# Patient Record
Sex: FEMALE | Race: WHITE | NOT HISPANIC OR LATINO | Employment: UNEMPLOYED | URBAN - METROPOLITAN AREA
[De-identification: names, ages, dates, MRNs, and addresses within clinical notes are randomized per-mention and may not be internally consistent; named-entity substitution may affect disease eponyms.]

---

## 2018-01-19 ENCOUNTER — GENERIC CONVERSION - ENCOUNTER (OUTPATIENT)
Dept: OTHER | Facility: OTHER | Age: 58
End: 2018-01-19

## 2018-01-19 DIAGNOSIS — E78.5 HYPERLIPIDEMIA: ICD-10-CM

## 2018-01-19 DIAGNOSIS — E03.9 HYPOTHYROIDISM: ICD-10-CM

## 2018-01-19 DIAGNOSIS — F32.9 MAJOR DEPRESSIVE DISORDER, SINGLE EPISODE: ICD-10-CM

## 2018-01-19 DIAGNOSIS — E11.9 TYPE 2 DIABETES MELLITUS WITHOUT COMPLICATIONS (HCC): ICD-10-CM

## 2018-01-24 VITALS
SYSTOLIC BLOOD PRESSURE: 122 MMHG | BODY MASS INDEX: 34.99 KG/M2 | HEART RATE: 82 BPM | HEIGHT: 65 IN | RESPIRATION RATE: 18 BRPM | TEMPERATURE: 97.1 F | DIASTOLIC BLOOD PRESSURE: 82 MMHG | WEIGHT: 210 LBS

## 2018-03-01 ENCOUNTER — OFFICE VISIT (OUTPATIENT)
Dept: FAMILY MEDICINE CLINIC | Facility: CLINIC | Age: 58
End: 2018-03-01
Payer: COMMERCIAL

## 2018-03-01 VITALS
SYSTOLIC BLOOD PRESSURE: 120 MMHG | DIASTOLIC BLOOD PRESSURE: 70 MMHG | HEIGHT: 65 IN | WEIGHT: 204 LBS | TEMPERATURE: 99.9 F | BODY MASS INDEX: 33.99 KG/M2

## 2018-03-01 DIAGNOSIS — J11.1 INFLUENZA: Primary | ICD-10-CM

## 2018-03-01 DIAGNOSIS — F32.A DEPRESSION, UNSPECIFIED DEPRESSION TYPE: Primary | ICD-10-CM

## 2018-03-01 PROBLEM — R79.89 LOW VITAMIN D LEVEL: Status: ACTIVE | Noted: 2018-01-19

## 2018-03-01 PROCEDURE — 99213 OFFICE O/P EST LOW 20 MIN: CPT | Performed by: FAMILY MEDICINE

## 2018-03-01 RX ORDER — LEVOTHYROXINE SODIUM 0.07 MG/1
1 TABLET ORAL DAILY
COMMUNITY
Start: 2018-01-19 | End: 2018-09-12 | Stop reason: SDUPTHER

## 2018-03-01 RX ORDER — BUPROPION HYDROCHLORIDE 300 MG/1
1 TABLET ORAL DAILY
COMMUNITY
Start: 2012-01-09 | End: 2018-03-01 | Stop reason: SDUPTHER

## 2018-03-01 RX ORDER — ALBUTEROL SULFATE 90 UG/1
1-2 AEROSOL, METERED RESPIRATORY (INHALATION)
COMMUNITY
Start: 2012-10-02 | End: 2020-01-24 | Stop reason: SDUPTHER

## 2018-03-01 RX ORDER — FLUTICASONE PROPIONATE 50 MCG
2 SPRAY, SUSPENSION (ML) NASAL DAILY
COMMUNITY
Start: 2016-03-24 | End: 2019-07-29 | Stop reason: SDUPTHER

## 2018-03-01 RX ORDER — SERTRALINE HYDROCHLORIDE 100 MG/1
150 TABLET, FILM COATED ORAL DAILY
Qty: 90 TABLET | Refills: 1 | Status: SHIPPED | OUTPATIENT
Start: 2018-03-01 | End: 2018-03-14 | Stop reason: SDUPTHER

## 2018-03-01 RX ORDER — QUETIAPINE 300 MG/1
300 TABLET, FILM COATED, EXTENDED RELEASE ORAL
Qty: 90 TABLET | Refills: 1 | Status: SHIPPED | OUTPATIENT
Start: 2018-03-01 | End: 2018-03-09 | Stop reason: SDUPTHER

## 2018-03-01 RX ORDER — BUPROPION HYDROCHLORIDE 300 MG/1
300 TABLET ORAL DAILY
Qty: 90 TABLET | Refills: 1 | Status: SHIPPED | OUTPATIENT
Start: 2018-03-01 | End: 2018-09-10 | Stop reason: SDUPTHER

## 2018-03-01 RX ORDER — LEVOTHYROXINE SODIUM 0.1 MG/1
1 TABLET ORAL DAILY
COMMUNITY
Start: 2018-01-22 | End: 2018-03-01 | Stop reason: SDUPTHER

## 2018-03-01 RX ORDER — OMEPRAZOLE 40 MG/1
1 CAPSULE, DELAYED RELEASE ORAL DAILY
COMMUNITY
Start: 2012-02-29 | End: 2018-09-12

## 2018-03-01 RX ORDER — BUPROPION HYDROCHLORIDE 75 MG/1
75 TABLET ORAL DAILY
Qty: 90 TABLET | Refills: 1 | Status: SHIPPED | OUTPATIENT
Start: 2018-03-01 | End: 2018-09-10 | Stop reason: SDUPTHER

## 2018-03-01 RX ORDER — ALBUTEROL SULFATE 2.5 MG/3ML
1 SOLUTION RESPIRATORY (INHALATION)
COMMUNITY
Start: 2012-02-22 | End: 2018-03-01 | Stop reason: SDUPTHER

## 2018-03-01 RX ORDER — QUETIAPINE 300 MG/1
1 TABLET, FILM COATED, EXTENDED RELEASE ORAL
COMMUNITY
Start: 2012-08-24 | End: 2018-03-01 | Stop reason: SDUPTHER

## 2018-03-01 RX ORDER — ALBUTEROL SULFATE 2.5 MG/3ML
2.5 SOLUTION RESPIRATORY (INHALATION) EVERY 4 HOURS PRN
Qty: 20 VIAL | Refills: 1 | Status: SHIPPED | OUTPATIENT
Start: 2018-03-01 | End: 2020-01-24 | Stop reason: SDUPTHER

## 2018-03-01 RX ORDER — SERTRALINE HYDROCHLORIDE 100 MG/1
1.5 TABLET, FILM COATED ORAL DAILY
COMMUNITY
Start: 2012-02-27 | End: 2018-03-01 | Stop reason: SDUPTHER

## 2018-03-01 RX ORDER — BUPROPION HYDROCHLORIDE 75 MG/1
1 TABLET ORAL DAILY
COMMUNITY
Start: 2012-12-18 | End: 2018-03-01 | Stop reason: SDUPTHER

## 2018-03-01 RX ORDER — OSELTAMIVIR PHOSPHATE 75 MG/1
75 CAPSULE ORAL 2 TIMES DAILY
Qty: 10 CAPSULE | Refills: 0 | Status: SHIPPED | OUTPATIENT
Start: 2018-03-01 | End: 2018-03-06

## 2018-03-01 RX ORDER — FLUTICASONE PROPIONATE 50 MCG
SPRAY, SUSPENSION (ML) NASAL
COMMUNITY
Start: 2014-11-17 | End: 2018-09-12

## 2018-03-01 RX ORDER — LEVOTHYROXINE SODIUM 0.1 MG/1
100 TABLET ORAL DAILY
Qty: 90 TABLET | Refills: 1 | Status: SHIPPED | OUTPATIENT
Start: 2018-03-01 | End: 2018-09-12

## 2018-03-01 NOTE — LETTER
March 1, 2018     Patient: Nuvia Mon   YOB: 1960   Date of Visit: 3/1/2018       To Whom it May Concern:    Nuvia Mon is under my professional care  She was seen in my office on 3/1/2018  She may return to work on 3/5/18  If you have any questions or concerns, please don't hesitate to call           Sincerely,          Lamar Gomes MD        CC: No Recipients

## 2018-03-01 NOTE — PROGRESS NOTES
Assessment/Plan:    70-year-old woman with: Influenza  Discussed supportive care return parameters  Will give Tamiflu  Advised patient to call back if symptoms are not resolving or if they worsen    No problem-specific Assessment & Plan notes found for this encounter  Diagnoses and all orders for this visit:    Influenza  -     oseltamivir (TAMIFLU) 75 mg capsule; Take 1 capsule (75 mg total) by mouth 2 (two) times a day for 5 days    Other orders  -     albuterol (2 5 mg/3 mL) 0 083 % nebulizer solution; Inhale 1 each  -     buPROPion (WELLBUTRIN) 75 mg tablet; Take 1 tablet by mouth daily  -     buPROPion (WELLBUTRIN XL) 300 mg 24 hr tablet; Take 1 tablet by mouth daily  -     fluticasone (FLONASE) 50 mcg/act nasal spray; into each nostril  -     fluticasone (FLONASE) 50 mcg/act nasal spray; 2 sprays into each nostril daily  -     levothyroxine 75 mcg tablet; Take 1 tablet by mouth daily  -     levothyroxine (LEVOXYL) 100 mcg tablet; Take 1 tablet by mouth daily  -     omeprazole (PriLOSEC) 40 MG capsule; Take 1 capsule by mouth daily  -     albuterol (PROVENTIL HFA) 90 mcg/act inhaler; Inhale 1-2 puffs  -     beclomethasone (QVAR) 40 MCG/ACT inhaler; Inhale 1 puff 2 (two) times a day  -     beclomethasone (QVAR) 80 MCG/ACT inhaler; Inhale Daily  -     QUEtiapine (SEROQUEL XR) 300 mg 24 hr tablet; Take 1 tablet by mouth  -     sertraline (ZOLOFT) 100 mg tablet; Take 1 5 tablets by mouth daily          Subjective:   Chief Complaint   Patient presents with    Fatigue     pt noted she slept right through her work shift   Fever     got as high as 102   URI     x 2-3 day  Body aches, cough, chest pains, some mucus (yellowish in color)  OTC Aleve used without much relief  Pt noted her parents have recently been ill   Medication Refill     Levoxyl , Gabapentin, Trazodone, Burpropion, Bupropion XL, Sertraline, Seroquel       Medication Refill     Lab slip           Patient ID: Caryl Shelton is a 66 y o  female  Patient is a 56-year-old woman who presents complaining of cough congestion sore throat fevers chills and body aches along with some nausea and diarrhea for the past day or so  Tolerating p o  intake  Positive sick contacts  Fatigue   Associated symptoms include chills, congestion, coughing, fatigue, a fever, myalgias and a sore throat  URI    Associated symptoms include congestion, coughing and a sore throat  Medication Refill   Associated symptoms include chills, congestion, coughing, fatigue, a fever, myalgias and a sore throat  The following portions of the patient's history were reviewed and updated as appropriate: allergies, current medications, past family history, past medical history, past social history, past surgical history and problem list     Review of Systems   Constitutional: Positive for chills, fatigue and fever  HENT: Positive for congestion, sinus pressure and sore throat  Eyes: Negative  Respiratory: Positive for cough  Cardiovascular: Negative  Gastrointestinal: Negative  Endocrine: Negative  Genitourinary: Negative  Musculoskeletal: Positive for myalgias  Allergic/Immunologic: Negative  Neurological: Negative  Hematological: Negative  Psychiatric/Behavioral: Negative  All other systems reviewed and are negative  Objective:      /70 (BP Location: Right arm, Patient Position: Sitting, Cuff Size: Large)   Temp 99 9 °F (37 7 °C)   Ht 5' 5" (1 651 m)   Wt 92 5 kg (204 lb)   LMP  (LMP Unknown)   BMI 33 95 kg/m²          Physical Exam   Constitutional: She is oriented to person, place, and time  She appears well-developed and well-nourished  HENT:   Head: Atraumatic  Right Ear: External ear normal    Left Ear: External ear normal    Eyes: Conjunctivae and EOM are normal  Pupils are equal, round, and reactive to light  Neck: Normal range of motion     Cardiovascular: Normal rate, regular rhythm and normal heart sounds  Pulmonary/Chest: Effort normal and breath sounds normal  No respiratory distress  Abdominal: Soft  Bowel sounds are normal  She exhibits no distension  There is no tenderness  There is no rebound and no guarding  Musculoskeletal: Normal range of motion  Neurological: She is alert and oriented to person, place, and time  No cranial nerve deficit  Skin: Skin is warm and dry  Psychiatric: She has a normal mood and affect   Her behavior is normal  Judgment and thought content normal

## 2018-03-05 ENCOUNTER — TELEPHONE (OUTPATIENT)
Dept: FAMILY MEDICINE CLINIC | Facility: CLINIC | Age: 58
End: 2018-03-05

## 2018-03-05 NOTE — TELEPHONE ENCOUNTER
Patient called requesting a refill on tamiflu  She also states she is takes 200mg of seroquel NOT 300mg  Requests it be filled as 200mg

## 2018-03-06 NOTE — TELEPHONE ENCOUNTER
I am okay refilling her medications in general but the Tamiflu is not typically refilled as it would not help  If she is still having respiratory symptoms or they worsen I can send in an antibiotic

## 2018-03-08 ENCOUNTER — TELEPHONE (OUTPATIENT)
Dept: FAMILY MEDICINE CLINIC | Facility: CLINIC | Age: 58
End: 2018-03-08

## 2018-03-08 DIAGNOSIS — F32.A DEPRESSION, UNSPECIFIED DEPRESSION TYPE: ICD-10-CM

## 2018-03-08 DIAGNOSIS — J06.9 ACUTE UPPER RESPIRATORY INFECTION: Primary | ICD-10-CM

## 2018-03-08 NOTE — TELEPHONE ENCOUNTER
PATIENT CALLED BACK AND IS OK WITH ANOTHER ANTIBIOTIC - SHE ALSO SAID THAT A SCRIPT WAS SENT OVER FOR HER AND IT SHOULD OF BEEN  MG  MG (SERAQUIL

## 2018-03-09 RX ORDER — QUETIAPINE 300 MG/1
300 TABLET, FILM COATED, EXTENDED RELEASE ORAL
Qty: 90 TABLET | Refills: 0 | Status: SHIPPED | OUTPATIENT
Start: 2018-03-09 | End: 2018-03-14

## 2018-03-09 RX ORDER — TRAZODONE HYDROCHLORIDE 50 MG/1
TABLET ORAL
COMMUNITY
Start: 2017-01-19 | End: 2018-03-14 | Stop reason: SDUPTHER

## 2018-03-09 RX ORDER — AMOXICILLIN 500 MG/1
500 TABLET, FILM COATED ORAL 3 TIMES DAILY
Qty: 21 TABLET | Refills: 0 | Status: SHIPPED | OUTPATIENT
Start: 2018-03-09 | End: 2018-03-16

## 2018-03-09 RX ORDER — QUETIAPINE FUMARATE 200 MG/1
TABLET, FILM COATED ORAL
COMMUNITY
Start: 2018-02-08 | End: 2018-03-14 | Stop reason: SDUPTHER

## 2018-03-09 RX ORDER — GABAPENTIN 100 MG/1
CAPSULE ORAL
COMMUNITY
Start: 2018-02-08 | End: 2018-09-12 | Stop reason: SDUPTHER

## 2018-03-09 NOTE — TELEPHONE ENCOUNTER
I sent in script for the 300 mg and will send in a script for amoxicillin as well  Patient should call back if symptoms are not improving

## 2018-03-13 NOTE — PROGRESS NOTES
Please call patient discuss that her labs showed elevated cholesterol  Encouraged healthy diet like the Mediterranean diet, exercise, healthy diet as tolerated and will discuss more fully at her follow-up visit

## 2018-03-14 DIAGNOSIS — F32.A DEPRESSION, UNSPECIFIED DEPRESSION TYPE: ICD-10-CM

## 2018-03-14 RX ORDER — QUETIAPINE FUMARATE 200 MG/1
200 TABLET, FILM COATED ORAL
Qty: 90 TABLET | Refills: 0 | Status: SHIPPED | OUTPATIENT
Start: 2018-03-14 | End: 2018-06-04 | Stop reason: SDUPTHER

## 2018-03-14 RX ORDER — SERTRALINE HYDROCHLORIDE 100 MG/1
200 TABLET, FILM COATED ORAL
Qty: 90 TABLET | Refills: 0 | Status: SHIPPED | OUTPATIENT
Start: 2018-03-14 | End: 2018-09-12 | Stop reason: SDUPTHER

## 2018-03-14 RX ORDER — TRAZODONE HYDROCHLORIDE 50 MG/1
50 TABLET ORAL
Qty: 90 TABLET | Refills: 0 | Status: SHIPPED | OUTPATIENT
Start: 2018-03-14 | End: 2018-06-25 | Stop reason: SDUPTHER

## 2018-03-14 NOTE — TELEPHONE ENCOUNTER
Patient called back she is asking for three of her meds to be refilled  She states the Seroquel was renewed for 300mg and this makes her too sleepy  She will keep it but the dose should have been for 200mg  She is also asking for her Zoloft to be renewed because the dosage was wrong because it should have been for 2 tablets not 1 5 tablets  Also she says her script for Trazodone was denied and patient does not know why  If you need to speak with her please call her back and let her know why this script was denied  She is out of her medication and would like this filled ASAP  Thank you

## 2018-06-04 DIAGNOSIS — F32.A DEPRESSION, UNSPECIFIED DEPRESSION TYPE: ICD-10-CM

## 2018-06-04 RX ORDER — QUETIAPINE FUMARATE 200 MG/1
200 TABLET, FILM COATED ORAL
Qty: 90 TABLET | Refills: 0 | Status: SHIPPED | OUTPATIENT
Start: 2018-06-04 | End: 2018-09-10 | Stop reason: SDUPTHER

## 2018-06-25 DIAGNOSIS — F32.A DEPRESSION, UNSPECIFIED DEPRESSION TYPE: ICD-10-CM

## 2018-06-25 RX ORDER — TRAZODONE HYDROCHLORIDE 50 MG/1
50 TABLET ORAL
Qty: 90 TABLET | Refills: 0 | Status: SHIPPED | OUTPATIENT
Start: 2018-06-25 | End: 2018-09-12 | Stop reason: SDUPTHER

## 2018-06-29 DIAGNOSIS — F32.A DEPRESSION, UNSPECIFIED DEPRESSION TYPE: ICD-10-CM

## 2018-06-29 RX ORDER — SERTRALINE HYDROCHLORIDE 100 MG/1
TABLET, FILM COATED ORAL
Qty: 90 TABLET | Refills: 1 | Status: SHIPPED | OUTPATIENT
Start: 2018-06-29 | End: 2018-09-12

## 2018-09-10 DIAGNOSIS — F32.A DEPRESSION, UNSPECIFIED DEPRESSION TYPE: ICD-10-CM

## 2018-09-10 RX ORDER — QUETIAPINE FUMARATE 200 MG/1
200 TABLET, FILM COATED ORAL
Qty: 90 TABLET | Refills: 0 | Status: SHIPPED | OUTPATIENT
Start: 2018-09-10 | End: 2018-09-12 | Stop reason: SDUPTHER

## 2018-09-10 RX ORDER — BUPROPION HYDROCHLORIDE 300 MG/1
TABLET ORAL
Qty: 90 TABLET | Refills: 0 | Status: SHIPPED | OUTPATIENT
Start: 2018-09-10 | End: 2018-09-12 | Stop reason: SDUPTHER

## 2018-09-10 RX ORDER — BUPROPION HYDROCHLORIDE 75 MG/1
TABLET ORAL
Qty: 90 TABLET | Refills: 0 | Status: SHIPPED | OUTPATIENT
Start: 2018-09-10 | End: 2018-09-12 | Stop reason: SDUPTHER

## 2018-09-12 ENCOUNTER — OFFICE VISIT (OUTPATIENT)
Dept: FAMILY MEDICINE CLINIC | Facility: CLINIC | Age: 58
End: 2018-09-12
Payer: COMMERCIAL

## 2018-09-12 VITALS
HEIGHT: 65 IN | TEMPERATURE: 97.2 F | DIASTOLIC BLOOD PRESSURE: 70 MMHG | BODY MASS INDEX: 34.92 KG/M2 | SYSTOLIC BLOOD PRESSURE: 128 MMHG | WEIGHT: 209.6 LBS

## 2018-09-12 DIAGNOSIS — E03.8 HYPOTHYROIDISM DUE TO HASHIMOTO'S THYROIDITIS: Primary | ICD-10-CM

## 2018-09-12 DIAGNOSIS — N18.30 TYPE 2 DIABETES MELLITUS WITH STAGE 3 CHRONIC KIDNEY DISEASE, WITHOUT LONG-TERM CURRENT USE OF INSULIN (HCC): ICD-10-CM

## 2018-09-12 DIAGNOSIS — E78.2 MIXED HYPERLIPIDEMIA: ICD-10-CM

## 2018-09-12 DIAGNOSIS — F41.9 ANXIETY: ICD-10-CM

## 2018-09-12 DIAGNOSIS — G43.009 MIGRAINE WITHOUT AURA AND WITHOUT STATUS MIGRAINOSUS, NOT INTRACTABLE: ICD-10-CM

## 2018-09-12 DIAGNOSIS — F32.A DEPRESSION, UNSPECIFIED DEPRESSION TYPE: ICD-10-CM

## 2018-09-12 DIAGNOSIS — E11.22 TYPE 2 DIABETES MELLITUS WITH STAGE 3 CHRONIC KIDNEY DISEASE, WITHOUT LONG-TERM CURRENT USE OF INSULIN (HCC): ICD-10-CM

## 2018-09-12 DIAGNOSIS — E05.90 HYPERTHYROIDISM: ICD-10-CM

## 2018-09-12 DIAGNOSIS — E06.3 HYPOTHYROIDISM DUE TO HASHIMOTO'S THYROIDITIS: Primary | ICD-10-CM

## 2018-09-12 DIAGNOSIS — R22.41 KNEE MASS, RIGHT: ICD-10-CM

## 2018-09-12 PROCEDURE — 3008F BODY MASS INDEX DOCD: CPT | Performed by: FAMILY MEDICINE

## 2018-09-12 PROCEDURE — 99214 OFFICE O/P EST MOD 30 MIN: CPT | Performed by: FAMILY MEDICINE

## 2018-09-12 RX ORDER — BUPROPION HYDROCHLORIDE 300 MG/1
300 TABLET ORAL DAILY
Qty: 90 TABLET | Refills: 1 | Status: SHIPPED | OUTPATIENT
Start: 2018-09-12 | End: 2018-11-30 | Stop reason: SDUPTHER

## 2018-09-12 RX ORDER — LEVOTHYROXINE SODIUM 0.07 MG/1
75 TABLET ORAL DAILY
Qty: 90 TABLET | Refills: 1 | Status: SHIPPED | OUTPATIENT
Start: 2018-09-12 | End: 2018-11-30 | Stop reason: SDUPTHER

## 2018-09-12 RX ORDER — BUPROPION HYDROCHLORIDE 75 MG/1
75 TABLET ORAL DAILY
Qty: 90 TABLET | Refills: 1 | Status: SHIPPED | OUTPATIENT
Start: 2018-09-12 | End: 2018-11-30 | Stop reason: SDUPTHER

## 2018-09-12 RX ORDER — TRAZODONE HYDROCHLORIDE 50 MG/1
50 TABLET ORAL
Qty: 90 TABLET | Refills: 1 | Status: SHIPPED | OUTPATIENT
Start: 2018-09-12 | End: 2019-04-03 | Stop reason: SDUPTHER

## 2018-09-12 RX ORDER — QUETIAPINE FUMARATE 200 MG/1
200 TABLET, FILM COATED ORAL
Qty: 90 TABLET | Refills: 1 | Status: SHIPPED | OUTPATIENT
Start: 2018-09-12 | End: 2018-11-30 | Stop reason: SDUPTHER

## 2018-09-12 RX ORDER — GABAPENTIN 100 MG/1
100 CAPSULE ORAL
Qty: 90 CAPSULE | Refills: 1 | Status: SHIPPED | OUTPATIENT
Start: 2018-09-12 | End: 2018-11-30 | Stop reason: SDUPTHER

## 2018-09-12 RX ORDER — SERTRALINE HYDROCHLORIDE 100 MG/1
200 TABLET, FILM COATED ORAL
Qty: 90 TABLET | Refills: 1 | Status: SHIPPED | OUTPATIENT
Start: 2018-09-12 | End: 2018-11-20 | Stop reason: SDUPTHER

## 2018-09-12 NOTE — PROGRESS NOTES
Assessment/Plan:    The patient go for MRI of right knee mass  Patient will go for laboratory studies regarding fatigue and other chronic conditions listed  Medications refilled at this time  The patient will see nutritionist   Would recommend flu shot  Would recommend pneumococcal 23 vaccine  Follow-up in 6 months or as needed       Diagnoses and all orders for this visit:    Hypothyroidism due to Hashimoto's thyroiditis  -     CBC and differential; Future  -     Comprehensive metabolic panel; Future  -     Hemoglobin A1C; Future  -     Lipid panel; Future  -     Microalbumin / creatinine urine ratio  -     TSH, 3rd generation with Free T4 reflex; Future  -     Vitamin B12; Future  -     Vitamin D 25 hydroxy; Future    Depression, unspecified depression type  -     buPROPion (WELLBUTRIN XL) 300 mg 24 hr tablet; Take 1 tablet (300 mg total) by mouth daily  -     buPROPion (WELLBUTRIN) 75 mg tablet; Take 1 tablet (75 mg total) by mouth daily  -     gabapentin (NEURONTIN) 100 mg capsule; Take 1 capsule (100 mg total) by mouth daily at bedtime  -     QUEtiapine (SEROquel) 200 mg tablet; Take 1 tablet (200 mg total) by mouth daily at bedtime  -     sertraline (ZOLOFT) 100 mg tablet; Take 2 tablets (200 mg total) by mouth daily at bedtime  -     traZODone (DESYREL) 50 mg tablet; Take 1 tablet (50 mg total) by mouth daily at bedtime    Hyperthyroidism  -     levothyroxine 75 mcg tablet; Take 1 tablet (75 mcg total) by mouth daily    Type 2 diabetes mellitus with stage 3 chronic kidney disease, without long-term current use of insulin ([facility] )  -     Ambulatory referral to Nutrition Services; Future  -     CBC and differential; Future  -     Comprehensive metabolic panel; Future  -     Hemoglobin A1C; Future  -     Lipid panel; Future  -     Microalbumin / creatinine urine ratio  -     TSH, 3rd generation with Free T4 reflex; Future  -     Vitamin B12; Future  -     Vitamin D 25 hydroxy;  Future    Migraine without aura and without status migrainosus, not intractable    Anxiety    Mixed hyperlipidemia  -     Lipid panel; Future    Knee mass, right  -     MRI knee right  wo contrast; Future          Subjective:      Patient ID: Yaritza Eastman is a 66 y o  female  Patient follow-up on diabetes, hypothyroidism, hyperlipidemia, anxiety and depression as well as migraines  Patient is having increasing headaches  Patient is using boost in the morning routinely  Patient will needs medications refilled  No change in urination or defecation  No chest pain or shortness of breath  Patient has noticed some swelling over the medial right  Knee  Patient also with fatigue  Patient has had x-ray of right knee already at [facility] which was unremarkable as per the patient  Headache      Knee Pain      Medication Refill   Associated symptoms include arthralgias, fatigue and headaches  The following portions of the patient's history were reviewed and updated as appropriate: allergies, current medications, past family history, past medical history, past social history, past surgical history and problem list     Review of Systems   Constitutional: Positive for fatigue  HENT: Negative  Eyes: Negative  Respiratory: Negative  Cardiovascular: Negative  Gastrointestinal: Negative  Endocrine: Negative  Genitourinary: Negative  Musculoskeletal: Positive for arthralgias  Skin: Negative  Allergic/Immunologic: Negative  Neurological: Positive for headaches  Hematological: Negative  Psychiatric/Behavioral: Negative  Objective:      /70 (BP Location: Right arm, Patient Position: Sitting, Cuff Size: Large)   Temp (!) 97 2 °F (36 2 °C) (Tympanic)   Ht 5' 5 25" (1 657 m)   Wt 95 1 kg (209 lb 9 6 oz)   LMP  (LMP Unknown)   BMI 34 61 kg/m²          Physical Exam   Constitutional: She is oriented to person, place, and time  She appears well-developed and well-nourished  No distress     HENT: Head: Normocephalic  Right Ear: External ear normal    Left Ear: External ear normal    Mouth/Throat: Oropharynx is clear and moist  No oropharyngeal exudate  Eyes: EOM are normal  Pupils are equal, round, and reactive to light  Right eye exhibits no discharge  Left eye exhibits no discharge  No scleral icterus  Neck: Normal range of motion  Neck supple  No thyromegaly present  Cardiovascular: Normal rate, regular rhythm, normal heart sounds and intact distal pulses  Exam reveals no gallop and no friction rub  No murmur heard  Pulmonary/Chest: Effort normal and breath sounds normal  No respiratory distress  She has no wheezes  She has no rales  She exhibits no tenderness  Abdominal: Soft  Bowel sounds are normal  She exhibits no distension  There is no tenderness  There is no rebound and no guarding  Musculoskeletal: Normal range of motion  She exhibits tenderness  She exhibits no edema  The swelling medially over right knee  Possible mass noted   Lymphadenopathy:     She has no cervical adenopathy  Neurological: She is oriented to person, place, and time  No cranial nerve deficit  She exhibits normal muscle tone  Coordination normal    Skin: Skin is warm and dry  No rash noted  She is not diaphoretic  No erythema  No pallor  Psychiatric: She has a normal mood and affect  Her behavior is normal  Judgment and thought content normal    Nursing note and vitals reviewed

## 2018-09-12 NOTE — PROGRESS NOTES
Assessment/Plan:         {Assess[facility]:96941}      Subjective:      Patient ID: Alfonza Hashimoto is a 66 y o  female      HPI    {Common ambulatory [place]:73175}    Review of Systems      Objective:      /70 (BP Location: Right arm, Patient Position: Sitting, Cuff Size: Large)   Temp (!) 97 2 °F (36 2 °C) (Tympanic)   Ht 5' 5 25" (1 657 m)   Wt 95 1 kg (209 lb 9 6 oz)   LMP  (LMP Unknown)   BMI 34 61 kg/m²          Physical Exam

## 2018-11-20 ENCOUNTER — TELEPHONE (OUTPATIENT)
Dept: FAMILY MEDICINE CLINIC | Facility: CLINIC | Age: 58
End: 2018-11-20

## 2018-11-20 DIAGNOSIS — F32.A DEPRESSION, UNSPECIFIED DEPRESSION TYPE: ICD-10-CM

## 2018-11-20 RX ORDER — SERTRALINE HYDROCHLORIDE 100 MG/1
200 TABLET, FILM COATED ORAL
Qty: 180 TABLET | Refills: 1 | Status: SHIPPED | OUTPATIENT
Start: 2018-11-20 | End: 2018-11-30 | Stop reason: SDUPTHER

## 2018-11-20 NOTE — TELEPHONE ENCOUNTER
PT ASKING FOR REFILL ON SERTRALINE, PT STATES SHE HAS BEEN TAKING 2 TABS AT BEDTIME, BUT CURRENTLY HAS A SCRIPT WITH THE WRONG INSTRUCTIONS FROM June   PLEASE ADDRESS  [facility] [place] [phone]

## 2018-11-30 ENCOUNTER — OFFICE VISIT (OUTPATIENT)
Dept: FAMILY MEDICINE CLINIC | Facility: CLINIC | Age: 58
End: 2018-11-30
Payer: COMMERCIAL

## 2018-11-30 VITALS
BODY MASS INDEX: 32.82 KG/M2 | DIASTOLIC BLOOD PRESSURE: 76 MMHG | SYSTOLIC BLOOD PRESSURE: 122 MMHG | TEMPERATURE: 97.7 F | WEIGHT: 197 LBS | HEIGHT: 65 IN

## 2018-11-30 DIAGNOSIS — F32.A DEPRESSION, UNSPECIFIED DEPRESSION TYPE: ICD-10-CM

## 2018-11-30 DIAGNOSIS — E05.90 HYPERTHYROIDISM: ICD-10-CM

## 2018-11-30 DIAGNOSIS — J06.9 ACUTE UPPER RESPIRATORY INFECTION: Primary | ICD-10-CM

## 2018-11-30 PROCEDURE — 99214 OFFICE O/P EST MOD 30 MIN: CPT | Performed by: FAMILY MEDICINE

## 2018-11-30 PROCEDURE — 1036F TOBACCO NON-USER: CPT | Performed by: FAMILY MEDICINE

## 2018-11-30 PROCEDURE — 3008F BODY MASS INDEX DOCD: CPT | Performed by: FAMILY MEDICINE

## 2018-11-30 RX ORDER — SERTRALINE HYDROCHLORIDE 100 MG/1
200 TABLET, FILM COATED ORAL
Qty: 180 TABLET | Refills: 1 | Status: SHIPPED | OUTPATIENT
Start: 2018-11-30 | End: 2019-07-29 | Stop reason: SDUPTHER

## 2018-11-30 RX ORDER — BUPROPION HYDROCHLORIDE 75 MG/1
75 TABLET ORAL DAILY
Qty: 90 TABLET | Refills: 1 | Status: SHIPPED | OUTPATIENT
Start: 2018-11-30 | End: 2019-02-06 | Stop reason: HOSPADM

## 2018-11-30 RX ORDER — LEVOTHYROXINE SODIUM 0.07 MG/1
75 TABLET ORAL DAILY
Qty: 90 TABLET | Refills: 1 | Status: SHIPPED | OUTPATIENT
Start: 2018-11-30 | End: 2019-07-29 | Stop reason: SDUPTHER

## 2018-11-30 RX ORDER — GUAIFENESIN AND CODEINE PHOSPHATE 100; 10 MG/5ML; MG/5ML
5 SOLUTION ORAL 3 TIMES DAILY PRN
Qty: 120 ML | Refills: 0 | Status: SHIPPED | OUTPATIENT
Start: 2018-11-30 | End: 2019-02-06 | Stop reason: HOSPADM

## 2018-11-30 RX ORDER — AZITHROMYCIN 250 MG/1
500 TABLET, FILM COATED ORAL EVERY 24 HOURS
Qty: 10 TABLET | Refills: 0 | Status: SHIPPED | OUTPATIENT
Start: 2018-11-30 | End: 2018-12-05

## 2018-11-30 RX ORDER — QUETIAPINE FUMARATE 200 MG/1
200 TABLET, FILM COATED ORAL
Qty: 90 TABLET | Refills: 1 | Status: SHIPPED | OUTPATIENT
Start: 2018-11-30 | End: 2019-07-29 | Stop reason: SDUPTHER

## 2018-11-30 RX ORDER — FLUTICASONE PROPIONATE 50 MCG
2 SPRAY, SUSPENSION (ML) NASAL DAILY
Qty: 16 G | Refills: 0 | Status: SHIPPED | OUTPATIENT
Start: 2018-11-30 | End: 2019-02-25

## 2018-11-30 RX ORDER — GABAPENTIN 100 MG/1
100 CAPSULE ORAL
Qty: 90 CAPSULE | Refills: 1 | Status: SHIPPED | OUTPATIENT
Start: 2018-11-30 | End: 2019-07-29 | Stop reason: SDUPTHER

## 2018-11-30 RX ORDER — BUPROPION HYDROCHLORIDE 300 MG/1
300 TABLET ORAL DAILY
Qty: 90 TABLET | Refills: 1 | Status: SHIPPED | OUTPATIENT
Start: 2018-11-30 | End: 2019-02-06 | Stop reason: HOSPADM

## 2018-11-30 NOTE — PROGRESS NOTES
Assessment/Plan:    57-year-old woman with:  Acute URI, depression and hypothyroidism  Discussed treatment options with risks and benefits  Will continue current medication and per patient's request will give Z-Shawn at the higher dose  Discussed the potential risks of the medication but patient admits that she did well on this dose  Will give cough medicine to use as needed at bedtime but not to be used with any other sedating medicines and patient to begin Flonase as well  Advised to call back if not improving or worsening  No problem-specific Assessment & Plan notes found for this encounter  Diagnoses and all orders for this visit:    Acute upper respiratory infection  -     azithromycin (ZITHROMAX) 250 mg tablet; Take 2 tablets (500 mg total) by mouth every 24 hours for 5 days  -     guaifenesin-codeine (GUAIFENESIN AC) 100-10 MG/5ML liquid; Take 5 mL by mouth 3 (three) times a day as needed for cough  -     fluticasone (FLONASE) 50 mcg/act nasal spray; 2 sprays into each nostril daily    Depression, unspecified depression type  -     sertraline (ZOLOFT) 100 mg tablet; Take 2 tablets (200 mg total) by mouth daily at bedtime  -     gabapentin (NEURONTIN) 100 mg capsule; Take 1 capsule (100 mg total) by mouth daily at bedtime  -     QUEtiapine (SEROquel) 200 mg tablet; Take 1 tablet (200 mg total) by mouth daily at bedtime  -     buPROPion (WELLBUTRIN XL) 300 mg 24 hr tablet; Take 1 tablet (300 mg total) by mouth daily  -     buPROPion (WELLBUTRIN) 75 mg tablet; Take 1 tablet (75 mg total) by mouth daily    Hyperthyroidism  -     levothyroxine 75 mcg tablet; Take 1 tablet (75 mcg total) by mouth daily          Subjective:   Chief Complaint   Patient presents with    Sore Throat    Cold Like Symptoms          Patient ID: Man Das is a 66 y o  female      Patient is a 57-year-old woman who presents complaining of several days of cough congestion sinus pressure with sore throat fevers and chills and body aches  No nausea vomiting  Tolerating p o  intake  Patient also requests refill for medications for depression hypothyroidism admits she has been stable on medications  No other complaints at this time  Sore Throat    Associated symptoms include congestion and coughing  The following portions of the patient's history were reviewed and updated as appropriate: allergies, current medications, past family history, past medical history, past social history, past surgical history and problem list     Review of Systems   Constitutional: Positive for chills and fever  HENT: Positive for congestion, sinus pressure and sore throat  Eyes: Negative  Respiratory: Positive for cough  Cardiovascular: Negative  Gastrointestinal: Negative  Endocrine: Negative  Genitourinary: Negative  Musculoskeletal: Positive for myalgias  Allergic/Immunologic: Negative  Neurological: Negative  Hematological: Negative  Psychiatric/Behavioral: Negative  All other systems reviewed and are negative  Objective:      /76 (BP Location: Left arm, Patient Position: Sitting, Cuff Size: Large)   Temp 97 7 °F (36 5 °C) (Tympanic)   Ht 5' 5" (1 651 m)   Wt 89 4 kg (197 lb)   LMP  (LMP Unknown)   BMI 32 78 kg/m²          Physical Exam   Constitutional: She is oriented to person, place, and time  She appears well-developed and well-nourished  HENT:   Head: Atraumatic  Right Ear: External ear normal    Left Ear: External ear normal    Mucus and edema nasal mucosa   Eyes: Pupils are equal, round, and reactive to light  Conjunctivae and EOM are normal    Neck: Normal range of motion  Cardiovascular: Normal rate, regular rhythm and normal heart sounds  Pulmonary/Chest: Effort normal and breath sounds normal  No respiratory distress  Abdominal: Soft  She exhibits no distension  There is no tenderness  There is no rebound and no guarding  Musculoskeletal: Normal range of motion  Neurological: She is alert and oriented to person, place, and time  No cranial nerve deficit  Skin: Skin is warm and dry  Psychiatric: She has a normal mood and affect   Her behavior is normal  Judgment and thought content normal

## 2019-01-26 ENCOUNTER — HOSPITAL ENCOUNTER (INPATIENT)
Facility: HOSPITAL | Age: 59
LOS: 11 days | Discharge: HOME | DRG: 885 | End: 2019-02-06
Attending: EMERGENCY MEDICINE | Admitting: PSYCHIATRY & NEUROLOGY
Payer: COMMERCIAL

## 2019-01-26 DIAGNOSIS — F32.A DEPRESSION: ICD-10-CM

## 2019-01-26 DIAGNOSIS — F41.9 ANXIETY: ICD-10-CM

## 2019-01-26 DIAGNOSIS — E03.9 HYPOTHYROIDISM, UNSPECIFIED TYPE: ICD-10-CM

## 2019-01-26 DIAGNOSIS — F33.2 SEVERE RECURRENT MAJOR DEPRESSION WITHOUT PSYCHOTIC FEATURES (HCC): Primary | ICD-10-CM

## 2019-01-26 LAB
ALBUMIN SERPL BCP-MCNC: 4.4 G/DL (ref 3–5.2)
ALP SERPL-CCNC: 116 U/L (ref 43–122)
ALT SERPL W P-5'-P-CCNC: 30 U/L (ref 9–52)
AMPHETAMINES SERPL QL SCN: NEGATIVE
ANION GAP SERPL CALCULATED.3IONS-SCNC: 7 MMOL/L (ref 5–14)
AST SERPL W P-5'-P-CCNC: 26 U/L (ref 14–36)
BARBITURATES UR QL: NEGATIVE
BASOPHILS # BLD AUTO: 0 THOUSANDS/ΜL (ref 0–0.1)
BASOPHILS NFR BLD AUTO: 1 % (ref 0–1)
BENZODIAZ UR QL: NEGATIVE
BILIRUB SERPL-MCNC: 0.3 MG/DL
BUN SERPL-MCNC: 15 MG/DL (ref 5–25)
CALCIUM SERPL-MCNC: 9.5 MG/DL (ref 8.4–10.2)
CHLORIDE SERPL-SCNC: 103 MMOL/L (ref 97–108)
CO2 SERPL-SCNC: 26 MMOL/L (ref 22–30)
COCAINE UR QL: NEGATIVE
CREAT SERPL-MCNC: 0.89 MG/DL (ref 0.6–1.2)
EOSINOPHIL # BLD AUTO: 0.3 THOUSAND/ΜL (ref 0–0.4)
EOSINOPHIL NFR BLD AUTO: 4 % (ref 0–6)
ERYTHROCYTE [DISTWIDTH] IN BLOOD BY AUTOMATED COUNT: 14.2 %
ETHANOL SERPL-MCNC: <10 MG/DL (ref 0–10)
GFR SERPL CREATININE-BSD FRML MDRD: 73 ML/MIN/1.73SQ M
GLUCOSE SERPL-MCNC: 127 MG/DL (ref 70–99)
HCT VFR BLD AUTO: 40.3 % (ref 36–46)
HGB BLD-MCNC: 13.2 G/DL (ref 12–16)
LYMPHOCYTES # BLD AUTO: 1.8 THOUSANDS/ΜL (ref 0.5–4)
LYMPHOCYTES NFR BLD AUTO: 25 % (ref 25–45)
MCH RBC QN AUTO: 29.3 PG (ref 26–34)
MCHC RBC AUTO-ENTMCNC: 32.8 G/DL (ref 31–36)
MCV RBC AUTO: 89 FL (ref 80–100)
METHADONE UR QL: NEGATIVE
MONOCYTES # BLD AUTO: 0.5 THOUSAND/ΜL (ref 0.2–0.9)
MONOCYTES NFR BLD AUTO: 8 % (ref 1–10)
NEUTROPHILS # BLD AUTO: 4.5 THOUSANDS/ΜL (ref 1.8–7.8)
NEUTS SEG NFR BLD AUTO: 63 % (ref 45–65)
OPIATES UR QL SCN: NEGATIVE
PCP UR QL: NEGATIVE
PLATELET # BLD AUTO: 262 THOUSANDS/UL (ref 150–450)
PMV BLD AUTO: 7.6 FL (ref 8.9–12.7)
POTASSIUM SERPL-SCNC: 4.2 MMOL/L (ref 3.6–5)
PROT SERPL-MCNC: 7.7 G/DL (ref 5.9–8.4)
RBC # BLD AUTO: 4.51 MILLION/UL (ref 4–5.2)
SODIUM SERPL-SCNC: 136 MMOL/L (ref 137–147)
THC UR QL: NEGATIVE
TSH SERPL DL<=0.05 MIU/L-ACNC: 1.88 UIU/ML (ref 0.47–4.68)
WBC # BLD AUTO: 7.2 THOUSAND/UL (ref 4.5–11)

## 2019-01-26 PROCEDURE — 85025 COMPLETE CBC W/AUTO DIFF WBC: CPT | Performed by: EMERGENCY MEDICINE

## 2019-01-26 PROCEDURE — 36415 COLL VENOUS BLD VENIPUNCTURE: CPT | Performed by: EMERGENCY MEDICINE

## 2019-01-26 PROCEDURE — 84443 ASSAY THYROID STIM HORMONE: CPT | Performed by: EMERGENCY MEDICINE

## 2019-01-26 PROCEDURE — 93005 ELECTROCARDIOGRAM TRACING: CPT

## 2019-01-26 PROCEDURE — 99285 EMERGENCY DEPT VISIT HI MDM: CPT

## 2019-01-26 PROCEDURE — 80307 DRUG TEST PRSMV CHEM ANLYZR: CPT | Performed by: EMERGENCY MEDICINE

## 2019-01-26 PROCEDURE — 80320 DRUG SCREEN QUANTALCOHOLS: CPT | Performed by: EMERGENCY MEDICINE

## 2019-01-26 PROCEDURE — 99253 IP/OBS CNSLTJ NEW/EST LOW 45: CPT | Performed by: HOSPITALIST

## 2019-01-26 PROCEDURE — 80053 COMPREHEN METABOLIC PANEL: CPT | Performed by: EMERGENCY MEDICINE

## 2019-01-26 RX ORDER — LORAZEPAM 1 MG/1
1 TABLET ORAL EVERY 4 HOURS PRN
Status: DISCONTINUED | OUTPATIENT
Start: 2019-01-26 | End: 2019-02-06 | Stop reason: HOSPADM

## 2019-01-26 RX ORDER — MAGNESIUM HYDROXIDE/ALUMINUM HYDROXICE/SIMETHICONE 120; 1200; 1200 MG/30ML; MG/30ML; MG/30ML
30 SUSPENSION ORAL EVERY 4 HOURS PRN
Status: DISCONTINUED | OUTPATIENT
Start: 2019-01-26 | End: 2019-02-06 | Stop reason: HOSPADM

## 2019-01-26 RX ORDER — BENZTROPINE MESYLATE 1 MG/ML
1 INJECTION INTRAMUSCULAR; INTRAVENOUS EVERY 6 HOURS PRN
Status: DISCONTINUED | OUTPATIENT
Start: 2019-01-26 | End: 2019-02-06 | Stop reason: HOSPADM

## 2019-01-26 RX ORDER — BENZTROPINE MESYLATE 1 MG/1
1 TABLET ORAL EVERY 6 HOURS PRN
Status: DISCONTINUED | OUTPATIENT
Start: 2019-01-26 | End: 2019-02-06 | Stop reason: HOSPADM

## 2019-01-26 RX ORDER — IBUPROFEN 400 MG/1
400 TABLET ORAL EVERY 8 HOURS PRN
Status: DISCONTINUED | OUTPATIENT
Start: 2019-01-26 | End: 2019-02-06 | Stop reason: HOSPADM

## 2019-01-26 RX ORDER — LEVOTHYROXINE SODIUM 0.07 MG/1
75 TABLET ORAL
Status: DISCONTINUED | OUTPATIENT
Start: 2019-01-27 | End: 2019-02-06 | Stop reason: HOSPADM

## 2019-01-26 RX ORDER — TRAZODONE HYDROCHLORIDE 50 MG/1
50 TABLET ORAL
Status: DISCONTINUED | OUTPATIENT
Start: 2019-01-26 | End: 2019-02-06 | Stop reason: HOSPADM

## 2019-01-26 RX ORDER — RISPERIDONE 1 MG/1
1 TABLET, FILM COATED ORAL
Status: DISCONTINUED | OUTPATIENT
Start: 2019-01-26 | End: 2019-02-06 | Stop reason: HOSPADM

## 2019-01-26 RX ORDER — GABAPENTIN 100 MG/1
100 CAPSULE ORAL
Status: DISCONTINUED | OUTPATIENT
Start: 2019-01-26 | End: 2019-02-06 | Stop reason: HOSPADM

## 2019-01-26 RX ORDER — QUETIAPINE FUMARATE 100 MG/1
200 TABLET, FILM COATED ORAL
Status: DISCONTINUED | OUTPATIENT
Start: 2019-01-26 | End: 2019-02-06 | Stop reason: HOSPADM

## 2019-01-26 RX ORDER — HALOPERIDOL 5 MG
5 TABLET ORAL EVERY 6 HOURS PRN
Status: DISCONTINUED | OUTPATIENT
Start: 2019-01-26 | End: 2019-02-06 | Stop reason: HOSPADM

## 2019-01-26 RX ORDER — OLANZAPINE 10 MG/1
10 INJECTION, POWDER, LYOPHILIZED, FOR SOLUTION INTRAMUSCULAR 2 TIMES DAILY PRN
Status: DISCONTINUED | OUTPATIENT
Start: 2019-01-26 | End: 2019-02-06 | Stop reason: HOSPADM

## 2019-01-26 RX ADMIN — QUETIAPINE FUMARATE 200 MG: 100 TABLET ORAL at 22:51

## 2019-01-26 RX ADMIN — GABAPENTIN 100 MG: 100 CAPSULE ORAL at 22:51

## 2019-01-26 RX ADMIN — TRAZODONE HYDROCHLORIDE 50 MG: 50 TABLET ORAL at 21:06

## 2019-01-26 NOTE — ED NOTES
Patient is a 66 yr old female with a hx of profound depression and anxiety  She was recommended to the Ed by a crisis line (through her job) for admission due to feeling overwhelmed, and having vague suicidal thoughts  She is having finanacial, relationship and now work related problems and feels that she is unable to return to work because she feels humiliated at [employer]  She has minimal support in the area, Her sister and family is in [address] [place], and parents are in [place]  She feels like a failure having to call them for help  She is single and has no children  She no longer attends [employer] which was a  big support for her because she feels angry at God  She feels  alone and isolated  She sleeps due to taking meds at night (from PCP)  She is very tearful and has been crying  for days  She feels despondant and  feels like there are no other options for her  She signed a 201  Patient is cooperative in the Ed, very anxious and tearful  Patient reports that she was hospitalized about 10 yrs ago in [place] for depression and was treated by a psychiatrist for a while, but is currently receiving meds from a PCP  She attempted to get an appt through her EAP at work, however missed the appointment yesterday due to getting hung up in traffic    She has called the crisis line from work several times and was directed to come to the hospital       201 signed and patient is being reviewed on [address] [place]

## 2019-01-26 NOTE — ED NOTES
Insurance Authorization:   Phone call placed to [facility] [place])  Phone number: [phone]     Spoke to  [address] [place], [place] [address] days approved    Level of care:inpatient mental health  Review on 5 days review on 1/30 Wed   Authorization # [id number]    review phone number is [phone]

## 2019-01-26 NOTE — ED NOTES
Patient is accepted at [phone] to [address] behavioral health  Patient is accepted by Dr Ricardo Dye       Patient may go to the floor after report is given       Nurse report is to be called to [phone]  prior to patient transfer

## 2019-01-26 NOTE — ED PROVIDER NOTES
History  Chief Complaint   Patient presents with    Psychiatric Evaluation     states that her depression has been getting worse for the past 1 week  denies SI at this time but states that she did think about it over the last week  denies HI/AH/VH     Patient is a 65-year-old female  She has a history of depression and anxiety  She is compliant with her medication  She denies drug or alcohol abuse  She reports several stressors one on top of the other  Over the last week she reports being profoundly depressed  She reports that she has been crying all the time  Does not want to eat  She is able to sleep  She denies any overdose or self injury  She denies any suicidal or homicidal ideation  No hallucinations  She has no localizing symptoms  Symptoms are severe  There been no relieving factors  Prior to Admission Medications   Prescriptions Last Dose Informant Patient Reported? Taking?    QUEtiapine (SEROquel) 200 mg tablet   No Yes   Sig: Take 1 tablet (200 mg total) by mouth daily at bedtime   albuterol (2 5 mg/3 mL) 0 083 % nebulizer solution   No No   Sig: Take 3 mL (2 5 mg total) by nebulization every 4 (four) hours as needed for wheezing   albuterol (PROVENTIL HFA) 90 mcg/act inhaler   Yes No   Sig: Inhale 1-2 puffs   beclomethasone (QVAR) 40 MCG/ACT inhaler   Yes Yes   Sig: Inhale 1 puff 2 (two) times a day   buPROPion (WELLBUTRIN XL) 300 mg 24 hr tablet   No Yes   Sig: Take 1 tablet (300 mg total) by mouth daily   buPROPion (WELLBUTRIN) 75 mg tablet   No Yes   Sig: Take 1 tablet (75 mg total) by mouth daily   fluticasone (FLONASE) 50 mcg/act nasal spray   Yes Yes   Si sprays into each nostril daily   fluticasone (FLONASE) 50 mcg/act nasal spray   No No   Si sprays into each nostril daily   gabapentin (NEURONTIN) 100 mg capsule   No Yes   Sig: Take 1 capsule (100 mg total) by mouth daily at bedtime   guaifenesin-codeine (GUAIFENESIN AC) 100-10 MG/5ML liquid   No No   Sig: Take 5 mL by mouth 3 (three) times a day as needed for cough   levothyroxine 75 mcg tablet   No Yes   Sig: Take 1 tablet (75 mcg total) by mouth daily   sertraline (ZOLOFT) 100 mg tablet   No Yes   Sig: Take 2 tablets (200 mg total) by mouth daily at bedtime   traZODone (DESYREL) 50 mg tablet   No Yes   Sig: Take 1 tablet (50 mg total) by mouth daily at bedtime      Facility-Administered Medications: None       Past Medical History:   Diagnosis Date    Asthma     Depression (emotion)     Pre-diabetes        Past Surgical History:   Procedure Laterality Date    ADENOIDECTOMY      DILATION AND CURETTAGE OF UTERUS      TONSILLECTOMY         History reviewed  No pertinent family history  I have reviewed and agree with the history as documented  Social History   Substance Use Topics    Smoking status: Never Smoker    Smokeless tobacco: Never Used    Alcohol use No        Review of Systems   Constitutional: Negative for chills and fever  HENT: Negative for rhinorrhea and sore throat  Eyes: Negative for pain, redness and visual disturbance  Respiratory: Negative for cough and shortness of breath  Cardiovascular: Negative for chest pain and leg swelling  Gastrointestinal: Negative for abdominal pain, diarrhea and vomiting  Endocrine: Negative for polydipsia and polyuria  Genitourinary: Negative for dysuria, frequency, hematuria, vaginal bleeding and vaginal discharge  Musculoskeletal: Negative for back pain and neck pain  Skin: Negative for rash and wound  Allergic/Immunologic: Negative for immunocompromised state  Neurological: Negative for weakness, numbness and headaches  Hematological: Does not bruise/bleed easily  Psychiatric/Behavioral: Positive for dysphoric mood  Negative for hallucinations and suicidal ideas  The patient is nervous/anxious  All other systems reviewed and are negative  Physical Exam  Physical Exam   Constitutional: She is oriented to person, place, and time  She appears well-developed and well-nourished  No distress  HENT:   Head: Normocephalic and atraumatic  Mouth/Throat: Oropharynx is clear and moist    Eyes: Pupils are equal, round, and reactive to light  Conjunctivae and EOM are normal  Right eye exhibits no discharge  Left eye exhibits no discharge  No scleral icterus  Neck: Normal range of motion  Neck supple  Cardiovascular: Normal rate, regular rhythm, normal heart sounds and intact distal pulses  Exam reveals no gallop and no friction rub  No murmur heard  Pulmonary/Chest: Effort normal and breath sounds normal  No stridor  No respiratory distress  She has no wheezes  She has no rales  Abdominal: Soft  Bowel sounds are normal  She exhibits no distension  There is no tenderness  There is no rebound and no guarding  Musculoskeletal: Normal range of motion  She exhibits no edema, tenderness or deformity  No CVA tenderness  No calf tenderness/palpable cords  Neurological: She is alert and oriented to person, place, and time  She has normal strength  No cranial nerve deficit or sensory deficit  GCS eye subscore is 4  GCS verbal subscore is 5  GCS motor subscore is 6  Skin: Skin is warm and dry  No rash noted  She is not diaphoretic  Psychiatric:   Crying female  Vitals reviewed        Vital Signs  ED Triage Vitals [01/26/19 1347]   Temperature Pulse Respirations Blood Pressure SpO2   (!) 97 4 °F (36 3 °C) 86 14 161/60 98 %      Temp Source Heart Rate Source Patient Position - Orthostatic VS BP Location FiO2 (%)   Tympanic Monitor Sitting Left arm --      Pain Score       --           Vitals:    01/26/19 1347   BP: 161/60   Pulse: 86   Patient Position - Orthostatic VS: Sitting       Visual Acuity      ED Medications  Medications - No data to display    Diagnostic Studies  Results Reviewed     Procedure Component Value Units Date/Time    Rapid drug screen, urine [[id number]]  (Normal) Collected:  01/26/19 1502    Lab Status:  Final result Specimen:  Urine from Urine, Clean Catch Updated:  01/26/19 1531     Amph/Meth UR Negative     Barbiturate Ur Negative     Benzodiazepine Urine Negative     Cocaine Urine Negative     Methadone Urine Negative     Opiate Urine Negative     PCP Ur Negative     THC Urine Negative    Narrative:         FOR MEDICAL PURPOSES ONLY  IF CONFIRMATION NEEDED PLEASE CONTACT THE LAB WITHIN 5 DAYS  Drug Screen Cutoff Levels:  AMPHETAMINE/METHAMPHETAMINES  1000 ng/mL  BARBITURATES     200 ng/mL  BENZODIAZEPINES     200 ng/mL  COCAINE      300 ng/mL  METHADONE      300 ng/mL  OPIATES      300 ng/mL  PHENCYCLIDINE     25 ng/mL  THC       50 ng/mL    Comprehensive metabolic panel [[id number]]  (Abnormal) Collected:  01/26/19 1501    Lab Status:  Final result Specimen:  Blood from Hand, Right Updated:  01/26/19 1525     Sodium 136 (L) mmol/L      Potassium 4 2 mmol/L      Chloride 103 mmol/L      CO2 26 mmol/L      ANION GAP 7 mmol/L      BUN 15 mg/dL      Creatinine 0 89 mg/dL      Glucose 127 (H) mg/dL      Calcium 9 5 mg/dL      AST 26 U/L      ALT 30 U/L      Alkaline Phosphatase 116 U/L      Total Protein 7 7 g/dL      Albumin 4 4 g/dL      Total Bilirubin 0 30 mg/dL      eGFR 73 ml/min/1 73sq m     Narrative:         National Kidney Disease Education Program recommendations are as follows:  GFR calculation is accurate only with a steady state creatinine  Chronic Kidney disease less than 60 ml/min/1 73 sq  meters  Kidney failure less than 15 ml/min/1 73 sq  meters      CBC and differential [[id number]]  (Abnormal) Collected:  01/26/19 1501    Lab Status:  Final result Specimen:  Blood from Hand, Right Updated:  01/26/19 1525     WBC 7 20 Thousand/uL      RBC 4 51 Million/uL      Hemoglobin 13 2 g/dL      Hematocrit 40 3 %      MCV 89 fL      MCH 29 3 pg      MCHC 32 8 g/dL      RDW 14 2 %      MPV 7 6 (L) fL      Platelets 817 Thousands/uL      Neutrophils Relative 63 %      Lymphocytes Relative 25 %      Monocytes Relative 8 % Eosinophils Relative 4 %      Basophils Relative 1 %      Neutrophils Absolute 4 50 Thousands/µL      Lymphocytes Absolute 1 80 Thousands/µL      Monocytes Absolute 0 50 Thousand/µL      Eosinophils Absolute 0 30 Thousand/µL      Basophils Absolute 0 00 Thousands/µL     Ethanol [[id number]]  (Normal) Collected:  01/26/19 1501    Lab Status:  Final result Specimen:  Blood from Hand, Right Updated:  01/26/19 1524     Ethanol Lvl <10 mg/dL     TSH [[id number]] Collected:  01/26/19 1501    Lab Status: In process Specimen:  Blood from Hand, Right Updated:  01/26/19 1510                 No orders to display              Procedures  ECG 12 Lead Documentation  Date/Time: 1/26/2019 3:15 PM  Performed by: Shayy Coughlin by: Kenny Patel     ECG reviewed by me, the ED Provider: yes    Patient location:  ED  Interpretation:     Interpretation: normal    Rate:     ECG rate assessment: normal    Rhythm:     Rhythm: sinus rhythm    Ectopy:     Ectopy: none    QRS:     QRS axis:  Normal  Conduction:     Conduction: normal    ST segments:     ST segments:  Normal  T waves:     T waves: normal             Phone Contacts  ED Phone Contact    ED Course                               MDM  Number of Diagnoses or Management Options  Depression:   Diagnosis management comments: Patient is medically cleared for crisis evaluate patient and psychiatric admission  201 signed         Amount and/or Complexity of Data Reviewed  Clinical lab tests: ordered and reviewed  Independent visualization of images, tracings, or specimens: yes      CritCare Time    Disposition  Final diagnoses:   Depression     Time reflects when diagnosis was documented in both MDM as applicable and the Disposition within this note     Time User Action Codes Description Comment    1/26/2019  3:15 PM Geetha La Add [F32 9] Depression       ED Disposition     ED Disposition Condition Comment    Transfer to [address] [place] [address] Dung should be transferred out to [facility] and has been medically cleared  Follow-up Information    None         Patient's Medications   Discharge Prescriptions    No medications on file     No discharge procedures on file      ED Provider  Electronically Signed by           Sandra Johnson MD  01/26/19 7297

## 2019-01-26 NOTE — ED NOTES
Patient change into hospital paper scrub and made comfortable  Belonging is done and place in locker 5 bottom  Blood and urine obtain and send to lab for testing valuables send with security to safe   meds send to [address] [place] [address]  01/26/19 6992

## 2019-01-26 NOTE — ED NOTES
Patient appears to be resting comfortably on litter with even and unlabored respirations  Offers no complaints at this time  Will continue to monitor       Scott Nava RN  01/26/19 3221

## 2019-01-27 LAB
ATRIAL RATE: 69 BPM
P AXIS: 36 DEGREES
PR INTERVAL: 152 MS
QRS AXIS: 8 DEGREES
QRSD INTERVAL: 88 MS
QT INTERVAL: 398 MS
QTC INTERVAL: 426 MS
T WAVE AXIS: 39 DEGREES
VENTRICULAR RATE: 69 BPM

## 2019-01-27 PROCEDURE — 93010 ELECTROCARDIOGRAM REPORT: CPT | Performed by: INTERNAL MEDICINE

## 2019-01-27 PROCEDURE — 86592 SYPHILIS TEST NON-TREP QUAL: CPT | Performed by: HOSPITALIST

## 2019-01-27 PROCEDURE — 99222 1ST HOSP IP/OBS MODERATE 55: CPT | Performed by: STUDENT IN AN ORGANIZED HEALTH CARE EDUCATION/TRAINING PROGRAM

## 2019-01-27 RX ORDER — TRAZODONE HYDROCHLORIDE 50 MG/1
50 TABLET ORAL
Status: DISCONTINUED | OUTPATIENT
Start: 2019-01-27 | End: 2019-02-01

## 2019-01-27 RX ORDER — FLUTICASONE PROPIONATE 50 MCG
2 SPRAY, SUSPENSION (ML) NASAL DAILY
Status: DISCONTINUED | OUTPATIENT
Start: 2019-01-27 | End: 2019-02-04

## 2019-01-27 RX ADMIN — LEVOTHYROXINE SODIUM 75 MCG: 75 TABLET ORAL at 06:55

## 2019-01-27 RX ADMIN — QUETIAPINE FUMARATE 200 MG: 100 TABLET ORAL at 21:45

## 2019-01-27 RX ADMIN — BUPROPION HYDROCHLORIDE 450 MG: 300 TABLET, FILM COATED, EXTENDED RELEASE ORAL at 14:05

## 2019-01-27 RX ADMIN — GABAPENTIN 100 MG: 100 CAPSULE ORAL at 21:45

## 2019-01-27 RX ADMIN — TRAZODONE HYDROCHLORIDE 50 MG: 50 TABLET ORAL at 21:45

## 2019-01-27 RX ADMIN — IBUPROFEN 400 MG: 400 TABLET ORAL at 10:50

## 2019-01-27 RX ADMIN — SERTRALINE HYDROCHLORIDE 200 MG: 50 TABLET ORAL at 14:05

## 2019-01-27 RX ADMIN — FLUTICASONE PROPIONATE 2 SPRAY: 50 SPRAY, METERED NASAL at 16:21

## 2019-01-27 NOTE — ASSESSMENT & PLAN NOTE
Patient presented with severe depression  Patient will be admitted to the psych [facility]   Management per psychiatric

## 2019-01-27 NOTE — PROGRESS NOTES
Patient has remained visible this afternoon  Patient was med/meal compliant  No prn's were requested  Patient presents pleasant and cooperative  Staff will continue to monitor for safety and well being

## 2019-01-27 NOTE — TREATMENT PLAN
TREATMENT PLAN REVIEW - [id number] [facility] 66 y o  1960 female MRN: [id number]    [address] Room / Bed: [facility] 376/[facility] 720-06 Encounter: [id number]          Admit Date/Time:  1/26/2019  1:44 PM    Treatment Team: Attending Provider: Gurinder Stevenson MD; Consulting Physician: India Pisano MD; Patient Care Assistant: Vale Valladares;  Patient Care Assistant: Joselito Savage; Licensed Practical Nurse: Carter Garner LPN; Patient Care Technician: Linus Rea    Diagnosis: Principal Problem:    Severe recurrent major depression without psychotic features ([facility] [address] )  Active Problems:    Anxiety    Hypothyroidism      Patient Strengths/Assets: ability for insight, average or above intelligence, capable of independent living, good past treatment response, motivation for treatment/growth, patient is on a voluntary commitment, patient is willing to work on problems    Patient Barriers/Limitations: lack of financial means, no/few hobbies or interests    Short Term Goals: decrease in depressive symptoms    Long Term Goals: improvement in depression, resolution of depressive symptoms, stabilization of mood, improvement in reality testing    Progress Towards Goals: starting psychiatric medications as prescribed    Recommended Treatment: medication management, patient medication education, group therapy, milieu therapy, continued Behavioral Health psychiatric evaluation/assessment process    Treatment Frequency: daily medication monitoring, group and milieu therapy daily, monitoring through interdisciplinary rounds, monitoring through weekly patient care conferences    Expected Discharge Date:  02/02/2019    Discharge Plan: referral for outpatient medication management with a psychiatrist, referral for outpatient psychotherapy, return to previous living arrangement    Treatment Plan Created/Updated By: Gurinder Stevenson MD

## 2019-01-27 NOTE — PROGRESS NOTES
Patient is a 64year old female, 61 51 81, admitted from [facility] for depression and anxiety  She is had  several stressors she reported that she is crying all the time    She is crying at this time Patient denies Si , homicidal ideations  patient is cooperative during admitting  will continue to monitor

## 2019-01-27 NOTE — NURSING NOTE
Dr Denise Montero paged from [facility] and ordered patents night time medications as she requested  He allowed her Seroquel 200 mg as well as her Gabapentin q100 mg and went to sleep after taking a PRN Trazadone 50 mg  Patient insisted RN get her HS meds ordered or she would not sleep well after having a very tearful evening  Depressed and isolative most of the evening

## 2019-01-27 NOTE — PROGRESS NOTES
She is compliant with her medication/meal   She denies anxiety, depression and no thoughts of hurts self  Patient c/o headache Ibuprofen given at 1050   Will continue to monitor

## 2019-01-27 NOTE — H&P
Psychiatric Evaluation - Behavioral Health     Identification Data:Ana Rosa Nguyen 66 y o  female MRN: [id number]  Unit/Bed#: U 376-01 Encounter: [id number]    Chief Complaint:   "I am so depressed"    History of present illness:    Danika Valenzuela is a 66 y o  female with a history of Major Depressive Disorder who was admitted to the inpatient psychiatric unit on a voluntary 201 commitment basis due to depression  Symptoms prior to admission included worsening depression, feeling depressed, hopelessness, helplessness, poor appetite and difficulty sleeping  Onset of symptoms was gradual starting few weeks ago with gradually worsening course since that time  Stressors preceding admission included problems at work and stress at work       On initial evaluation after admission to the inpatient psychiatric unit [facility] patient reports of worsening depression in the last few weeks the patient reports feeling numb and crying a lot  Patient reports stressors at work including that she was written up at work  Patient reports feeling worthless hopeless reports poor sleep with difficulty falling asleep and staying asleep  Patient reports decreased appetite  Patient reports anxiety with nervousness; she is nervous about going back to her present job  Patient also reports feeling lonely and see patient was tearful during evaluation  Patient denies symptoms of keenan or psychosis  Patient endorses symptoms of anhedonia stating she does not want to do anything  Fiona Anderson Psychiatric Review Of Systems:  Change in sleep: yes  Appetite changes: yes  Weight changes: no  Change in energy/anergy: yes  Change in interest/pleasure/anhedonia: yes  Somatic symptoms: no  Anxiety/panic: yes  Manic symptoms: no  Guilt feelings:no  Hopeless: yes  Self injurious behavior/risky behavior: no    Historical Information     Past Psychiatric History:   The patient reports long history of depression   Patient seen in outpatient treatment in the past  Patient stated that she last seen a psychiatrist 1 year ago and stated that her psychitric medications have been prescribed by her PCP in the last year  Patient denies any past suicide attempt  Patient reports compliance with medications  Substance Abuse History:  Denies illicit drug use or alcohol use  Patient denies cigarette smoking  Family Psychiatric History:   Patient reports an aunt with history of bipolar disorder  Patient does not know any family history of completed or attempted suicide  Social History:  Developmental:  Education: college graduate  Marital history: single  Living arrangement, social support: lives alone  Occupational History: employed  Access to firearms: denies     Traumatic History:   Abuse:none is reported  Other Traumatic Events: none reported    Past Medical History:   Diagnosis Date    Asthma     Depression (emotion)     Pre-diabetes        Medical Review Of Systems:  Pertinent items are noted in HPI  Meds/Allergies   all current active meds have been reviewed  Allergies   Allergen Reactions    Darvon [Propoxyphene]     Fluoxetine     Meclizine     Morphine And Related     Oxycodone-Acetaminophen      Objective      Mental Status Evaluation:  Appearance:  {Adequate hygiene and grooming   Behavior:  cooperative   Speech:   Language Normal rate and Normal volume  Able to name objects   Mood:  depressed   Affect:    Thought process constricted  Goal directed and coherent   Associations: Tightly connected   Thought Content:  Does not verbalize delusional material   Perceptual Disturbances: Denies hallucinations and does not appear to be responding to internal stimuli   Risk Potential: No suicidal or homicidal ideation   Orientation  Oriented x 3   Memory grossly intact   Attention/Concentration attention span and concentration were age appropriate   Fund of knowledge Diminished   Insight:  limited   Judgment: Limited   Gait/Station: normal gait/station   Motor Activity: No abnormal movement noted         Lab Results: I have personally reviewed pertinent lab results  Imaging Studies: reviewe  EKG, Pathology, and Other Studies: reviewed    Code Status:Full code    Patient Strengths/Assets: ability for insight, capable of independent living, communication skills, compliant with medication, motivation for treatment/growth, patient is on a voluntary commitment, patient is willing to work on problems, reasoning ability    Patient Barriers/Limitations: financial instability, no/few hobbies or interests    Assessment/Plan     Principal Problem:    Recurrent major depressive disorder ([facility] )  Active Problems:    Anxiety    Hypothyroidism    Plan: Will up titrate Wellbutrin XL to 450 mg p o  Q day for depression  Will continue Zoloft 200 mg tablet daily for depression  We also continue trazodone 50 mg p o  Q bedtime and Seroquel 200 mg Q bedtime for mood stabilization and depression  We will encourage her group, participation milieu structure  Risks, benefits and possible side effects of Medications:   Risks, benefits, and possible side effects of medications explained to patient and patient verbalizes understanding  Inpatient Psychiatric Certification:    Estimated length of stay: 7 midnights    Based upon physical, mental and social evaluations, I certify that inpatient psychiatric services are medically necessary for this patient for a duration of 7 midnights for the treatment of Recurrent major depressive disorder ([facility] )    Available alternative community resources do not meet the patient's mental health care needs  I further attest that an established written individualized plan of care has been implemented and is outlined in the patient's medical records      Lisa Farah MD 01/27/19

## 2019-01-27 NOTE — NURSING NOTE
Behaviors controlled and appropriate all evening  Medication complaint  Reports no issues related to sleep  Patient in bed at this time and appears to be sleeping  Eyes closed and chest movements noted  Offered no complaints    Patient did not wake up to request any PRN medications during the night

## 2019-01-27 NOTE — CONSULTS
Consult- Stephanie Quigley 1960, 66 y o  female MRN: [id number]    Unit/Bed#: [facility] 376-01 Encounter: [id number]    Primary Care Provider: Mony Catalan DO   Date and time admitted to hospital: 1/26/2019  1:44 PM      Inpatient consult for Medical Clearance for [address] patient  Consult performed by: Joe Hercules ordered by: Geremias Hooper          Hypothyroidism   Assessment & Plan    TSH is normal  Continue with home dose     Depression   Assessment & Plan    Patient presented with severe depression  Patient will be admitted to the psych [facility]   Management per psychiatric       Anxiety   Assessment & Plan    Management per Psychiatry           Recommendations for Discharge:  · Patient is medically stable    Counseling / Coordination of Care Time: 30 minutes  Greater than 50% of total time spent on patient counseling and coordination of care  Collaboration of Care: Were Recommendations Directly Discussed with Primary Treatment Team? - No     History of Present Illness:    Stephanie Quigley is a 66 y o  female who is originally admitted to the psychiatric service due to depression  We are consulted for medical management  Patient has been feeling anxious and depressed lately  According to her patient is going through lot of stressors  Patient denied any suicidal or homicidal ideation  Review of Systems:    Review of Systems   Constitutional: Negative  HENT: Negative  Respiratory: Negative  Cardiovascular: Negative  Gastrointestinal: Negative  Genitourinary: Negative  Musculoskeletal: Negative  Neurological: Negative  Psychiatric/Behavioral: Positive for behavioral problems and sleep disturbance  Negative for suicidal ideas  The patient is nervous/anxious          Past Medical and Surgical History:     Past Medical History:   Diagnosis Date    Asthma     Depression (emotion)     Pre-diabetes        Past Surgical History:   Procedure Laterality Date    ADENOIDECTOMY  DILATION AND CURETTAGE OF UTERUS      TONSILLECTOMY         Meds/Allergies:    all medications and allergies reviewed    Allergies: Allergies   Allergen Reactions    Darvon [Propoxyphene]     Fluoxetine     Meclizine     Morphine And Related     Oxycodone-Acetaminophen        Social History:     Marital Status: Single    Substance Use History:   History   Alcohol Use No     History   Smoking Status    Never Smoker   Smokeless Tobacco    Never Used     History   Drug Use No       Family History:    History reviewed  No pertinent family history  Physical Exam:     Vitals:   Blood Pressure: 132/77 (01/26/19 1820)  Pulse: 89 (01/26/19 1820)  Temperature: 98 2 °F (36 8 °C) (01/26/19 1820)  Temp Source: Temporal (01/26/19 1820)  Respirations: 18 (01/26/19 1820)  Height: 5' 0 5" (153 7 cm) (01/26/19 1820)  Weight - Scale: 95 3 kg (210 lb) (01/26/19 1820)  SpO2: 97 % (01/26/19 1820)    Physical Exam   Constitutional: She appears well-developed and well-nourished  Eyes: Pupils are equal, round, and reactive to light  EOM are normal    Neck: Normal range of motion  Neck supple  Cardiovascular: Normal rate and regular rhythm  Exam reveals no friction rub  No murmur heard  Pulmonary/Chest: Effort normal and breath sounds normal  No respiratory distress  Abdominal: Bowel sounds are normal  She exhibits no distension  There is no guarding  Musculoskeletal: Normal range of motion  She exhibits no edema  Skin: Skin is warm and dry  Psychiatric:   Patient is depressed             Additional Data:     Lab Results: I have personally reviewed pertinent reports          Results from last 7 days  Lab Units 01/26/19  1501   WBC Thousand/uL 7 20   HEMOGLOBIN g/dL 13 2   HEMATOCRIT % 40 3   PLATELETS Thousands/uL 262   NEUTROS PCT % 63   LYMPHS PCT % 25   MONOS PCT % 8   EOS PCT % 4       Results from last 7 days  Lab Units 01/26/19  1501   SODIUM mmol/L 136*   POTASSIUM mmol/L 4 2   CHLORIDE mmol/L 103 CO2 mmol/L 26   BUN mg/dL 15   CREATININE mg/dL 0 89   ANION GAP mmol/L 7   CALCIUM mg/dL 9 5   ALBUMIN g/dL 4 4   TOTAL BILIRUBIN mg/dL 0 30   ALK PHOS U/L 116   ALT U/L 30   AST U/L 26   GLUCOSE RANDOM mg/dL 127*             Lab Results   Component Value Date/Time    HGBA1C 7 0 (H) 11/25/2013 11:54 AM               Imaging: I have personally reviewed pertinent films in PACS with a Radiologist  and No imaging available    No orders to display       EKG, Pathology, and Other Studies Reviewed on Admission:   · EKG:  Normal sinus rhythm    ** Please Note: This note has been constructed using a voice recognition system   **

## 2019-01-28 LAB — RPR SER QL: NORMAL

## 2019-01-28 PROCEDURE — 99232 SBSQ HOSP IP/OBS MODERATE 35: CPT | Performed by: PSYCHIATRY & NEUROLOGY

## 2019-01-28 RX ADMIN — SERTRALINE HYDROCHLORIDE 200 MG: 50 TABLET ORAL at 08:38

## 2019-01-28 RX ADMIN — TRAZODONE HYDROCHLORIDE 50 MG: 50 TABLET ORAL at 22:17

## 2019-01-28 RX ADMIN — BUPROPION HYDROCHLORIDE 450 MG: 300 TABLET, FILM COATED, EXTENDED RELEASE ORAL at 08:37

## 2019-01-28 RX ADMIN — LEVOTHYROXINE SODIUM 75 MCG: 75 TABLET ORAL at 05:19

## 2019-01-28 RX ADMIN — FLUTICASONE PROPIONATE 2 SPRAY: 50 SPRAY, METERED NASAL at 08:37

## 2019-01-28 RX ADMIN — GABAPENTIN 100 MG: 100 CAPSULE ORAL at 22:17

## 2019-01-28 RX ADMIN — QUETIAPINE FUMARATE 200 MG: 100 TABLET ORAL at 22:17

## 2019-01-28 NOTE — SOCIAL WORK
Pt cooperative during initial biopsychosocial assessment  Pt's mood depressed and affect anxious  Pt's thought process/content appropriate/organized  Pt's speech normal rate and rhythm, eye contact appropriate, and appearance well-groomed  Pt reports coming to the hospital due to increased depression, tearfulness, poor eating and sleeping and noticed when fun things were no longer fun, such as [place] shopping  Pt reports she "couldn't deal with life anymore"  Pt reports she was hospitalized once before so she knows when she needs to get help before it is too late  Pt reports passive SI, but preventative factors include her family and her cats  Pt reports she does not want her family to find her body and she cannot say goodbye to her 2 cats  Pt's current stressors are her employment and finances  Pt reports she is med compliant with meds prescribed by PCP  Pt denies D/A  Pt denies TOB use  Pt denies family hx of D/A  Pt denies legal hx  Pt denies hx of abuse  Pt reports psychosocial loss including her grandmother "way back", her mother who passed from [address] 9 years ago and her aunt 5 years ago  Pt reports seeing her mother die from ALS was very difficult  Pt reports one previous IP psychiatric hospitalization about 10 years ago in [address] [place] [address]  Pt reports that hospitalization was caused by SI  Pt reports family hx of MH  Pt reports her aunt had bipolar disorder and her nephew has anxiety and depression  Pt reports she currently has no outpatient mental health services  Pt interested in therapy and psychiatry  Pt reports a hx of seeing Dr Morgan Velasquez and is requesting not to see him for psychiatry  Pt reports she signed up for counseling through her EAP, but missed the appointment due to traffic  Pt reports increased anxiety and depression due to missing appointment  Pt single with no children  Pt lives alone in her own home for the past 10 years   Pt currently employed at [employer] but plans to quit her job as it has become an extreme stressor for her  Pt reports she was recently written up and nothing remains private as multiple employees know her write up  Pt reports the  is not who he used to be  Pt reports a lot of tesfaye activity going  Pt reports they are writing lots of people up for silly things, watching employees on the cameras, and cutting hours  Pt reports 2 people at her work will not even speak with her and she has given up on saying hello to them  Pt reports it seems as though she is not allowed to make any mistakes  Pt reports she has been employed there for 16 years and the past 10 years have been terrible for pt  Pt plans on quitting  Pt has family and friend support  Pt has limited family support due to distance  Pt reports he sister and father are both very supportive but sister lives in [place] and father lives in [place], but also travels a lot  Pt has support from friends  Pt has 2 cats  Pt has college degree in [employer] and advertising, but does not want a 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

## 2019-01-28 NOTE — PLAN OF CARE
Problem: DISCHARGE PLANNING  Goal: Discharge to home or other facility with appropriate resources  INTERVENTIONS:  - Identify barriers to discharge w/patient and caregiver  - Arrange for needed discharge resources and transportation as appropriate  - Deny SI, depression, increase sleep and appetite  Symptoms will resolve or diminish upon discharge  - Comply with meds, attend 75% of group therapy, identify positive coping skills  - Identify discharge learning needs (meds, outpatient mental health services)  - Arrange for interpretive services to assist at discharge as needed  - Refer to Case Management Department for coordinating discharge planning if the patient needs post-hospital services based on physician/advanced practitioner order or complex needs related to functional status, cognitive ability, or social support system    Outcome: Progressing  Worker sat with pt and contacted [employer] financial/benefit services  Pt eligible for short-term disability  Claim opened  [employer] will fax needed paperwork to [facility] fax  Worker will complete paperwork and have physician complete necessary paperwork

## 2019-01-28 NOTE — SOCIAL WORK
Worker sat with patient  [employer] benefits contacted  Pt eligible for short-term disability  Claim opened  [employer] to fax worker short term disability paperwork  Worker will complete necessary paperwork and have physician complete necessary paperwork

## 2019-01-28 NOTE — PROGRESS NOTES
Progress Note - James Boudreaux 66 y o  female MRN: [id number]   Unit/Bed#: [facility] 376-01 Encounter: [id number]    Behavior over the last 24 hours: kate Goldstein continues to feel anxious and depressed, continues to feel anxious, depressed and hopeless, appears anxious, depressed and helpless today  She endorsed excessive daytime sleepiness with crying spells  Seclusive to the room  Compliant with medications  Attends group therapy  Sleep: hypersomnia  Appetite: normal  Medication side effects: No   ROS: no complaints    Mental Status Evaluation:    Appearance:  casually dressed   Behavior:  normal   Speech:  normal rate and volume   Mood:  depressed, anxious   Affect:  constricted   Thought Process:  coherent, goal directed   Associations: intact associations   Thought Content:  normal   Perceptual Disturbances: none   Risk Potential: Suicidal ideation - Yes, passive death wish  Homicidal ideation - None  Potential for aggression - No   Sensorium:  oriented to person, place and time/date   Memory:  recent and remote memory grossly intact   Consciousness:  alert and awake   Attention: attention span and concentration are age appropriate   Insight:  fair   Judgment: fair   Gait/Station: normal gait/station   Motor Activity: no abnormal movements     Vital signs in last 24 hours:    Temp:  [97 °F (36 1 °C)-98 5 °F (36 9 °C)] 97 °F (36 1 °C)  HR:  [77-82] 82  Resp:  [16] 16  BP: (101-119)/(62-69) 101/62    Laboratory results:  I have personally reviewed all pertinent laboratory/tests results  Progress Toward Goals: progressing gradually    Assessment/Plan   Principal Problem:    Severe recurrent major depression without psychotic features [facility])  Active Problems:    Anxiety    Hypothyroidism    Recommended Treatment:     Planned medication and treatment changes: All current active medications have been reviewed    Encourage group therapy, milieu therapy and occupational therapy  Behavioral Health checks every 15 minutes  Continue current medications:    Current Facility-Administered Medications:  albuterol 2 5 mg Nebulization Q6H PRN Donnie Jaquez MD   aluminum-magnesium hydroxide-simethicone 30 mL Oral Q4H PRN Malachi España MD   benztropine 1 mg Intramuscular Q6H PRN Malachi España MD   benztropine 1 mg Oral Q6H PRN Malachi España MD   buPROPion 450 mg Oral Daily Faviola Andrade MD   fluticasone 2 spray Each Nare Daily Faviola Andrade MD   gabapentin 100 mg Oral HS Carlos Enrique Mccullough MD   haloperidol 5 mg Oral Q6H PRN Malachi España MD   ibuprofen 400 mg Oral Q8H PRN Malachi España MD   influenza vaccine 0 5 mL Intramuscular Prior to discharge Donnie Jaquez MD   levothyroxine 75 mcg Oral Early Morning Donnie Jaquez MD   LORazepam 1 mg Oral Q4H PRN Malachi Epsaña MD   magnesium hydroxide 30 mL Oral Daily PRN Malachi España MD   OLANZapine 10 mg Intramuscular BID PRN Malachi España MD   QUEtiapine 200 mg Oral HS Carlos Enrique Mccullough MD   risperiDONE 1 mg Oral Q3H PRN Malachi España MD   sertraline 200 mg Oral Daily Faviola Andrade MD   traZODone 50 mg Oral HS PRN Malachi España MD   traZODone 50 mg Oral HS Faviola Andrade MD       Risks / Benefits of Treatment:    Risks, benefits, and possible side effects of medications explained to patient and patient verbalizes understanding and agreement for treatment  Counseling / Coordination of Care:    Patient's progress discussed with staff in treatment team meeting  Medications, treatment progress and treatment plan reviewed with patient      Micky Burnett MD 01/28/19

## 2019-01-28 NOTE — NURSING NOTE
Patient received Trazadone 50 mg po prn at 2145 and slept well  Patient was less depressed, less tearful and much brighter affect last evening  Behaviors controlled and appropriate all evening  Medication complaint  Reports no issues related to sleep  Patient in bed at this time and appears to be sleeping  Eyes closed and chest movements noted  Offered no complaints    Patient did not wake up to request any PRN medications during the night

## 2019-01-28 NOTE — DISCHARGE INSTR - OTHER ORDERS
If you are experiencing a mental health emergency, you may call the [address] 24 hours a day, 7 days per week at [phone]  In [facility], call [phone]  When you need someone to listen, the Michael Franks is available for 16 hours a day, 7 days a week, from the time of 7-10am and 2pm-2am   It is not available from the hours of 2am-6am and 10am-2pm  A representative can be reached at [phone]  The [facility] Family-to-Family Education Program is a free 12-week (2 1/2 hours/week) course for families of individuals with severe brain disorders (mental illnesses)  The classes are taught by trained family members  All course materials are furnished at no cost to you  Below are some details  To register, e-mail [email or web address] or call [phone]  The curriculum focuses on schizophrenia, bipolar disorder (manic depression), clinical depression, panic disorders and obsessive-compulsive disorder (OCD)  The course discusses the clinical treatment of these illnesses and teaches the knowledge and skills that family members need to cope more effectively    Topics Include:  Learning about feelings, learning about facts   Schizophrenia, major depression and keenan: diagnosis and dealing with critical periods   Subtypes of depression and bipolar disorder, panic disorder and OCD; diagnosis and causes; sharing our stories   The biology of the brain/new research   Problem solving workshops   Medication review   Empathy workshop  what its like to have a brain disorder   Communication skills workshop   Self-care and relative groups   Rehabilitation, services available   Advocacy: fighting stigma   Review and certification ceremony    Mljx-pe-Zqhy Education Course  The [employer] Education class is a ten week  two hours per week  experiential education course on the topic of recovery for any person with serious mental illness who is interested in establishing and maintaining wellness  The course uses a combination of lecture, interactive exercises, and structural group processes  The diversity of experience among participants affords for a lively dynamic that moves the course along  [employer]s Bmma-br-Nbzc Education class is offered free of charge to people who experience mental illness  You do not need to be a member of [employer] to take the course  Courses are taught by teams of trained mentors/peer-teachers who are themselves experienced at living well with mental illness  Below are some details  To register, call [phone] or e-mail [email or web address]  Sign up today! [facility] group is for family members, caregivers, and loved ones of individuals living with the everyday challenges of mental illness  The leaders are family members in the same situation  Sessions take place in an intimate, confidential setting to allow families to share openly with each other  These support groups allow participants to learn from the experiences of other group members, share coping strategies, and offer each other encouragement and understanding  Celina Pantoja know that you are not alone  Drop inno registration is necessary  Here are the times and locations  MELY  Monthly: 3rd Monday, 7:00-8:30 pm  [place]onda  [address]  Monthly: 4th Tuesday, 7:00-8:30 pm  [address] [facility]  Monthly: 1st Monday, 7:00-8:30 pm  [address]         Monthly Support for Persons with Mental Illness  The Peer Support Group is a monthly meeting for individuals facing the challenges of recovering from severe and persistent mental illness  Depression, manic depression, schizophrenia, and general anxiety disorder are only a few of the diagnoses of individuals who have found a supportive place at our meetings    Our [facility]  We are a fellowship of individuals who share a common goal of recovery and the ability to maintain mental and emotional stability  We help others and ourselves through sharing our experiences, strength and hope with each other  No matter how traumatic our past or how despairing our present may be, there is hope for a new day  Sessions take place in an intimate, confidential setting to allow individuals to share openly with each other  Julius March know that you are not alone  Drop inno registration is necessary  Here are the times and locations    ZEENAT  Monthly: 1st Monday, 7:00-8:30 pm  [facility]  [address], [place]   [id number]  Monthly: 3rd Monday, 7:00-8:30 pm  [address]  [address] [facility]

## 2019-01-28 NOTE — PROGRESS NOTES
Pt attended discharge group and self discovery  Pt engaged and able to self reflect on mh recovery needs  Pt cooperative and anxious in presentation  Pt needed prompting to participate  Group focused on knowing diagnosis, positive thinking and goals for the future  Admissions group focused on signs and symptoms of depression and bipolar diagnosis  Pt was respectful and cooperative  Continue to provide therapeutic group support  01/28/19 1430   Activity/Group Checklist   Group Other (Comment)  (self discovery)   Attendance Attended   Attendance Duration (min) 46-60   Interactions Interacted appropriately   Affect/Mood Appropriate   Goals Achieved Identified feelings; Discussed coping strategies; Discussed self-esteem issues; Increased hopefulness; Displayed empathy;Identified distorted thoughts/beliefs; Able to listen to others; Able to engage in interactions; Able to reflect/comment on own behavior;Able to manage/cope with feelings;Verbalized increased hopefulness; Able to self-disclose; Able to recieve feedback; Able to give feedback to another;Able to experience relief/decrease in symptoms

## 2019-01-28 NOTE — PLAN OF CARE
Anxiety     Anxiety is at manageable level Progressing        Depression     Treatment Goal: Demonstrate behavioral control of depressive symptoms, verbalize feelings of improved mood/affect, and adopt new coping skills prior to discharge Progressing     Verbalize thoughts and feelings Progressing     Refrain from harming self Progressing     Refrain from [address] Attend and participate in unit activities, including therapeutic, recreational, and educational groups Progressing

## 2019-01-29 PROCEDURE — 99232 SBSQ HOSP IP/OBS MODERATE 35: CPT | Performed by: PSYCHIATRY & NEUROLOGY

## 2019-01-29 RX ADMIN — QUETIAPINE FUMARATE 200 MG: 100 TABLET ORAL at 22:45

## 2019-01-29 RX ADMIN — SERTRALINE HYDROCHLORIDE 200 MG: 50 TABLET ORAL at 08:20

## 2019-01-29 RX ADMIN — TRAZODONE HYDROCHLORIDE 50 MG: 50 TABLET ORAL at 22:45

## 2019-01-29 RX ADMIN — GABAPENTIN 100 MG: 100 CAPSULE ORAL at 22:45

## 2019-01-29 RX ADMIN — LEVOTHYROXINE SODIUM 75 MCG: 75 TABLET ORAL at 06:02

## 2019-01-29 RX ADMIN — BUPROPION HYDROCHLORIDE 450 MG: 300 TABLET, FILM COATED, EXTENDED RELEASE ORAL at 08:20

## 2019-01-29 RX ADMIN — FLUTICASONE PROPIONATE 2 SPRAY: 50 SPRAY, METERED NASAL at 08:21

## 2019-01-29 NOTE — PROGRESS NOTES
Pt presents with blunted affect  Brightens slightly upon approach  C/o medications making her tired and would like to take them at night  She is encouraged to speak with her doctor regarding this  Dr Imani Singh note added  Medication and meal complaint  2/10 depression, 1/4 anxiety  Will monitor

## 2019-01-29 NOTE — PROGRESS NOTES
Pt attended problem solving group  Pt initially mentioned she was tired from medication adjustments and mh needs  Pt making steady progress towards goals  Pt utilize problem solving example for group and shared her concerns about financial/work situation  Pt able to brainstorm and focus on mh recovery needs  Pt mentioned anxiety is a barrier  Pt was able to self reflect and provide positive feedback and critical thinking  Pt engaged in problem solving worksheet and applying methods to her own mh recovery needs  Group focus on positive self talk, positive coping skills, self esteem, problem solving steps and process  Continue to provide therepeutic group support  01/29/19 1000   Activity/Group Checklist   Group Other (Comment)  (problem solving)   Attendance Attended   Attendance Duration (min) 46-60   Interactions Interacted appropriately   Affect/Mood Appropriate   Goals Achieved Identified feelings; Discussed coping strategies; Increased hopefulness; Displayed empathy;Identified distorted thoughts/beliefs; Identified resources and support systems; Able to listen to others; Able to engage in interactions; Able to reflect/comment on own behavior;Able to manage/cope with feelings

## 2019-01-29 NOTE — PROGRESS NOTES
Patient received at 1900, and was bright in affect and pleasant upon approach  Patient was cooperative with care and medication compliant  AAOx4  Patient was visible and social in milieu as she was observed ambulating around unit socializing with a group of peers  Interactions with other patients and staff were appropriate  Patient denied SI, HI, A/V hallucinations, pain, and depression (pt stated she was depressed and crying earlier in the day, but was feeling much better with no crying or depression tonight)  Patient reported 2/4 anxiety  No negative behaviors or distress noted/observed, and patient was encouraged to attend groups tomorrow  No PRN"s were requested  Q15 minute rounding was continued for patient safety  Will continue to monitor and offer therapeutic support

## 2019-01-29 NOTE — PROGRESS NOTES
Progress Note - James Boudreaux 66 y o  female MRN: [id number]   Unit/Bed#: [facility] 376-01 Encounter: [id number]    Behavior over the last 24 hours: unchanged  "I am extremely tired"  Rowan Goldstein appears anxious and depressed, continues to report anxiety symptoms and depressive symptoms, continues to report low energy, poor sleep and anhedonia today  Talked about ways of dealing with cognitive distortions and guilt  Also on dealing with psychosocial stressors  Compliant with medications  Participates appropriately in milieu  Socializes with peers  Sleep: insomnia  Appetite: normal  Medication side effects: No   ROS: no complaints    Mental Status Evaluation:    Appearance:  casually dressed   Behavior:  cooperative, calm   Speech:  normal rate and volume   Mood:  depressed, anxious   Affect:  tearful   Thought Process:  logical, coherent, goal directed   Associations: intact associations   Thought Content:  normal   Perceptual Disturbances: none   Risk Potential: Suicidal ideation - None at present  Homicidal ideation - None at present  Potential for aggression - No   Sensorium:  oriented to person, place and time/date   Memory:  recent and remote memory grossly intact   Consciousness:  alert and awake   Attention: attention span and concentration are age appropriate   Insight:  fair   Judgment: improving   Gait/Station: normal gait/station   Motor Activity: no abnormal movements     Vital signs in last 24 hours:    Temp:  [97 °F (36 1 °C)-98 4 °F (36 9 °C)] 97 °F (36 1 °C)  HR:  [76-81] 81  Resp:  [16] 16  BP: (119-128)/(71-76) 119/75    Laboratory results:  I have personally reviewed all pertinent laboratory/tests results  Progress Toward Goals: improving gradually    Assessment/Plan   Principal Problem:    Severe recurrent major depression without psychotic features [facility])  Active Problems:    Anxiety    Hypothyroidism    Recommended Treatment:     Planned medication and treatment changes:     All current active medications have been reviewed  Encourage group therapy, milieu therapy and occupational therapy  Behavioral Health checks every 15 minutes  Change Zoloft from QAM to QHS    Current Facility-Administered Medications:  albuterol 2 5 mg Nebulization Q6H PRN Lafe Duane, MD   aluminum-magnesium hydroxide-simethicone 30 mL Oral Q4H PRN Cherise El MD   benztropine 1 mg Intramuscular Q6H PRN Cherise El MD   benztropine 1 mg Oral Q6H PRN Cherise El MD   buPROPion 450 mg Oral Daily Daryl Gaviria MD   fluticasone 2 spray Each Nare Daily Daryl Gaviria MD   gabapentin 100 mg Oral HS Darby Figueroa MD   haloperidol 5 mg Oral Q6H PRN Cherise El MD   ibuprofen 400 mg Oral Q8H PRN Cherise El MD   influenza vaccine 0 5 mL Intramuscular Prior to discharge Lafe Duane, MD   levothyroxine 75 mcg Oral Early Morning Lafe Duane, MD   LORazepam 1 mg Oral Q4H PRN Cherise El MD   magnesium hydroxide 30 mL Oral Daily PRN Cherise El MD   OLANZapine 10 mg Intramuscular BID PRN Cherise El MD   QUEtiapine 200 mg Oral HS Darby Figueroa MD   risperiDONE 1 mg Oral Q3H PRN MD Fern Gay ON 1/30/2019] sertraline 200 mg Oral HS Monroe Brunner, MD   traZODone 50 mg Oral HS PRN Cherise El MD   traZODone 50 mg Oral HS Daryl Gaviria MD       Risks / Benefits of Treatment:    Risks, benefits, and possible side effects of medications explained to patient and patient verbalizes understanding and agreement for treatment  Counseling / Coordination of Care:    Patient's progress discussed with staff in treatment team meeting  Medications, treatment progress and treatment plan reviewed with patient  Reassurance and supportive therapy provided  Encouraged participation in milieu and group therapy on the unit      Monroe Brunner, MD 01/29/19

## 2019-01-29 NOTE — PROGRESS NOTES
01/29/19 1345   Activity/Group Checklist   Group Other (Comment)  (Art Therapy Process Group/Visual Introduction, Discussion)   Attendance Attended   Attendance Duration (min) Greater than 60   Interactions Interacted appropriately   Affect/Mood Appropriate; Constricted  (Emotional at times)   Goals Achieved Identified feelings; Able to listen to others; Able to engage in interactions; Able to reflect/comment on own behavior;Able to manage/cope with feelings; Able to recieve feedback; Able to give feedback to another  (Able to engage art materials and directive)     Patient process appeared focused and involved; very cognitive and thoughtful  Artwork reflected avoidance of connecting with emotions creatively with little connection to various themes  Sense of self off-balance, overshadowed by external factors that are affecting her emotional/mental state  Patient able to share artwork and participate in discussion  Able to work through emotional moment, with support  Patient apprehensive about gaining insight to issues, though seems motivated to challenge self  No obvious indicators of SI, HI or psychosis

## 2019-01-29 NOTE — PLAN OF CARE
Problem: DEPRESSION  Goal: Will be euthymic at discharge  INTERVENTIONS: DOROTA Lepe  - Encourage patient to attend and participate in art therapy  - Support patient taking ownership of thoughts/behavior contributing to depression   - Explore concepts of hope, grounding and healthy expression through discussion and exercising properties of art materials and process  Outcome: Progressing      Problem: SELF CARE DEFICIT  Goal: Return ADL status to a safe level of function  INTERVENTIONS: DOROTA Lepe  - Encourage to attend and participate in art therapy   - Provide means incorporate new coping strategies and self care  - Explore concepts of alternative perspective, awareness  and healthy expression through discussion and exercising properties of art materials and process       Outcome: Progressing  Patient began attending art therapy with full participation

## 2019-01-30 PROCEDURE — 99232 SBSQ HOSP IP/OBS MODERATE 35: CPT | Performed by: PSYCHIATRY & NEUROLOGY

## 2019-01-30 RX ADMIN — BUPROPION HYDROCHLORIDE 450 MG: 300 TABLET, FILM COATED, EXTENDED RELEASE ORAL at 08:20

## 2019-01-30 RX ADMIN — TRAZODONE HYDROCHLORIDE 50 MG: 50 TABLET ORAL at 21:28

## 2019-01-30 RX ADMIN — GABAPENTIN 100 MG: 100 CAPSULE ORAL at 21:28

## 2019-01-30 RX ADMIN — QUETIAPINE FUMARATE 200 MG: 100 TABLET ORAL at 21:28

## 2019-01-30 RX ADMIN — SERTRALINE HYDROCHLORIDE 200 MG: 50 TABLET ORAL at 21:27

## 2019-01-30 RX ADMIN — LEVOTHYROXINE SODIUM 75 MCG: 75 TABLET ORAL at 06:07

## 2019-01-30 NOTE — PLAN OF CARE
Problem: DISCHARGE PLANNING  Goal: Discharge to home or other facility with appropriate resources  INTERVENTIONS:  - Identify barriers to discharge w/patient and caregiver  - Arrange for needed discharge resources and transportation as appropriate  - Deny SI, depression, increase sleep and appetite  Symptoms will resolve or diminish upon discharge  - Comply with meds, attend 75% of group therapy, identify positive coping skills  - Identify discharge learning needs (meds, outpatient mental health services)  - Arrange for interpretive services to assist at discharge as needed  - Refer to Case Management Department for coordinating discharge planning if the patient needs post-hospital services based on physician/advanced practitioner order or complex needs related to functional status, cognitive ability, or social support system    Outcome: Progressing  Worker in contact with pt's sister

## 2019-01-30 NOTE — PLAN OF CARE
Problem: DISCHARGE PLANNING  Goal: Discharge to home or other facility with appropriate resources  INTERVENTIONS:  - Identify barriers to discharge w/patient and caregiver  - Arrange for needed discharge resources and transportation as appropriate  - Deny SI, depression, increase sleep and appetite  Symptoms will resolve or diminish upon discharge  - Comply with meds, attend 75% of group therapy, identify positive coping skills  - Identify discharge learning needs (meds, outpatient mental health services)  - Arrange for interpretive services to assist at discharge as needed  - Refer to Case Management Department for coordinating discharge planning if the patient needs post-hospital services based on physician/advanced practitioner order or complex needs related to functional status, cognitive ability, or social support system    Outcome: Progressing  Worker opened short term disability claim through [employer] and Disability Team  Pt approved from 1/26/19 through 3/1/19

## 2019-01-30 NOTE — PROGRESS NOTES
Progress Note - James Boudreaux 66 y o  female MRN: [id number]   Unit/Bed#: [facility] 376-01 Encounter: [id number]    Behavior over the last 24 hours: some improvement  "I still feel low"     Mariana Ott continues to report anxiety and depressive symptoms, appears depressed, reports less frequent crying spells today  She was able to identify several aspects of her life that she plans to work on  Compliant with medications  Participates appropriately in group therapy  Social with peers  Sleep: improved  Appetite: normal  Medication side effects: No   ROS: no complaints    Mental Status Evaluation:    Appearance:  casually dressed, dressed appropriately   Behavior:  normal, cooperative, calm   Speech:  normal rate and volume   Mood:  depressed   Affect:  constricted   Thought Process:  goal directed, linear   Associations: intact associations   Thought Content:  normal   Perceptual Disturbances: none   Risk Potential: Suicidal ideation - None at present  Homicidal ideation - None at present  Potential for aggression - Not at present   Sensorium:  oriented to person, place and time/date   Memory:  recent and remote memory grossly intact   Consciousness:  alert and awake   Attention: attention span and concentration are age appropriate   Insight:  fair   Judgment: improving   Gait/Station: normal gait/station   Motor Activity: no abnormal movements     Vital signs in last 24 hours:    Temp:  [97 5 °F (36 4 °C)-97 7 °F (36 5 °C)] 97 7 °F (36 5 °C)  HR:  [79-82] 82  Resp:  [16] 16  BP: (105-124)/(50-70) 105/50    Laboratory results:  I have personally reviewed all pertinent laboratory/tests results  Progress Toward Goals: improving gradually    Assessment/Plan   Principal Problem:    Severe recurrent major depression without psychotic features [facility])  Active Problems:    Anxiety    Hypothyroidism    Recommended Treatment:     Planned medication and treatment changes:     All current active medications have been reviewed  Encourage group therapy, milieu therapy and occupational therapy  Behavioral Health checks every 15 minutes  Continue current medications:    Current Facility-Administered Medications:  albuterol 2 5 mg Nebulization Q6H PRN Fanny Blakely MD   aluminum-magnesium hydroxide-simethicone 30 mL Oral Q4H NAYAN Muir MD   benztropine 1 mg Intramuscular Q6H PRWESTLEY Muir MD   benztropine 1 mg Oral Q6H PRWESTLEY Muir MD   buPROPion 450 mg Oral Daily Yazmin Franco MD   fluticasone 2 spray Each Nare Daily Yazmin Franco MD   gabapentin 100 mg Oral HS Jennifer Browne MD   haloperidol 5 mg Oral Q6H PRWESTLEY Muir MD   ibuprofen 400 mg Oral Q8H PRN Keke Muir MD   influenza vaccine 0 5 mL Intramuscular Prior to discharge Fanny Blakely MD   levothyroxine 75 mcg Oral Early Morning Fanny Blakely MD   LORazepam 1 mg Oral Q4H PRWESTLEY Muir MD   magnesium hydroxide 30 mL Oral Daily PRWESTLEY Muri MD   OLANZapine 10 mg Intramuscular BID PRWESTLEY Muir MD   QUEtiapine 200 mg Oral HS Jennifer Browne MD   risperiDONE 1 mg Oral Q3H PRWESTLEY Muir MD   sertraline 200 mg Oral HS Shawn Ignacio MD   traZODone 50 mg Oral HS PRWESTLEY Muir MD   traZODone 50 mg Oral HS Yazmin Franco MD       Risks / Benefits of Treatment:    Risks, benefits, and possible side effects of medications explained to patient and patient verbalizes understanding and agreement for treatment  Counseling / Coordination of Care:    Patient's progress discussed with staff in treatment team meeting  Medications, treatment progress and treatment plan reviewed with patient  Reassurance and supportive therapy provided      Shawn Ignacio MD 01/30/19

## 2019-01-30 NOTE — PROGRESS NOTES
01/30/19 6925   Activity/Group Checklist   Group Other (Comment)  (Art Therapy Process Group/Ownership, Discussion)   Attendance Attended   Attendance Duration (min) Greater than 60   Interactions Interacted appropriately   Affect/Mood Appropriate   Goals Achieved Identified feelings; Discussed coping strategies; Identified distorted thoughts/beliefs; Able to listen to others; Able to reflect/comment on own behavior;Able to engage in interactions; Able to manage/cope with feelings;Verbalized increased hopefulness; Able to recieve feedback; Able to give feedback to another;Able to experience relief/decrease in symptoms  (Able to engage art materials and gain insight)

## 2019-01-30 NOTE — SOCIAL WORK
Worker contacted pt's sister, Jennifer Pinon  Sister lives in [address] [facility]  Worker explained pt has no discharge date as of yet, is compliant with medications and has been pleasant and cooperative on the unit  Worker explained worker completed short term disability claim today so pt will not have to worry about returning to work until 3/1  Worker explained this may also give pt time to search for more fitting jobs  Sister explained pt does not have skills in finding and applying for jobs  Sister reports pt still looks in the newspaper and will walk in to apply for jobs  Sister explained while these are possible ways to get a job, most applications are now online  Sister reports pt does not have great technology skills, so this may be a bridge that needs to be gapped in order for pt to successfully get a job  Worker explained she will check with pt's insurance to see if her benefits cover ICM services  Worker educated sister on ICM services and that they could potentially help pt with job application skills  Worker will also have to see if pt is interested in services  Sister would like pt to have an ICM  Worker explained other services offered would be therapy and psychiatry  Sister reports pt loves the group therapy and would like to know if there are any group therapy options in the community for pt to attend  Worker explained she will provide pt with these types of resources in pt's discharge packet  Sister informed worker pt does have an issue with hoarding  Sister reports pt's apartment are becoming unlivable over time  Sister reports pt's apt is not longer safe and the entrance and exits are almost blocked  Sister explained she believes this is a fire hazard  Pt's kitchen has no room for pt to cook in  Pt is self-conscious about her hoarding and now keeps her blinds closed so no one can see into her apartment  Sister explained pt sits in the dark       Pt's sister and sister's  plans to come visit from [place] this weekend and also help pt be prepared for discharge  Sister reports they plan to make some repairs in the apartment, do some laundry and some grocery shopping  Sister reports pt has good medication compliance  Sister would like to be updated with discharge dates when known more clearly  Sister appears very supportive

## 2019-01-30 NOTE — PROGRESS NOTES
01/30/19 1000   Activity/Group Checklist   Group Other (Comment)  (Surviving Trauma/Education, Open Discussion)   Attendance Attended   Attendance Duration (min) Greater than 60   Interactions Interacted appropriately   Affect/Mood Appropriate   Goals Achieved Discussed self-esteem issues; Increased hopefulness; Displayed empathy;Identified distorted thoughts/beliefs; Able to listen to others; Able to engage in interactions; Able to reflect/comment on own behavior;Able to manage/cope with feelings; Able to recieve feedback; Able to give feedback to another

## 2019-01-30 NOTE — PROGRESS NOTES
Pt pleasant upon approach  Visible and social on the unit  Denies anxiety, AH,VH,HI,SI  C/o difficulty waking up this morning  Will monitor

## 2019-01-30 NOTE — PLAN OF CARE
Anxiety     Anxiety is at manageable level Progressing        Depression     Treatment Goal: Demonstrate behavioral control of depressive symptoms, verbalize feelings of improved mood/affect, and adopt new coping skills prior to discharge Progressing     Verbalize thoughts and feelings Progressing     Refrain from harming self Progressing     Refrain from [address] Attend and participate in unit activities, including therapeutic, recreational, and educational groups Progressing          Pt continues to be visible and social on the unit  Pleasant with peers and staff  Often seen smiling and joking with peers  Not noticed sleeping or withdrawing to room

## 2019-01-30 NOTE — PROGRESS NOTES
Pt attended discharge group  Pt able to engage in group dialogue  Pt pleasant and expresses interest in group topic  Pt needs increased confidence and abilities working on her mh recovery goals  Pt hesitates and underlying concerns need to continue to be addressed  Pt anxious and hopeless  Pt did acknowledge that her EAP was helpful from her work assistance program and was aware of that resource  She is continuing to educate herself on community providers and provide further confidence building  Pt showing investment into herself  Pt able to develop a crisis plan for self and work on skills for her own personal mh recovery  Group reviewed problem behaviors, triggers, warning signs and interventions to deescalate  Pt was able to identify own personal strengths and needs to keep calm and safe  Continue to provide therepeutic group support  01/30/19 1300   Activity/Group Checklist   Group Admission/Discharge   Attendance Attended   Attendance Duration (min) 31-45   Interactions Interacted appropriately   Affect/Mood Appropriate   Goals Achieved Identified feelings; Discussed coping strategies; Discussed discharge plans; Able to listen to others; Able to engage in interactions; Able to reflect/comment on own behavior;Able to manage/cope with feelings;Verbalized increased hopefulness; Able to self-disclose

## 2019-01-30 NOTE — PROGRESS NOTES
Patient received at 1900, and again was bright and pleasant upon approach  Patient was cooperative with care and medication compliant  AAOx4  Patient was visible and social in milieu as she was observed sitting in conference room playing cards and socializing with a group of peers  Interactions with other patients and staff were appropriate  Patient denied SI, HI, A/V hallucinations, pain, and anxiety (pt  stated she was anxious earlier in the day, but stated playing cards helped her relax and decrease her anxiety) Patient reported 5/10 depression  No negative behaviors or distress noted/observed, and patient was encouraged to attend groups tomorrow  No PRN's were requested  Q15 minute rounding was continued for patient safety  Will continue to monitor and offer therapeutic support

## 2019-01-31 LAB
CHOLEST SERPL-MCNC: 230 MG/DL
HDLC SERPL-MCNC: 46 MG/DL (ref 40–59)
LDLC SERPL CALC-MCNC: 156 MG/DL
NONHDLC SERPL-MCNC: 184 MG/DL
TRIGL SERPL-MCNC: 141 MG/DL

## 2019-01-31 PROCEDURE — 99232 SBSQ HOSP IP/OBS MODERATE 35: CPT | Performed by: PSYCHIATRY & NEUROLOGY

## 2019-01-31 PROCEDURE — 80061 LIPID PANEL: CPT | Performed by: PSYCHIATRY & NEUROLOGY

## 2019-01-31 RX ORDER — BUPROPION HYDROCHLORIDE 300 MG/1
300 TABLET ORAL DAILY
Status: DISCONTINUED | OUTPATIENT
Start: 2019-02-01 | End: 2019-02-01

## 2019-01-31 RX ADMIN — LEVOTHYROXINE SODIUM 75 MCG: 75 TABLET ORAL at 05:35

## 2019-01-31 RX ADMIN — FLUTICASONE PROPIONATE 2 SPRAY: 50 SPRAY, METERED NASAL at 09:03

## 2019-01-31 RX ADMIN — GABAPENTIN 100 MG: 100 CAPSULE ORAL at 22:46

## 2019-01-31 RX ADMIN — TRAZODONE HYDROCHLORIDE 50 MG: 50 TABLET ORAL at 22:46

## 2019-01-31 RX ADMIN — SERTRALINE HYDROCHLORIDE 200 MG: 50 TABLET ORAL at 22:45

## 2019-01-31 RX ADMIN — QUETIAPINE FUMARATE 200 MG: 100 TABLET ORAL at 22:46

## 2019-01-31 RX ADMIN — BUPROPION HYDROCHLORIDE 450 MG: 300 TABLET, FILM COATED, EXTENDED RELEASE ORAL at 09:03

## 2019-01-31 NOTE — PROGRESS NOTES
Patient is visible and social on the unit  Pleasant with peers and staff  Med and meal compliant   Patient requested med before bed  Will continue to monitor and provide therapeutic support

## 2019-01-31 NOTE — PROGRESS NOTES
Patient stated she did not sleep good last night, She told staff she was not going to groups this morning because  She needs to get some sleep but stated that she will go  Later on to groups

## 2019-01-31 NOTE — PROGRESS NOTES
Pt attended stress management and recovery principles group  Pt alert and engaged in group process  Pt continuing to make progress towards goals  Pt asks appropriate questions and working on decision making  Continue to provide education and mh recovery support  01/31/19 1300   Activity/Group Checklist   Group Other (Comment)  (recovery principles )   Attendance Attended   Attendance Duration (min) 31-45   Interactions Interacted appropriately   Affect/Mood Appropriate   Goals Achieved Discussed coping strategies; Displayed empathy;Identified distorted thoughts/beliefs; Identified resources and support systems; Able to engage in interactions; Able to reflect/comment on own behavior;Able to manage/cope with feelings;Verbalized increased hopefulness; Able to self-disclose; Able to recieve feedback; Able to give feedback to another;Able to experience relief/decrease in symptoms

## 2019-01-31 NOTE — PROGRESS NOTES
Patient requested to meet with this therapist after the art therapy group  Patient had questions regarding personal issues she has been connecting to what she has learned during group throughout the week  This therapist provided support, accountability and compassion  Patient was able to identify patterns occurring within the trouble she has reported having with past employers that arise from her  She was able to connect them to dysfunctional family systems and expressed some relief at the sense they were making to her  Patient continues to challenge her norms with new perceptions, which seems to be making a difference for her  Patient became emotional as she processed this information, stating she was feeling relief and release  This therapist updated the nurses patient may become emotional as she digests her breakthrough

## 2019-01-31 NOTE — PROGRESS NOTES
Progress Note - James Boudreaux 66 y o  female MRN: [id number]   Unit/Bed#: U 376-01 Encounter: [id number]    Behavior over the last 24 hours: some improvement  CC "I'm doing alright but still tired though"  Shazia Garza appears slightly less anxious and depressed, continues to report depressive symptoms and some anxiety, reports low energy today  She talked about coping skills and how to deal with some of her stressors  Was stressed yesterday regarding conversations with her sister about job changes  Compliant with medications  Participates appropriately in milieu  Participates appropriately in group therapy  Sleep: improved, slept better, with some daytime drowsiness  Appetite: normal  Medication side effects: No   ROS: no complaints    Mental Status Evaluation:    Appearance:  casually dressed, dressed appropriately   Behavior:  normal, pleasant, cooperative   Speech:  normal rate and volume   Mood:  depressed   Affect:  constricted, reactive   Thought Process:  coherent, goal directed   Associations: intact associations   Thought Content:  no overt delusions   Perceptual Disturbances: none   Risk Potential: Suicidal ideation - None at present  Homicidal ideation - None at present  Potential for aggression - Not at present   Sensorium:  oriented to person, place and time/date   Memory:  recent and remote memory grossly intact   Consciousness:  alert and awake   Attention: attention span and concentration are age appropriate   Insight:  good and improved   Judgment: good and improved   Gait/Station: normal gait/station   Motor Activity: no abnormal movements     Vital signs in last 24 hours:    Temp:  [97 2 °F (36 2 °C)-97 9 °F (36 6 °C)] 97 9 °F (36 6 °C)  HR:  [75-81] 81  Resp:  [16] 16  BP: (113-137)/(60-73) 113/73    Laboratory results:  I have personally reviewed all pertinent laboratory/tests results      Progress Toward Goals: progressing gradually    Assessment/Plan   Principal Problem:    Severe recurrent major depression without psychotic features [facility])  Active Problems:    Anxiety    Hypothyroidism    Recommended Treatment:     Planned medication and treatment changes: All current active medications have been reviewed  Encourage group therapy, milieu therapy and occupational therapy  Behavioral Health checks every 15 minutes  Consider SNRI  Taper off Wellbutrin    Current Facility-Administered Medications:  albuterol 2 5 mg Nebulization Q6H PRN Sandra Kevin MD   aluminum-magnesium hydroxide-simethicone 30 mL Oral Q4H PRN Jose Carlos Johnson MD   benztropine 1 mg Intramuscular Q6H PRN Jose Carlos Johnson MD   benztropine 1 mg Oral Q6H PRN Jose Calros Johnson MD   buPROPion 450 mg Oral Daily Susan Aguero MD   fluticasone 2 spray Each Nare Daily Susan Aguero MD   gabapentin 100 mg Oral HS Hina Gannon MD   haloperidol 5 mg Oral Q6H PRN Jose Carlos Johnson MD   ibuprofen 400 mg Oral Q8H PRN Jose Carlos Johnson MD   influenza vaccine 0 5 mL Intramuscular Prior to discharge Sandra Kevin MD   levothyroxine 75 mcg Oral Early Morning Sandra Kevin MD   LORazepam 1 mg Oral Q4H PRN Jose Carlos Johnson MD   magnesium hydroxide 30 mL Oral Daily PRN Jose Carlos Johnson MD   OLANZapine 10 mg Intramuscular BID PRN Jose Carlos Johnson MD   QUEtiapine 200 mg Oral HS Hina Gannon MD   risperiDONE 1 mg Oral Q3H PRN Jose Carlos Johnson MD   sertraline 200 mg Oral HS Alicja Rendon MD   traZODone 50 mg Oral HS PRN Jose Carlos Johnson MD   traZODone 50 mg Oral HS Susan Aguero MD       Risks / Benefits of Treatment:    Risks, benefits, and possible side effects of medications explained to patient and patient verbalizes understanding and agreement for treatment  Counseling / Coordination of Care:    Patient's progress discussed with staff in treatment team meeting  Medications, treatment progress and treatment plan reviewed with patient    Reassurance and supportive therapy provided      Jomarie Cranker, MD 01/31/19

## 2019-01-31 NOTE — PROGRESS NOTES
Clinical Pharmacy Note: Medication Education Group     Intervention:  Interactive Game    Length of Group: 60 min     Methods/resources used: verbal discussion     Topics Discussed: Medication adherence, side effect management, nonpharmacologic strategies, medication effectiveness and indications      Time spent in group: Entire time      Patient Specific concerns: Kathryn Cannon presented to group today dressed in street clothing and appeared well-groomed and alert and oriented to person, place and time  Kathryn Cannon seemed depressed  Patient's affect was mainly pleasant and quiet and she listened attentively and asked a few questions  Patient was respectful of others throughout and interacted appropriately with peers  Kathryn Cannon did have specific concerns about how each of her medications affected her appetite  Group discussed bupropion, sertraline, and quetiapine and how antidepressants typically do not increase appetite (unless decreased appetite was a symptom of depression) and antipsychotics can cause increased appetite and weight gain  Kathryn Cannon had appropriate insight and said quetiapine has helped her for years and it wouldn't be worth stopping it to lose weight       Patient understood counseling: yes     Electronically signed by: Leidy Godfrey, Pharmacist

## 2019-01-31 NOTE — SOCIAL WORK
Pt's sister called with questions for worker regarding "the big picture"  Sister is in agreement that [employer] may be toxic for pt and leaving the job may be beneficial  Sister is concerned for pt when she has no income due to leaving employment and what should the steps to follow be  Worker explained it is pt's choice and responsibility to leave her current employment  Worker explained pt is approved through 3/1 for disability through [employer] benefits so pt has time to create a plan  Sister reports pt is already getting $80 from their father to help pt afford her apartment  Worker explained this will be another responsibility of pt to inform father of her plans and accept whether or not pt will continue to receive assistance from her father  Worker explained pt has options for when she decides to leave [employer]  Worker explained pt has about one month to begin searching and applying to jobs  Worker explained she would not be able to begin working somewhere new due to the short term disability  Worker explained pt could at least have potential places lined up so there is not a large time period of unemployment with no income  Worker explained worker is referring pt to an outpatient provider with a  Sliding scale for uninsured  Worker explained this would be expensive but she would still be able to access services  Worker explained pt would be able to apply for medicaid when pt becomes unemployed leading to being uninsured  Worker explained pt could follow up with the [facility] mental health office until her benefits were to become active and qualify for services in the interim  Worker explained SSD is difficult to be approved for and it may be a long process for pt to be approved due to few hospitalizations, no current outpatient services and holding a full-time job appropriately for so long  Worker explained [employer] may be a more appropriate option for pt and pt can even maintain a small part-time job while receiving SSI  Sister concerned she may have pushed this topic too much with pt last night focusing on what will happen once she quits [employer]  Worker explained although pt is dealing with her mental health at this point, she still needs to remain accountable  Worker explained sister does need to remain cautious and supportive but it is okay to talk about the responsibilities of life  Worker explained to sister it may be beneficial to make the responsibility known, but then move forward without perseverating on it too much  Worker explained pt is already very overwhelmed and anxious, so she needs to be aware of responsibilities, but also given space to regain her mental health well-being  Sister verbalized understanding  Sister appreciative of tx being provided for pt  Worker will remain in contact with sister

## 2019-01-31 NOTE — PROGRESS NOTES
Patient slept through the night,Patient was observed sleeping, with eyes closed, chest movements noted, and breathing heard during sleep period  Will continue to monitor and provide therapeutic support

## 2019-01-31 NOTE — PLAN OF CARE
Anxiety     Anxiety is at manageable level Progressing        Depression     Treatment Goal: Demonstrate behavioral control of depressive symptoms, verbalize feelings of improved mood/affect, and adopt new coping skills prior to discharge Progressing     Verbalize thoughts and feelings Progressing     Refrain from harming self Progressing     Refrain from [address] Attend and participate in unit activities, including therapeutic, recreational, and educational groups Progressing        DEPRESSION     Will be euthymic at discharge [address] Discharge to home or other facility with appropriate resources Progressing        SELF CARE DEFICIT     Return ADL status to a safe level of function Progressing

## 2019-02-01 PROCEDURE — 99232 SBSQ HOSP IP/OBS MODERATE 35: CPT | Performed by: PSYCHIATRY & NEUROLOGY

## 2019-02-01 RX ORDER — BUPROPION HYDROCHLORIDE 150 MG/1
150 TABLET ORAL DAILY
Status: DISCONTINUED | OUTPATIENT
Start: 2019-02-02 | End: 2019-02-04

## 2019-02-01 RX ORDER — DULOXETIN HYDROCHLORIDE 20 MG/1
20 CAPSULE, DELAYED RELEASE ORAL
Status: DISCONTINUED | OUTPATIENT
Start: 2019-02-01 | End: 2019-02-04

## 2019-02-01 RX ORDER — TRAZODONE HYDROCHLORIDE 50 MG/1
50 TABLET ORAL
Status: DISCONTINUED | OUTPATIENT
Start: 2019-02-01 | End: 2019-02-01

## 2019-02-01 RX ADMIN — DULOXETINE HYDROCHLORIDE 20 MG: 20 CAPSULE, DELAYED RELEASE ORAL at 21:25

## 2019-02-01 RX ADMIN — QUETIAPINE FUMARATE 200 MG: 100 TABLET ORAL at 21:25

## 2019-02-01 RX ADMIN — LEVOTHYROXINE SODIUM 75 MCG: 75 TABLET ORAL at 05:49

## 2019-02-01 RX ADMIN — GABAPENTIN 100 MG: 100 CAPSULE ORAL at 21:26

## 2019-02-01 RX ADMIN — SERTRALINE HYDROCHLORIDE 200 MG: 50 TABLET ORAL at 21:25

## 2019-02-01 NOTE — PROGRESS NOTES
Clinical Pharmacy Note: Antipsychotic Monitoring Requirements     Lizeth Metcalf is a 66 y o  female who presents with depression and is currently taking  quetiapine 200 mg once daily  Performing metabolic monitoring on patients receiving any antipsychotics is a Centers for [employer] (CMS) requirement  Antipsychotic treatment increases the risk of developing type 2 diabetes mellitus, hypertension, and hyperlipidemia  According to the [facility] [address] (NICE) guidelines, baseline weight, waist circumference, pulse, blood pressure, fasting blood glucose, hemoglobin A1c, lipid profile, and prolactin level (if initiating risperidone or paliperidone) should be collected  These guidelines coincide with Centers and Medicare & Medicaid Services (CMS) requirements including baseline Body Mass Index, blood pressure, fasting glucose, hemoglobin A1c, and fasting lipid panel  CMS states laboratory values collected 12 months from discharge date are acceptable for evaluation  CMS Checklist:     BMI: yes  BP: yes  Fasting glucose: yes  A1c within last 12 months: yes  Lipid panel within last 12 months: yes  Nicotine Replacement Therapy: no, patient does not smoke    The following values have been collected:  Value Status Result    BMI Body mass index is 39 38 kg/m²     Blood pressure /68 (BP Location: Left arm)   Pulse 76   Temp 98 °F (36 7 °C)   Resp 16   Ht 5' 0 5" (1 537 m)   Wt 93 kg (205 lb)   LMP  (LMP Unknown)   SpO2 99%   BMI 39 38 kg/m²    Fasting glucose   0  Lab Value Date/Time   GLUC 127 (H) 01/26/2019 1501      Hemoglobin A1c   0  Lab Value Date/Time   HGBA1C 7 0 (H) 11/25/2013 1154      Lipid panel   0  Lab Value Date/Time   CHOLESTEROL 230 (H) 01/31/2019 0550       0  Lab Value Date/Time   HDL 46 01/31/2019 0550   HDL 49 11/25/2013 1154       0  Lab Value Date/Time   LDLCALC 156 (H) 01/31/2019 0550   LDLCALC 136 11/25/2013 1154 0  Lab Value Date/Time   TRIG 141 01/31/2019 0550   TRIG 122 11/25/2013 1154        ASCVD risk score: 5 8%  History of ACS, MI, stable angina, stroke, TIA, PAD, coronary/aterial revascularization:  no  LDL cholesterol =>190 mg/dL: no  Age: 66 y o  Diabetes: yes  Sex: female  Race: white  HDL Cholesterol: 46  Systolic Blood Pressure: 949  Treatment for Hypertension: yes and no  Smoker: no  (score reflects the risk of cardiovascular events such as stroke or myocardial infarction in the next 10 years)    Recommendations    Based on the results of hemoglobin A1c, lipid panel, and blood pressure monitoring, Anny Russ is an potential candidate for no pharmacological interventions and routine monitoring based on  ASCVD<7 5% and BP within goal       Based on the above information, pharmacy recommends the following: Patient does not have a recent A1c on file, but has a history of diabetes mellitus  With a history of A1c=7% and patient expressing frustrating with weight gain due to quetiapine, may be appropriate to start prophylactic metformin for metabolic syndrome        Pharmacy will continue to follow patient with team   Electronically signed by: Noa Wilson, Pharmacist

## 2019-02-01 NOTE — PLAN OF CARE
Problem: Depression  Goal: Treatment Goal: Demonstrate behavioral control of depressive symptoms, verbalize feelings of improved mood/affect, and adopt new coping skills prior to discharge  Outcome: Progressing    Goal: Verbalize thoughts and feelings  Interventions:  - Assess and re-assess patient's level of risk   - Engage patient in 1:1 interactions, daily, for a minimum of 15 minutes   - Encourage patient to express feelings, fears, frustrations, hopes    Outcome: Progressing    Goal: Refrain from harming self  Interventions:  - Monitor patient closely, per order   - Supervise medication ingestion, monitor effects and side effects    Outcome: Progressing    Goal: Refrain from isolation  Interventions:  - Develop a trusting relationship   - Encourage socialization    Outcome: Progressing    Goal: Attend and participate in unit activities, including therapeutic, recreational, and educational groups  Interventions:  - Provide therapeutic and educational activities daily, encourage attendance and participation, and document same in the medical record    Outcome: Progressing      Problem: Anxiety  Goal: Anxiety is at manageable level  Interventions:  - Assess and monitor patient's anxiety level  - Monitor for signs and symptoms of anxiety both physical and emotional (heart palpitations, chest pain, shortness of breath, headaches, nausea, feeling jumpy, restlessness, irritable, apprehensive)  - Collaborate with interdisciplinary team and initiate plan and interventions as ordered  - Weare patient to unit/surroundings  - Explain treatment plan  - Encourage participation in care  - Encourage verbalization of concerns/fears  - Identify coping mechanisms  - Assist in developing anxiety-reducing skills  - Administer/offer alternative therapies  - Limit or eliminate stimulants   Outcome: Progressing      Comments: Pt a&o x 4    Pt requested to hold morning medications until she spoke with MD since she would like a change in her meds  Pt reported after not taking the Wellbutrin this morning, she already has started to feel less anxious  Pt denies SI's, HI's, AH's, VH's  Pt social with peers  Will continue to monitor and provide therapeutic support

## 2019-02-01 NOTE — PROGRESS NOTES
02/01/19 0175   Activity/Group Checklist   Group Other (Comment)  (Art Therapy Process Group/Exploring Recovery Transformation)   Attendance Attended   Attendance Duration (min) Greater than 60   Interactions Interacted appropriately   Affect/Mood Appropriate   Goals Achieved Identified feelings; Identified distorted thoughts/beliefs; Able to listen to others; Able to engage in interactions; Able to reflect/comment on own behavior;Able to manage/cope with feelings; Able to recieve feedback; Able to give feedback to another;Able to experience relief/decrease in symptoms     Patient continues to look for insight and new ways to think about personal obstacles, reactions and behavior and how she contributes to recycling them

## 2019-02-01 NOTE — PROGRESS NOTES
Patient received at 1900, and was very personable and bright upon approach  However, patient approached author with concerns about her medication as she stated doctor was supposed to order a new medication to start at bedtime for her  Author informed her that no new medication had been started, but she was to start a lower dose of Wellbutrin tomorrow morning  Patient was upset with this news stating "I told the doctor I do not want to take that medication anymore  I told the doctor I wanted to try something else and he told me I would be given something else  Can you please tell him because I don't want to take that tomorrow morning"  Author told her he would let doctor know with a sticky note, and pass it along to next shift, and that she needed to let him know tomorrow as well  Patient verbalized comprehension, and thanked Flo Councilman for his help  Patient was cooperative with care and medication compliant  AAOx4  Patient was visible and social in milieu as she was observed ambulating around unit and sitting in dining room socializing with a group of peers  Interactions with other patients and staff were appropriate  Patient denied SI, HI, A/V hallucinations, pain, and anxiety  Patient reported 610 depression  No negative behaviors or distress noted/observed, and patient was encouraged to attend groups tomorrow  No PRN's were requested  Q15 minute rounding was continued for patient safety  Will continue to monitor and offer therapeutic support
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
## 2019-02-01 NOTE — PROGRESS NOTES
Pt attended positive coping skills group  Pt continues to struggle with feelings of depression and noted she does not feel Hildegard Pap in the coping skills she does in life  Pt engaged in group dialogue but cautious with exploring feelings  Pt acknowledged she is dealing with feelings of anger  She also spoke about her spirituality and noted she had conflictual feelings  She also addressed how her past relationships influenced her spirituality  Pt spoke about feeling uncomfortable with her emotions stating she cries often but it isn't a relief but more anger  Pt asks questions in the problem solving process and williness to learn but indicated uncertainty decision making and low self esteem  Continue to provide therepeutic group support  02/01/19 1300   Activity/Group Checklist   Group Other (Comment)  (problem solving)   Attendance Attended   Attendance Duration (min) 31-45   Interactions Interacted appropriately   Affect/Mood Appropriate   Goals Achieved Identified feelings; Discussed coping strategies; Identified distorted thoughts/beliefs; Able to listen to others; Able to engage in interactions; Able to reflect/comment on own behavior;Able to manage/cope with feelings;Verbalized increased hopefulness; Able to self-disclose; Able to recieve feedback; Able to give feedback to another;Able to experience relief/decrease in symptoms

## 2019-02-02 PROCEDURE — 99232 SBSQ HOSP IP/OBS MODERATE 35: CPT | Performed by: PHYSICIAN ASSISTANT

## 2019-02-02 RX ADMIN — QUETIAPINE FUMARATE 200 MG: 100 TABLET ORAL at 21:45

## 2019-02-02 RX ADMIN — SERTRALINE HYDROCHLORIDE 200 MG: 50 TABLET ORAL at 21:45

## 2019-02-02 RX ADMIN — BUPROPION HYDROCHLORIDE 150 MG: 150 TABLET, FILM COATED, EXTENDED RELEASE ORAL at 10:17

## 2019-02-02 RX ADMIN — GABAPENTIN 100 MG: 100 CAPSULE ORAL at 21:45

## 2019-02-02 RX ADMIN — LEVOTHYROXINE SODIUM 75 MCG: 75 TABLET ORAL at 06:53

## 2019-02-02 RX ADMIN — DULOXETINE HYDROCHLORIDE 20 MG: 20 CAPSULE, DELAYED RELEASE ORAL at 21:45

## 2019-02-02 NOTE — PROGRESS NOTES
Pt appears slightly anxious this morning  Approached this nurse and immediately stated, "I need my phone  I am past due on 2 bills because I didn't get it yesterday " Pt 2/4 anxiety and 6/10 "because of this " Denies HI,SI,AH,  Security paged and pt advised that the phone would be brought to the unit today  She is happy with this  Meal compliant  Currently resting in her room

## 2019-02-02 NOTE — PROGRESS NOTES
02/02/19 1400   Activity/Group Checklist   Group Other (Comment)  (Art Therapy/OPEN STUDIO, Social)   Attendance Attended   Attendance Duration (min) Greater than 60   Interactions Interacted appropriately   Affect/Mood Appropriate   Goals Achieved Able to listen to others; Able to engage in interactions

## 2019-02-02 NOTE — PROGRESS NOTES
It appeared patient slept through majority of the night without concerns/complaints; did not request any PRN medications   Chest rises noted, no evidence of dyspnea

## 2019-02-02 NOTE — PROGRESS NOTES
Progress Note - Behavioral Health   Angel Rodriguez 66 y o  female MRN: [id number]  Unit/Bed#: [facility] 376-01 Encounter: [id number]    Assessment/Plan   Principal Problem:    Severe recurrent major depression without psychotic features ([address] )  Active Problems:    Anxiety    Hypothyroidism      Subjective:  Patient reports slow decrease in depressive symptoms  Reports energy low and has hard time concentrating  Crying episodes are at a minimum  Has improved sleep and appetite  Denied suicidal thoughts today  Anxiety reported as more under control  No irritability or mood swings  Does not endorse manic symptoms  Denies psychotic symptoms  Medication compliant  Is on taper of Wellbutrin with recent addition of Cymbalta  Appears to be tolerating medications well without serious side effects      Current Medications:  Current Facility-Administered Medications   Medication Dose Route Frequency    albuterol inhalation solution 2 5 mg  2 5 mg Nebulization Q6H PRN    aluminum-magnesium hydroxide-simethicone (MYLANTA) 200-200-20 mg/5 mL oral suspension 30 mL  30 mL Oral Q4H PRN    benztropine (COGENTIN) injection 1 mg  1 mg Intramuscular Q6H PRN    benztropine (COGENTIN) tablet 1 mg  1 mg Oral Q6H PRN    buPROPion (WELLBUTRIN XL) 24 hr tablet 150 mg  150 mg Oral Daily    DULoxetine (CYMBALTA) delayed release capsule 20 mg  20 mg Oral HS    fluticasone (FLONASE) 50 mcg/act nasal spray 2 spray  2 spray Each Nare Daily    gabapentin (NEURONTIN) capsule 100 mg  100 mg Oral HS    haloperidol (HALDOL) tablet 5 mg  5 mg Oral Q6H PRN    ibuprofen (MOTRIN) tablet 400 mg  400 mg Oral Q8H PRN    influenza vaccine, recombinant, quadrivalent (FLUBLOK) IM injection 0 5 mL  0 5 mL Intramuscular Prior to discharge    levothyroxine tablet 75 mcg  75 mcg Oral Early Morning    LORazepam (ATIVAN) tablet 1 mg  1 mg Oral Q4H PRN    magnesium hydroxide (MILK OF MAGNESIA) 400 mg/5 mL oral suspension 30 mL  30 mL Oral Daily PRN    OLANZapine (ZyPREXA) IM injection 10 mg  10 mg Intramuscular BID PRN    QUEtiapine (SEROquel) tablet 200 mg  200 mg Oral HS    risperiDONE (RisperDAL) tablet 1 mg  1 mg Oral Q3H PRN    sertraline (ZOLOFT) tablet 200 mg  200 mg Oral HS    traZODone (DESYREL) tablet 50 mg  50 mg Oral HS PRN       Behavioral Health Medications: all current active meds have been reviewed and continue current psychiatric medications  Vitals:  Vitals:    02/02/19 0801   BP: 134/70   Pulse: 76   Resp:    Temp:    SpO2:        Laboratory results:    I have personally reviewed all pertinent laboratory/tests results  Most Recent Labs:   Lab Results   Component Value Date    WBC 7 20 01/26/2019    RBC 4 51 01/26/2019    HGB 13 2 01/26/2019    HCT 40 3 01/26/2019     01/26/2019    RDW 14 2 01/26/2019    NEUTROABS 4 50 01/26/2019    SODIUM 136 (L) 01/26/2019    K 4 2 01/26/2019     01/26/2019    CO2 26 01/26/2019    BUN 15 01/26/2019    CREATININE 0 89 01/26/2019    GLUC 127 (H) 01/26/2019    CALCIUM 9 5 01/26/2019    AST 26 01/26/2019    ALT 30 01/26/2019    ALKPHOS 116 01/26/2019    TP 7 7 01/26/2019    ALB 4 4 01/26/2019    TBILI 0 30 01/26/2019    CHOLESTEROL 230 (H) 01/31/2019    HDL 46 01/31/2019    TRIG 141 01/31/2019    LDLCALC 156 (H) 01/31/2019    [place] 184 01/31/2019    [id number] 1 880 01/26/2019    RPR Non-Reactive 01/27/2019    HGBA1C 7 0 (H) 11/25/2013     11/25/2013       Psychiatric Review of Systems:  Behavior over the last 24 hours:  improved  Sleep: normal  Appetite: normal  Medication side effects: Yes, sedation  ROS: no complaints    Mental Status Evaluation:  Appearance:  casually dressed   Behavior:  cooperative   Speech:  normal pitch and normal volume   Mood:  Less depressed and anxious   Affect:  mood-congruent   Language appropriate   Thought Process:  linear   Thought Content:  denied delusions and obsessions   Perceptual Disturbances: None   Risk Potential: Denied SI/HI   Potential for aggression: No   Sensorium:  person, place and time/date   Cognition:  grossly intact   Consciousness:  alert and awake    Recent and Remote Memory intact   Attention: attention span and concentration were age appropriate   Insight:  partial   Judgment: partial   Gait/Station: normal gait/station and normal balance   Motor Activity: no abnormal movements     Progress Toward Goals: slow progression    Recommended Treatment: Continue with group therapy, milieu therapy and occupational therapy  1   Continue taper of Wellbutrin  2  Continue increase dosing of Cymbalta in coming days  3  Continue other medications    Risks, benefits and possible side effects of Medications:   Risks, benefits, and possible side effects of medications explained to patient and patient verbalizes understanding        Yoan Velasquez PA-C

## 2019-02-02 NOTE — PROGRESS NOTES
Patient received at 200, and is a very personable and genuinely nice person  Patient had one moment earlier in shift of sobbing as she stated "I am so upset  I just talked to my sister and she said my cats are pooping all over the carpet because they are mad at me I am not there  She said she is going to take them to the vet to hold until I get out  I am so upset  I want to be there for them too"  Author expressed his empathy, and assured her that they should be ok, and she should make sure she gets better as well to be there for the cats  Patient agreed, and stated "I just feel bad for them  They must really miss me"  Patient was cooperative with care and medication compliant  AAOx4  Patient was visible and social in milieu as she was observed ambulating around unit and sitting in dining room socializing with a group of peers  Interactions with other patients and staff were appropriate  Patient denied SI, HI, A/V hallucinations, and pain  Patient reported 3/4 anxiety "because of my cats", and 610 depression  No negative behaviors or distress noted/observed, and patient was encouraged to attend groups tomorrow  No PRN's were requested  Q15 minute rounding was continued for patient safety  Will continue to monitor and offer therapeutic support

## 2019-02-02 NOTE — PROGRESS NOTES
Pt has been pleasant and cooperative  Pt visible and social on the unit  Pt is currently visiting with family  No PRNs have been requested  Will continue to monitor

## 2019-02-02 NOTE — PROGRESS NOTES
02/02/19 1015   Activity/Group Checklist   Group Other (Comment)  (Art Therapy Process Group/Perceptions, Open Discussion)   Attendance Attended   Attendance Duration (min) 46-60   Interactions Interacted appropriately   Affect/Mood Appropriate   Goals Achieved Identified feelings; Discussed coping strategies; Identified distorted thoughts/beliefs; Able to listen to others; Able to engage in interactions; Able to reflect/comment on own behavior;Able to recieve feedback; Able to give feedback to another  (Able to engage art materials and directive)

## 2019-02-03 PROCEDURE — 99232 SBSQ HOSP IP/OBS MODERATE 35: CPT | Performed by: PHYSICIAN ASSISTANT

## 2019-02-03 RX ADMIN — TRAZODONE HYDROCHLORIDE 50 MG: 50 TABLET ORAL at 21:23

## 2019-02-03 RX ADMIN — FLUTICASONE PROPIONATE 2 SPRAY: 50 SPRAY, METERED NASAL at 08:03

## 2019-02-03 RX ADMIN — DULOXETINE HYDROCHLORIDE 20 MG: 20 CAPSULE, DELAYED RELEASE ORAL at 21:11

## 2019-02-03 RX ADMIN — QUETIAPINE FUMARATE 200 MG: 100 TABLET ORAL at 21:11

## 2019-02-03 RX ADMIN — BUPROPION HYDROCHLORIDE 150 MG: 150 TABLET, FILM COATED, EXTENDED RELEASE ORAL at 08:02

## 2019-02-03 RX ADMIN — SERTRALINE HYDROCHLORIDE 200 MG: 50 TABLET ORAL at 21:11

## 2019-02-03 RX ADMIN — GABAPENTIN 100 MG: 100 CAPSULE ORAL at 21:11

## 2019-02-03 RX ADMIN — LEVOTHYROXINE SODIUM 75 MCG: 75 TABLET ORAL at 06:36

## 2019-02-03 NOTE — NURSING NOTE
Patient slept well and reported no issues related to sleep  Patient in bed at this time and appears to be sleeping  Eyes closed and chest movements noted  Offered no complaints    Patient did not wake up to request any PRN medications during the night

## 2019-02-03 NOTE — PROGRESS NOTES
Pt brightens upon approach  Denies AH,VH,HI,SI  5/10 depression, 2/4 anxiety  Continues to report dissatisfaction at Wellbutrin being ordered for the morning  Pt is visible and social on the unit  No complaints  Currently resting in bed

## 2019-02-03 NOTE — PROGRESS NOTES
Pt remains pleasant and cooperative  Pt is visible and social on the unit  Pt has not requested any PRNs  Pt reports feeling much better since her admission  Will continue to monitor

## 2019-02-03 NOTE — PROGRESS NOTES
Progress Note - Behavioral Health   Rodger Davis 66 y o  female MRN: [id number]  Unit/Bed#: [facility] 376-01 Encounter: [id number]    Assessment/Plan   Principal Problem:    Severe recurrent major depression without psychotic features ([address] )  Active Problems:    Anxiety    Hypothyroidism      Subjective:  Patient on taper of Wellbutrin with addition of Cymbalta  Reports ongoing depression but less than on admission  Denied SI  Appears to remain with low energy and not interested in socializing this morning  Did not appear overly anxious  Not irritable  Denied psychosis and doesn't show signs of keenan  Does not show signs of Serotonin syndrome  Medication compliant  Denied potential side effects      Current Medications:  Current Facility-Administered Medications   Medication Dose Route Frequency    albuterol inhalation solution 2 5 mg  2 5 mg Nebulization Q6H PRN    aluminum-magnesium hydroxide-simethicone (MYLANTA) 200-200-20 mg/5 mL oral suspension 30 mL  30 mL Oral Q4H PRN    benztropine (COGENTIN) injection 1 mg  1 mg Intramuscular Q6H PRN    benztropine (COGENTIN) tablet 1 mg  1 mg Oral Q6H PRN    buPROPion (WELLBUTRIN XL) 24 hr tablet 150 mg  150 mg Oral Daily    DULoxetine (CYMBALTA) delayed release capsule 20 mg  20 mg Oral HS    fluticasone (FLONASE) 50 mcg/act nasal spray 2 spray  2 spray Each Nare Daily    gabapentin (NEURONTIN) capsule 100 mg  100 mg Oral HS    haloperidol (HALDOL) tablet 5 mg  5 mg Oral Q6H PRN    ibuprofen (MOTRIN) tablet 400 mg  400 mg Oral Q8H PRN    influenza vaccine, recombinant, quadrivalent (FLUBLOK) IM injection 0 5 mL  0 5 mL Intramuscular Prior to discharge    levothyroxine tablet 75 mcg  75 mcg Oral Early Morning    LORazepam (ATIVAN) tablet 1 mg  1 mg Oral Q4H PRN    magnesium hydroxide (MILK OF MAGNESIA) 400 mg/5 mL oral suspension 30 mL  30 mL Oral Daily PRN    OLANZapine (ZyPREXA) IM injection 10 mg  10 mg Intramuscular BID PRN    QUEtiapine (SEROquel) tablet 200 mg  200 mg Oral HS    risperiDONE (RisperDAL) tablet 1 mg  1 mg Oral Q3H PRN    sertraline (ZOLOFT) tablet 200 mg  200 mg Oral HS    traZODone (DESYREL) tablet 50 mg  50 mg Oral HS PRN       Behavioral Health Medications: all current active meds have been reviewed and continue current psychiatric medications  Vitals:  Vitals:    02/03/19 0749   BP: 115/68   Pulse: 76   Resp:    Temp:    SpO2:        Laboratory results:    I have personally reviewed all pertinent laboratory/tests results  Most Recent Labs:   Lab Results   Component Value Date    WBC 7 20 01/26/2019    RBC 4 51 01/26/2019    HGB 13 2 01/26/2019    HCT 40 3 01/26/2019     01/26/2019    RDW 14 2 01/26/2019    NEUTROABS 4 50 01/26/2019    SODIUM 136 (L) 01/26/2019    K 4 2 01/26/2019     01/26/2019    CO2 26 01/26/2019    BUN 15 01/26/2019    CREATININE 0 89 01/26/2019    GLUC 127 (H) 01/26/2019    CALCIUM 9 5 01/26/2019    AST 26 01/26/2019    ALT 30 01/26/2019    ALKPHOS 116 01/26/2019    TP 7 7 01/26/2019    ALB 4 4 01/26/2019    TBILI 0 30 01/26/2019    CHOLESTEROL 230 (H) 01/31/2019    HDL 46 01/31/2019    TRIG 141 01/31/2019    LDLCALC 156 (H) 01/31/2019    [place] 184 01/31/2019    [id number] 1 880 01/26/2019    RPR Non-Reactive 01/27/2019    HGBA1C 7 0 (H) 11/25/2013     11/25/2013       Psychiatric Review of Systems:  Behavior over the last 24 hours:  unchanged  Sleep: normal  Appetite: normal  Medication side effects: No  ROS: no complaints    Mental Status Evaluation:  Appearance:  casually dressed   Behavior:  guarded   Speech:  normal pitch and normal volume   Mood:  less depressed and anxious   Affect:  constricted   Language sparse   Thought Process:  Goal directed and linear   Thought Content:  denied delusions and obsessions   Perceptual Disturbances: None   Risk Potential: Denied SI/HI  Potential for aggression;  No   Sensorium:     Cognition:  grossly intact   Consciousness:  awake    Recent and Remote Memory intact   Attention: attention span and concentration were age appropriate   Insight:  partial   Judgment: partial   Gait/Station: normal gait/station and normal balance   Motor Activity: no abnormal movements     Progress Toward Goals: progressing slowly    Recommended Treatment: Continue with group therapy, milieu therapy and occupational therapy  1   Continue taper of Wellbutrin and increase of Cymbalta most likely tomorrow  2  Continue other medications    Risks, benefits and possible side effects of Medications:   Risks, benefits, and possible side effects of medications explained to patient and patient verbalizes understanding        Karen Bowers PA-C

## 2019-02-04 PROCEDURE — 90682 RIV4 VACC RECOMBINANT DNA IM: CPT | Performed by: HOSPITALIST

## 2019-02-04 PROCEDURE — 99232 SBSQ HOSP IP/OBS MODERATE 35: CPT | Performed by: PSYCHIATRY & NEUROLOGY

## 2019-02-04 RX ORDER — DULOXETIN HYDROCHLORIDE 60 MG/1
60 CAPSULE, DELAYED RELEASE ORAL
Status: DISCONTINUED | OUTPATIENT
Start: 2019-02-04 | End: 2019-02-06 | Stop reason: HOSPADM

## 2019-02-04 RX ORDER — FLUTICASONE PROPIONATE 50 MCG
2 SPRAY, SUSPENSION (ML) NASAL DAILY PRN
Status: DISCONTINUED | OUTPATIENT
Start: 2019-02-04 | End: 2019-02-06 | Stop reason: HOSPADM

## 2019-02-04 RX ADMIN — INFLUENZA A VIRUS A/MICHIGAN/45/2015 (H1N1) RECOMBINANT HEMAGGLUTININ ANTIGEN, INFLUENZA A VIRUS A/SINGAPORE/INFIMH-16-0019/2016 (H3N2) RECOMBINANT HEMAGGLUTININ ANTIGEN, INFLUENZA B VIRUS B/MARYLAND/15/2016 RECOMBINANT HEMAGGLUTININ ANTIGEN, AND INFLUENZA B VIRUS B/PHUKET/3073/2013 RECOMBINANT HEMAGGLUTININ ANTIGEN 0.5 ML: 45; 45; 45; 45 INJECTION INTRAMUSCULAR at 22:36

## 2019-02-04 RX ADMIN — TRAZODONE HYDROCHLORIDE 50 MG: 50 TABLET ORAL at 22:39

## 2019-02-04 RX ADMIN — DULOXETINE HYDROCHLORIDE 60 MG: 60 CAPSULE, DELAYED RELEASE ORAL at 22:31

## 2019-02-04 RX ADMIN — QUETIAPINE FUMARATE 200 MG: 100 TABLET ORAL at 22:31

## 2019-02-04 RX ADMIN — BUPROPION HYDROCHLORIDE 150 MG: 150 TABLET, FILM COATED, EXTENDED RELEASE ORAL at 08:06

## 2019-02-04 RX ADMIN — LEVOTHYROXINE SODIUM 75 MCG: 75 TABLET ORAL at 06:21

## 2019-02-04 RX ADMIN — GABAPENTIN 100 MG: 100 CAPSULE ORAL at 22:31

## 2019-02-04 RX ADMIN — SERTRALINE HYDROCHLORIDE 200 MG: 50 TABLET ORAL at 22:30

## 2019-02-04 RX ADMIN — FLUTICASONE PROPIONATE 2 SPRAY: 50 SPRAY, METERED NASAL at 08:06

## 2019-02-04 RX ADMIN — Medication 2 SPRAY: at 16:00

## 2019-02-04 NOTE — NURSING NOTE
Patient visible on unit at start of shift  Pleasant and cooperative with unit routine  Patient observed interacting appropriately with staff and peers  Patient took part in the super bowl activities  Patient continues to have some mild depression, denies anxiety and SI  Thinking clear and organized  Patient HS medication compliant and PRN trazodone requested and received  Patient retreated to her room for sleep without issue  Will continue to monitor

## 2019-02-04 NOTE — PROGRESS NOTES
Pt attended admissions and self discovery group  Pt able to self reflect with prompting  Pt slowly working towards [address] recovery goals and seeks further education and information  Pt anxious at times and low self esteem  Provided resources of community resources, crisis intervention, warmline and local supports  Pt able to continue working on positive self talk and coping skills  Discussed common symtoms and triggers  Continue to provide therepeutic group support  02/04/19 1430   Activity/Group Checklist   Group Other (Comment)  (self discovery)   Attendance Attended   Attendance Duration (min) 46-60   Interactions Interacted appropriately   Affect/Mood Appropriate   Goals Achieved Identified feelings; Discussed coping strategies; Able to listen to others; Able to engage in interactions; Able to reflect/comment on own behavior;Able to manage/cope with feelings;Verbalized increased hopefulness; Able to self-disclose; Able to recieve feedback; Able to give feedback to another;Able to experience relief/decrease in symptoms

## 2019-02-04 NOTE — PLAN OF CARE
Problem: DISCHARGE PLANNING  Goal: Discharge to home or other facility with appropriate resources  INTERVENTIONS:  - Identify barriers to discharge w/patient and caregiver  - Arrange for needed discharge resources and transportation as appropriate  - Deny SI, depression, increase sleep and appetite  Symptoms will resolve or diminish upon discharge  - Comply with meds, attend 75% of group therapy, identify positive coping skills  - Identify discharge learning needs (meds, outpatient mental health services)  - Arrange for interpretive services to assist at discharge as needed  - Refer to Case Management Department for coordinating discharge planning if the patient needs post-hospital services based on physician/advanced practitioner order or complex needs related to functional status, cognitive ability, or social support system    Outcome: Progressing  Worker left voicemail for pt's sister regarding discharge planning

## 2019-02-04 NOTE — PROGRESS NOTES
Progress Note - James Boudreaux 66 y o  female MRN: [id number]   Unit/Bed#: [facility] 376-01 Encounter: [id number]    Behavior over the last 24 hours: improved  CC  "I feel a little bit better, I feel like I'm making baby steps now"  John Stone appears less anxious and depressed, continues to improve, feels more motivated and optimistic today  She reported feeling concerned about care for her cats and is looking forward to going back home to care for them  Compliant with medications  Participates appropriately in milieu  Participates appropriately in group therapy  Sleep: improved  Appetite: normal  Medication side effects: No   ROS: reports constipation    Mental Status Evaluation:    Appearance:  casually dressed, dressed appropriately   Behavior:  pleasant, cooperative, calm   Speech:  normal rate and volume   Mood:  improved   Affect:  normal range and intensity   Thought Process:  logical, coherent, goal directed   Associations: intact associations   Thought Content:  no overt delusions   Perceptual Disturbances: none   Risk Potential: Suicidal ideation - None at present  Homicidal ideation - None at present  Potential for aggression - No   Sensorium:  oriented to person, place and time/date   Memory:  recent and remote memory grossly intact   Consciousness:  alert and awake   Attention: attention span and concentration are age appropriate   Insight:  age appropriate and improved   Judgment: age appropriate and improved   Gait/Station: normal gait/station   Motor Activity: no abnormal movements     Vital signs in last 24 hours:    Temp:  [97 °F (36 1 °C)-98 °F (36 7 °C)] 97 °F (36 1 °C)  HR:  [80-90] 80  Resp:  [16] 16  BP: (110-134)/(66-76) 124/71    Laboratory results:  I have personally reviewed all pertinent laboratory/tests results      Progress Toward Goals: making gradual improvement    Assessment/Plan   Principal Problem:    Severe recurrent major depression without psychotic features [facility])  Active Problems:    Anxiety    Hypothyroidism    Recommended Treatment:     Planned medication and treatment changes: All current active medications have been reviewed  Encourage group therapy, milieu therapy and occupational therapy  Behavioral Health checks every 15 minutes  Discharge planning  Increase Cymbalta to 60mg/day  Discontinue Wellbutrin    Current Facility-Administered Medications:  albuterol 2 5 mg Nebulization Q6H PRN Ant Pacheco MD   aluminum-magnesium hydroxide-simethicone 30 mL Oral Q4H PRN Algie Paget, MD   benztropine 1 mg Intramuscular Q6H PRN Algie Paget, MD   benztropine 1 mg Oral Q6H PRN Algie Paget, MD   DULoxetine 60 mg Oral HS Etta Zapata MD   fluticasone 2 spray Each Nare Daily Amaris Sinha MD   gabapentin 100 mg Oral HS Steffen Soto MD   haloperidol 5 mg Oral Q6H PRN Algie Paget, MD   ibuprofen 400 mg Oral Q8H PRN Algie Paget, MD   influenza vaccine 0 5 mL Intramuscular Prior to discharge Ant Pacheco MD   levothyroxine 75 mcg Oral Early Morning Ant Pacheco MD   LORazepam 1 mg Oral Q4H PRN Algie Paget, MD   magnesium hydroxide 30 mL Oral Daily PRN Algie Paget, MD   OLANZapine 10 mg Intramuscular BID PRN Algie Paget, MD   QUEtiapine 200 mg Oral HS Steffen Soto MD   risperiDONE 1 mg Oral Q3H PRN Algie Paget, MD   sertraline 200 mg Oral HS Etta Zapata MD   traZODone 50 mg Oral HS PRN Algie Paget, MD       Risks / Benefits of Treatment:    Risks, benefits, and possible side effects of medications explained to patient and patient verbalizes understanding and agreement for treatment  Counseling / Coordination of Care:    Patient's progress discussed with staff in treatment team meeting  Medications, treatment progress and treatment plan reviewed with patient  Reassurance and supportive therapy provided      Etta Zapata MD 02/04/19

## 2019-02-04 NOTE — PROGRESS NOTES
02/04/19 1000   Activity/Group Checklist   Group (recovery group)   Attendance Attended   Attendance Duration (min) 46-60   Interactions Interacted appropriately   Affect/Mood Appropriate   Goals Achieved Able to self-disclose;Verbalized increased hopefulness; Able to engage in interactions; Able to listen to others   Group was on exploring the triggers that led to admissions problems solving and brain storming coping skills

## 2019-02-04 NOTE — PLAN OF CARE
Problem: Depression  Goal: Verbalize thoughts and feelings  Interventions:  - Assess and re-assess patient's level of risk   - Engage patient in 1:1 interactions, daily, for a minimum of 15 minutes   - Encourage patient to express feelings, fears, frustrations, hopes    Outcome: Progressing    Goal: Refrain from harming self  Interventions:  - Monitor patient closely, per order   - Supervise medication ingestion, monitor effects and side effects    Outcome: Progressing    Goal: Refrain from isolation  Interventions:  - Develop a trusting relationship   - Encourage socialization    Outcome: Progressing    Goal: Attend and participate in unit activities, including therapeutic, recreational, and educational groups  Interventions:  - Provide therapeutic and educational activities daily, encourage attendance and participation, and document same in the medical record    Outcome: Progressing      Problem: Anxiety  Goal: Anxiety is at manageable level  Interventions:  - Assess and monitor patient's anxiety level  - Monitor for signs and symptoms of anxiety both physical and emotional (heart palpitations, chest pain, shortness of breath, headaches, nausea, feeling jumpy, restlessness, irritable, apprehensive)  - Collaborate with interdisciplinary team and initiate plan and interventions as ordered  - [place] patient to unit/surroundings  - Explain treatment plan  - Encourage participation in care  - Encourage verbalization of concerns/fears  - Identify coping mechanisms  - Assist in developing anxiety-reducing skills  - Administer/offer alternative therapies  - Limit or eliminate stimulants   Outcome: Progressing      Comments: Patient pleasant upon approach  Patient reports "feeling good today " Patient is visible and social on the unit walking the hallway  Patient is med and meal compliant  Will continue to monitor and provide therapeutic support

## 2019-02-04 NOTE — PROGRESS NOTES
Pt is pleasant and cooperative  Pt is visible and social on the unit  Pt med/meal compliant  Flonase was changed from once daily to PRN per pt's request  No other PRNs were requested  Will continue to monitor

## 2019-02-04 NOTE — PLAN OF CARE
Problem: DISCHARGE PLANNING  Goal: Discharge to home or other facility with appropriate resources  INTERVENTIONS:  - Identify barriers to discharge w/patient and caregiver  - Arrange for needed discharge resources and transportation as appropriate  - Deny SI, depression, increase sleep and appetite  Symptoms will resolve or diminish upon discharge  - Comply with meds, attend 75% of group therapy, identify positive coping skills  - Identify discharge learning needs (meds, outpatient mental health services)  - Arrange for interpretive services to assist at discharge as needed  - Refer to Case Management Department for coordinating discharge planning if the patient needs post-hospital services based on physician/advanced practitioner order or complex needs related to functional status, cognitive ability, or social support system    Outcome: Progressing  Worker left voicemail for [address] to schedule intake

## 2019-02-05 PROCEDURE — 99232 SBSQ HOSP IP/OBS MODERATE 35: CPT | Performed by: PSYCHIATRY & NEUROLOGY

## 2019-02-05 RX ORDER — DOCUSATE SODIUM 100 MG/1
100 CAPSULE, LIQUID FILLED ORAL 2 TIMES DAILY
Status: DISCONTINUED | OUTPATIENT
Start: 2019-02-05 | End: 2019-02-06 | Stop reason: HOSPADM

## 2019-02-05 RX ADMIN — GABAPENTIN 100 MG: 100 CAPSULE ORAL at 21:22

## 2019-02-05 RX ADMIN — QUETIAPINE FUMARATE 200 MG: 100 TABLET ORAL at 21:22

## 2019-02-05 RX ADMIN — DULOXETINE HYDROCHLORIDE 60 MG: 60 CAPSULE, DELAYED RELEASE ORAL at 21:22

## 2019-02-05 RX ADMIN — DOCUSATE SODIUM 100 MG: 100 CAPSULE, LIQUID FILLED ORAL at 17:58

## 2019-02-05 RX ADMIN — SERTRALINE HYDROCHLORIDE 200 MG: 50 TABLET ORAL at 21:22

## 2019-02-05 RX ADMIN — LEVOTHYROXINE SODIUM 75 MCG: 75 TABLET ORAL at 05:40

## 2019-02-05 NOTE — PROGRESS NOTES
Patient received at 1900, and was very pleasant and amiable  Pt  asked to speak with author shortly after 2000, and stated very excitedly "I talked to my sister aminta who told me our father is driving up from [place] because he wants to be there for me and help with my treatment and recovery  I am so happy and grateful!" Author expressed his support, and told patient that having family support is of the utmost importance to assist any patient in recovery, and having him here will be instrumental to her getting back on the right track for recovery and to normalcy in her life  Patient thanked Christiano Porter for his support  Patient was cooperative with care and medication compliant  AAOx4  Patient was visible and social in milieu as she was observed sitting in dining room socializing with a group of peers  Interactions with other patients and staff were appropriate  Patient denied SI, HI, A/V hallucinations, and pain  Patient reported 2/4 anxiety "because of my pending discharge", and 510 depression  No negative behaviors or distress noted/observed, and patient was encouraged to attend groups tomorrow  PRN Trazodone 50 mg was requested at 2239  Q15 minute rounding was continued for patient safety  Will continue to monitor and offer therapeutic support

## 2019-02-05 NOTE — PLAN OF CARE
Problem: DEPRESSION  Goal: Will be euthymic at discharge  INTERVENTIONS: DOROTA Garcia  - Encourage patient to attend and participate in art therapy  - Support patient taking ownership of thoughts/behavior contributing to depression   - Explore concepts of hope, grounding and healthy expression through discussion and exercising properties of art materials and process  Outcome: Completed Date Met: 02/05/19  Patient able to demonstrate increased ownership in depression and reactions, with an increased understanding of the value of feeling uncomfortable  Increased hope, grounding and awareness as she engaged constructive ways to express herself through art materials    Problem: SELF CARE DEFICIT  Goal: Return ADL status to a safe level of function  INTERVENTIONS: DOROTA Garcia  - Encourage to attend and participate in art therapy   - Provide means incorporate new coping strategies and self care  - Explore concepts of alternative perspective, awareness  and healthy expression through discussion and exercising properties of art materials and process  Outcome: Completed Date Met: 02/05/19  Patient has been independently engaging new creative coping techniques, seeing beyond the aesthetics and understanding self better

## 2019-02-05 NOTE — SOCIAL WORK
Worker faxed psych eval and med list to [facility] in order to determine eligibility for therapy through the agency  Pt's insurance in-network

## 2019-02-05 NOTE — PLAN OF CARE
Problem: DISCHARGE PLANNING  Goal: Discharge to home or other facility with appropriate resources  INTERVENTIONS:  - Identify barriers to discharge w/patient and caregiver  - Arrange for needed discharge resources and transportation as appropriate  - Deny SI, depression, increase sleep and appetite  Symptoms will resolve or diminish upon discharge  - Comply with meds, attend 75% of group therapy, identify positive coping skills  - Identify discharge learning needs (meds, outpatient mental health services)  - Arrange for interpretive services to assist at discharge as needed  - Refer to Case Management Department for coordinating discharge planning if the patient needs post-hospital services based on physician/advanced practitioner order or complex needs related to functional status, cognitive ability, or social support system    Outcome: Progressing  Worker spoke with pt's sister regarding discharge   Worker will have family meeting with pt and pt's sister via conference call tomorrow at 8 AM

## 2019-02-05 NOTE — PLAN OF CARE
Problem: DISCHARGE PLANNING  Goal: Discharge to home or other facility with appropriate resources  INTERVENTIONS:  - Identify barriers to discharge w/patient and caregiver  - Arrange for needed discharge resources and transportation as appropriate  - Deny SI, depression, increase sleep and appetite  Symptoms will resolve or diminish upon discharge  - Comply with meds, attend 75% of group therapy, identify positive coping skills  - Identify discharge learning needs (meds, outpatient mental health services)  - Arrange for interpretive services to assist at discharge as needed  - Refer to Case Management Department for coordinating discharge planning if the patient needs post-hospital services based on physician/advanced practitioner order or complex needs related to functional status, cognitive ability, or social support system    Outcome: Progressing  Worker informed pt a family meeting with worker, pt and pt's sister via telephone will be held tomorrow prior to discharge at [employer]  Worker again provided pt with various options if she decides to quit her job in terms of financial options, including SSI, unemployment and insurance options including Medicaid  Worker informed pt of outpatient referrals including [facility] that has available intake on 3/5  Pt in agreement with intake but would like to find a therapist sooner  Worker will search for available therapist in-network with [employer]  Worker will provide [employer]'s benefits telephone number to pt

## 2019-02-05 NOTE — PROGRESS NOTES
Pt presents as with depressed mood  Brightens on approach  C/o " a little depression and anxiety, but much better than before " States she is ready to leave tomorrow  Pt is meal compliant  She appears to nap often through the morning  Will monitor

## 2019-02-05 NOTE — SOCIAL WORK
Worker spoke with pt regarding discharge planning for discharge tomorrow 2/6  Worker explained there is an available intake at [facility] on 3/5  Pt in agreement with intake, but reports she will need individual therapy prior to that intake as she cannot wait that long  Worker explained she will look into therapists with earlier availability  Worker explained she will speak with the psychiatrist in regards to providing enough medication in order for pt to schedule a psychiatry appointment at [facility]  Worker discussed the family meeting to be with with pt, pt's sister via telephone and worker  Worker explained a large family meeting did not seem appropriate for pt as she is progressing well towards discharge and the family needs to be in support of pt right now, rather than focusing only on consequences of pt's decisions  Worker explained pt must remain proactive and responsible, but would benefit from holding boundaries with family so that the family does not focus only on what family may not be in support of or what pt needs to get done as soon as possible  Pt verbalized understanding  Pt reports she is aware she needs to be proactive and understand quitting her job may be no insurance  Worker provided pt with potential financial resources once she [place] from her job including [employer], Medicaid and/or unemployment

## 2019-02-05 NOTE — PROGRESS NOTES
02/05/19 1345   Activity/Group Checklist   Group Other (Comment)  (Art Therapy Process Group/Visual Introduction, Discussion)   Attendance Attended   Attendance Duration (min) Greater than 60   Interactions Interacted appropriately   Affect/Mood Appropriate   Goals Achieved Increased hopefulness; Able to listen to others; Able to engage in interactions; Able to recieve feedback; Able to give feedback to another;Verbalized increased hopefulness  (Able to engage art materials)     Patient presented as upbeat and hopeful  Able to identify new creative coping strategies which she plans on implementing once discharged  Patient was able to share with the group that feeling uncomfortable can be a good thing

## 2019-02-05 NOTE — PLAN OF CARE
Problem: DISCHARGE PLANNING  Goal: Discharge to home or other facility with appropriate resources  INTERVENTIONS:  - Identify barriers to discharge w/patient and caregiver  - Arrange for needed discharge resources and transportation as appropriate  - Deny SI, depression, increase sleep and appetite  Symptoms will resolve or diminish upon discharge  - Comply with meds, attend 75% of group therapy, identify positive coping skills  - Identify discharge learning needs (meds, outpatient mental health services)  - Arrange for interpretive services to assist at discharge as needed  - Refer to Case Management Department for coordinating discharge planning if the patient needs post-hospital services based on physician/advanced practitioner order or complex needs related to functional status, cognitive ability, or social support system    Outcome: Progressing  Worker faxed psych eval and med list to [facility] to determine if pt can be seen by therapist through the agency

## 2019-02-05 NOTE — SOCIAL WORK
Worker spoke with pt's sister regarding discharge planning  Pt's sister requesting a family meeting prior to discharge with pt, pt's father, pt's step mother, and pt's sister  Sister reports they would like to discuss what is to follow pt's discharge and work out all the missing pieces  Sister reports the family would like to discuss the living conditions that need to be resolved within the pt's home as it is becoming unlivable  Family would like to discuss the consequences if pt decides to quit her job at [employer] and what financial stability would she have  Family would like to discuss where patient will obtain funding if she is not employed as family has been helping her but will not be able to fully provide financially for pt  Worker explained a discharge family meeting would be appropriate, however worker explained it seemed that it could quickly turn into an intervention regarding all that needs to be completed once pt leaves  Worker explained an intervention and having the family present one conflict after another to pt would not be appropriate as pt is ready for and facing discharge  Worker explained this type of meeting could lead pt backwards into the symptomology she presented with upon admission  Worker explained while pt needs to be held accountable and cannot avoid responsibilities, pt needs to presented with these topics is a supportive, motivational manner  Sister verbalized understanding and explained it may be better if sister is just present via telephone and pt's father and stepmother will be at pt's home after discharge to take her out for lunch  Worker explained with the sister that these topics can still be discussed tomorrow, but worker will facilitate the meeting so patient feels supported and remains motivated for discharge  Sister in agreement   Worker will have family meeting with pt and pt's sister via conference call tomorrow at [place] [id number]

## 2019-02-05 NOTE — PROGRESS NOTES
Progress Note - James Boudreaux 66 y o  female MRN: [id number]   Unit/Bed#: [facility] 376-01 Encounter: [id number]    Behavior over the last 24 hours: improved  CC"I'm doing okay, just got up from a nice nap"    Kathryn Cannon appears less anxious and depressed, feels more cooperative, doing better and in control, continues to improve, reports improved mood and motivation today  She reported that she is doing well and looking forward to discharge tomorrow, her parents and sister are coming and she is pleased with the support that they have provided  Compliant with medications  Participates appropriately in group therapy  Socializes with peers  Sleep: improved, slept better  Appetite: normal  Medication side effects: No   ROS: reports constipation    Mental Status Evaluation:    Appearance:  age appropriate, casually dressed   Behavior:  normal, pleasant, cooperative   Speech:  normal rate and volume   Mood:  improved, anxious   Affect:  normal range and intensity   Thought Process:  logical, coherent, goal directed   Associations: intact associations   Thought Content:  normal   Perceptual Disturbances: none   Risk Potential: Suicidal ideation - None  Homicidal ideation - None  Potential for aggression - No   Sensorium:  oriented to person, place and time/date   Memory:  recent and remote memory grossly intact   Consciousness:  alert and awake   Attention: attention span and concentration are age appropriate   Insight:  good   Judgment: good   Gait/Station: normal gait/station   Motor Activity: no abnormal movements     Vital signs in last 24 hours:    Temp:  [97 5 °F (36 4 °C)-97 8 °F (36 6 °C)] 97 8 °F (36 6 °C)  HR:  [85-87] 87  Resp:  [16-18] 16  BP: (115-117)/(71-73) 115/73    Laboratory results:  I have personally reviewed all pertinent laboratory/tests results      Progress Toward Goals: significant improvement    Assessment/Plan   Principal Problem:    Severe recurrent major depression without psychotic features [facility])  Active Problems:    Anxiety    Hypothyroidism    Recommended Treatment:     Planned medication and treatment changes: All current active medications have been reviewed  Encourage group therapy, milieu therapy and occupational therapy  Behavioral Health checks every 15 minutes  Discharge planning  Add Docusate PRN for constipation  Current Facility-Administered Medications:  albuterol 2 5 mg Nebulization Q6H PRN Chandni Mccauley MD   aluminum-magnesium hydroxide-simethicone 30 mL Oral Q4H PRN Enrrique Knutson MD   benztropine 1 mg Intramuscular Q6H PRN Enrrique Knutson MD   benztropine 1 mg Oral Q6H PRN Enrrique Knutson MD   DULoxetine 60 mg Oral HS Val Mackay MD   fluticasone 2 spray Each Nare Daily PRN TERELL Lea   gabapentin 100 mg Oral HS Keanu Mantilla MD   haloperidol 5 mg Oral Q6H PRN Enrrique Knutson MD   ibuprofen 400 mg Oral Q8H PRN Enrrique Knutson MD   levothyroxine 75 mcg Oral Early Morning Chandni Mccauley MD   LORazepam 1 mg Oral Q4H PRN Enrrique Knutson MD   magnesium hydroxide 30 mL Oral Daily PRN Enrrique Knutson MD   OLANZapine 10 mg Intramuscular BID PRN Enrrique Knutson MD   QUEtiapine 200 mg Oral HS Keanu Mantilla MD   risperiDONE 1 mg Oral Q3H PRN Enrrique Knutson MD   sertraline 200 mg Oral HS Val Mackay MD   traZODone 50 mg Oral HS PRN Enrrique Knutson MD       Risks / Benefits of Treatment:    Risks, benefits, and possible side effects of medications explained to patient and patient verbalizes understanding and agreement for treatment  Counseling / Coordination of Care:    Patient's progress discussed with staff in treatment team meeting  Medications, treatment progress and treatment plan reviewed with patient  Importance of medication and treatment compliance reviewed with patient  Supportive therapy provided to patient  Crisis/safety plan discussed with patient      Justice Rodriguez Katie Wilcox MD 02/05/19

## 2019-02-05 NOTE — PLAN OF CARE
Problem: DISCHARGE PLANNING  Goal: Discharge to home or other facility with appropriate resources  INTERVENTIONS:  - Identify barriers to discharge w/patient and caregiver  - Arrange for needed discharge resources and transportation as appropriate  - Deny SI, depression, increase sleep and appetite   Symptoms will resolve or diminish upon discharge  - Comply with meds, attend 75% of group therapy, identify positive coping skills  - Identify discharge learning needs (meds, outpatient mental health services)  - Arrange for interpretive services to assist at discharge as needed  - Refer to Case Management Department for coordinating discharge planning if the patient needs post-hospital services based on physician/advanced practitioner order or complex needs related to functional status, cognitive ability, or social support system    Outcome: Progressing  Intake scheduled at [facility] on 3/5 at 11AM

## 2019-02-05 NOTE — PLAN OF CARE
Anxiety     Anxiety is at manageable level Progressing        Depression     Treatment Goal: Demonstrate behavioral control of depressive symptoms, verbalize feelings of improved mood/affect, and adopt new coping skills prior to discharge Progressing     Verbalize thoughts and feelings Progressing     Refrain from harming self Progressing     Refrain from [address] Attend and participate in unit activities, including therapeutic, recreational, and educational groups Progressing        Pt continues to be visible and social on the unit  Medication and meal compliant  Will monitor

## 2019-02-06 VITALS
HEART RATE: 85 BPM | HEIGHT: 61 IN | OXYGEN SATURATION: 99 % | SYSTOLIC BLOOD PRESSURE: 115 MMHG | BODY MASS INDEX: 39.27 KG/M2 | RESPIRATION RATE: 18 BRPM | TEMPERATURE: 98.2 F | WEIGHT: 208 LBS | DIASTOLIC BLOOD PRESSURE: 73 MMHG

## 2019-02-06 PROCEDURE — 99239 HOSP IP/OBS DSCHRG MGMT >30: CPT | Performed by: PSYCHIATRY & NEUROLOGY

## 2019-02-06 RX ORDER — SERTRALINE HYDROCHLORIDE 100 MG/1
200 TABLET, FILM COATED ORAL
Qty: 30 TABLET | Refills: 0 | Status: CANCELLED | OUTPATIENT
Start: 2019-02-06

## 2019-02-06 RX ORDER — SERTRALINE HYDROCHLORIDE 100 MG/1
200 TABLET, FILM COATED ORAL
Qty: 60 TABLET | Refills: 1 | Status: SHIPPED | OUTPATIENT
Start: 2019-02-06 | End: 2019-02-25

## 2019-02-06 RX ORDER — DULOXETIN HYDROCHLORIDE 60 MG/1
60 CAPSULE, DELAYED RELEASE ORAL
Qty: 30 CAPSULE | Refills: 1 | Status: SHIPPED | OUTPATIENT
Start: 2019-02-06 | End: 2019-03-20

## 2019-02-06 RX ORDER — ALBUTEROL SULFATE 90 UG/1
2 AEROSOL, METERED RESPIRATORY (INHALATION) EVERY 6 HOURS PRN
Status: DISCONTINUED | OUTPATIENT
Start: 2019-02-06 | End: 2019-02-06 | Stop reason: HOSPADM

## 2019-02-06 RX ORDER — LEVOTHYROXINE SODIUM 0.07 MG/1
75 TABLET ORAL
Qty: 30 TABLET | Refills: 0 | Status: CANCELLED | OUTPATIENT
Start: 2019-02-07

## 2019-02-06 RX ORDER — LEVOTHYROXINE SODIUM 0.07 MG/1
75 TABLET ORAL
Qty: 30 TABLET | Refills: 1 | Status: SHIPPED | OUTPATIENT
Start: 2019-02-07 | End: 2019-02-25

## 2019-02-06 RX ORDER — DOCUSATE SODIUM 100 MG/1
100 CAPSULE, LIQUID FILLED ORAL 2 TIMES DAILY
Qty: 10 CAPSULE | Refills: 0 | Status: CANCELLED | OUTPATIENT
Start: 2019-02-06

## 2019-02-06 RX ORDER — GABAPENTIN 100 MG/1
100 CAPSULE ORAL
Qty: 30 CAPSULE | Refills: 0 | Status: CANCELLED | OUTPATIENT
Start: 2019-02-06 | End: 2019-03-08

## 2019-02-06 RX ORDER — QUETIAPINE FUMARATE 200 MG/1
200 TABLET, FILM COATED ORAL
Qty: 30 TABLET | Refills: 1 | Status: SHIPPED | OUTPATIENT
Start: 2019-02-06 | End: 2019-02-25

## 2019-02-06 RX ORDER — QUETIAPINE FUMARATE 200 MG/1
200 TABLET, FILM COATED ORAL
Qty: 30 TABLET | Refills: 0 | Status: CANCELLED | OUTPATIENT
Start: 2019-02-06

## 2019-02-06 RX ORDER — GABAPENTIN 100 MG/1
100 CAPSULE ORAL
Qty: 30 CAPSULE | Refills: 1 | Status: SHIPPED | OUTPATIENT
Start: 2019-02-06 | End: 2019-02-25

## 2019-02-06 RX ORDER — DULOXETIN HYDROCHLORIDE 60 MG/1
60 CAPSULE, DELAYED RELEASE ORAL
Qty: 30 CAPSULE | Refills: 0 | Status: CANCELLED | OUTPATIENT
Start: 2019-02-06

## 2019-02-06 RX ADMIN — DOCUSATE SODIUM 100 MG: 100 CAPSULE, LIQUID FILLED ORAL at 08:40

## 2019-02-06 RX ADMIN — LEVOTHYROXINE SODIUM 75 MCG: 75 TABLET ORAL at 06:37

## 2019-02-06 NOTE — SOCIAL WORK
Worker held family meeting with pt, pt's sister via telephone and worker to discuss final discharge information  Worker provided sister with all follow up appointments and resources provided for potential assistance for the future  Pt's future responsibilities were briefly touched including quitting [employer], finding new employment/benefits and making her home livable again  Pt verbalized understanding and motivation in these areas  Sister is in support of pt  Pt will be discharged today

## 2019-02-06 NOTE — PLAN OF CARE
Problem: DISCHARGE PLANNING  Goal: Discharge to home or other facility with appropriate resources  INTERVENTIONS:  - Identify barriers to discharge w/patient and caregiver  - Arrange for needed discharge resources and transportation as appropriate  - Deny SI, depression, increase sleep and appetite   Symptoms will resolve or diminish upon discharge  - Comply with meds, attend 75% of group therapy, identify positive coping skills  - Identify discharge learning needs (meds, outpatient mental health services)  - Arrange for interpretive services to assist at discharge as needed  - Refer to Case Management Department for coordinating discharge planning if the patient needs post-hospital services based on physician/advanced practitioner order or complex needs related to functional status, cognitive ability, or social support system    Outcome: Completed Date Met: 02/06/19

## 2019-02-06 NOTE — SOCIAL WORK
[facility] refused pt as they believe she should be referred to [facility]  Worker explained pt not interested in partial  [facility] will not take pt's referral   Worker contacted multiple private therapists accepting [employer] and left voicemails with none of pt's information, just expressing interest to schedule a referral  No responses from private therapists  Worker contacted [facility]  Worker schedule therapy intake for 1/13 at 5:15PM with Dr Bjorn Kulkarni

## 2019-02-06 NOTE — NURSING NOTE
Discharge note:  Patient is awake, A&Ox4, respirations are regular and unlabored, no c/o pain or discomfort offered  Patient denies anxiety, depression, and SI at time of discharge  Discharge instructions and medications reviewed with patient prior to discharge, patient states she "does not have any questions"  Personal belongings, discharge instructions, and prescriptions given to patient at time of discharge  Patient escorted by staff

## 2019-02-06 NOTE — SOCIAL WORK
Pt to be discharged today  Pt denies SI/HI, AH/VH  Pt oriented x3  Pt to return home  Pt's car is parked in the parking deck  Pt to follow up with [facility] on 1/13 at 5:15 and [facility] on 3/5 at 11AM  Pt provided information for Social Security and Assistance Office for future as pt plans on leaving her job  Pt provided with [facility] information as she is interested in groups  Pt requesting to leave with her scripts and fill them at her personal pharmacy  Pt with no further questions

## 2019-02-06 NOTE — PLAN OF CARE
Problem: Depression  Goal: Treatment Goal: Demonstrate behavioral control of depressive symptoms, verbalize feelings of improved mood/affect, and adopt new coping skills prior to discharge  Outcome: Progressing    Goal: Refrain from harming self  Interventions:  - Monitor patient closely, per order   - Supervise medication ingestion, monitor effects and side effects    Outcome: Adequate for Discharge    Goal: Refrain from isolation  Interventions:  - Develop a trusting relationship   - Encourage socialization    Outcome: Adequate for Discharge    Goal: Attend and participate in unit activities, including therapeutic, recreational, and educational groups  Interventions:  - Provide therapeutic and educational activities daily, encourage attendance and participation, and document same in the medical record    Outcome: Progressing      Problem: Anxiety  Goal: Anxiety is at manageable level  Interventions:  - Assess and monitor patient's anxiety level  - Monitor for signs and symptoms of anxiety both physical and emotional (heart palpitations, chest pain, shortness of breath, headaches, nausea, feeling jumpy, restlessness, irritable, apprehensive)  - Collaborate with interdisciplinary team and initiate plan and interventions as ordered    - [place] patient to unit/surroundings  - Explain treatment plan  - Encourage participation in care  - Encourage verbalization of concerns/fears  - Identify coping mechanisms  - Assist in developing anxiety-reducing skills  - Administer/offer alternative therapies  - Limit or eliminate stimulants   Outcome: Progressing

## 2019-02-06 NOTE — DISCHARGE SUMMARY
Discharge Summary - 1 [facility] [place],[address] 66 y o  female MRN: [id number]  Unit/Bed#: -01 Encounter: [id number]     Admission Date: 1/26/2019         Discharge Date: No discharge date for patient encounter  Attending Psychiatrist: Kyara Harrison MD    Reason for Admission/HPI: Kaila Holly is a 66 y o  female with a history of Major Depressive Disorder who was admitted to the inpatient psychiatric unit on a voluntary 201 commitment basis due to depression  Symptoms prior to admission included worsening depression, feeling depressed, hopelessness, helplessness, poor appetite and difficulty sleeping  Onset of symptoms was gradual starting few weeks ago with gradually worsening course since that time  Stressors preceding admission included problems at work and stress at work        On initial evaluation after admission to the inpatient psychiatric unit Frankomayito Ortiz patient reports of worsening depression in the last few weeks the patient reports feeling numb and crying a lot  Patient reports stressors at work including that she was written up at work  Patient reports feeling worthless hopeless reports poor sleep with difficulty falling asleep and staying asleep  Patient reports decreased appetite  Patient reports anxiety with nervousness; she is nervous about going back to her present job  Patient also reports feeling lonely and see patient was tearful during evaluation  Patient denies symptoms of keenan or psychosis  Patient endorses symptoms of anhedonia stating she does not want to do anything          Past Psychiatric History:     Past Inpatient Psychiatric Treatment:   Multiple past inpatient psychiatric admissions    Past Outpatient Psychiatric Treatment:    Currently in outpatient psychiatric treatment with a psychiatrist   Past Suicide Attempts: no  Past Violent Behavior: no  Past Psychiatric Medication Trials: Zoloft, Wellbutrin XL and Seroquel    Substance Abuse History:    Social History     Tobacco History     Smoking Status  Never Smoker    Smokeless Tobacco Use  Never Used          Alcohol History     Alcohol Use Status  No          Drug Use     Drug Use Status  No          Sexual Activity     Sexually Active  Not Currently          Activities of Daily Living    Not Asked                 I have assessed this patient for substance use within the past 12 months    Alcohol use: occasional, social use  Recreational drug use:   Cocaine:  denies use  Heroin:  denies use  Marijuana:  denies use  Other drugs: denies use  Longest clean time: not applicable  History of Inpatient/Outpatient rehabilitation program: no  Smoking history: denies use  Use of caffeine: unknown amount    Family Psychiatric History:     Psychiatric Illness:  no family history of psychiatric illness  Substance Abuse:  no family history of psychiatric illness  Suicide Attempts:  no family history of psychiatric illness    Social History:    Education: high school diploma/GED  Learning Disabilities: none  Marital History: single  Children: none  Living Arrangement: lives alone in a house  Occupational History: works at [employer]: father, stepmother and sister are supportive  Legal History: none   History: None    Traumatic History:     Abuse: no history of sexual abuse, no history of physical abuse  Other Traumatic Events:none     Past Medical History:    History of Seizures: no  History of Head injury with loss of consciousness: no    Past Medical History:   Diagnosis Date    Asthma     Depression (emotion)     Pre-diabetes      Past Surgical History:   Procedure Laterality Date    ADENOIDECTOMY      DILATION AND CURETTAGE OF UTERUS      TONSILLECTOMY         Medications: All current active medications have been reviewed  Allergies:     Allergies   Allergen Reactions    Darvon [Propoxyphene]     Fluoxetine     Meclizine     Morphine And Related     Oxycodone-Acetaminophen        Objective     Vital signs in last 24 hours:    Temp:  [98 1 °F (36 7 °C)-98 2 °F (36 8 °C)] 98 2 °F (36 8 °C)  HR:  [80-87] 85  Resp:  [16-18] 18  BP: (115-131)/(73) 115/73    No intake or output data in the 24 hours ending 02/06/19 [facility]:     Will Bocanegra was admitted to the inpatient psychiatric unit and started on Behavioral Health checks every 7 minutes  During the hospitalization she was attending individual therapy, group therapy, milieu therapy and occupational therapy  Maci Chopra Psychiatric medications were restarted during the hospital stay  To address depressive symptoms and anxiety symptoms, Will Bocanegra was treated with antidepressant Zoloft and Cymbalta and antipsychotic medication Seroquel  Medication doses were gradually titrated during the hospital course  Wellbutrin XL was tapered off  Prior to beginning of treatment medications risks and benefits and possible side effects including risk of parkinsonian symptoms, Tardive Dyskinesia and metabolic syndrome related to treatment with antipsychotic medications and risk of suicidality and serotonin syndrome related to treatment with antidepressants were reviewed with Will Bocanegra  She verbalized understanding and agreement for treatment  Upon admission Will Bocanegra was seen by medical service for medical clearance for inpatient treatment and medical follow up  Ghulam symptoms gradually improved over the hospital course  Initially after admission she was still feeling depressed, anxious and overwhelmed  With adjustment of medications and therapeutic milieu her symptoms gradually improved  At the end of treatment Will Bocanegra was significantly improved  Her mood was stable at the time of discharge  Will Bocanegra denied suicidal ideation, intent or plan at the time of discharge and denied homicidal ideation, intent or plan at the time of discharge      Since Will Bocanegra was doing well at the end of the hospitalization, treatment team felt that she could be safely discharged to outpatient care  The outpatient follow up with a psychiatrist and a therapist was arranged by the unit  upon discharge  Mental Status at Time of Discharge:     Appearance:  casually dressed, dressed appropriately   Behavior:  pleasant, cooperative, calm   Speech:  normal rate and volume   Mood:  improved, euthymic   Affect:  normal range and intensity, reactive   Thought Process:  logical, coherent, linear   Associations: intact associations   Thought Content:  normal, no overt delusions   Perceptual Disturbances: none   Risk Potential: Suicidal ideation - None  Homicidal ideation - None  Potential for aggression - No   Sensorium:  oriented to person, place and time/date   Memory:  recent and remote memory grossly intact   Consciousness:  alert and awake   Attention: attention span and concentration are age appropriate   Insight:  good and improved   Judgment: good   Gait/Station: normal gait/station   Motor Activity: no abnormal movements       Admission Diagnosis:    Principal Problem:    Severe recurrent major depression without psychotic features ([facility] )  Active Problems:    Anxiety    Hypothyroidism      Discharge Diagnosis:     Principal Problem:    Severe recurrent major depression without psychotic features ([facility] )  Active Problems:    Anxiety    Hypothyroidism  Resolved Problems:    * No resolved hospital problems  *      Lab results:   I have personally reviewed all pertinent laboratory/tests results    Most Recent Labs:   Lab Results   Component Value Date    WBC 7 20 01/26/2019    RBC 4 51 01/26/2019    HGB 13 2 01/26/2019    HCT 40 3 01/26/2019     01/26/2019    RDW 14 2 01/26/2019    NEUTROABS 4 50 01/26/2019    SODIUM 136 (L) 01/26/2019    K 4 2 01/26/2019     01/26/2019    CO2 26 01/26/2019    BUN 15 01/26/2019    CREATININE 0 89 01/26/2019    GLUC 127 (H) 01/26/2019    CALCIUM 9 5 01/26/2019    AST 26 01/26/2019    ALT 30 01/26/2019    ALKPHOS 116 01/26/2019    TP 7 7 01/26/2019    ALB 4 4 01/26/2019    TBILI 0 30 01/26/2019    CHOLESTEROL 230 (H) 01/31/2019    HDL 46 01/31/2019    TRIG 141 01/31/2019    LDLCALC 156 (H) 01/31/2019    [place] 184 01/31/2019    FJJ4BPONLCSC 1 880 01/26/2019    RPR Non-Reactive 01/27/2019    HGBA1C 7 0 (H) 11/25/2013     11/25/2013       Discharge Medications:    See after visit summary for all reconciled discharge medications provided to patient and family  Discharge instructions/Information to patient and family:     See after visit summary for information provided to patient and family  Provisions for Follow-Up Care:    See after visit summary for information related to follow-up care and any pertinent home health orders  Discharge Statement:    I spent 30 minutes discharging the patient  This time was spent on the day of discharge  I had direct contact with the patient on the day of discharge  Additional documentation is required if more than 30 minutes were spent on discharge:    I reviewed with Orin Bosworth importance of compliance with medications and outpatient treatment after discharge  I discussed the medication regimen and possible side effects of the medications with Orin Bosworth prior to discharge  At the time of discharge she was tolerating psychiatric medications      Glenna Mo MD 02/06/19

## 2019-02-06 NOTE — NURSING NOTE
patient received at 0700  Patient is awake, A&Ox4, VSS, respirations are regular and unlabored, no c/o pain or discomfort offered  Patient denies anxiety, depression, A/V hallucinations, SI/HI  Patient is smiling, pleasant and cooperative, compliant with medication administration  Patient is looking forward to discharge today and seeing her "fur babies" (cats)  Will maintain Q15 minute checks for patient safety

## 2019-02-25 ENCOUNTER — OFFICE VISIT (OUTPATIENT)
Dept: FAMILY MEDICINE CLINIC | Facility: CLINIC | Age: 59
End: 2019-02-25
Payer: COMMERCIAL

## 2019-02-25 VITALS
HEIGHT: 65 IN | TEMPERATURE: 98.6 F | WEIGHT: 213.4 LBS | SYSTOLIC BLOOD PRESSURE: 142 MMHG | DIASTOLIC BLOOD PRESSURE: 78 MMHG | BODY MASS INDEX: 35.56 KG/M2

## 2019-02-25 DIAGNOSIS — E11.8 TYPE 2 DIABETES MELLITUS WITH COMPLICATION, UNSPECIFIED WHETHER LONG TERM INSULIN USE: Primary | ICD-10-CM

## 2019-02-25 DIAGNOSIS — E06.3 HYPOTHYROIDISM DUE TO HASHIMOTO'S THYROIDITIS: ICD-10-CM

## 2019-02-25 DIAGNOSIS — E03.8 HYPOTHYROIDISM DUE TO HASHIMOTO'S THYROIDITIS: ICD-10-CM

## 2019-02-25 DIAGNOSIS — Z12.11 SCREENING FOR COLON CANCER: ICD-10-CM

## 2019-02-25 DIAGNOSIS — F41.9 ANXIETY: ICD-10-CM

## 2019-02-25 DIAGNOSIS — F33.2 SEVERE RECURRENT MAJOR DEPRESSION WITHOUT PSYCHOTIC FEATURES (HCC): ICD-10-CM

## 2019-02-25 PROCEDURE — 3008F BODY MASS INDEX DOCD: CPT | Performed by: FAMILY MEDICINE

## 2019-02-25 PROCEDURE — 90732 PPSV23 VACC 2 YRS+ SUBQ/IM: CPT

## 2019-02-25 PROCEDURE — 99214 OFFICE O/P EST MOD 30 MIN: CPT | Performed by: FAMILY MEDICINE

## 2019-02-25 PROCEDURE — 90471 IMMUNIZATION ADMIN: CPT

## 2019-02-25 NOTE — PROGRESS NOTES
Assessment/Plan:  Patient will see nutritionist   Patient will continue modify diet and exercise and try to lose weight  Patient will continue with current regimen of psychotropic medications  Patient will be seeing psychiatrist in March  Patient will continue with counseling  The patient will continue with short-term disability at this time  Patient will have disability for 1 month and then will let psychiatrist and counselor determine patient's ability to return to work  Patient will be referred to GI  Patient will see ophthalmologist   Jennifer Martinez will be refilled as needed   Diagnoses and all orders for this visit:    Type 2 diabetes mellitus with complication, unspecified whether long term insulin use ([facility] )  -     Ambulatory referral to Ophthalmology; Future  -     Ambulatory referral to medical nutrition therapy for diabetes; Future  -     Ambulatory referral to Nutrition Services; Future  -     PNEUMOCOCCAL POLYSACCHARIDE VACCINE 23-VALENT =>1YO SQ IM    Screening for colon cancer  -     Ambulatory referral to Gastroenterology; Future    Anxiety    Severe recurrent major depression without psychotic features ([facility] )    Hypothyroidism due to Hashimoto's thyroiditis          Subjective:      Patient ID: Cynthia Carrion is a 64 y o  female  Patient is here status post hospitalization for depression  Patient status post being in [facility] behavioral unit  Patient was admitted on January 26  Patient was discharged on February 6  The patient is on short-term disability until March 1st   Patient would like extension the  The patient will be seeing psychiatrist on the 5th of March  The the patient is seeing counselor Debbie díaz on [place]  Patient also has short-term disability contact [facility] phone number is [phone]  The patient is still feeling depressed and anxious  The patient also having sleep related issues    Patient also with decreased motivation and sleep issues the  Mood is depressed  Patient has seeing a counselor 3 times since discharge  The patient not in intensive outpatient therapy  The patient on duloxetine 60 mg daily patient is on Seroquel 200 mg nightly along with trazodone 50 mg nightly  Patient is still having some anger issues  Patient needs to work on coping skills  Patient would like to see nutritionist      The following portions of the patient's history were reviewed and updated as appropriate: allergies, current medications, past family history, past medical history, past social history, past surgical history and problem list     Review of Systems   Constitutional: Negative  HENT: Negative  Eyes: Negative  Respiratory: Negative  Cardiovascular: Negative  Gastrointestinal: Negative  Endocrine: Negative  Genitourinary: Negative  Musculoskeletal: Negative  Skin: Negative  Allergic/Immunologic: Negative  Neurological: Negative  Hematological: Negative  Psychiatric/Behavioral: Positive for decreased concentration and sleep disturbance  Negative for confusion and suicidal ideas  The patient is nervous/anxious  Objective:      /78 (BP Location: Right arm, Patient Position: Sitting, Cuff Size: Adult)   Temp 98 6 °F (37 °C) (Tympanic)   Ht 5' 5 25" (1 657 m)   Wt 96 8 kg (213 lb 6 4 oz)   LMP  (LMP Unknown)   BMI 35 24 kg/m²          Physical Exam   Constitutional: She is oriented to person, place, and time  She appears well-developed and well-nourished  No distress  HENT:   Head: Normocephalic  Right Ear: External ear normal    Left Ear: External ear normal    Mouth/Throat: Oropharynx is clear and moist  No oropharyngeal exudate  Eyes: Pupils are equal, round, and reactive to light  EOM are normal  Right eye exhibits no discharge  Left eye exhibits no discharge  No scleral icterus  Neck: Normal range of motion  Neck supple  No thyromegaly present     Cardiovascular: Normal rate, regular rhythm, normal heart sounds and intact distal pulses  Exam reveals no gallop and no friction rub  No murmur heard  Pulmonary/Chest: Effort normal and breath sounds normal  No respiratory distress  She has no wheezes  She has no rales  She exhibits no tenderness  Abdominal: Soft  Bowel sounds are normal  She exhibits no distension  There is no tenderness  There is no rebound and no guarding  Musculoskeletal: Normal range of motion  She exhibits no edema or tenderness  Lymphadenopathy:     She has no cervical adenopathy  Neurological: She is oriented to person, place, and time  No cranial nerve deficit  She exhibits normal muscle tone  Coordination normal    Skin: Skin is warm and dry  No rash noted  She is not diaphoretic  No erythema  No pallor  Psychiatric: Her behavior is normal  Judgment and thought content normal    Mood depressed   Nursing note and vitals reviewed

## 2019-03-04 ENCOUNTER — TELEPHONE (OUTPATIENT)
Dept: FAMILY MEDICINE CLINIC | Facility: CLINIC | Age: 59
End: 2019-03-04

## 2019-03-04 NOTE — TELEPHONE ENCOUNTER
Baylee Thorne from [facility] called to clarify a few things related to the request for additional time off from work  He was questioning specific symptoms she is experiencing  I had informed him she is depressed, anxious, sleep issues, anger issues, decreased motivation and decreased concentration  He stated that should be enough for approval for approx 1 more month  If additional time is needed after that they are to get additional information from psychiatrist/counselor  I agreed

## 2019-03-07 ENCOUNTER — PREP FOR PROCEDURE (OUTPATIENT)
Dept: GASTROENTEROLOGY | Facility: MEDICAL CENTER | Age: 59
End: 2019-03-07

## 2019-03-07 ENCOUNTER — TELEPHONE (OUTPATIENT)
Dept: GASTROENTEROLOGY | Facility: AMBULARY SURGERY CENTER | Age: 59
End: 2019-03-07

## 2019-03-07 DIAGNOSIS — Z12.11 SCREENING FOR COLON CANCER: Primary | ICD-10-CM

## 2019-03-07 NOTE — TELEPHONE ENCOUNTER
Pt is scheduled with dr Geetha Quezada on 3/15/19 at [facility] for oa colon, I went over suprep with pt and emailed instructions to her  Patient is aware she will need a  to and from   She will get a call the day before with an exact time for arrival  Pt is diabetic and needs an am appt

## 2019-03-07 NOTE — TELEPHONE ENCOUNTER
03/07/19  Screened by: Tayler Robertson    Referring Provider     Pre- Screening: There is no height or weight on file to calculate BMI  Has patient been referred for a routine screening Colonoscopy? yes  Is the patient between 39-70 years old? yes    SCHEDULING STAFF   If the patient is between 45yrs-49yrs, please advise patient to confirm benefits/coverage with their insurance company for a routine screening colonoscopy, some insurance carriers will only cover at [address] or older   If the patient is over 66years old, please schedule an office visit  Do you have any of the following symptoms? NO      Have you had a coronary or vascular stent within the last year? no    Have you had a heart attack or stroke in the last 6 months? no    Have you had intestinal surgery in the last 3 months? no    Do you have problems with: Sleep Apnea NO    Do you use: CPAP/BiPAP    Have you been hospitalized in the last Month? no    Have you been diagnosed with a bleeding disorder or anemia? no    Have you had chest pain (angina) or breathing problems  (COPD) in the last 3 months? no    Do you have any difficulty walking up a flight of stairs? no    Have you had Kidney failure or insufficiency? no    Have you had heart valve surgery? no    Are you confined to a wheelchair? no    Do you take Other blood thinning medications no    Have you been diagnosed with Diabetes or are you taking any   Diabetic medications? no    : If patient answers NO to medical questions, then schedule procedure  If patient answers YES to medical questions, then schedule office appointment  Previous Colonoscopy yes  Date and Facility of last colonoscopy?   10  YEARS    Comments:  PASSED OA --REQ DR Kinza Duarte

## 2019-03-07 NOTE — PATIENT INSTRUCTIONS
Pt is scheduled with dr Juan C Shaw on 3/15/19 at [facility] for oa colon, I went over suprep with pt and emailed instructions to her  Patient is aware she will need a  to and from   She will get a call the day before with an exact time for arrival  Pt is diabetic and needs an am appt

## 2019-03-14 ENCOUNTER — ANESTHESIA EVENT (OUTPATIENT)
Dept: GASTROENTEROLOGY | Facility: MEDICAL CENTER | Age: 59
End: 2019-03-14
Payer: COMMERCIAL

## 2019-03-15 ENCOUNTER — ANESTHESIA (OUTPATIENT)
Dept: GASTROENTEROLOGY | Facility: MEDICAL CENTER | Age: 59
End: 2019-03-15
Payer: COMMERCIAL

## 2019-03-15 ENCOUNTER — HOSPITAL ENCOUNTER (OUTPATIENT)
Facility: MEDICAL CENTER | Age: 59
Setting detail: OUTPATIENT SURGERY
Discharge: HOME | End: 2019-03-15
Attending: INTERNAL MEDICINE | Admitting: INTERNAL MEDICINE
Payer: COMMERCIAL

## 2019-03-15 VITALS
TEMPERATURE: 97.6 F | WEIGHT: 213 LBS | RESPIRATION RATE: 16 BRPM | HEIGHT: 65 IN | SYSTOLIC BLOOD PRESSURE: 123 MMHG | OXYGEN SATURATION: 98 % | DIASTOLIC BLOOD PRESSURE: 63 MMHG | HEART RATE: 74 BPM | BODY MASS INDEX: 35.49 KG/M2

## 2019-03-15 DIAGNOSIS — Z12.11 SCREENING FOR COLON CANCER: ICD-10-CM

## 2019-03-15 PROCEDURE — 88305 TISSUE EXAM BY PATHOLOGIST: CPT | Performed by: PATHOLOGY

## 2019-03-15 PROCEDURE — 45380 COLONOSCOPY AND BIOPSY: CPT | Performed by: INTERNAL MEDICINE

## 2019-03-15 RX ORDER — PROPOFOL 10 MG/ML
INJECTION, EMULSION INTRAVENOUS AS NEEDED
Status: DISCONTINUED | OUTPATIENT
Start: 2019-03-15 | End: 2019-03-15 | Stop reason: SURG

## 2019-03-15 RX ORDER — SODIUM CHLORIDE 9 MG/ML
125 INJECTION, SOLUTION INTRAVENOUS CONTINUOUS
Status: DISCONTINUED | OUTPATIENT
Start: 2019-03-15 | End: 2019-03-15 | Stop reason: HOSPADM

## 2019-03-15 RX ADMIN — PROPOFOL 50 MG: 10 INJECTION, EMULSION INTRAVENOUS at 08:15

## 2019-03-15 RX ADMIN — PROPOFOL 50 MG: 10 INJECTION, EMULSION INTRAVENOUS at 08:21

## 2019-03-15 RX ADMIN — PROPOFOL 50 MG: 10 INJECTION, EMULSION INTRAVENOUS at 08:18

## 2019-03-15 RX ADMIN — PROPOFOL 120 MG: 10 INJECTION, EMULSION INTRAVENOUS at 08:09

## 2019-03-15 RX ADMIN — SODIUM CHLORIDE 125 ML/HR: 0.9 INJECTION, SOLUTION INTRAVENOUS at 07:55

## 2019-03-15 NOTE — H&P
History and Physical -  [facility]  Yue Saul 64 y o  female MRN: [id number]                  HPI: Yue Saul is a 64y o  year old female who presents for colon cancer screening  REVIEW OF SYSTEMS: Per the HPI, and otherwise unremarkable      Historical Information   Past Medical History:   Diagnosis Date    Anxiety     Asthma     Diabetes ([address] )     prediabetic    GERD (gastroesophageal reflux disease)     Hyperlipemia     Hypothyroidism     Major depressive disorder     Migraine     Sleep apnea      Past Surgical History:   Procedure Laterality Date    ADENOIDECTOMY      DILATION AND CURETTAGE OF UTERUS      SHOULDER SURGERY      TONSILLECTOMY      TUBAL LIGATION       Social History   Social History     Substance and Sexual Activity   Alcohol Use No     Social History     Substance and Sexual Activity   Drug Use No     Social History     Tobacco Use   Smoking Status Never Smoker   Smokeless Tobacco Never Used     Family History   Problem Relation Age of Onset    ALS Mother     Venous thrombosis Father     Diabetes Father     Other Family         cerebral embolism    Diabetes Family     Hypertension Family     Kidney disease Family        Meds/Allergies     Medications Prior to Admission   Medication    albuterol (2 5 mg/3 mL) 0 083 % nebulizer solution    albuterol (PROVENTIL HFA) 90 mcg/act inhaler    DULoxetine (CYMBALTA) 60 mg delayed release capsule    fluticasone (FLONASE) 50 mcg/act nasal spray    gabapentin (NEURONTIN) 100 mg capsule    levothyroxine 75 mcg tablet    QUEtiapine (SEROquel) 200 mg tablet    sertraline (ZOLOFT) 100 mg tablet    traZODone (DESYREL) 50 mg tablet    beclomethasone (QVAR) 40 MCG/ACT inhaler    Na Sulfate-K Sulfate-Mg Sulf (SUPREP BOWEL PREP KIT) 17 5-3 13-1 6 GM/177ML SOLN       Allergies   Allergen Reactions    Darvon [Propoxyphene]     Fluoxetine     Meclizine     Morphine And Related     Oxycodone-Acetaminophen        Objective     Blood pressure 162/73, pulse 89, temperature 97 6 °F (36 4 °C), temperature source Temporal, resp  rate 16, height 5' 5 25" (1 657 m), weight 96 6 kg (213 lb), SpO2 97 %  PHYSICAL EXAM    Gen: NAD  CV: RRR  CHEST: Clear  ABD: soft, NT/ND  EXT: no edema      ASSESSMENT/PLAN:  This is a 64y o  year old female here for colonoscopy, and she is stable and optimized for her procedure

## 2019-03-15 NOTE — DISCHARGE INSTRUCTIONS
Colonoscopy   WHAT YOU NEED TO KNOW:   A colonoscopy is a procedure to examine the inside of your colon (intestine) with a scope  Polyps or tissue growths may have been removed during your colonoscopy  It is normal to feel bloated and to have some abdominal discomfort  You should be passing gas  If you have hemorrhoids or you had polyps removed, you may have a small amount of bleeding  DISCHARGE INSTRUCTIONS:   Seek care immediately if:   · You have a large amount of bright red blood in your bowel movements  · Your abdomen is hard and firm and you have severe pain  · You have sudden trouble breathing  Contact your healthcare provider if:   · You develop a rash or hives  · You have a fever within 24 hours of your procedure  · You have not had a bowel movement for 3 days after your procedure  · You have questions or concerns about your condition or care  Activity:   · Do not lift, strain, or run  for 3 days after your procedure  · Rest after your procedure  You have been given medicine to relax you  Do not  drive or make important decisions until the day after your procedure  Return to your normal activity as directed  · Relieve gas and discomfort from bloating  by lying on your right side with a heating pad on your abdomen  You may need to take short walks to help the gas move out  Eat small meals until bloating is relieved  If you had polyps removed: For 7 days after your procedure:  · Do not  take aspirin  · Do not  go on long car rides  Help prevent constipation:   · Eat a variety of healthy foods  Healthy foods include fruit, vegetables, whole-grain breads, low-fat dairy products, beans, lean meat, and fish  Ask if you need to be on a special diet  Your healthcare provider may recommend that you eat high-fiber foods such as cooked beans  Fiber helps you have regular bowel movements  · Drink liquids as directed    Adults should drink between 9 and 13 eight-ounce cups of liquid every day  Ask what amount is best for you  For most people, good liquids to drink are water, juice, and milk  · Exercise as directed  Talk to your healthcare provider about the best exercise plan for you  Exercise can help prevent constipation, decrease your blood pressure and improve your health  Follow up with your healthcare provider as directed:  Write down your questions so you remember to ask them during your visits  © 2017 [employer] Information is for End User's use only and may not be sold, redistributed or otherwise used for commercial purposes  All illustrations and images included in CareNotes® are the copyrighted property of [employer]  or [place]  The above information is an  only  It is not intended as medical advice for individual conditions or treatments  Talk to your doctor, nurse or pharmacist before following any medical regimen to see if it is safe and effective for you  Colorectal Polyps   WHAT YOU NEED TO KNOW:   Colorectal polyps are small growths of tissue in the lining of the colon and rectum  Most polyps are hyperplastic polyps and are usually benign (noncancerous)  Certain types of polyps, called adenomatous polyps, may turn into cancer  DISCHARGE INSTRUCTIONS:   Follow up with your healthcare provider or gastroenterologist as directed: You may need to return for more tests, such as another colonoscopy  Write down your questions so you remember to ask them during your visits  Reduce your risk for colorectal polyps:   · Eat a variety of healthy foods:  Healthy foods include fruit, vegetables, whole-grain breads, low-fat dairy products, beans, lean meat, and fish  Ask if you need to be on a special diet  · Maintain a healthy weight:  Ask your healthcare provider if you need to lose weight and how much you need to lose   Ask for help with a weight loss program     · Exercise:  Begin to exercise slowly and do more as you get stronger  Talk with your healthcare provider before you start an exercise program      · Limit alcohol:  Your risk for polyps increases the more you drink  · Do not smoke: If you smoke, it is never too late to quit  Ask for information about how to stop  For support and more information:   · [facility] ([facility])  [address] , [place] [address]  Phone: [phone]  Web Address: www digestive  niddk nih gov  Contact your healthcare provider or gastroenterologist if:   · You have a fever  · You have chills, a cough, or feel weak and achy  · You have abdominal pain that does not go away or gets worse after you take medicine  · Your abdomen is swollen  · You are losing weight without trying  · You have questions or concerns about your condition or care  Seek care immediately or call 911 if:   · You have sudden shortness of breath  · You have a fast heart rate, fast breathing, or are too dizzy to stand up  · You have severe abdominal pain  · You see blood in your bowel movement  © 2017 [address] [facility] Information is for End User's use only and may not be sold, redistributed or otherwise used for commercial purposes  All illustrations and images included in CareNotes® are the copyrighted property of [employer]  or [place]  The above information is an  only  It is not intended as medical advice for individual conditions or treatments  Talk to your doctor, nurse or pharmacist before following any medical regimen to see if it is safe and effective for you

## 2019-03-15 NOTE — ANESTHESIA PREPROCEDURE EVALUATION
Review of Systems/Medical History          Cardiovascular  Hyperlipidemia,    Pulmonary  Asthma , Sleep apnea ,        GI/Hepatic    GERD ,             Endo/Other  Diabetes , History of thyroid disease ,   Obesity    GYN       Hematology  Anemia ,     Musculoskeletal  Negative musculoskeletal ROS        Neurology    Headaches,    Psychology   Anxiety, Depression ,              Physical Exam    Airway    Mallampati score: II  TM Distance: >3 FB  Neck ROM: full     Dental   No notable dental hx     Cardiovascular  Cardiovascular exam normal    Pulmonary  Pulmonary exam normal     Other Findings        Anesthesia Plan  ASA Score- 3     Anesthesia Type- IV sedation with anesthesia with ASA Monitors  Additional Monitors:   Airway Plan:         Plan Factors-    Induction- intravenous  Postoperative Plan-     Informed Consent- Anesthetic plan and risks discussed with patient

## 2019-03-15 NOTE — DISCHARGE INSTR - AVS FIRST PAGE
Colonoscopy Procedure Note    Procedure: Colonoscopy    Sedation: Monitored anesthesia care, check anesthesia records      ASA Class: 2    INDICATIONS:  History of colon polyps    POST-OP DIAGNOSIS: See the impression below    Procedure Details     Prior colonoscopy: 5 years ago  Informed consent was obtained for the procedure, including sedation  Risks of perforation, hemorrhage, adverse drug reaction and aspiration were discussed  The patient was placed in the left lateral decubitus position  Based on the pre-procedure assessment, including review of the patient's medical history, medications, allergies, and review of systems, she had been deemed to be an appropriate candidate for conscious sedation; she was therefore sedated with the medications listed below  The patient was monitored continuously with telemetry, pulse oximetry, blood pressure monitoring, and direct observations  A rectal examination was performed  The colonoscope was inserted into the rectum and advanced under direct vision to the cecum, which was identified by the ileocecal valve and appendiceal orifice  The quality of the colonic preparation was good  A careful inspection was made as the colonoscope was withdrawn, including a retroflexed view of the rectum; findings and interventions are described below  Findings: There was a 3 mm sessile polyp in the transverse colon that was removed with cold forceps polypectomy  Retroflexed view of the rectum was unremarkable  Complications: None; patient tolerated the procedure well  Impression:    Transverse colon polyp removed    Recommendations:  Repeat colonoscopy in 5 years or sooner if clinically indicated  Repeat colonoscopy is being recommended at an interval of less than 10 years, this is because of a personal history of polyps or colon cancer  Await pathology results  COMPLICATIONS:  None; patient tolerated the procedure well      SPECIMENS:    ID Type Source Tests Collected by Time Destination   1 : Transverse colon polyp  Tissue Polyp, Colorectal TISSUE EXAM Emily Rivera MD 3/15/2019 9966        ESTIMATED BLOOD LOSS:  Minimal

## 2019-03-15 NOTE — OP NOTE
Colonoscopy Procedure Note    Procedure: Colonoscopy    Sedation: Monitored anesthesia care, check anesthesia records      ASA Class: 2    INDICATIONS:  History of colon polyps    POST-OP DIAGNOSIS: See the impression below    Procedure Details     Prior colonoscopy: 5 years ago  Informed consent was obtained for the procedure, including sedation  Risks of perforation, hemorrhage, adverse drug reaction and aspiration were discussed  The patient was placed in the left lateral decubitus position  Based on the pre-procedure assessment, including review of the patient's medical history, medications, allergies, and review of systems, she had been deemed to be an appropriate candidate for conscious sedation; she was therefore sedated with the medications listed below  The patient was monitored continuously with telemetry, pulse oximetry, blood pressure monitoring, and direct observations  A rectal examination was performed  The colonoscope was inserted into the rectum and advanced under direct vision to the cecum, which was identified by the ileocecal valve and appendiceal orifice  The quality of the colonic preparation was good  A careful inspection was made as the colonoscope was withdrawn, including a retroflexed view of the rectum; findings and interventions are described below  Findings: There was a 3 mm sessile polyp in the transverse colon that was removed with cold forceps polypectomy  Retroflexed view of the rectum was unremarkable  Complications: None; patient tolerated the procedure well  Impression:    Transverse colon polyp removed    Recommendations:  Repeat colonoscopy in 5 years or sooner if clinically indicated  Repeat colonoscopy is being recommended at an interval of less than 10 years, this is because of a personal history of polyps or colon cancer  Await pathology results  COMPLICATIONS:  None; patient tolerated the procedure well      SPECIMENS:    ID Type Source Tests Collected by Time Destination   1 : Transverse colon polyp  Tissue Polyp, Colorectal TISSUE EXAM Harsh Abel MD 3/15/2019 5819        ESTIMATED BLOOD LOSS:  Minimal

## 2019-03-18 ENCOUNTER — TELEPHONE (OUTPATIENT)
Dept: FAMILY MEDICINE CLINIC | Facility: CLINIC | Age: 59
End: 2019-03-18

## 2019-03-18 NOTE — TELEPHONE ENCOUNTER
Patient called regarding your conversation about giving her the phone number for Dilma Sadler who was former employee  Caller can be reached at [phone]  Thank you

## 2019-03-19 NOTE — TELEPHONE ENCOUNTER
I spoke with [facility]  I explained the patient wishes to speak with her  [facility] will reach out to her, she has her phone number

## 2019-03-20 ENCOUNTER — ANNUAL EXAM (OUTPATIENT)
Dept: OBGYN CLINIC | Facility: CLINIC | Age: 59
End: 2019-03-20
Payer: COMMERCIAL

## 2019-03-20 VITALS
OXYGEN SATURATION: 96 % | WEIGHT: 214 LBS | SYSTOLIC BLOOD PRESSURE: 138 MMHG | HEIGHT: 65 IN | DIASTOLIC BLOOD PRESSURE: 80 MMHG | BODY MASS INDEX: 35.65 KG/M2 | HEART RATE: 92 BPM

## 2019-03-20 DIAGNOSIS — Z12.31 ENCOUNTER FOR SCREENING MAMMOGRAM FOR BREAST CANCER: ICD-10-CM

## 2019-03-20 DIAGNOSIS — Z01.419 ENCOUNTER FOR GYNECOLOGICAL EXAMINATION WITHOUT ABNORMAL FINDING: Primary | ICD-10-CM

## 2019-03-20 PROBLEM — E05.90 HYPERTHYROIDISM: Status: RESOLVED | Noted: 2018-11-30 | Resolved: 2019-03-20

## 2019-03-20 PROCEDURE — 99386 PREV VISIT NEW AGE 40-64: CPT | Performed by: NURSE PRACTITIONER

## 2019-03-20 RX ORDER — VENLAFAXINE 100 MG/1
50 TABLET ORAL 2 TIMES DAILY
COMMUNITY
End: 2019-07-29

## 2019-03-20 NOTE — PROGRESS NOTES
Assessment / Plan    1  Encounter for gynecological examination without abnormal finding  Normal well woman exam  2017 pap/ hpv negative-- plan repeat 2020  Has appt for mammogram next week  Up to date on colonoscopy-- Q 5 years    2  Encounter for screening mammogram for breast cancer    - Mammo screening bilateral w cad; Future      Subjective      Jennifer Mak is a 64 y o  female who presents for her annual gynecologic exam   NEW PATIENT  Know to me from [facility]    Recently discharged from behavioral health for severe major depression  States that she is doing much better, on new medications, seeing a therapist   Will be starting back to work tomorrow  Has family and friends for support  Last pap: 3/2017  Last mammogram: scheduled next week; 3/2017 mammo negative, 2/4  Colonoscopy, 3/2019- one polyp, Q 5 yr      Current contraception: post menopausal status  History of abnormal Pap smear: no  Family history of breast,uterine, ovarian or colon cancer: no    Menstrual History:  OB History    None        Menarche age: 17  No LMP recorded (lmp unknown)  Patient is postmenopausal        The following portions of the patient's history were reviewed and updated as appropriate: allergies, current medications, past family history, past medical history, past social history, past surgical history and problem list     Review of Systems      Review of Systems   Constitutional: Negative for chills and fever  Gastrointestinal: Negative for abdominal distention, abdominal pain, blood in stool, constipation, diarrhea, nausea and vomiting  Genitourinary: Negative for difficulty urinating, dysuria, frequency, genital sores, hematuria, menstrual problem, pelvic pain, urgency, vaginal bleeding and vaginal discharge       Breasts:  Negative for skin changes, dimpling, asymmetry, nipple discharge, redness, tenderness or palpable masses      Objective      /80 (BP Location: Left arm, Patient Position: Sitting, Cuff Size: Adult)   Pulse 92   Ht 5' 5" (1 651 m)   Wt 97 1 kg (214 lb)   LMP  (LMP Unknown)   SpO2 96%   BMI 35 61 kg/m²      Physical Exam   Constitutional: She is oriented to person, place, and time  She appears well-developed and well-nourished  No distress  HENT:   Head: Normocephalic and atraumatic  Eyes: Pupils are equal, round, and reactive to light  Neck: Neck supple  No thyromegaly present  Pulmonary/Chest: Effort normal  Right breast exhibits no inverted nipple, no mass, no nipple discharge, no skin change and no tenderness  Left breast exhibits no inverted nipple, no mass, no nipple discharge, no skin change and no tenderness  Breasts are symmetrical    Abdominal: Soft  Normal appearance  She exhibits no mass  There is no tenderness  There is no CVA tenderness  Genitourinary: Rectum normal and uterus normal  Pelvic exam was performed with patient supine  No labial fusion  There is no rash, tenderness, lesion or injury on the right labia  There is no rash, tenderness, lesion or injury on the left labia  Uterus is not enlarged and not tender  Cervix exhibits no motion tenderness, no discharge and no friability  Right adnexum displays no mass, no tenderness and no fullness  Left adnexum displays no mass, no tenderness and no fullness  No erythema, tenderness or bleeding in the vagina  No foreign body in the vagina  No signs of injury around the vagina  No vaginal discharge found  Lymphadenopathy:     She has no cervical adenopathy  She has no axillary adenopathy  Right: No inguinal and no supraclavicular adenopathy present  Left: No inguinal and no supraclavicular adenopathy present  Neurological: She is alert and oriented to person, place, and time  She is not disoriented  Skin: Skin is warm, dry and intact  Psychiatric: She has a normal mood and affect   Her behavior is normal  Thought content normal

## 2019-03-21 LAB
LEFT EYE DIABETIC RETINOPATHY: NORMAL
RIGHT EYE DIABETIC RETINOPATHY: NORMAL

## 2019-03-21 PROCEDURE — 3072F LOW RISK FOR RETINOPATHY: CPT | Performed by: FAMILY MEDICINE

## 2019-04-03 DIAGNOSIS — F32.A DEPRESSION, UNSPECIFIED DEPRESSION TYPE: ICD-10-CM

## 2019-04-03 RX ORDER — TRAZODONE HYDROCHLORIDE 50 MG/1
TABLET ORAL
Qty: 90 TABLET | Refills: 1 | Status: SHIPPED | OUTPATIENT
Start: 2019-04-03 | End: 2019-07-29 | Stop reason: SDUPTHER

## 2019-07-22 DIAGNOSIS — F33.2 SEVERE RECURRENT MAJOR DEPRESSION WITHOUT PSYCHOTIC FEATURES (HCC): Primary | ICD-10-CM

## 2019-07-22 NOTE — TELEPHONE ENCOUNTER
Patient called back requesting effexor 37 5mg  She does not get this medication through us  She states the office she does get it from refuses to prescribe it for her because didn't make an appt  She declined coming into our office for an appt as well

## 2019-07-25 RX ORDER — VENLAFAXINE HYDROCHLORIDE 37.5 MG/1
37.5 CAPSULE, EXTENDED RELEASE ORAL DAILY
Refills: 0 | COMMUNITY
Start: 2019-06-20 | End: 2019-07-25 | Stop reason: SDUPTHER

## 2019-07-25 NOTE — TELEPHONE ENCOUNTER
Patient called she is out of medication and doing 16 weeks of training  She will schedule to see the physician that normally prescribes this medication for her after she is done with training  That office will not prescribe any more medication for her without being seen  She states Dr Moriah Carrington has told her in the past that if she has trouble getting her medication to call and he will help  She is willing to schedule now to come in and see Dr Moriah Carrington next week in the evening; however, she has NO MEDICATION LEFT  Will you rx enough medication to get her through to next week and she will schedule with Dr Moriah Carrington for next week?

## 2019-07-26 RX ORDER — VENLAFAXINE HYDROCHLORIDE 37.5 MG/1
37.5 CAPSULE, EXTENDED RELEASE ORAL DAILY
Qty: 10 CAPSULE | Refills: 0 | Status: SHIPPED | OUTPATIENT
Start: 2019-07-26 | End: 2019-07-29 | Stop reason: SDUPTHER

## 2019-07-29 ENCOUNTER — OFFICE VISIT (OUTPATIENT)
Dept: FAMILY MEDICINE CLINIC | Facility: CLINIC | Age: 59
End: 2019-07-29
Payer: COMMERCIAL

## 2019-07-29 VITALS
SYSTOLIC BLOOD PRESSURE: 152 MMHG | WEIGHT: 217.8 LBS | TEMPERATURE: 97.7 F | DIASTOLIC BLOOD PRESSURE: 84 MMHG | HEIGHT: 65 IN | BODY MASS INDEX: 36.29 KG/M2

## 2019-07-29 DIAGNOSIS — F33.2 SEVERE RECURRENT MAJOR DEPRESSION WITHOUT PSYCHOTIC FEATURES (HCC): ICD-10-CM

## 2019-07-29 DIAGNOSIS — E05.90 HYPERTHYROIDISM: ICD-10-CM

## 2019-07-29 DIAGNOSIS — E06.3 HYPOTHYROIDISM DUE TO HASHIMOTO'S THYROIDITIS: ICD-10-CM

## 2019-07-29 DIAGNOSIS — J45.909 ASTHMA, UNSPECIFIED ASTHMA SEVERITY, UNSPECIFIED WHETHER COMPLICATED, UNSPECIFIED WHETHER PERSISTENT: ICD-10-CM

## 2019-07-29 DIAGNOSIS — E03.8 HYPOTHYROIDISM DUE TO HASHIMOTO'S THYROIDITIS: ICD-10-CM

## 2019-07-29 DIAGNOSIS — E11.9 TYPE 2 DIABETES MELLITUS WITHOUT COMPLICATION, WITHOUT LONG-TERM CURRENT USE OF INSULIN (HCC): ICD-10-CM

## 2019-07-29 DIAGNOSIS — E78.2 MIXED HYPERLIPIDEMIA: ICD-10-CM

## 2019-07-29 DIAGNOSIS — E11.8 TYPE 2 DIABETES MELLITUS WITH COMPLICATION, UNSPECIFIED WHETHER LONG TERM INSULIN USE: Primary | ICD-10-CM

## 2019-07-29 DIAGNOSIS — F32.A DEPRESSION, UNSPECIFIED DEPRESSION TYPE: ICD-10-CM

## 2019-07-29 DIAGNOSIS — Z12.39 SCREENING FOR BREAST CANCER: ICD-10-CM

## 2019-07-29 DIAGNOSIS — F41.9 ANXIETY: ICD-10-CM

## 2019-07-29 LAB — SL AMB POCT HEMOGLOBIN AIC: 8.4 (ref ?–6.5)

## 2019-07-29 PROCEDURE — 82570 ASSAY OF URINE CREATININE: CPT | Performed by: FAMILY MEDICINE

## 2019-07-29 PROCEDURE — 83036 HEMOGLOBIN GLYCOSYLATED A1C: CPT | Performed by: FAMILY MEDICINE

## 2019-07-29 PROCEDURE — 3045F PR MOST RECENT HEMOGLOBIN A1C LEVEL 7.0-9.0%: CPT | Performed by: FAMILY MEDICINE

## 2019-07-29 PROCEDURE — 3008F BODY MASS INDEX DOCD: CPT | Performed by: FAMILY MEDICINE

## 2019-07-29 PROCEDURE — 1036F TOBACCO NON-USER: CPT | Performed by: FAMILY MEDICINE

## 2019-07-29 PROCEDURE — 99214 OFFICE O/P EST MOD 30 MIN: CPT | Performed by: FAMILY MEDICINE

## 2019-07-29 PROCEDURE — 82043 UR ALBUMIN QUANTITATIVE: CPT | Performed by: FAMILY MEDICINE

## 2019-07-29 RX ORDER — FLUTICASONE PROPIONATE 50 MCG
2 SPRAY, SUSPENSION (ML) NASAL DAILY
Qty: 1 BOTTLE | Refills: 2 | Status: SHIPPED | OUTPATIENT
Start: 2019-07-29 | End: 2020-01-24 | Stop reason: SDUPTHER

## 2019-07-29 RX ORDER — QUETIAPINE FUMARATE 200 MG/1
200 TABLET, FILM COATED ORAL
Qty: 90 TABLET | Refills: 1 | Status: SHIPPED | OUTPATIENT
Start: 2019-07-29 | End: 2019-10-25 | Stop reason: SDUPTHER

## 2019-07-29 RX ORDER — TRAZODONE HYDROCHLORIDE 50 MG/1
50 TABLET ORAL
Qty: 90 TABLET | Refills: 1 | Status: SHIPPED | OUTPATIENT
Start: 2019-07-29 | End: 2019-10-25 | Stop reason: SDUPTHER

## 2019-07-29 RX ORDER — SERTRALINE HYDROCHLORIDE 100 MG/1
200 TABLET, FILM COATED ORAL
Qty: 180 TABLET | Refills: 1 | Status: SHIPPED | OUTPATIENT
Start: 2019-07-29 | End: 2019-10-25 | Stop reason: SDUPTHER

## 2019-07-29 RX ORDER — VENLAFAXINE HYDROCHLORIDE 37.5 MG/1
37.5 CAPSULE, EXTENDED RELEASE ORAL DAILY
Qty: 90 CAPSULE | Refills: 0 | Status: SHIPPED | OUTPATIENT
Start: 2019-07-29 | End: 2019-10-25 | Stop reason: SDUPTHER

## 2019-07-29 RX ORDER — GABAPENTIN 100 MG/1
100 CAPSULE ORAL
Qty: 90 CAPSULE | Refills: 1 | Status: SHIPPED | OUTPATIENT
Start: 2019-07-29 | End: 2019-10-25 | Stop reason: SDUPTHER

## 2019-07-29 RX ORDER — LEVOTHYROXINE SODIUM 0.07 MG/1
75 TABLET ORAL DAILY
Qty: 90 TABLET | Refills: 1 | Status: SHIPPED | OUTPATIENT
Start: 2019-07-29 | End: 2019-10-25 | Stop reason: SDUPTHER

## 2019-07-29 NOTE — PROGRESS NOTES
Assessment/Plan:  Patient go for laboratory studies  A1c is 8 4  Start metformin 500 mg twice daily  Patient will see nutritionist   Patient will continue with current list of medications for other chronic conditions listed  Refills given at this time  The patient will follow up in 3-4 months     Diagnoses and all orders for this visit:    Type 2 diabetes mellitus with complication, unspecified whether long term insulin use (HCC)  -     POCT hemoglobin A1c  -     Microalbumin / creatinine urine ratio  -     CBC and differential; Future  -     Comprehensive metabolic panel; Future  -     Lipid panel; Future  -     TSH, 3rd generation with Free T4 reflex; Future  -     Microalbumin / creatinine urine ratio  -     CBC and differential  -     Comprehensive metabolic panel  -     Lipid panel  -     TSH, 3rd generation with Free T4 reflex  -     metFORMIN (GLUCOPHAGE) 500 mg tablet; Take 1 tablet (500 mg total) by mouth 2 (two) times a day with meals  -     Ambulatory referral to Nutrition Services; Future    Depression, unspecified depression type  -     gabapentin (NEURONTIN) 100 mg capsule; Take 1 capsule (100 mg total) by mouth daily at bedtime  -     QUEtiapine (SEROquel) 200 mg tablet; Take 1 tablet (200 mg total) by mouth daily at bedtime  -     sertraline (ZOLOFT) 100 mg tablet; Take 2 tablets (200 mg total) by mouth daily at bedtime  -     traZODone (DESYREL) 50 mg tablet; Take 1 tablet (50 mg total) by mouth daily at bedtime    Asthma, unspecified asthma severity, unspecified whether complicated, unspecified whether persistent  -     fluticasone (FLONASE) 50 mcg/act nasal spray; 2 sprays into each nostril daily    Hyperthyroidism  -     levothyroxine 75 mcg tablet; Take 1 tablet (75 mcg total) by mouth daily    Severe recurrent major depression without psychotic features (HCC)  -     venlafaxine (EFFEXOR-XR) 37 5 mg 24 hr capsule;  Take 1 capsule (37 5 mg total) by mouth daily  -     CBC and differential; Future  -     Comprehensive metabolic panel; Future  -     Lipid panel; Future  -     TSH, 3rd generation with Free T4 reflex; Future  -     Microalbumin / creatinine urine ratio  -     CBC and differential  -     Comprehensive metabolic panel  -     Lipid panel  -     TSH, 3rd generation with Free T4 reflex    Screening for breast cancer  -     Mammo screening bilateral w cad; Future    Type 2 diabetes mellitus without complication, without long-term current use of insulin (HCC)    Mixed hyperlipidemia  -     CBC and differential; Future  -     Comprehensive metabolic panel; Future  -     Lipid panel; Future  -     TSH, 3rd generation with Free T4 reflex; Future  -     Microalbumin / creatinine urine ratio  -     CBC and differential  -     Comprehensive metabolic panel  -     Lipid panel  -     TSH, 3rd generation with Free T4 reflex    Hypothyroidism due to Hashimoto's thyroiditis  -     CBC and differential; Future  -     Comprehensive metabolic panel; Future  -     Lipid panel; Future  -     TSH, 3rd generation with Free T4 reflex; Future  -     Microalbumin / creatinine urine ratio  -     CBC and differential  -     Comprehensive metabolic panel  -     Lipid panel  -     TSH, 3rd generation with Free T4 reflex    Anxiety            Subjective:        Patient ID: Ave Severe is a 64 y o  female  Patient follow-up on diabetes hyperlipidemia hypothyroidism and anxiety/depression  Patient's mood is been much improved since leaving previous job in starting new job  Patient already had ophthalmology exam for diabetes  This was normal   No numbness or tingling of the feet  No chest pain or shortness of breath  Normal urination defecation  A1c 8 4          The following portions of the patient's history were reviewed and updated as appropriate: allergies, current medications, past family history, past medical history, past social history, past surgical history and problem list       Review of Systems Constitutional: Negative  HENT: Negative  Eyes: Negative  Respiratory: Negative  Cardiovascular: Negative  Gastrointestinal: Negative  Endocrine: Negative  Genitourinary: Negative  Musculoskeletal: Negative  Skin: Negative  Allergic/Immunologic: Negative  Neurological: Negative  Hematological: Negative  Psychiatric/Behavioral: Negative  Objective:      BMI Counseling: Body mass index is 36 24 kg/m²  Discussed the patient's BMI with her  The BMI is above average  BMI counseling and education was provided to the patient  Nutrition recommendations include reducing portion sizes  /84 (BP Location: Right arm, Patient Position: Sitting, Cuff Size: Adult)   Temp 97 7 °F (36 5 °C) (Tympanic)   Ht 5' 5" (1 651 m)   Wt 98 8 kg (217 lb 12 8 oz)   LMP  (LMP Unknown)   BMI 36 24 kg/m²          Physical Exam   Constitutional: She is oriented to person, place, and time  She appears well-developed and well-nourished  No distress  HENT:   Head: Normocephalic  Right Ear: External ear normal    Left Ear: External ear normal    Mouth/Throat: Oropharynx is clear and moist  No oropharyngeal exudate  Eyes: Pupils are equal, round, and reactive to light  EOM are normal  Right eye exhibits no discharge  Left eye exhibits no discharge  No scleral icterus  Neck: Normal range of motion  Neck supple  No thyromegaly present  Cardiovascular: Normal rate, regular rhythm, normal heart sounds and intact distal pulses  Exam reveals no gallop and no friction rub  Pulses are no weak pulses  No murmur heard  Pulses:       Dorsalis pedis pulses are 2+ on the right side, and 2+ on the left side  Posterior tibial pulses are 2+ on the right side, and 2+ on the left side  Pulmonary/Chest: Effort normal and breath sounds normal  No respiratory distress  She has no wheezes  She has no rales  She exhibits no tenderness  Abdominal: Soft   Bowel sounds are normal  She exhibits no distension  There is no tenderness  There is no rebound and no guarding  Musculoskeletal: Normal range of motion  She exhibits no edema or tenderness  Feet:   Right Foot:   Skin Integrity: Negative for ulcer, skin breakdown, erythema, warmth, callus or dry skin  Left Foot:   Skin Integrity: Negative for ulcer, skin breakdown, erythema, warmth, callus or dry skin  Lymphadenopathy:     She has no cervical adenopathy  Neurological: She is oriented to person, place, and time  No cranial nerve deficit  She exhibits normal muscle tone  Coordination normal    Skin: Skin is warm and dry  No rash noted  She is not diaphoretic  No erythema  No pallor  Psychiatric: She has a normal mood and affect  Her behavior is normal  Judgment and thought content normal    Nursing note and vitals reviewed  Patient's shoes and socks removed  Right Foot/Ankle   Right Foot Inspection  Skin Exam: skin normal and skin intact no dry skin, no warmth, no callus, no erythema, no maceration, no abnormal color, no pre-ulcer, no ulcer and no callus                          Toe Exam: ROM and strength within normal limits  Sensory       Monofilament testing: intact  Vascular  Capillary refills: < 3 seconds  The right DP pulse is 2+  The right PT pulse is 2+  Left Foot/Ankle  Left Foot Inspection  Skin Exam: skin normal and skin intactno dry skin, no warmth, no erythema, no maceration, normal color, no pre-ulcer, no ulcer and no callus                         Toe Exam: ROM and strength within normal limits                   Sensory       Monofilament: intact  Vascular  Capillary refills: < 3 seconds  The left DP pulse is 2+  The left PT pulse is 2+  Assign Risk Category:  No deformity present; No loss of protective sensation;  No weak pulses       Risk: 0

## 2019-07-30 LAB
CREAT UR-MCNC: 168 MG/DL
MICROALBUMIN UR-MCNC: 12 MG/L (ref 0–20)
MICROALBUMIN/CREAT 24H UR: 7 MG/G CREATININE (ref 0–30)

## 2019-07-30 PROCEDURE — 3061F NEG MICROALBUMINURIA REV: CPT | Performed by: FAMILY MEDICINE

## 2019-09-04 DIAGNOSIS — N18.30 TYPE 2 DIABETES MELLITUS WITH STAGE 3 CHRONIC KIDNEY DISEASE, WITHOUT LONG-TERM CURRENT USE OF INSULIN (HCC): ICD-10-CM

## 2019-09-04 DIAGNOSIS — E11.22 TYPE 2 DIABETES MELLITUS WITH STAGE 3 CHRONIC KIDNEY DISEASE, WITHOUT LONG-TERM CURRENT USE OF INSULIN (HCC): ICD-10-CM

## 2019-09-04 DIAGNOSIS — E06.3 HYPOTHYROIDISM DUE TO HASHIMOTO'S THYROIDITIS: ICD-10-CM

## 2019-09-04 DIAGNOSIS — E03.8 HYPOTHYROIDISM DUE TO HASHIMOTO'S THYROIDITIS: ICD-10-CM

## 2019-09-04 DIAGNOSIS — Z12.39 SCREENING FOR BREAST CANCER: ICD-10-CM

## 2019-10-05 NOTE — SOCIAL WORK
Patient:   ANEL SANCHEZ            MRN: [id number]            FIN: [id number]              Age:   82 years     Sex:  MALE     :  41   Associated Diagnoses:   None   Author:   GISELL BELTRAN     Subjective:     Pt seen and examined  denies any cp or sob  started on diet didnt eat much  no ON events   Objective:      I & O between:  04-OCT-2019 18:02 TO 05-OCT-2019 18:02  Med Dosing Weight:  89.5  kg   29-SEP-2019  24 Hour Intake:   64.00  ( 0.72 mL/kg )  24 Hour Output:   750.00           24 Hour Urine/Stool Output:   0.0  24 Hour Balance:   -686.00           24 Hour Urine Output:   750.00  ( 0.35 mL/kg/hr )    Vitals between:   04-OCT-2019 18:02:27   TO   05-OCT-2019 18:02:27                   LAST RESULT MINIMUM MAXIMUM  Temperature 36.8 36.6 37.2  Heart Rate 61 45 67  Respiratory Rate 21 13 25  NISBP           120 112 128  NIDBP           57 46 80  NIMBP           77 66 93  CVP                     -14 -14 17  SpO2                    96 92 99  GENERAL:  in no apparent distress.  neck : swanz catheter noted  CHEST:  Chest with clear breath sounds bilaterally. No wheezes, rales, or rhonchi.  CARDIAC:  Regular rate and rhythm.  S1 and S2, without murmurs, gallops, or rubs.  VASCULAR:  No Edema.  Peripheral pulses normal and equal in all extremities.  ABDOMEN:  Soft, no tedneress   PSYCH: Normal moood and affect.  Labs:  Labs between:  04-OCT-2019 18:02 to 05-OCT-2019 18:02  CBC:                 WBC  HgB  Hct  Plt  MCV  RDW   05-OCT-2019 7.6  (L) 11.1  (L) 34.2  (L) 109  91.0  (H) 16.1   DIFF:                 Seg  Neutroph//ABS  Lymph//ABS  Mono//ABS  EOS/ABS  05-OCT-2019 NOT APPLICABLE  83 // 6.2 7 // (L) 0.5  10 // 0.8 0 // (L) 0.0  BMP:                 Na  Cl  BUN  Glu   05-OCT-2019 143  106  (H) 23  (H) 103                              K  CO2  Cr  Ca                              3.9  28  (H) 1.18  8.6   Other Chem:             Mg  Phos  Triglycerides  GGTP  DirectBili                            RAN signed for [employer]  Worker received phone call from [employer]- Clinician: Otoniel Kim [phone]  Clive Wilson explained worker can complete short term disability claim over the telephone and claim would be opened without having to complete and fax the paperwork that was originally faxed to worker on 1/28/19  Worker opened claim with Clive Wilson over the telephone  Worker provided pt's admission date, that a discharge date was unknown, pt's diagnosis, and presenting symptoms  Clive Wilson reported that it is okay a discharge date is unknown as it is ongoing tx  Clive Wilson explained he is able to approve one month when first opening the claim  Clive Wilson reports this will allow pt to receive proper inpatient tx, follow up with outpatient providers appropriately and have time for recovery  Pt is approved from 1/26/19 through 3/1/19  2.1  4.0                        Medications (15) Active  Scheduled: (9)  Atorvastatin 20 mg tab  20 mg 1 tab, Oral, Daily  Bumetanide 1 mg/4 mL inj SDV  1 mg 4 mL, Slow IV Push, BID  ceFAZolin  2,000 mg 15 mL, Slow IV Push, On Call  Chlorhexidine gluconate 0.12% ORAL RINSE 15 mL repack  15 mL, Oral Mucosa, Q12H  Docusate sodium 100 mg cap  100 mg 1 cap, Oral, Q Bedtime  Docusate sodium 100 mg cap  100 mg 1 cap, Oral, BID  Doxazosin 1 mg tab  2 mg 2 tab, Oral, Q Bedtime  HydrALAZINE 25 mg tab  25 mg 1 tab, Oral, Q8H  Pantoprazole 40 mg DR tab  40 mg 1 tab, Oral, BID  Continuous: (1)  nitroPRUSSIDE 50 mg [5 mcg/kg/min] + premixed in Sodium Chloride 0.9% 100 mL  100 mL, IV, 49.5 mL/hr  PRN: (5)  Acetaminophen 325 mg tab  650 mg 2 tab, Oral, Q4H  Acetaminophen 650 mg/20.3 mL oral liquid UD  650 mg 20.3 mL, Oral, Q6H  Benzocaine-menthol 15-3.6 mg lozenge  1 lozenge, Oral, Q4H  Ondansetron 4 mg disintegrating tab  4 mg 1 tab, Oral, Q6H  Ondansetron 4 mg/2 mL inj SDV  4 mg 2 mL, Slow IV Push, Q6H  Assessment and plan:   Small bowel obstruction 2/2 incarcerated inguinal hernia s/p reduction  Abdominal pain, nausea, anorexia 2/2 above  appreciate surgery input  s/p open hernia repair POD #1  post op care - IV Abx, IVF, diet,activity and DVT ppx as per surgery  Chronic systolic and diastolic congestive heart failure  Persistent Afib on warfarin  Hypertensive heart disease  Moderate to severe MR  Pulmonary HTN  NT proBNP elevated  cardiology on board pre operative w/u done  coumadin/asa on hold for surgery as per surgery can resume AC from POD#2  s/p angiogram swanz catheter placed. hemodynamics being monitored post surgery.  on nitro gtt and bumex. cards started on hydralazine  d/w ICU team   BPH  on doxazosin  DVT ppx -scd. start chemica VTE once ok with surgery team

## 2019-10-25 ENCOUNTER — OFFICE VISIT (OUTPATIENT)
Dept: FAMILY MEDICINE CLINIC | Facility: CLINIC | Age: 59
End: 2019-10-25
Payer: COMMERCIAL

## 2019-10-25 VITALS
BODY MASS INDEX: 34.82 KG/M2 | SYSTOLIC BLOOD PRESSURE: 144 MMHG | WEIGHT: 209 LBS | TEMPERATURE: 97.1 F | DIASTOLIC BLOOD PRESSURE: 88 MMHG | HEIGHT: 65 IN

## 2019-10-25 DIAGNOSIS — F32.A DEPRESSION, UNSPECIFIED DEPRESSION TYPE: ICD-10-CM

## 2019-10-25 DIAGNOSIS — Z23 FLU VACCINE NEED: Primary | ICD-10-CM

## 2019-10-25 DIAGNOSIS — F33.2 SEVERE RECURRENT MAJOR DEPRESSION WITHOUT PSYCHOTIC FEATURES (HCC): Primary | ICD-10-CM

## 2019-10-25 DIAGNOSIS — E11.00 TYPE 2 DIABETES MELLITUS WITH HYPEROSMOLARITY WITHOUT COMA, WITHOUT LONG-TERM CURRENT USE OF INSULIN (HCC): ICD-10-CM

## 2019-10-25 DIAGNOSIS — E05.90 HYPERTHYROIDISM: ICD-10-CM

## 2019-10-25 PROCEDURE — 90471 IMMUNIZATION ADMIN: CPT

## 2019-10-25 PROCEDURE — 90682 RIV4 VACC RECOMBINANT DNA IM: CPT

## 2019-10-25 PROCEDURE — 99214 OFFICE O/P EST MOD 30 MIN: CPT | Performed by: FAMILY MEDICINE

## 2019-10-25 RX ORDER — GABAPENTIN 100 MG/1
100 CAPSULE ORAL
Qty: 90 CAPSULE | Refills: 1 | Status: SHIPPED | OUTPATIENT
Start: 2019-10-25

## 2019-10-25 RX ORDER — LEVOTHYROXINE SODIUM 0.07 MG/1
75 TABLET ORAL DAILY
Qty: 90 TABLET | Refills: 1 | Status: SHIPPED | OUTPATIENT
Start: 2019-10-25 | End: 2020-01-24 | Stop reason: SDUPTHER

## 2019-10-25 RX ORDER — VENLAFAXINE HYDROCHLORIDE 37.5 MG/1
37.5 CAPSULE, EXTENDED RELEASE ORAL DAILY
Qty: 90 CAPSULE | Refills: 1 | Status: SHIPPED | OUTPATIENT
Start: 2019-10-25 | End: 2020-04-26

## 2019-10-25 RX ORDER — QUETIAPINE FUMARATE 200 MG/1
200 TABLET, FILM COATED ORAL
Qty: 90 TABLET | Refills: 1 | Status: SHIPPED | OUTPATIENT
Start: 2019-10-25 | End: 2022-01-07 | Stop reason: SDUPTHER

## 2019-10-25 RX ORDER — SERTRALINE HYDROCHLORIDE 100 MG/1
200 TABLET, FILM COATED ORAL
Qty: 180 TABLET | Refills: 1 | Status: SHIPPED | OUTPATIENT
Start: 2019-10-25 | End: 2022-01-07 | Stop reason: SDUPTHER

## 2019-10-25 RX ORDER — TRAZODONE HYDROCHLORIDE 50 MG/1
50 TABLET ORAL
Qty: 90 TABLET | Refills: 1 | Status: SHIPPED | OUTPATIENT
Start: 2019-10-25 | End: 2022-01-07 | Stop reason: SDUPTHER

## 2019-10-25 NOTE — PROGRESS NOTES
Assessment/Plan:  Patient have flu shot  Patient will see Psychiatry as well as counselor/psychologist   Refills given on medicines at this time  Patient will go for diabetic counseling  Patient will start metformin  Patient of labs in roughly 3 months and follow up at this  Patient will test blood sugars daily in the morning       Diagnoses and all orders for this visit:    Severe recurrent major depression without psychotic features (HCC)  -     venlafaxine (EFFEXOR-XR) 37 5 mg 24 hr capsule; Take 1 capsule (37 5 mg total) by mouth daily    Hyperthyroidism  -     levothyroxine 75 mcg tablet; Take 1 tablet (75 mcg total) by mouth daily  -     CBC and differential; Future  -     Comprehensive metabolic panel; Future  -     Hemoglobin A1C; Future  -     Lipid panel; Future  -     Microalbumin / creatinine urine ratio  -     TSH, 3rd generation with Free T4 reflex; Future  -     CBC and differential  -     Comprehensive metabolic panel  -     Lipid panel  -     TSH, 3rd generation with Free T4 reflex    Depression, unspecified depression type  -     gabapentin (NEURONTIN) 100 mg capsule; Take 1 capsule (100 mg total) by mouth daily at bedtime  -     QUEtiapine (SEROquel) 200 mg tablet; Take 1 tablet (200 mg total) by mouth daily at bedtime  -     traZODone (DESYREL) 50 mg tablet; Take 1 tablet (50 mg total) by mouth daily at bedtime  -     sertraline (ZOLOFT) 100 mg tablet; Take 2 tablets (200 mg total) by mouth daily at bedtime  -     CBC and differential; Future  -     Comprehensive metabolic panel; Future  -     Hemoglobin A1C; Future  -     Lipid panel; Future  -     Microalbumin / creatinine urine ratio  -     TSH, 3rd generation with Free T4 reflex;  Future  -     CBC and differential  -     Comprehensive metabolic panel  -     Lipid panel  -     TSH, 3rd generation with Free T4 reflex    Type 2 diabetes mellitus with hyperosmolarity without coma, without long-term current use of insulin ([facility] [address] )  - Ambulatory referral to Nutrition Services; Future  -     CBC and differential; Future  -     Comprehensive metabolic panel; Future  -     Hemoglobin A1C; Future  -     Lipid panel; Future  -     Microalbumin / creatinine urine ratio  -     TSH, 3rd generation with Free T4 reflex; Future  -     CBC and differential  -     Comprehensive metabolic panel  -     Lipid panel  -     TSH, 3rd generation with Free T4 reflex            Subjective:        Patient ID: Colleen Pizarro is a 64 y o  female  Patient is here to follow-up on depression  Patient was hospital from January and February for depression  Patient did return to work the [employer] for 2 weeks and then quit  Patient is working at [employer] for 3 weeks ago at the present quit  Patient started job in at and June 10th  Patient was crying at home and at work  Patient quit this job on August 23rd  The patient was seeing counselor intermittently  Patient with has applied for the on appointment  The patient is appealing unemployment  Patient financial struggling at this time  The patient with low mood, depressed, sad, decreased motivation patient with the psychomotor retardation  Concentration is decreased  The patient would like to see Psychiatry and counseling  Patient wishes to have flu shot also  The patient did not start the metformin  Patient is trying to go to [employer] for computer training  No suicidal ideation  No homicidal ideation  Patient with decreased appetite  Patient with anhedonia  Patient up set regarding cat with medical issues  The following portions of the patient's history were reviewed and updated as appropriate: allergies, current medications, past family history, past medical history, past social history, past surgical history and problem list       Review of Systems   Constitutional: Negative  HENT: Negative  Eyes: Negative  Respiratory: Negative  Cardiovascular: Negative      Gastrointestinal: Negative  Endocrine: Negative  Genitourinary: Negative  Musculoskeletal: Negative  Skin: Negative  Allergic/Immunologic: Negative  Neurological: Negative  Hematological: Negative  Psychiatric/Behavioral: Positive for behavioral problems, decreased concentration and sleep disturbance  Negative for suicidal ideas  The patient is nervous/anxious  Objective:      BMI Counseling: Body mass index is 34 78 kg/m²  Discussed the patient's BMI with her  The BMI is above normal  Nutrition recommendations include reducing portion sizes  /88 (BP Location: Left arm, Patient Position: Sitting, Cuff Size: Standard)   Temp (!) 97 1 °F (36 2 °C) (Tympanic)   Ht 5' 5" (1 651 m)   Wt 94 8 kg (209 lb)   LMP  (LMP Unknown)   BMI 34 78 kg/m²          Physical Exam   Constitutional: She appears well-developed and well-nourished  No distress  HENT:   Head: Normocephalic  Right Ear: External ear normal    Left Ear: External ear normal    Mouth/Throat: Oropharynx is clear and moist  No oropharyngeal exudate  Eyes: Pupils are equal, round, and reactive to light  EOM are normal  Right eye exhibits no discharge  Left eye exhibits no discharge  No scleral icterus  Neck: Normal range of motion  Neck supple  No thyromegaly present  Cardiovascular: Normal rate, regular rhythm, normal heart sounds and intact distal pulses  Exam reveals no gallop and no friction rub  No murmur heard  Pulmonary/Chest: Effort normal and breath sounds normal  No respiratory distress  She has no wheezes  She has no rales  She exhibits no tenderness  Abdominal: Soft  Bowel sounds are normal  She exhibits no distension  There is no tenderness  There is no rebound and no guarding  Musculoskeletal: Normal range of motion  She exhibits no edema or tenderness  Lymphadenopathy:     She has no cervical adenopathy  Neurological: She is alert  No cranial nerve deficit  She exhibits normal muscle tone  Coordination normal    Skin: Skin is warm and dry  No rash noted  She is not diaphoretic  No erythema  No pallor  Psychiatric: Her behavior is normal  Judgment and thought content normal    Depressed  Flat affect   Nursing note and vitals reviewed

## 2020-01-11 ENCOUNTER — TRANSCRIBE ORDERS (OUTPATIENT)
Dept: ADMINISTRATIVE | Facility: HOSPITAL | Age: 60
End: 2020-01-11

## 2020-01-11 ENCOUNTER — CLINICAL SUPPORT (OUTPATIENT)
Dept: URGENT CARE | Facility: MEDICAL CENTER | Age: 60
End: 2020-01-11
Payer: COMMERCIAL

## 2020-01-11 ENCOUNTER — APPOINTMENT (OUTPATIENT)
Dept: LAB | Facility: HOSPITAL | Age: 60
End: 2020-01-11
Payer: COMMERCIAL

## 2020-01-11 DIAGNOSIS — F25.9 SCHIZOAFFECTIVE DISORDER, CHRONIC CONDITION WITH ACUTE EXACERBATION (HCC): ICD-10-CM

## 2020-01-11 DIAGNOSIS — F20.0 PARANOID SCHIZOPHRENIA, SUBCHRONIC CONDITION (HCC): ICD-10-CM

## 2020-01-11 DIAGNOSIS — F33.2 SEVERE RECURRENT MAJOR DEPRESSION WITHOUT PSYCHOTIC FEATURES (HCC): ICD-10-CM

## 2020-01-11 DIAGNOSIS — F25.9 SCHIZOAFFECTIVE DISORDER, CHRONIC CONDITION WITH ACUTE EXACERBATION (HCC): Primary | ICD-10-CM

## 2020-01-11 DIAGNOSIS — F31.9 DEPRESSED BIPOLAR I DISORDER (HCC): ICD-10-CM

## 2020-01-11 DIAGNOSIS — F25.9 SCHIZOAFFECTIVE DISORDER, UNSPECIFIED TYPE (HCC): Primary | ICD-10-CM

## 2020-01-11 DIAGNOSIS — F25.9 SCHIZOAFFECTIVE DISORDER, UNSPECIFIED TYPE (HCC): ICD-10-CM

## 2020-01-11 LAB
ALBUMIN SERPL BCP-MCNC: 3.7 G/DL (ref 3.5–5)
ALP SERPL-CCNC: 136 U/L (ref 46–116)
ALT SERPL W P-5'-P-CCNC: 28 U/L (ref 12–78)
ANION GAP SERPL CALCULATED.3IONS-SCNC: 10 MMOL/L (ref 4–13)
AST SERPL W P-5'-P-CCNC: 16 U/L (ref 5–45)
BASOPHILS # BLD AUTO: 0.04 THOUSANDS/ΜL (ref 0–0.1)
BASOPHILS NFR BLD AUTO: 1 % (ref 0–1)
BILIRUB SERPL-MCNC: 0.23 MG/DL (ref 0.2–1)
BUN SERPL-MCNC: 22 MG/DL (ref 5–25)
CALCIUM SERPL-MCNC: 8.7 MG/DL (ref 8.3–10.1)
CHLORIDE SERPL-SCNC: 101 MMOL/L (ref 100–108)
CHOLEST SERPL-MCNC: 262 MG/DL (ref 50–200)
CO2 SERPL-SCNC: 28 MMOL/L (ref 21–32)
CREAT SERPL-MCNC: 0.93 MG/DL (ref 0.6–1.3)
EOSINOPHIL # BLD AUTO: 0.39 THOUSAND/ΜL (ref 0–0.61)
EOSINOPHIL NFR BLD AUTO: 6 % (ref 0–6)
ERYTHROCYTE [DISTWIDTH] IN BLOOD BY AUTOMATED COUNT: 13.1 % (ref 11.6–15.1)
GFR SERPL CREATININE-BSD FRML MDRD: 68 ML/MIN/1.73SQ M
GLUCOSE P FAST SERPL-MCNC: 212 MG/DL (ref 65–99)
HCT VFR BLD AUTO: 41.8 % (ref 34.8–46.1)
HDLC SERPL-MCNC: 45 MG/DL
HGB BLD-MCNC: 13.6 G/DL (ref 11.5–15.4)
IMM GRANULOCYTES # BLD AUTO: 0.02 THOUSAND/UL (ref 0–0.2)
IMM GRANULOCYTES NFR BLD AUTO: 0 % (ref 0–2)
LDLC SERPL CALC-MCNC: 176 MG/DL (ref 0–100)
LYMPHOCYTES # BLD AUTO: 2.43 THOUSANDS/ΜL (ref 0.6–4.47)
LYMPHOCYTES NFR BLD AUTO: 36 % (ref 14–44)
MCH RBC QN AUTO: 30 PG (ref 26.8–34.3)
MCHC RBC AUTO-ENTMCNC: 32.5 G/DL (ref 31.4–37.4)
MCV RBC AUTO: 92 FL (ref 82–98)
MONOCYTES # BLD AUTO: 0.61 THOUSAND/ΜL (ref 0.17–1.22)
MONOCYTES NFR BLD AUTO: 9 % (ref 4–12)
NEUTROPHILS # BLD AUTO: 3.23 THOUSANDS/ΜL (ref 1.85–7.62)
NEUTS SEG NFR BLD AUTO: 48 % (ref 43–75)
NONHDLC SERPL-MCNC: 217 MG/DL
NRBC BLD AUTO-RTO: 0 /100 WBCS
PLATELET # BLD AUTO: 264 THOUSANDS/UL (ref 149–390)
PMV BLD AUTO: 9.6 FL (ref 8.9–12.7)
POTASSIUM SERPL-SCNC: 3.8 MMOL/L (ref 3.5–5.3)
PROT SERPL-MCNC: 7.5 G/DL (ref 6.4–8.2)
RBC # BLD AUTO: 4.54 MILLION/UL (ref 3.81–5.12)
SODIUM SERPL-SCNC: 139 MMOL/L (ref 136–145)
TRIGL SERPL-MCNC: 206 MG/DL
TSH SERPL DL<=0.05 MIU/L-ACNC: 4.05 UIU/ML (ref 0.36–3.74)
WBC # BLD AUTO: 6.72 THOUSAND/UL (ref 4.31–10.16)

## 2020-01-11 PROCEDURE — 84443 ASSAY THYROID STIM HORMONE: CPT

## 2020-01-11 PROCEDURE — 80053 COMPREHEN METABOLIC PANEL: CPT

## 2020-01-11 PROCEDURE — 36415 COLL VENOUS BLD VENIPUNCTURE: CPT

## 2020-01-11 PROCEDURE — 80061 LIPID PANEL: CPT

## 2020-01-11 PROCEDURE — 85025 COMPLETE CBC W/AUTO DIFF WBC: CPT

## 2020-01-11 PROCEDURE — 93005 ELECTROCARDIOGRAM TRACING: CPT

## 2020-01-12 LAB
ATRIAL RATE: 75 BPM
P AXIS: 33 DEGREES
PR INTERVAL: 152 MS
QRS AXIS: 0 DEGREES
QRSD INTERVAL: 92 MS
QT INTERVAL: 406 MS
QTC INTERVAL: 453 MS
T WAVE AXIS: 40 DEGREES
VENTRICULAR RATE: 75 BPM

## 2020-01-12 PROCEDURE — 93010 ELECTROCARDIOGRAM REPORT: CPT | Performed by: INTERNAL MEDICINE

## 2020-01-24 ENCOUNTER — OFFICE VISIT (OUTPATIENT)
Dept: FAMILY MEDICINE CLINIC | Facility: CLINIC | Age: 60
End: 2020-01-24
Payer: COMMERCIAL

## 2020-01-24 VITALS
TEMPERATURE: 97.3 F | BODY MASS INDEX: 35.82 KG/M2 | WEIGHT: 215 LBS | SYSTOLIC BLOOD PRESSURE: 118 MMHG | DIASTOLIC BLOOD PRESSURE: 80 MMHG | HEIGHT: 65 IN

## 2020-01-24 DIAGNOSIS — E05.90 HYPERTHYROIDISM: ICD-10-CM

## 2020-01-24 DIAGNOSIS — F32.A DEPRESSION, UNSPECIFIED DEPRESSION TYPE: ICD-10-CM

## 2020-01-24 DIAGNOSIS — E11.00 TYPE 2 DIABETES MELLITUS WITH HYPEROSMOLARITY WITHOUT COMA, WITHOUT LONG-TERM CURRENT USE OF INSULIN (HCC): Primary | ICD-10-CM

## 2020-01-24 DIAGNOSIS — E11.8 TYPE 2 DIABETES MELLITUS WITH COMPLICATION (HCC): ICD-10-CM

## 2020-01-24 DIAGNOSIS — E03.8 HYPOTHYROIDISM DUE TO HASHIMOTO'S THYROIDITIS: ICD-10-CM

## 2020-01-24 DIAGNOSIS — E78.2 MIXED HYPERLIPIDEMIA: ICD-10-CM

## 2020-01-24 DIAGNOSIS — J45.909 ASTHMA, UNSPECIFIED ASTHMA SEVERITY, UNSPECIFIED WHETHER COMPLICATED, UNSPECIFIED WHETHER PERSISTENT: ICD-10-CM

## 2020-01-24 DIAGNOSIS — G43.001 MIGRAINE WITHOUT AURA AND WITH STATUS MIGRAINOSUS, NOT INTRACTABLE: ICD-10-CM

## 2020-01-24 DIAGNOSIS — E11.9 TYPE 2 DIABETES MELLITUS WITHOUT COMPLICATION, WITHOUT LONG-TERM CURRENT USE OF INSULIN (HCC): ICD-10-CM

## 2020-01-24 DIAGNOSIS — E06.3 HYPOTHYROIDISM DUE TO HASHIMOTO'S THYROIDITIS: ICD-10-CM

## 2020-01-24 LAB — SL AMB POCT HEMOGLOBIN AIC: 8.7 (ref ?–6.5)

## 2020-01-24 PROCEDURE — 1036F TOBACCO NON-USER: CPT | Performed by: FAMILY MEDICINE

## 2020-01-24 PROCEDURE — 3052F HG A1C>EQUAL 8.0%<EQUAL 9.0%: CPT | Performed by: FAMILY MEDICINE

## 2020-01-24 PROCEDURE — 3725F SCREEN DEPRESSION PERFORMED: CPT | Performed by: FAMILY MEDICINE

## 2020-01-24 PROCEDURE — 99214 OFFICE O/P EST MOD 30 MIN: CPT | Performed by: FAMILY MEDICINE

## 2020-01-24 PROCEDURE — 3008F BODY MASS INDEX DOCD: CPT | Performed by: FAMILY MEDICINE

## 2020-01-24 PROCEDURE — 83036 HEMOGLOBIN GLYCOSYLATED A1C: CPT | Performed by: FAMILY MEDICINE

## 2020-01-24 RX ORDER — LEVOTHYROXINE SODIUM 88 UG/1
88 TABLET ORAL
Qty: 90 TABLET | Refills: 1 | Status: SHIPPED | OUTPATIENT
Start: 2020-01-24 | End: 2020-07-27

## 2020-01-24 RX ORDER — ALBUTEROL SULFATE 2.5 MG/3ML
2.5 SOLUTION RESPIRATORY (INHALATION) EVERY 4 HOURS PRN
Qty: 20 VIAL | Refills: 1 | Status: SHIPPED | OUTPATIENT
Start: 2020-01-24 | End: 2022-01-07 | Stop reason: SDUPTHER

## 2020-01-24 RX ORDER — LANCETS
EACH MISCELLANEOUS 3 TIMES WEEKLY
Qty: 102 EACH | Refills: 3 | Status: SHIPPED | OUTPATIENT
Start: 2020-01-24 | End: 2020-02-07

## 2020-01-24 RX ORDER — FLUTICASONE PROPIONATE 50 MCG
2 SPRAY, SUSPENSION (ML) NASAL DAILY
Qty: 1 BOTTLE | Refills: 2 | Status: SHIPPED | OUTPATIENT
Start: 2020-01-24 | End: 2020-08-19

## 2020-01-24 RX ORDER — LANCETS
EACH MISCELLANEOUS 3 TIMES WEEKLY
COMMUNITY
End: 2020-01-24 | Stop reason: SDUPTHER

## 2020-01-24 RX ORDER — ALBUTEROL SULFATE 90 UG/1
1-2 AEROSOL, METERED RESPIRATORY (INHALATION) EVERY 4 HOURS PRN
Qty: 1 INHALER | Refills: 2 | Status: SHIPPED | OUTPATIENT
Start: 2020-01-24 | End: 2020-12-02 | Stop reason: SDUPTHER

## 2020-01-24 NOTE — PROGRESS NOTES
Assessment/Plan: patient will start taking metformin twice daily as directed  Will recheck A1c at follow-up visit  The patient will increase levothyroxine 88 mcg daily  Recheck labs prior to next visit  Patient will make dietary modifications regarding hyperlipidemia  Will recheck and address at follow-up  Patient will try to lose weight  Patient will follow-up with psychiatrist and continue with counseling  Follow-up 3 months       Diagnoses and all orders for this visit:    Type 2 diabetes mellitus with hyperosmolarity without coma, without long-term current use of insulin (HCC)  -     POCT hemoglobin A1c  -     ACCU-CHEK FASTCLIX LANCETS MISC; by Does not apply route 3 (three) times a week  -     glucose blood test strip; 1 each by Other route 3 (three) times a week Patient test blood glucose once weekly and then three times weekly  Patient uses Accu-Chek guide strips  -     CBC and differential; Future  -     Comprehensive metabolic panel; Future  -     Hemoglobin A1C; Future  -     Lipid panel; Future  -     TSH, 3rd generation with Free T4 reflex; Future  -     Vitamin B12; Future  -     Vitamin D 25 hydroxy; Future  -     Microalbumin / creatinine urine ratio  -     CBC and differential  -     Comprehensive metabolic panel  -     Lipid panel  -     TSH, 3rd generation with Free T4 reflex  -     Vitamin B12  -     Vitamin D 25 hydroxy    Depression, unspecified depression type  -     albuterol (2 5 mg/3 mL) 0 083 % nebulizer solution; Take 1 vial (2 5 mg total) by nebulization every 4 (four) hours as needed for wheezing    Asthma, unspecified asthma severity, unspecified whether complicated, unspecified whether persistent  -     albuterol (PROVENTIL HFA) 90 mcg/act inhaler; Inhale 1-2 puffs every 4 (four) hours as needed for wheezing  -     beclomethasone (QVAR) 40 MCG/ACT inhaler;  Inhale 1 puff 2 (two) times a day  -     fluticasone (FLONASE) 50 mcg/act nasal spray; 2 sprays into each nostril daily    Hyperthyroidism  -     levothyroxine 88 mcg tablet; Take 1 tablet (88 mcg total) by mouth daily in the early morning    Type 2 diabetes mellitus with complication (HCC)  -     metFORMIN (GLUCOPHAGE) 500 mg tablet; Take 1 tablet (500 mg total) by mouth 2 (two) times a day with meals  -     CBC and differential; Future  -     Comprehensive metabolic panel; Future  -     Hemoglobin A1C; Future  -     Lipid panel; Future  -     TSH, 3rd generation with Free T4 reflex; Future  -     Vitamin B12; Future  -     Vitamin D 25 hydroxy; Future  -     Microalbumin / creatinine urine ratio  -     CBC and differential  -     Comprehensive metabolic panel  -     Lipid panel  -     TSH, 3rd generation with Free T4 reflex  -     Vitamin B12  -     Vitamin D 25 hydroxy    Type 2 diabetes mellitus without complication, without long-term current use of insulin (HCC)    Migraine without aura and with status migrainosus, not intractable  -     CBC and differential; Future  -     Comprehensive metabolic panel; Future  -     Hemoglobin A1C; Future  -     Lipid panel; Future  -     TSH, 3rd generation with Free T4 reflex; Future  -     Vitamin B12; Future  -     Vitamin D 25 hydroxy; Future  -     Microalbumin / creatinine urine ratio  -     CBC and differential  -     Comprehensive metabolic panel  -     Lipid panel  -     TSH, 3rd generation with Free T4 reflex  -     Vitamin B12  -     Vitamin D 25 hydroxy    Mixed hyperlipidemia  -     CBC and differential; Future  -     Comprehensive metabolic panel; Future  -     Hemoglobin A1C; Future  -     Lipid panel; Future  -     TSH, 3rd generation with Free T4 reflex; Future  -     Vitamin B12; Future  -     Vitamin D 25 hydroxy;  Future  -     Microalbumin / creatinine urine ratio  -     CBC and differential  -     Comprehensive metabolic panel  -     Lipid panel  -     TSH, 3rd generation with Free T4 reflex  -     Vitamin B12  -     Vitamin D 25 hydroxy    Hypothyroidism due to Hashimoto's thyroiditis  -     CBC and differential; Future  -     Comprehensive metabolic panel; Future  -     Hemoglobin A1C; Future  -     Lipid panel; Future  -     TSH, 3rd generation with Free T4 reflex; Future  -     Vitamin B12; Future  -     Vitamin D 25 hydroxy; Future  -     Microalbumin / creatinine urine ratio  -     CBC and differential  -     Comprehensive metabolic panel  -     Lipid panel  -     TSH, 3rd generation with Free T4 reflex  -     Vitamin B12  -     Vitamin D 25 hydroxy    Other orders  -     Discontinue: glucose blood test strip; 1 each by Other route daily as needed Patient test blood glucose once weekly and then three times weekly  Patient uses Accu-Chek guide strips  -     Discontinue: ACCU-CHEK FASTCLIX LANCETS MISC; by Does not apply route 3 (three) times a week            Subjective:        Patient ID: Ángela Thompson is a 64 y o  female  Patient is here to follow-up on diabetes  Hypothyroidism, depression, hypertension and hyperlipidemia  Patient is seeking disability  Patient is waiting to hear in this regard  Patient has been out of work  Patient with decreased motivation  Patient does see psychiatrist on a monthly basis  Patient is getting counseling weekly  Patient still very depressed  A1c is 8 7 the day  The patient has decreased dietary intake of sugar  Patient still getting headaches  Patient just started metformin last week  Patient is taking 1 tablet daily  Patient will be increased to 2 tablets daily shortly  The following portions of the patient's history were reviewed and updated as appropriate: allergies, current medications, past family history, past medical history, past social history, past surgical history and problem list       Review of Systems   Constitutional: Negative  HENT: Negative  Eyes: Negative  Respiratory: Negative  Cardiovascular: Negative  Gastrointestinal: Negative  Endocrine: Negative      Genitourinary: Negative  Musculoskeletal: Negative  Skin: Negative  Allergic/Immunologic: Negative  Neurological: Negative  Hematological: Negative  Psychiatric/Behavioral: Positive for decreased concentration, dysphoric mood and sleep disturbance  Negative for suicidal ideas  Objective:      BMI Counseling: Body mass index is 35 78 kg/m²  The BMI is above normal  Nutrition recommendations include decreasing portion sizes  Exercise recommendations include moderate physical activity 150 minutes/week  /80 (BP Location: Right arm, Patient Position: Sitting, Cuff Size: Adult)   Temp (!) 97 3 °F (36 3 °C) (Tympanic)   Ht 5' 5" (1 651 m)   Wt 97 5 kg (215 lb)   LMP  (LMP Unknown)   BMI 35 78 kg/m²          Physical Exam   Constitutional: She appears well-developed and well-nourished  No distress  HENT:   Head: Normocephalic  Right Ear: External ear normal    Left Ear: External ear normal    Mouth/Throat: Oropharynx is clear and moist  No oropharyngeal exudate  Eyes: Pupils are equal, round, and reactive to light  EOM are normal  Right eye exhibits no discharge  Left eye exhibits no discharge  No scleral icterus  Neck: Normal range of motion  Neck supple  No thyromegaly present  Cardiovascular: Normal rate, regular rhythm, normal heart sounds and intact distal pulses  Exam reveals no gallop and no friction rub  No murmur heard  Pulmonary/Chest: Effort normal and breath sounds normal  No respiratory distress  She has no wheezes  She has no rales  She exhibits no tenderness  Abdominal: Soft  Bowel sounds are normal  She exhibits no distension  There is no tenderness  There is no rebound and no guarding  Musculoskeletal: Normal range of motion  She exhibits no edema or tenderness  Lymphadenopathy:     She has no cervical adenopathy  Neurological: She is alert  No cranial nerve deficit  She exhibits normal muscle tone  Coordination normal    Skin: Skin is warm and dry  No rash noted  She is not diaphoretic  No erythema  No pallor  Psychiatric: Her behavior is normal  Judgment and thought content normal    Nursing note and vitals reviewed

## 2020-02-07 ENCOUNTER — TELEPHONE (OUTPATIENT)
Dept: FAMILY MEDICINE CLINIC | Facility: CLINIC | Age: 60
End: 2020-02-07

## 2020-02-07 DIAGNOSIS — E11.00 TYPE 2 DIABETES MELLITUS WITH HYPEROSMOLARITY WITHOUT COMA, WITHOUT LONG-TERM CURRENT USE OF INSULIN (HCC): Primary | ICD-10-CM

## 2020-02-07 DIAGNOSIS — J45.30 MILD PERSISTENT ASTHMA WITHOUT COMPLICATION: ICD-10-CM

## 2020-02-07 RX ORDER — BLOOD-GLUCOSE METER
EACH MISCELLANEOUS DAILY
Qty: 1 EACH | Refills: 0 | Status: SHIPPED | OUTPATIENT
Start: 2020-02-07

## 2020-02-07 RX ORDER — LANCETS
EACH MISCELLANEOUS
Qty: 100 EACH | Refills: 1 | Status: SHIPPED | OUTPATIENT
Start: 2020-02-07

## 2020-02-07 NOTE — TELEPHONE ENCOUNTER
Please approve the testing supplies     What strength of the asmanex would you like to change her to?

## 2020-02-07 NOTE — TELEPHONE ENCOUNTER
Pt insurance will cover one touch ultra  with test strips    Pt test 1 daily      For the inhaler they will cover     [facility]    asmeanex    Solvent hfa    pulmicort

## 2020-02-07 NOTE — TELEPHONE ENCOUNTER
Okay with test strips testing daily    Patient will switch to Asmanex 2 puffs daily dispense 1 inhaler with 5 refills

## 2020-02-07 NOTE — TELEPHONE ENCOUNTER
Patient called to say that glucose test strips and Qvar ordered by Dr Terri Brown on 1/24 were denied by insurance  Please address  Thank you

## 2020-02-21 NOTE — NURSING NOTE
Infectious Disease Will Bocanegra said she has been dealing with depression,due to her employment  She also said she had some childhood issues that caused her concern over the years,nothing sexual she stated  [place] Ram it was the way they talked to her and her sister  She was angry that she had to be moved from her room,because her roommate was sick and now another new admission is in the single room and she refuses to return to her original room with a sick roommate  Writer suggested  She ware a face mask,to avoid cross contamination  She was agreeable  Writer did say that if roommate was infectious,she would have been placed on precautions

## 2020-03-05 ENCOUNTER — TELEPHONE (OUTPATIENT)
Dept: FAMILY MEDICINE CLINIC | Facility: CLINIC | Age: 60
End: 2020-03-05

## 2020-03-06 ENCOUNTER — APPOINTMENT (EMERGENCY)
Dept: CT IMAGING | Facility: HOSPITAL | Age: 60
End: 2020-03-06
Payer: COMMERCIAL

## 2020-03-06 ENCOUNTER — HOSPITAL ENCOUNTER (EMERGENCY)
Facility: HOSPITAL | Age: 60
Discharge: HOME | End: 2020-03-06
Attending: EMERGENCY MEDICINE | Admitting: EMERGENCY MEDICINE
Payer: COMMERCIAL

## 2020-03-06 VITALS
DIASTOLIC BLOOD PRESSURE: 59 MMHG | RESPIRATION RATE: 16 BRPM | SYSTOLIC BLOOD PRESSURE: 132 MMHG | BODY MASS INDEX: 35.81 KG/M2 | TEMPERATURE: 98.1 F | OXYGEN SATURATION: 98 % | HEART RATE: 73 BPM | WEIGHT: 215.17 LBS

## 2020-03-06 DIAGNOSIS — R10.32 LLQ PAIN: Primary | ICD-10-CM

## 2020-03-06 LAB
ALBUMIN SERPL BCP-MCNC: 3.7 G/DL (ref 3.5–5)
ALP SERPL-CCNC: 115 U/L (ref 46–116)
ALT SERPL W P-5'-P-CCNC: 25 U/L (ref 12–78)
ANION GAP SERPL CALCULATED.3IONS-SCNC: 9 MMOL/L (ref 4–13)
AST SERPL W P-5'-P-CCNC: 15 U/L (ref 5–45)
BACTERIA UR QL AUTO: ABNORMAL /HPF
BASOPHILS # BLD AUTO: 0.05 THOUSANDS/ΜL (ref 0–0.1)
BASOPHILS NFR BLD AUTO: 1 % (ref 0–1)
BILIRUB SERPL-MCNC: 0.19 MG/DL (ref 0.2–1)
BILIRUB UR QL STRIP: NEGATIVE
BUN SERPL-MCNC: 19 MG/DL (ref 5–25)
CALCIUM SERPL-MCNC: 9.1 MG/DL (ref 8.3–10.1)
CHLORIDE SERPL-SCNC: 100 MMOL/L (ref 100–108)
CLARITY UR: CLEAR
CO2 SERPL-SCNC: 27 MMOL/L (ref 21–32)
COLOR UR: YELLOW
COLOR, POC: YELLOW
CREAT SERPL-MCNC: 1.07 MG/DL (ref 0.6–1.3)
EOSINOPHIL # BLD AUTO: 0.39 THOUSAND/ΜL (ref 0–0.61)
EOSINOPHIL NFR BLD AUTO: 6 % (ref 0–6)
ERYTHROCYTE [DISTWIDTH] IN BLOOD BY AUTOMATED COUNT: 12.7 % (ref 11.6–15.1)
GFR SERPL CREATININE-BSD FRML MDRD: 57 ML/MIN/1.73SQ M
GLUCOSE SERPL-MCNC: 157 MG/DL (ref 65–140)
GLUCOSE UR STRIP-MCNC: NEGATIVE MG/DL
HCT VFR BLD AUTO: 39.5 % (ref 34.8–46.1)
HGB BLD-MCNC: 12.9 G/DL (ref 11.5–15.4)
HGB UR QL STRIP.AUTO: NEGATIVE
IMM GRANULOCYTES # BLD AUTO: 0.02 THOUSAND/UL (ref 0–0.2)
IMM GRANULOCYTES NFR BLD AUTO: 0 % (ref 0–2)
KETONES UR STRIP-MCNC: NEGATIVE MG/DL
LEUKOCYTE ESTERASE UR QL STRIP: ABNORMAL
LIPASE SERPL-CCNC: 162 U/L (ref 73–393)
LYMPHOCYTES # BLD AUTO: 2.01 THOUSANDS/ΜL (ref 0.6–4.47)
LYMPHOCYTES NFR BLD AUTO: 29 % (ref 14–44)
MCH RBC QN AUTO: 29.9 PG (ref 26.8–34.3)
MCHC RBC AUTO-ENTMCNC: 32.7 G/DL (ref 31.4–37.4)
MCV RBC AUTO: 91 FL (ref 82–98)
MONOCYTES # BLD AUTO: 0.56 THOUSAND/ΜL (ref 0.17–1.22)
MONOCYTES NFR BLD AUTO: 8 % (ref 4–12)
NEUTROPHILS # BLD AUTO: 3.93 THOUSANDS/ΜL (ref 1.85–7.62)
NEUTS SEG NFR BLD AUTO: 56 % (ref 43–75)
NITRITE UR QL STRIP: NEGATIVE
NON-SQ EPI CELLS URNS QL MICRO: ABNORMAL /HPF
NRBC BLD AUTO-RTO: 0 /100 WBCS
PH UR STRIP.AUTO: 5.5 [PH] (ref 4.5–8)
PLATELET # BLD AUTO: 257 THOUSANDS/UL (ref 149–390)
PMV BLD AUTO: 9.1 FL (ref 8.9–12.7)
POTASSIUM SERPL-SCNC: 4.4 MMOL/L (ref 3.5–5.3)
PROT SERPL-MCNC: 7.3 G/DL (ref 6.4–8.2)
PROT UR STRIP-MCNC: NEGATIVE MG/DL
RBC # BLD AUTO: 4.32 MILLION/UL (ref 3.81–5.12)
RBC #/AREA URNS AUTO: ABNORMAL /HPF
SODIUM SERPL-SCNC: 136 MMOL/L (ref 136–145)
SP GR UR STRIP.AUTO: 1.02 (ref 1–1.03)
UROBILINOGEN UR QL STRIP.AUTO: 0.2 E.U./DL
WBC # BLD AUTO: 6.96 THOUSAND/UL (ref 4.31–10.16)
WBC #/AREA URNS AUTO: ABNORMAL /HPF

## 2020-03-06 PROCEDURE — 36415 COLL VENOUS BLD VENIPUNCTURE: CPT | Performed by: EMERGENCY MEDICINE

## 2020-03-06 PROCEDURE — 85025 COMPLETE CBC W/AUTO DIFF WBC: CPT | Performed by: EMERGENCY MEDICINE

## 2020-03-06 PROCEDURE — 80053 COMPREHEN METABOLIC PANEL: CPT | Performed by: EMERGENCY MEDICINE

## 2020-03-06 PROCEDURE — 96361 HYDRATE IV INFUSION ADD-ON: CPT

## 2020-03-06 PROCEDURE — 83690 ASSAY OF LIPASE: CPT | Performed by: EMERGENCY MEDICINE

## 2020-03-06 PROCEDURE — 99284 EMERGENCY DEPT VISIT MOD MDM: CPT

## 2020-03-06 PROCEDURE — 96375 TX/PRO/DX INJ NEW DRUG ADDON: CPT

## 2020-03-06 PROCEDURE — 81001 URINALYSIS AUTO W/SCOPE: CPT

## 2020-03-06 PROCEDURE — 74177 CT ABD & PELVIS W/CONTRAST: CPT

## 2020-03-06 PROCEDURE — 99285 EMERGENCY DEPT VISIT HI MDM: CPT | Performed by: EMERGENCY MEDICINE

## 2020-03-06 PROCEDURE — 96374 THER/PROPH/DIAG INJ IV PUSH: CPT

## 2020-03-06 RX ORDER — FENTANYL CITRATE 50 UG/ML
50 INJECTION, SOLUTION INTRAMUSCULAR; INTRAVENOUS ONCE
Status: COMPLETED | OUTPATIENT
Start: 2020-03-06 | End: 2020-03-06

## 2020-03-06 RX ORDER — METHOCARBAMOL 500 MG/1
500 TABLET, FILM COATED ORAL 2 TIMES DAILY
Qty: 20 TABLET | Refills: 0 | Status: SHIPPED | OUTPATIENT
Start: 2020-03-06 | End: 2020-03-06 | Stop reason: CLARIF

## 2020-03-06 RX ORDER — METHOCARBAMOL 500 MG/1
500 TABLET, FILM COATED ORAL 2 TIMES DAILY
Qty: 20 TABLET | Refills: 0 | Status: SHIPPED | OUTPATIENT
Start: 2020-03-06 | End: 2020-08-19

## 2020-03-06 RX ORDER — KETOROLAC TROMETHAMINE 30 MG/ML
15 INJECTION, SOLUTION INTRAMUSCULAR; INTRAVENOUS ONCE
Status: COMPLETED | OUTPATIENT
Start: 2020-03-06 | End: 2020-03-06

## 2020-03-06 RX ADMIN — KETOROLAC TROMETHAMINE 15 MG: 30 INJECTION, SOLUTION INTRAMUSCULAR at 16:02

## 2020-03-06 RX ADMIN — SODIUM CHLORIDE 1000 ML: 0.9 INJECTION, SOLUTION INTRAVENOUS at 16:02

## 2020-03-06 RX ADMIN — IOHEXOL 100 ML: 350 INJECTION, SOLUTION INTRAVENOUS at 17:10

## 2020-03-06 RX ADMIN — FENTANYL CITRATE 50 MCG: 50 INJECTION INTRAMUSCULAR; INTRAVENOUS at 17:25

## 2020-03-06 NOTE — ED PROVIDER NOTES
History  Chief Complaint   Patient presents with    Abdominal Pain     LLQ abdominal pain for few days  Denies nausea, vomiting  diarrhea, constipation  No urinary symptoms  24-year-old woman with a past medical history of hypothyroid, diabetes mellitus type 2, anxiety presents for evaluation of left lower quadrant abdominal pain  Patient states that the symptoms started 3 days ago  They initially were waxing waning but since this afternoon have persisted  Pain does not radiate  It is worse with walking and with standing up straight  Is for relieved with lying in bed  She is not taking medication for the symptoms  She denies diarrhea, constipation     She is having normal bowel movements without straining  She denies dysuria, hematuria, vaginal bleeding, discharge  She does have a history of kidney stones as well as a history of a ruptured ovarian cyst over 20 years ago  No prior abdominal surgeries  She had a colonoscopy in 2019 that was positive for a single polyp with no diverticular disease  She denies fever, chills, nausea, vomiting  Prior to Admission Medications   Prescriptions Last Dose Informant Patient Reported? Taking?    Blood Glucose Monitoring Suppl (ONE TOUCH ULTRA 2) w/Device KIT   No No   Sig: by Does not apply route daily   Lancets (ONETOUCH ULTRASOFT) lancets   No No   Sig: Use as instructed daily   QUEtiapine (SEROquel) 200 mg tablet   No No   Sig: Take 1 tablet (200 mg total) by mouth daily at bedtime   albuterol (2 5 mg/3 mL) 0 083 % nebulizer solution   No No   Sig: Take 1 vial (2 5 mg total) by nebulization every 4 (four) hours as needed for wheezing   albuterol (PROVENTIL HFA) 90 mcg/act inhaler   No No   Sig: Inhale 1-2 puffs every 4 (four) hours as needed for wheezing   beclomethasone (QVAR) 40 MCG/ACT inhaler   No No   Sig: Inhale 1 puff 2 (two) times a day   fluticasone (FLONASE) 50 mcg/act nasal spray   No No   Si sprays into each nostril daily gabapentin (NEURONTIN) 100 mg capsule  Self No No   Sig: Take 1 capsule (100 mg total) by mouth daily at bedtime   Patient taking differently: Take 200 mg by mouth daily at bedtime    glucose blood (ONE TOUCH ULTRA TEST) test strip   No No   Si each by Other route daily Use as instructed   levothyroxine 88 mcg tablet   No No   Sig: Take 1 tablet (88 mcg total) by mouth daily in the early morning   metFORMIN (GLUCOPHAGE) 500 mg tablet   No No   Sig: Take 1 tablet (500 mg total) by mouth 2 (two) times a day with meals   mometasone (ASMANEX TWISTHALER) 220 MCG/INH inhaler   No No   Sig: Inhale 1 puff every evening Rinse mouth after use  sertraline (ZOLOFT) 100 mg tablet   No No   Sig: Take 2 tablets (200 mg total) by mouth daily at bedtime   traZODone (DESYREL) 50 mg tablet   No No   Sig: Take 1 tablet (50 mg total) by mouth daily at bedtime   venlafaxine (EFFEXOR-XR) 37 5 mg 24 hr capsule  Self No No   Sig: Take 1 capsule (37 5 mg total) by mouth daily   Patient taking differently: Take 75 mg by mouth daily       Facility-Administered Medications: None       Past Medical History:   Diagnosis Date    Anxiety     Asthma     Diabetes ([facility] [address] )     prediabetic    GERD (gastroesophageal reflux disease)     Hyperlipemia     Hypothyroidism     Major depressive disorder     Migraine     Sleep apnea        Past Surgical History:   Procedure Laterality Date    ADENOIDECTOMY      DILATION AND CURETTAGE OF UTERUS      AL COLONOSCOPY FLX DX W/COLLJ SPEC WHEN PFRMD N/A 3/15/2019    Procedure: COLONOSCOPY;  Surgeon: Sveta Del Rosario MD;  Location: [facility];   Service: Gastroenterology    SHOULDER SURGERY      TONSILLECTOMY      TUBAL LIGATION         Family History   Problem Relation Age of Onset    ALS Mother     Venous thrombosis Father     Diabetes Father     Other Family         cerebral embolism    Diabetes Family     Hypertension Family     Kidney disease Family     Breast cancer Neg Hx     Colon cancer Neg Hx     Ovarian cancer Neg Hx     Uterine cancer Neg Hx      I have reviewed and agree with the history as documented  E-Cigarette/Vaping     E-Cigarette/Vaping Substances     Social History     Tobacco Use    Smoking status: Never Smoker    Smokeless tobacco: Never Used   Substance Use Topics    Alcohol use: No    Drug use: No        Review of Systems   Constitutional: Negative for appetite change, chills, diaphoresis, fatigue and fever  HENT: Negative for congestion, rhinorrhea and sore throat  Respiratory: Negative for cough, shortness of breath, wheezing and stridor  Cardiovascular: Negative for chest pain, palpitations and leg swelling  Gastrointestinal: Positive for abdominal pain (llq)  Negative for abdominal distention, constipation, diarrhea, nausea and vomiting  Endocrine: Negative for polydipsia and polyuria  Genitourinary: Negative for dysuria and hematuria  Musculoskeletal: Negative for back pain, neck pain and neck stiffness  Skin: Negative for pallor, rash and wound  Neurological: Negative for dizziness, light-headedness and headaches  Psychiatric/Behavioral: Negative for behavioral problems and confusion  All other systems reviewed and are negative  Physical Exam  ED Triage Vitals [03/06/20 1514]   Temperature Pulse Respirations Blood Pressure SpO2   98 1 °F (36 7 °C) 88 18 147/68 96 %      Temp Source Heart Rate Source Patient Position - Orthostatic VS BP Location FiO2 (%)   Oral Monitor Sitting Right arm --      Pain Score       8             Orthostatic Vital Signs  Vitals:    03/06/20 1514 03/06/20 1700 03/06/20 1816   BP: 147/68 133/69 132/59   Pulse: 88 75 73   Patient Position - Orthostatic VS: Sitting Sitting        Physical Exam   Constitutional: She is oriented to person, place, and time  She appears well-developed and well-nourished  No distress  HENT:   Head: Normocephalic and atraumatic     Eyes: Conjunctivae are normal  No scleral icterus  Neck: Normal range of motion  Neck supple  Cardiovascular: Normal rate, regular rhythm, normal heart sounds and intact distal pulses  No murmur heard  Pulmonary/Chest: Effort normal and breath sounds normal  No respiratory distress  She has no wheezes  Abdominal: Soft  Bowel sounds are normal  She exhibits no distension  There is no hepatosplenomegaly  There is tenderness in the left lower quadrant  There is no rigidity, no rebound, no guarding, no CVA tenderness, no tenderness at McBurney's point and negative Clifford's sign  No hernia  Musculoskeletal: Normal range of motion  She exhibits no edema, tenderness or deformity  Neurological: She is alert and oriented to person, place, and time  Skin: Skin is warm and dry  No rash noted  She is not diaphoretic  No erythema  No pallor  Psychiatric: She has a normal mood and affect  Her behavior is normal    Nursing note and vitals reviewed        ED Medications  Medications   sodium chloride 0 9 % bolus 1,000 mL (0 mL Intravenous Stopped 3/6/20 1715)   ketorolac (TORADOL) injection 15 mg (15 mg Intravenous Given 3/6/20 1602)   iohexol (OMNIPAQUE) 350 MG/ML injection (MULTI-DOSE) 100 mL (100 mL Intravenous Given 3/6/20 1710)   fentanyl citrate (PF) 100 MCG/2ML 50 mcg (50 mcg Intravenous Given 3/6/20 1725)       Diagnostic Studies  Results Reviewed     Procedure Component Value Units Date/Time    Urine Microscopic [[id number]]  (Abnormal) Collected:  03/06/20 1617    Lab Status:  Final result Specimen:  Urine, Clean Catch Updated:  03/06/20 1756     RBC, UA None Seen /hpf      WBC, UA 1-2 /hpf      Epithelial Cells Occasional /hpf      Bacteria, UA Occasional /hpf     Comprehensive metabolic panel [[id number]]  (Abnormal) Collected:  03/06/20 1602    Lab Status:  Final result Specimen:  Blood from Arm, Right Updated:  03/06/20 1630     Sodium 136 mmol/L      Potassium 4 4 mmol/L      Chloride 100 mmol/L      CO2 27 mmol/L      ANION GAP 9 mmol/L BUN 19 mg/dL      Creatinine 1 07 mg/dL      Glucose 157 mg/dL      Calcium 9 1 mg/dL      AST 15 U/L      ALT 25 U/L      Alkaline Phosphatase 115 U/L      Total Protein 7 3 g/dL      Albumin 3 7 g/dL      Total Bilirubin 0 19 mg/dL      eGFR 57 ml/min/1 73sq m     Narrative:       [place] guidelines for Chronic Kidney Disease (CKD):     Stage 1 with normal or high GFR (GFR > 90 mL/min/1 73 square meters)    Stage 2 Mild CKD (GFR = 60-89 mL/min/1 73 square meters)    Stage 3A Moderate CKD (GFR = 45-59 mL/min/1 73 square meters)    Stage 3B Moderate CKD (GFR = 30-44 mL/min/1 73 square meters)    Stage 4 Severe CKD (GFR = 15-29 mL/min/1 73 square meters)    Stage 5 End Stage CKD (GFR <15 mL/min/1 73 square meters)  Note: GFR calculation is accurate only with a steady state creatinine    Lipase [[id number]]  (Normal) Collected:  03/06/20 1602    Lab Status:  Final result Specimen:  Blood from Arm, Right Updated:  03/06/20 1630     Lipase 162 u/L     Urine Macroscopic, POC [[id number]]  (Abnormal) Collected:  03/06/20 1617    Lab Status:  Final result Specimen:  Urine Updated:  03/06/20 1619     Color, UA Yellow     Clarity, UA Clear     pH, UA 5 5     Leukocytes, UA Trace     Nitrite, UA Negative     Protein, UA Negative mg/dl      Glucose, UA Negative mg/dl      Ketones, UA Negative mg/dl      Urobilinogen, UA 0 2 E U /dl      Bilirubin, UA Negative     Blood, UA Negative     Specific Gravity, UA 1 025    Narrative:       CLINITEK RESULT    POCT urinalysis dipstick [[id number]]  (Normal) Resulted:  03/06/20 1618    Lab Status:  Final result Specimen:  Urine Updated:  03/06/20 1619     Color, UA yellow    CBC and differential [[id number]] Collected:  03/06/20 1602    Lab Status:  Final result Specimen:  Blood from Arm, Right Updated:  03/06/20 1610     WBC 6 96 Thousand/uL      RBC 4 32 Million/uL      Hemoglobin 12 9 g/dL      Hematocrit 39 5 %      MCV 91 fL      MCH 29 9 pg      MCHC 32 7 g/dL      RDW 12 7 %      MPV 9 1 fL      Platelets 970 Thousands/uL      nRBC 0 /100 WBCs      Neutrophils Relative 56 %      Immat GRANS % 0 %      Lymphocytes Relative 29 %      Monocytes Relative 8 %      Eosinophils Relative 6 %      Basophils Relative 1 %      Neutrophils Absolute 3 93 Thousands/µL      Immature Grans Absolute 0 02 Thousand/uL      Lymphocytes Absolute 2 01 Thousands/µL      Monocytes Absolute 0 56 Thousand/µL      Eosinophils Absolute 0 39 Thousand/µL      Basophils Absolute 0 05 Thousands/µL                  CT abdomen pelvis with contrast   Final Result by Genoveva Oneil MD (03/06 2405)      No acute inflammatory findings in the abdomen or the pelvis  No findings to account for this patient's left lower quadrant pain  Workstation performed: [id number]               Procedures  Procedures      ED Course  ED Course as of Mar 06 2053   Fri Mar 06, 2020   1634 eGFR: 57   1635 Hemoglobin: 12 9                               MDM  Number of Diagnoses or Management Options  LLQ pain: new and requires workup  Diagnosis management comments: 29-year-old woman presents with left lower quadrant abdominal pain  Waxing & waning for 3 days  Patient's left lower quadrant tenderness on exam   No hernia, rash appreciated  Will give IV fluids, Toradol  Will check CBC, CMP, lipase, urinalysis, CT abdomen pelvis with IV contrast to assess for diverticulitis, UTI, renal stone, ovarian abnormality  Patient given 50 mcg of fentanyl after returning from CT scan  She states that this exacerbated the pain  Lab workup and imaging unremarkable  Unclear etiology  As patient has worsening symptoms with ambulating the pain is possibly musculoskeletal in etiology will discharge patient home with script for Robaxin  Continue Tylenol, Motrin for symptoms  Return precautions discussed         Amount and/or Complexity of Data Reviewed  Clinical lab tests: ordered and reviewed  Tests in the radiology section of CPT®: ordered and reviewed  Decide to obtain previous medical records or to obtain history from someone other than the patient: yes  Obtain history from someone other than the patient: yes  Review and summarize past medical records: yes  Discuss the patient with other providers: yes  Independent visualization of images, tracings, or specimens: yes    Risk of Complications, Morbidity, and/or Mortality  Presenting problems: low  Diagnostic procedures: low  Management options: low    Patient Progress  Patient progress: stable        Disposition  Final diagnoses:   LLQ pain     Time reflects when diagnosis was documented in both MDM as applicable and the Disposition within this note     Time User Action Codes Description Comment    3/6/2020  6:35 PM Nicci Ivan Add [R10 32] LLQ pain       ED Disposition     ED Disposition Condition Date/Time Comment    Discharge Stable Fri Mar 6, 2020  6:35 PM Franklyn Harding discharge to home/self care              Follow-up Information     Follow up With Specialties Details Why Contact Info Additional Information    Aysha Beltran DO Family Medicine   [address] [place] [address] Emergency Department Emergency Medicine  As needed [facility] [id number] [phone] [place] [facility], [address], [place], [address]          Discharge Medication List as of 3/6/2020  6:36 PM      START taking these medications    Details   methocarbamol (ROBAXIN) 500 mg tablet Take 1 tablet (500 mg total) by mouth 2 (two) times a day, Starting Fri 3/6/2020, Normal         CONTINUE these medications which have NOT CHANGED    Details   albuterol (2 5 mg/3 mL) 0 083 % nebulizer solution Take 1 vial (2 5 mg total) by nebulization every 4 (four) hours as needed for wheezing, Starting Fri 1/24/2020, Normal      albuterol (PROVENTIL HFA) 90 mcg/act inhaler Inhale 1-2 puffs every 4 (four) hours as needed for wheezing, Starting Fri 1/24/2020, Normal      beclomethasone (QVAR) 40 MCG/ACT inhaler Inhale 1 puff 2 (two) times a day, Starting Fri 1/24/2020, Normal      Blood Glucose Monitoring Suppl (ONE TOUCH ULTRA 2) w/Device KIT by Does not apply route daily, Starting Fri 2/7/2020, Normal      fluticasone (FLONASE) 50 mcg/act nasal spray 2 sprays into each nostril daily, Starting Fri 1/24/2020, Normal      gabapentin (NEURONTIN) 100 mg capsule Take 1 capsule (100 mg total) by mouth daily at bedtime, Starting Fri 10/25/2019, Normal      glucose blood (ONE TOUCH ULTRA TEST) test strip 1 each by Other route daily Use as instructed, Starting Fri 2/7/2020, Until Thu 5/7/2020, Normal      Lancets (ONETOUCH ULTRASOFT) lancets Use as instructed daily, Normal      levothyroxine 88 mcg tablet Take 1 tablet (88 mcg total) by mouth daily in the early morning, Starting Fri 1/24/2020, Normal      metFORMIN (GLUCOPHAGE) 500 mg tablet Take 1 tablet (500 mg total) by mouth 2 (two) times a day with meals, Starting Fri 1/24/2020, Normal      mometasone (ASMANEX TWISTHALER) 220 MCG/INH inhaler Inhale 1 puff every evening Rinse mouth after use , Starting Fri 2/7/2020, Normal      QUEtiapine (SEROquel) 200 mg tablet Take 1 tablet (200 mg total) by mouth daily at bedtime, Starting Fri 10/25/2019, Normal      sertraline (ZOLOFT) 100 mg tablet Take 2 tablets (200 mg total) by mouth daily at bedtime, Starting Fri 10/25/2019, Normal      traZODone (DESYREL) 50 mg tablet Take 1 tablet (50 mg total) by mouth daily at bedtime, Starting Fri 10/25/2019, Normal      venlafaxine (EFFEXOR-XR) 37 5 mg 24 hr capsule Take 1 capsule (37 5 mg total) by mouth daily, Starting Fri 10/25/2019, Normal           No discharge procedures on file  PDMP Review     None           ED Provider  Attending physically available and evaluated Fanny Hdz I managed the patient along with the ED Attending      Electronically Signed by         Dung Avalos Bhavna Green MD  03/06/20 9078

## 2020-03-06 NOTE — TELEPHONE ENCOUNTER
Patient requesting to have referral to nutrition services faxed to [facility] at [phone]  I faxed it today because our fax machine was not working yesterday

## 2020-03-07 NOTE — ED ATTENDING ATTESTATION
3/6/2020  IGabby DO, saw and evaluated the patient  I have discussed the patient with the resident/non-physician practitioner and agree with the resident's/non-physician practitioner's findings, Plan of Care, and MDM as documented in the resident's/non-physician practitioner's note, except where noted  All available labs and Radiology studies were reviewed  I was present for key portions of any procedure(s) performed by the resident/non-physician practitioner and I was immediately available to provide assistance  At this point I agree with the current assessment done in the Emergency Department  I have conducted an independent evaluation of this patient a history and physical is as follows:      22-year-old woman presents for left lower quadrant abdominal pain  Onset was three days ago  Worse with movement  No associated nausea vomiting or diarrhea  Did not take any medication for this pain  Seems to be worse when moving her left leg    No back pain no fevers plan CT to rule out diverticulitis doubt ovarian pathology likely musculoskeletal    ED Course         Critical Care Time  Procedures

## 2020-03-10 NOTE — TELEPHONE ENCOUNTER
PT CALLED STATING THE REFERRAL FOR NUTRITIONAL SERVICES WAS NEVER RECEIVED    REFAXED TO THEM  [phone]

## 2020-04-23 ENCOUNTER — TELEPHONE (OUTPATIENT)
Dept: OTHER | Facility: OTHER | Age: 60
End: 2020-04-23

## 2020-04-25 DIAGNOSIS — F33.2 SEVERE RECURRENT MAJOR DEPRESSION WITHOUT PSYCHOTIC FEATURES (HCC): ICD-10-CM

## 2020-04-26 RX ORDER — VENLAFAXINE HYDROCHLORIDE 37.5 MG/1
CAPSULE, EXTENDED RELEASE ORAL
Qty: 90 CAPSULE | Refills: 0 | Status: SHIPPED | OUTPATIENT
Start: 2020-04-26 | End: 2020-07-23

## 2020-05-19 ENCOUNTER — TELEPHONE (OUTPATIENT)
Dept: OBGYN CLINIC | Facility: CLINIC | Age: 60
End: 2020-05-19

## 2020-05-20 ENCOUNTER — TELEPHONE (OUTPATIENT)
Dept: OBGYN CLINIC | Facility: CLINIC | Age: 60
End: 2020-05-20

## 2020-05-28 ENCOUNTER — TELEPHONE (OUTPATIENT)
Dept: OBGYN CLINIC | Facility: CLINIC | Age: 60
End: 2020-05-28

## 2020-05-29 ENCOUNTER — ANNUAL EXAM (OUTPATIENT)
Dept: OBGYN CLINIC | Facility: CLINIC | Age: 60
End: 2020-05-29
Payer: COMMERCIAL

## 2020-05-29 VITALS
TEMPERATURE: 97.4 F | HEART RATE: 87 BPM | SYSTOLIC BLOOD PRESSURE: 140 MMHG | DIASTOLIC BLOOD PRESSURE: 76 MMHG | HEIGHT: 65 IN | OXYGEN SATURATION: 98 % | WEIGHT: 215.4 LBS | BODY MASS INDEX: 35.89 KG/M2

## 2020-05-29 DIAGNOSIS — Z11.51 SCREENING FOR HPV (HUMAN PAPILLOMAVIRUS): ICD-10-CM

## 2020-05-29 DIAGNOSIS — Z01.419 ENCOUNTER FOR GYNECOLOGICAL EXAMINATION WITHOUT ABNORMAL FINDING: Primary | ICD-10-CM

## 2020-05-29 DIAGNOSIS — Z12.31 ENCOUNTER FOR SCREENING MAMMOGRAM FOR BREAST CANCER: ICD-10-CM

## 2020-05-29 DIAGNOSIS — Z12.4 ENCOUNTER FOR PAPANICOLAOU SMEAR FOR CERVICAL CANCER SCREENING: ICD-10-CM

## 2020-05-29 PROCEDURE — 3052F HG A1C>EQUAL 8.0%<EQUAL 9.0%: CPT | Performed by: NURSE PRACTITIONER

## 2020-05-29 PROCEDURE — 99396 PREV VISIT EST AGE 40-64: CPT | Performed by: NURSE PRACTITIONER

## 2020-05-29 RX ORDER — BENZTROPINE MESYLATE 0.5 MG/1
0.5 TABLET ORAL DAILY
COMMUNITY
Start: 2020-04-21 | End: 2021-10-20 | Stop reason: SDUPTHER

## 2020-06-03 LAB
CLINICAL INFO: NORMAL
CYTO CVX: NORMAL
CYTOLOGY CMNT CVX/VAG CYTO-IMP: NORMAL
DATE PREVIOUS BX: NORMAL
HPV E6+E7 MRNA CVX QL NAA+PROBE: NOT DETECTED
LMP START DATE: NORMAL
SL AMB PREV. PAP:: NORMAL
SPECIMEN SOURCE CVX/VAG CYTO: NORMAL

## 2020-07-23 DIAGNOSIS — F33.2 SEVERE RECURRENT MAJOR DEPRESSION WITHOUT PSYCHOTIC FEATURES (HCC): ICD-10-CM

## 2020-07-23 RX ORDER — VENLAFAXINE HYDROCHLORIDE 37.5 MG/1
CAPSULE, EXTENDED RELEASE ORAL
Qty: 90 CAPSULE | Refills: 0 | Status: SHIPPED | OUTPATIENT
Start: 2020-07-23 | End: 2021-06-21

## 2020-07-26 DIAGNOSIS — E05.90 HYPERTHYROIDISM: ICD-10-CM

## 2020-07-27 RX ORDER — LEVOTHYROXINE SODIUM 88 UG/1
TABLET ORAL
Qty: 90 TABLET | Refills: 1 | Status: SHIPPED | OUTPATIENT
Start: 2020-07-27 | End: 2020-08-19 | Stop reason: SDUPTHER

## 2020-08-18 LAB
25(OH)D3 SERPL-MCNC: 21 NG/ML (ref 30–100)
ALBUMIN SERPL-MCNC: 4.1 G/DL (ref 3.6–5.1)
ALBUMIN/GLOB SERPL: 1.7 (CALC) (ref 1–2.5)
ALP SERPL-CCNC: 99 U/L (ref 37–153)
ALT SERPL-CCNC: 20 U/L (ref 6–29)
AST SERPL-CCNC: 17 U/L (ref 10–35)
BASOPHILS # BLD AUTO: 50 CELLS/UL (ref 0–200)
BASOPHILS NFR BLD AUTO: 0.8 %
BILIRUB SERPL-MCNC: 0.3 MG/DL (ref 0.2–1.2)
BUN SERPL-MCNC: 15 MG/DL (ref 7–25)
BUN/CREAT SERPL: ABNORMAL (CALC) (ref 6–22)
CALCIUM SERPL-MCNC: 9.1 MG/DL (ref 8.6–10.4)
CHLORIDE SERPL-SCNC: 104 MMOL/L (ref 98–110)
CHOLEST SERPL-MCNC: 263 MG/DL
CHOLEST/HDLC SERPL: 6.3 (CALC)
CO2 SERPL-SCNC: 27 MMOL/L (ref 20–32)
CREAT SERPL-MCNC: 0.94 MG/DL (ref 0.5–1.05)
EOSINOPHIL # BLD AUTO: 302 CELLS/UL (ref 15–500)
EOSINOPHIL NFR BLD AUTO: 4.8 %
ERYTHROCYTE [DISTWIDTH] IN BLOOD BY AUTOMATED COUNT: 13 % (ref 11–15)
GLOBULIN SER CALC-MCNC: 2.4 G/DL (CALC) (ref 1.9–3.7)
GLUCOSE SERPL-MCNC: 187 MG/DL (ref 65–99)
HBA1C MFR BLD: 9.1 % OF TOTAL HGB
HCT VFR BLD AUTO: 38.1 % (ref 35–45)
HDLC SERPL-MCNC: 42 MG/DL
HGB BLD-MCNC: 12.7 G/DL (ref 11.7–15.5)
LDLC SERPL CALC-MCNC: 176 MG/DL (CALC)
LYMPHOCYTES # BLD AUTO: 1789 CELLS/UL (ref 850–3900)
LYMPHOCYTES NFR BLD AUTO: 28.4 %
MCH RBC QN AUTO: 29.8 PG (ref 27–33)
MCHC RBC AUTO-ENTMCNC: 33.3 G/DL (ref 32–36)
MCV RBC AUTO: 89.4 FL (ref 80–100)
MONOCYTES # BLD AUTO: 586 CELLS/UL (ref 200–950)
MONOCYTES NFR BLD AUTO: 9.3 %
NEUTROPHILS # BLD AUTO: 3572 CELLS/UL (ref 1500–7800)
NEUTROPHILS NFR BLD AUTO: 56.7 %
NONHDLC SERPL-MCNC: 221 MG/DL (CALC)
PLATELET # BLD AUTO: 256 THOUSAND/UL (ref 140–400)
PMV BLD REES-ECKER: 10.1 FL (ref 7.5–12.5)
POTASSIUM SERPL-SCNC: 4.3 MMOL/L (ref 3.5–5.3)
PROT SERPL-MCNC: 6.5 G/DL (ref 6.1–8.1)
RBC # BLD AUTO: 4.26 MILLION/UL (ref 3.8–5.1)
SL AMB EGFR AFRICAN AMERICAN: 78 ML/MIN/1.73M2
SL AMB EGFR NON AFRICAN AMERICAN: 67 ML/MIN/1.73M2
SODIUM SERPL-SCNC: 137 MMOL/L (ref 135–146)
TRIGL SERPL-MCNC: 253 MG/DL
TSH SERPL-ACNC: 1.55 MIU/L (ref 0.4–4.5)
VIT B12 SERPL-MCNC: 347 PG/ML (ref 200–1100)
WBC # BLD AUTO: 6.3 THOUSAND/UL (ref 3.8–10.8)

## 2020-08-18 PROCEDURE — 3046F HEMOGLOBIN A1C LEVEL >9.0%: CPT | Performed by: FAMILY MEDICINE

## 2020-08-19 ENCOUNTER — OFFICE VISIT (OUTPATIENT)
Dept: FAMILY MEDICINE CLINIC | Facility: CLINIC | Age: 60
End: 2020-08-19
Payer: COMMERCIAL

## 2020-08-19 ENCOUNTER — TELEPHONE (OUTPATIENT)
Dept: SLEEP CENTER | Facility: CLINIC | Age: 60
End: 2020-08-19

## 2020-08-19 VITALS
TEMPERATURE: 95.7 F | DIASTOLIC BLOOD PRESSURE: 88 MMHG | BODY MASS INDEX: 35.49 KG/M2 | SYSTOLIC BLOOD PRESSURE: 138 MMHG | WEIGHT: 213 LBS | HEIGHT: 65 IN

## 2020-08-19 DIAGNOSIS — E11.8 TYPE 2 DIABETES MELLITUS WITH COMPLICATION (HCC): ICD-10-CM

## 2020-08-19 DIAGNOSIS — E78.2 MIXED HYPERLIPIDEMIA: ICD-10-CM

## 2020-08-19 DIAGNOSIS — K21.00 GASTROESOPHAGEAL REFLUX DISEASE WITH ESOPHAGITIS: ICD-10-CM

## 2020-08-19 DIAGNOSIS — E05.90 HYPERTHYROIDISM: ICD-10-CM

## 2020-08-19 DIAGNOSIS — E11.00 TYPE 2 DIABETES MELLITUS WITH HYPEROSMOLARITY WITHOUT COMA, WITHOUT LONG-TERM CURRENT USE OF INSULIN (HCC): Primary | ICD-10-CM

## 2020-08-19 DIAGNOSIS — F33.2 SEVERE RECURRENT MAJOR DEPRESSION WITHOUT PSYCHOTIC FEATURES (HCC): ICD-10-CM

## 2020-08-19 DIAGNOSIS — E03.9 HYPOTHYROIDISM, UNSPECIFIED TYPE: ICD-10-CM

## 2020-08-19 DIAGNOSIS — G47.30 SLEEP APNEA, UNSPECIFIED TYPE: ICD-10-CM

## 2020-08-19 DIAGNOSIS — E11.9 TYPE 2 DIABETES MELLITUS WITHOUT COMPLICATION, WITHOUT LONG-TERM CURRENT USE OF INSULIN (HCC): ICD-10-CM

## 2020-08-19 DIAGNOSIS — G47.30 SLEEP APNEA, UNSPECIFIED TYPE: Primary | ICD-10-CM

## 2020-08-19 PROCEDURE — 2022F DILAT RTA XM EVC RTNOPTHY: CPT | Performed by: FAMILY MEDICINE

## 2020-08-19 PROCEDURE — 3052F HG A1C>EQUAL 8.0%<EQUAL 9.0%: CPT | Performed by: FAMILY MEDICINE

## 2020-08-19 PROCEDURE — 3008F BODY MASS INDEX DOCD: CPT | Performed by: FAMILY MEDICINE

## 2020-08-19 PROCEDURE — 99214 OFFICE O/P EST MOD 30 MIN: CPT | Performed by: FAMILY MEDICINE

## 2020-08-19 PROCEDURE — 3725F SCREEN DEPRESSION PERFORMED: CPT | Performed by: FAMILY MEDICINE

## 2020-08-19 PROCEDURE — 1036F TOBACCO NON-USER: CPT | Performed by: FAMILY MEDICINE

## 2020-08-19 RX ORDER — OMEPRAZOLE 20 MG/1
20 CAPSULE, DELAYED RELEASE ORAL
Qty: 90 CAPSULE | Refills: 3 | Status: SHIPPED | OUTPATIENT
Start: 2020-08-19 | End: 2021-03-15 | Stop reason: SDUPTHER

## 2020-08-19 RX ORDER — ROSUVASTATIN CALCIUM 5 MG/1
5 TABLET, COATED ORAL DAILY
Qty: 30 TABLET | Refills: 5 | Status: SHIPPED | OUTPATIENT
Start: 2020-08-19 | End: 2021-04-12

## 2020-08-19 RX ORDER — LEVOTHYROXINE SODIUM 88 UG/1
88 TABLET ORAL
Qty: 90 TABLET | Refills: 1 | Status: SHIPPED | OUTPATIENT
Start: 2020-08-19 | End: 2020-12-02 | Stop reason: SDUPTHER

## 2020-08-19 NOTE — TELEPHONE ENCOUNTER
Patients insurance doesn't cover a home sleep study, please order an in lab diagnostic sleep study, the code is an Keith  Thank you!

## 2020-08-19 NOTE — PROGRESS NOTES
Assessment/Plan:  Labs reviewed the patient  Monofilament test done at this time  A1c of 9 1  Elevated cholesterol 267  Hypothyroidism stable  Patient also pleural with vitamin-D and B12  Patient use vitamin-D 2000 International Units daily  Patient increase metformin to 2 tablets twice daily  The patient was taking 1 tablet daily  Patient will start Crestor daily  Follow-up in 3 months or as needed  Patient will follow with psychiatry appropriately  Diagnoses and all orders for this visit:    Type 2 diabetes mellitus with hyperosmolarity without coma, without long-term current use of insulin ([facility])    Hyperthyroidism  -     levothyroxine 88 mcg tablet; Take 1 tablet (88 mcg total) by mouth daily in the early morning    Type 2 diabetes mellitus with complication ([facility])  -     metFORMIN (GLUCOPHAGE) 500 mg tablet; Take 2 tablets (1,000 mg total) by mouth 2 (two) times a day with meals    Hypothyroidism, unspecified type    Type 2 diabetes mellitus without complication, without long-term current use of insulin ([facility])    Gastroesophageal reflux disease with esophagitis  -     omeprazole (PriLOSEC) 20 mg delayed release capsule; Take 1 capsule (20 mg total) by mouth daily before breakfast    Mixed hyperlipidemia  -     rosuvastatin (CRESTOR) 5 mg tablet; Take 1 tablet (5 mg total) by mouth daily    Severe recurrent major depression without psychotic features [facility] )    Sleep apnea, unspecified type  -     Home Study; Future    Other orders  -     Cancel: POCT hemoglobin A1c            Subjective:        Patient ID: Jg Sarmiento is a 64 y o  female  Patient follow-up on hypothyroidism, diabetes hypertension hyperlipidemia  Patient also with history of depression  Patient with decreased motivation and having difficulty getting out of bed in the morning  Patient also having headaches  Labs reviewed at this time with the patient  Patient does see Psychiatry          The following portions of the patient's history were reviewed and updated as appropriate: allergies, current medications, past family history, past medical history, past social history, past surgical history and problem list       Review of Systems   Constitutional: Negative  HENT: Negative  Eyes: Negative  Respiratory: Negative  Cardiovascular: Negative  Gastrointestinal: Negative  Endocrine: Negative  Genitourinary: Negative  Musculoskeletal: Negative  Skin: Negative  Allergic/Immunologic: Negative  Neurological: Negative  Hematological: Negative  Psychiatric/Behavioral: Positive for dysphoric mood  Objective:               /88 (BP Location: Right arm, Patient Position: Sitting, Cuff Size: Standard)   Temp (!) 95 7 °F (35 4 °C) (Tympanic)   Ht 5' 5" (1 651 m)   Wt 96 6 kg (213 lb)   LMP  (LMP Unknown)   BMI 35 45 kg/m²          Physical Exam  Vitals signs and nursing note reviewed  Constitutional:       General: She is not in acute distress  Appearance: Normal appearance  She is well-developed  She is not diaphoretic  HENT:      Head: Normocephalic and atraumatic  Right Ear: Tympanic membrane, ear canal and external ear normal       Left Ear: Tympanic membrane, ear canal and external ear normal       Nose: Nose normal  No congestion  Eyes:      General: No scleral icterus  Right eye: No discharge  Left eye: No discharge  Pupils: Pupils are equal, round, and reactive to light  Neck:      Musculoskeletal: Normal range of motion and neck supple  Thyroid: No thyromegaly  Cardiovascular:      Rate and Rhythm: Normal rate and regular rhythm  Heart sounds: Normal heart sounds  No murmur  No friction rub  No gallop  Pulmonary:      Effort: Pulmonary effort is normal  No respiratory distress  Breath sounds: Normal breath sounds  No wheezing or rales  Chest:      Chest wall: No tenderness     Abdominal:      General: Bowel sounds are normal  There is no distension  Palpations: Abdomen is soft  Tenderness: There is no abdominal tenderness  There is no guarding or rebound  Musculoskeletal: Normal range of motion  General: Deformity present  No tenderness  Lymphadenopathy:      Cervical: No cervical adenopathy  Skin:     General: Skin is warm and dry  Coloration: Skin is not pale  Findings: No erythema or rash  Neurological:      General: No focal deficit present  Mental Status: She is alert and oriented to person, place, and time  Cranial Nerves: No cranial nerve deficit  Motor: No abnormal muscle tone  Coordination: Coordination normal    Psychiatric:         Behavior: Behavior normal          Thought Content:  Thought content normal          Judgment: Judgment normal       Comments: Depressed mood

## 2020-08-20 LAB
ALBUMIN/CREAT UR: 6 MCG/MG CREAT
CREAT UR-MCNC: 169 MG/DL (ref 20–275)
MICROALBUMIN UR-MCNC: 1 MG/DL

## 2020-08-20 PROCEDURE — 3061F NEG MICROALBUMINURIA REV: CPT | Performed by: FAMILY MEDICINE

## 2020-08-27 ENCOUNTER — TRANSCRIBE ORDERS (OUTPATIENT)
Dept: SLEEP CENTER | Facility: CLINIC | Age: 60
End: 2020-08-27

## 2020-09-04 ENCOUNTER — TELEPHONE (OUTPATIENT)
Dept: SLEEP CENTER | Facility: CLINIC | Age: 60
End: 2020-09-04

## 2020-09-04 NOTE — TELEPHONE ENCOUNTER
----- Message from Compa Villagomez DO sent at 9/2/2020  3:20 PM EDT -----  Chart reviewed  Study approved    ----- Message -----  From: Osmany Matthews MA  Sent: 8/31/2020   5:16 PM EDT  To: Sleep Medicine [facility] Provider    This sleep study needs approval      If approved please sign and return to clerical pool  If denied please include reasons why  Also provide alternative testing if warranted  Please sign and return to clerical pool

## 2020-09-18 ENCOUNTER — HOSPITAL ENCOUNTER (OUTPATIENT)
Dept: SLEEP CENTER | Facility: CLINIC | Age: 60
Discharge: HOME | End: 2020-09-18
Payer: COMMERCIAL

## 2020-09-18 DIAGNOSIS — G47.30 SLEEP APNEA, UNSPECIFIED TYPE: ICD-10-CM

## 2020-09-18 PROCEDURE — 95810 POLYSOM 6/> YRS 4/> PARAM: CPT

## 2020-09-18 PROCEDURE — 95810 POLYSOM 6/> YRS 4/> PARAM: CPT | Performed by: INTERNAL MEDICINE

## 2020-09-19 NOTE — PROGRESS NOTES
Sleep Study Documentation    Pre-Sleep Study       Sleep testing procedure explained to patient:YES    Patient napped prior to study:NO; but I only got out of bed at 1 p m  Caffeine:Dayshift worker after 12PM   Caffeine use:YES- chocolate  6 ounces    Alcohol:Dayshift workers after 5PM: Alcohol use:NO    Typical day for patient:YES       Study Documentation    Sleep Study Indications: snoring, daytime sleepiness, sleeps all the time, mood  disturbance    Sleep Study: Diagnostic   Snore: Moderate  Supplemental O2: no        Minimum SaO2 83  Baseline SaO2 93            EKG abnormalities: no     EEG abnormalities: no    Sleep Study Recorded < 2 hours: N/A    Sleep Study Recorded > 2 hours but incomplete study: N/A    Sleep Study Recorded 6 hours but no sleep obtained: NO          Post-Sleep Study    Medication used at bedtime or during sleep study:YES prescription sleep aid and other prescription medications    Patient reports time it took to fall asleep:30 to 60 minutes    Patient reports waking up during study:3 or more times  Patient reports returning to sleep in 10 to 30 minutes  Patient reports sleeping 4 to 6 hours without dreaming  Patient reports sleep during study:typical    Patient rated sleepiness: Very sleepy or tired    PAP treatment:no

## 2020-09-24 ENCOUNTER — TELEPHONE (OUTPATIENT)
Dept: SLEEP CENTER | Facility: CLINIC | Age: 60
End: 2020-09-24

## 2020-09-24 NOTE — TELEPHONE ENCOUNTER
Left message sleep study shows mild to moderate FELIX   Patient needs to schedule sleep medicine consult

## 2020-10-02 ENCOUNTER — TELEPHONE (OUTPATIENT)
Dept: FAMILY MEDICINE CLINIC | Facility: CLINIC | Age: 60
End: 2020-10-02

## 2020-11-25 ENCOUNTER — TRANSCRIBE ORDERS (OUTPATIENT)
Dept: ADMINISTRATIVE | Facility: HOSPITAL | Age: 60
End: 2020-11-25

## 2020-11-25 ENCOUNTER — LAB (OUTPATIENT)
Dept: LAB | Facility: CLINIC | Age: 60
End: 2020-11-25
Payer: COMMERCIAL

## 2020-11-25 ENCOUNTER — CLINICAL SUPPORT (OUTPATIENT)
Dept: URGENT CARE | Facility: CLINIC | Age: 60
End: 2020-11-25
Payer: COMMERCIAL

## 2020-11-25 DIAGNOSIS — E11.00 TYPE 2 DIABETES MELLITUS WITH HYPEROSMOLARITY WITHOUT COMA, WITHOUT LONG-TERM CURRENT USE OF INSULIN (HCC): ICD-10-CM

## 2020-11-25 DIAGNOSIS — F31.9 BIPOLAR AFFECTIVE DISORDER, REMISSION STATUS UNSPECIFIED (HCC): ICD-10-CM

## 2020-11-25 DIAGNOSIS — E78.2 MIXED HYPERLIPIDEMIA: ICD-10-CM

## 2020-11-25 DIAGNOSIS — F41.9 ANXIETY HYPERVENTILATION: ICD-10-CM

## 2020-11-25 DIAGNOSIS — Z79.899 ENCOUNTER FOR LONG-TERM (CURRENT) USE OF OTHER MEDICATIONS: ICD-10-CM

## 2020-11-25 DIAGNOSIS — F33.1 MAJOR DEPRESSIVE DISORDER, RECURRENT EPISODE, MODERATE (HCC): ICD-10-CM

## 2020-11-25 DIAGNOSIS — F31.9 BIPOLAR AFFECTIVE DISORDER, REMISSION STATUS UNSPECIFIED (HCC): Primary | ICD-10-CM

## 2020-11-25 DIAGNOSIS — F45.8 ANXIETY HYPERVENTILATION: ICD-10-CM

## 2020-11-25 DIAGNOSIS — E03.9 HYPOTHYROIDISM, UNSPECIFIED TYPE: ICD-10-CM

## 2020-11-25 LAB
ALBUMIN SERPL BCP-MCNC: 3.7 G/DL (ref 3.5–5)
ALP SERPL-CCNC: 124 U/L (ref 46–116)
ALT SERPL W P-5'-P-CCNC: 23 U/L (ref 12–78)
ANION GAP SERPL CALCULATED.3IONS-SCNC: 6 MMOL/L (ref 4–13)
AST SERPL W P-5'-P-CCNC: 12 U/L (ref 5–45)
ATRIAL RATE: 84 BPM
BILIRUB SERPL-MCNC: 0.23 MG/DL (ref 0.2–1)
BUN SERPL-MCNC: 20 MG/DL (ref 5–25)
CALCIUM SERPL-MCNC: 9.1 MG/DL (ref 8.3–10.1)
CHLORIDE SERPL-SCNC: 105 MMOL/L (ref 100–108)
CHOLEST SERPL-MCNC: 172 MG/DL (ref 50–200)
CO2 SERPL-SCNC: 29 MMOL/L (ref 21–32)
CREAT SERPL-MCNC: 0.9 MG/DL (ref 0.6–1.3)
EST. AVERAGE GLUCOSE BLD GHB EST-MCNC: 206 MG/DL
GFR SERPL CREATININE-BSD FRML MDRD: 71 ML/MIN/1.73SQ M
GLUCOSE P FAST SERPL-MCNC: 176 MG/DL (ref 65–99)
HBA1C MFR BLD: 8.8 %
HDLC SERPL-MCNC: 48 MG/DL
LDLC SERPL CALC-MCNC: 78 MG/DL (ref 0–100)
NONHDLC SERPL-MCNC: 124 MG/DL
P AXIS: 41 DEGREES
POTASSIUM SERPL-SCNC: 3.8 MMOL/L (ref 3.5–5.3)
PR INTERVAL: 150 MS
PROT SERPL-MCNC: 7.1 G/DL (ref 6.4–8.2)
QRS AXIS: 4 DEGREES
QRSD INTERVAL: 86 MS
QT INTERVAL: 388 MS
QTC INTERVAL: 458 MS
SODIUM SERPL-SCNC: 140 MMOL/L (ref 136–145)
T WAVE AXIS: 28 DEGREES
TRIGL SERPL-MCNC: 230 MG/DL
VENTRICULAR RATE: 84 BPM

## 2020-11-25 PROCEDURE — 83036 HEMOGLOBIN GLYCOSYLATED A1C: CPT

## 2020-11-25 PROCEDURE — 80061 LIPID PANEL: CPT

## 2020-11-25 PROCEDURE — 80053 COMPREHEN METABOLIC PANEL: CPT

## 2020-11-25 PROCEDURE — 93010 ELECTROCARDIOGRAM REPORT: CPT | Performed by: INTERNAL MEDICINE

## 2020-11-25 PROCEDURE — 36415 COLL VENOUS BLD VENIPUNCTURE: CPT

## 2020-11-25 PROCEDURE — 93005 ELECTROCARDIOGRAM TRACING: CPT

## 2020-11-30 ENCOUNTER — TELEPHONE (OUTPATIENT)
Dept: FAMILY MEDICINE CLINIC | Facility: CLINIC | Age: 60
End: 2020-11-30

## 2020-12-02 ENCOUNTER — OFFICE VISIT (OUTPATIENT)
Dept: FAMILY MEDICINE CLINIC | Facility: CLINIC | Age: 60
End: 2020-12-02
Payer: COMMERCIAL

## 2020-12-02 ENCOUNTER — TELEPHONE (OUTPATIENT)
Dept: FAMILY MEDICINE CLINIC | Facility: CLINIC | Age: 60
End: 2020-12-02

## 2020-12-02 VITALS
TEMPERATURE: 98.2 F | DIASTOLIC BLOOD PRESSURE: 76 MMHG | WEIGHT: 217 LBS | HEIGHT: 65 IN | SYSTOLIC BLOOD PRESSURE: 130 MMHG | BODY MASS INDEX: 36.15 KG/M2

## 2020-12-02 DIAGNOSIS — E11.00 TYPE 2 DIABETES MELLITUS WITH HYPEROSMOLARITY WITHOUT COMA, WITHOUT LONG-TERM CURRENT USE OF INSULIN (HCC): Primary | ICD-10-CM

## 2020-12-02 DIAGNOSIS — E03.8 HYPOTHYROIDISM DUE TO HASHIMOTO'S THYROIDITIS: ICD-10-CM

## 2020-12-02 DIAGNOSIS — E06.3 HYPOTHYROIDISM DUE TO HASHIMOTO'S THYROIDITIS: ICD-10-CM

## 2020-12-02 DIAGNOSIS — E78.2 MIXED HYPERLIPIDEMIA: ICD-10-CM

## 2020-12-02 DIAGNOSIS — Z20.822 ENCOUNTER FOR SCREENING LABORATORY TESTING FOR COVID-19 VIRUS: ICD-10-CM

## 2020-12-02 DIAGNOSIS — E11.9 TYPE 2 DIABETES MELLITUS WITHOUT COMPLICATION, WITHOUT LONG-TERM CURRENT USE OF INSULIN (HCC): ICD-10-CM

## 2020-12-02 DIAGNOSIS — N30.90 CYSTITIS: ICD-10-CM

## 2020-12-02 DIAGNOSIS — Z91.81 HISTORY OF FALL: Primary | ICD-10-CM

## 2020-12-02 DIAGNOSIS — J45.909 ASTHMA, UNSPECIFIED ASTHMA SEVERITY, UNSPECIFIED WHETHER COMPLICATED, UNSPECIFIED WHETHER PERSISTENT: ICD-10-CM

## 2020-12-02 DIAGNOSIS — E05.90 HYPERTHYROIDISM: ICD-10-CM

## 2020-12-02 DIAGNOSIS — R30.0 DYSURIA: ICD-10-CM

## 2020-12-02 LAB
SL AMB  POCT GLUCOSE, UA: NORMAL
SL AMB LEUKOCYTE ESTERASE,UA: ABNORMAL
SL AMB POCT BILIRUBIN,UA: NEGATIVE
SL AMB POCT BLOOD,UA: ABNORMAL
SL AMB POCT CLARITY,UA: CLEAR
SL AMB POCT COLOR,UA: YELLOW
SL AMB POCT KETONES,UA: NEGATIVE
SL AMB POCT NITRITE,UA: NEGATIVE
SL AMB POCT PH,UA: 5
SL AMB POCT SPECIFIC GRAVITY,UA: 1.02
SL AMB POCT URINE PROTEIN: ABNORMAL
SL AMB POCT UROBILINOGEN: NORMAL

## 2020-12-02 PROCEDURE — 99214 OFFICE O/P EST MOD 30 MIN: CPT | Performed by: FAMILY MEDICINE

## 2020-12-02 PROCEDURE — 1036F TOBACCO NON-USER: CPT | Performed by: FAMILY MEDICINE

## 2020-12-02 PROCEDURE — U0003 INFECTIOUS AGENT DETECTION BY NUCLEIC ACID (DNA OR RNA); SEVERE ACUTE RESPIRATORY SYNDROME CORONAVIRUS 2 (SARS-COV-2) (CORONAVIRUS DISEASE [COVID-19]), AMPLIFIED PROBE TECHNIQUE, MAKING USE OF HIGH THROUGHPUT TECHNOLOGIES AS DESCRIBED BY CMS-2020-01-R: HCPCS | Performed by: FAMILY MEDICINE

## 2020-12-02 PROCEDURE — 3725F SCREEN DEPRESSION PERFORMED: CPT | Performed by: FAMILY MEDICINE

## 2020-12-02 PROCEDURE — 81002 URINALYSIS NONAUTO W/O SCOPE: CPT | Performed by: FAMILY MEDICINE

## 2020-12-02 PROCEDURE — 3061F NEG MICROALBUMINURIA REV: CPT | Performed by: FAMILY MEDICINE

## 2020-12-02 PROCEDURE — 3008F BODY MASS INDEX DOCD: CPT | Performed by: FAMILY MEDICINE

## 2020-12-02 RX ORDER — ALBUTEROL SULFATE 90 UG/1
1-2 AEROSOL, METERED RESPIRATORY (INHALATION) EVERY 4 HOURS PRN
Qty: 1 INHALER | Refills: 2 | Status: SHIPPED | OUTPATIENT
Start: 2020-12-02 | End: 2022-01-07 | Stop reason: SDUPTHER

## 2020-12-02 RX ORDER — LEVOTHYROXINE SODIUM 88 UG/1
88 TABLET ORAL
Qty: 90 TABLET | Refills: 1 | Status: SHIPPED | OUTPATIENT
Start: 2020-12-02 | End: 2021-02-05 | Stop reason: SDUPTHER

## 2020-12-02 RX ORDER — CIPROFLOXACIN 500 MG/1
500 TABLET, FILM COATED ORAL EVERY 12 HOURS SCHEDULED
Qty: 10 TABLET | Refills: 0 | Status: SHIPPED | OUTPATIENT
Start: 2020-12-02 | End: 2020-12-07

## 2020-12-03 LAB — SARS-COV-2 RNA SPEC QL NAA+PROBE: NOT DETECTED

## 2020-12-14 ENCOUNTER — TELEPHONE (OUTPATIENT)
Dept: FAMILY MEDICINE CLINIC | Facility: CLINIC | Age: 60
End: 2020-12-14

## 2020-12-14 DIAGNOSIS — R30.0 DYSURIA: Primary | ICD-10-CM

## 2020-12-14 RX ORDER — NITROFURANTOIN 25; 75 MG/1; MG/1
100 CAPSULE ORAL 2 TIMES DAILY
Qty: 14 CAPSULE | Refills: 0 | Status: SHIPPED | OUTPATIENT
Start: 2020-12-14 | End: 2021-02-05

## 2021-01-13 ENCOUNTER — TELEPHONE (OUTPATIENT)
Dept: FAMILY MEDICINE CLINIC | Facility: CLINIC | Age: 61
End: 2021-01-13

## 2021-01-13 NOTE — TELEPHONE ENCOUNTER
Pt had sleep study in oct and finally called for results  The results are she should see a sleep specialist   She wants to know if you agree

## 2021-01-13 NOTE — TELEPHONE ENCOUNTER
Left message for patient  Per chart, sleep study results have been reviewed with patient by PCP Dr Surinder Villalpando  She will need to schedule consult with sleep specialist   Phone number for central scheduling provided

## 2021-01-21 ENCOUNTER — TRANSCRIBE ORDERS (OUTPATIENT)
Dept: ADMINISTRATIVE | Facility: HOSPITAL | Age: 61
End: 2021-01-21

## 2021-01-21 DIAGNOSIS — R06.81 APNEA: Primary | ICD-10-CM

## 2021-02-04 ENCOUNTER — OFFICE VISIT (OUTPATIENT)
Dept: URGENT CARE | Facility: MEDICAL CENTER | Age: 61
End: 2021-02-04
Payer: COMMERCIAL

## 2021-02-04 ENCOUNTER — APPOINTMENT (OUTPATIENT)
Dept: RADIOLOGY | Facility: MEDICAL CENTER | Age: 61
End: 2021-02-04
Payer: COMMERCIAL

## 2021-02-04 ENCOUNTER — TELEPHONE (OUTPATIENT)
Dept: FAMILY MEDICINE CLINIC | Facility: CLINIC | Age: 61
End: 2021-02-04

## 2021-02-04 VITALS
SYSTOLIC BLOOD PRESSURE: 120 MMHG | DIASTOLIC BLOOD PRESSURE: 78 MMHG | OXYGEN SATURATION: 98 % | HEART RATE: 80 BPM | BODY MASS INDEX: 35.82 KG/M2 | RESPIRATION RATE: 16 BRPM | HEIGHT: 65 IN | WEIGHT: 215 LBS | TEMPERATURE: 98 F

## 2021-02-04 DIAGNOSIS — S83.92XA SPRAIN OF LEFT KNEE, UNSPECIFIED LIGAMENT, INITIAL ENCOUNTER: ICD-10-CM

## 2021-02-04 DIAGNOSIS — S83.92XA SPRAIN OF LEFT KNEE, UNSPECIFIED LIGAMENT, INITIAL ENCOUNTER: Primary | ICD-10-CM

## 2021-02-04 PROCEDURE — 99283 EMERGENCY DEPT VISIT LOW MDM: CPT | Performed by: PHYSICIAN ASSISTANT

## 2021-02-04 PROCEDURE — 73564 X-RAY EXAM KNEE 4 OR MORE: CPT

## 2021-02-04 PROCEDURE — 99203 OFFICE O/P NEW LOW 30 MIN: CPT | Performed by: PHYSICIAN ASSISTANT

## 2021-02-04 PROCEDURE — G0382 LEV 3 HOSP TYPE B ED VISIT: HCPCS | Performed by: PHYSICIAN ASSISTANT

## 2021-02-04 NOTE — PATIENT INSTRUCTIONS
Knee Sprain   WHAT YOU NEED TO KNOW:   A knee sprain is a stretched or torn ligament in your knee  Ligaments support the knee and keep the joint and bones in the correct position  A knee sprain may involve one or more ligaments  DISCHARGE INSTRUCTIONS:   Return to the emergency department if:   · Any part of your leg feels cold, numb, or looks pale  Call your doctor if:   · You have new or increased swelling, bruising, or pain in your knee  · Your symptoms do not improve within 6 weeks, even with treatment  · You have questions or concerns about your condition or care  Medicines:   · NSAIDs , such as ibuprofen, help decrease swelling, pain, and fever  This medicine is available with or without a doctor's order  NSAIDs can cause stomach bleeding or kidney problems in certain people  If you take blood thinner medicine, always ask your healthcare provider if NSAIDs are safe for you  Always read the medicine label and follow directions  · Acetaminophen  decreases pain and fever  It is available without a doctor's order  Ask how much to take and how often to take it  Follow directions  Read the labels of all other medicines you are using to see if they also contain acetaminophen, or ask your doctor or pharmacist  Acetaminophen can cause liver damage if not taken correctly  Do not use more than 4 grams (4,000 milligrams) total of acetaminophen in one day  · Prescription pain medicine  may be given  Ask your healthcare provider how to take this medicine safely  Some prescription pain medicines contain acetaminophen  Do not take other medicines that contain acetaminophen without talking to your healthcare provider  Too much acetaminophen may cause liver damage  Prescription pain medicine may cause constipation  Ask your healthcare provider how to prevent or treat constipation  · Take your medicine as directed    Contact your healthcare provider if you think your medicine is not helping or if you have side effects  Tell him or her if you are allergic to any medicine  Keep a list of the medicines, vitamins, and herbs you take  Include the amounts, and when and why you take them  Bring the list or the pill bottles to follow-up visits  Carry your medicine list with you in case of an emergency  A support device  such as a splint or brace may be needed  These devices limit movement and protect the joint while it heals  You may be given crutches to use until you can stand on your injured leg without pain  Physical therapy  may be needed  A physical therapist teaches you exercises to help improve movement and strength, and to decrease pain  Manage a knee sprain:   · Rest  your knee and do not exercise  Do not walk on your injured leg if you are told to keep weight off your knee  Rest helps decrease swelling and allows the injury to heal  You can do gentle range of motion exercises as directed to prevent stiffness  · Apply ice  on your knee for 15 to 20 minutes every hour or as directed  Use an ice pack, or put crushed ice in a plastic bag  Cover the bag with a towel before you apply it  Ice helps prevent tissue damage and decreases swelling and pain  · Apply compression  to your knee as directed  You may need to wear an elastic bandage  This helps keep your injured knee from moving too much while it heals  It should be tight enough to give support but so tight that it causes your toes to feel numb or tingly  Take the bandage off and rewrap it at least 1 time each day  · Elevate your knee  above the level of your heart as often as you can  This will help decrease swelling and pain  Prop your leg on pillows or blankets to keep it elevated comfortably  Do not put pillows directly behind your knee  Prevent another knee sprain:  Exercise your legs to keep your muscles strong  Strong leg muscles help protect your knee and prevent strain   The following may also prevent a knee sprain:  · Slowly start your exercise or training program   Slowly increase the time, distance, and intensity of your exercise  Sudden increases in training may cause another knee sprain  · Wear protective braces and equipment as directed  Braces may prevent your knee from moving the wrong way and causing another sprain  Protective equipment may support your bones and ligaments to prevent injury  · Warm up and stretch before exercise  Warm up by walking or using an exercise bike before starting your regular exercise  Do gentle stretches after warming up  This helps to loosen your muscles and decrease stress on your knee  Cool down and stretch after you exercise  · Wear shoes that fit correctly and support your feet  Replace your running or exercise shoes before the padding or shock absorption is worn out  Ask your healthcare provider which exercise shoes are best for you  Ask if you should wear shoe inserts  Shoe inserts can help support your heels and arches or keep your foot lined up correctly in your shoes  Exercise on flat surfaces  Follow up with your doctor as directed:  Write down your questions so you remember to ask them during your visits  © Copyright [address] Information is for End User's use only and may not be sold, redistributed or otherwise used for commercial purposes  All illustrations and images included in CareNotes® are the copyrighted property of A D A M , Inc  or [facility] Jamie Tariq   The above information is an  only  It is not intended as medical advice for individual conditions or treatments  Talk to your doctor, nurse or pharmacist before following any medical regimen to see if it is safe and effective for you

## 2021-02-04 NOTE — TELEPHONE ENCOUNTER
Pt was at Urgent Care  She has a mass in her knee area and has  A lot of pain  She wants you to call her to discuss

## 2021-02-04 NOTE — PROGRESS NOTES
[facility]        NAME: Jimbo Enriquez is a 64 y o  female  : 1960    MRN: [id number]  DATE: 2021  TIME: 3:32 PM    /78   Pulse 80   Temp 98 °F (36 7 °C)   Resp 16   Ht 5' 5" (1 651 m)   Wt 97 5 kg (215 lb)   LMP  (LMP Unknown)   SpO2 98%   BMI 35 78 kg/m²     Assessment and Plan   Sprain of left knee, unspecified ligament, initial encounter [S83  92XA]  1  Sprain of left knee, unspecified ligament, initial encounter  XR knee 4+ vw left injury         Patient Instructions       Follow up with PCP in 3-5 days  Proceed to  ER if symptoms worsen  Chief Complaint     Chief Complaint   Patient presents with    Knee Pain     Pt c/o 9/10 left knee pain  Pt has mass on medial side of left knee for about 6 month  Pt has rx for MRI from PCP, but has not scheduled appt yet  States that pain is worse since shoveling snow this past week  History of Present Illness       Pt with 3 days of left knee pain since shoveling snow,  Pt with "mass on knee" for several months, pt has mri rx from family doctor pt has not done it yet       Review of Systems   Review of Systems   Constitutional: Negative  HENT: Negative  Eyes: Negative  Respiratory: Negative  Cardiovascular: Negative  Gastrointestinal: Negative  Endocrine: Negative  Genitourinary: Negative  Musculoskeletal: Negative  Skin: Negative  Allergic/Immunologic: Negative  Neurological: Negative  Hematological: Negative  Psychiatric/Behavioral: Negative  All other systems reviewed and are negative          Current Medications       Current Outpatient Medications:     albuterol (2 5 mg/3 mL) 0 083 % nebulizer solution, Take 1 vial (2 5 mg total) by nebulization every 4 (four) hours as needed for wheezing (Patient not taking: Reported on 2020), Disp: 20 vial, Rfl: 1    albuterol (Proventil HFA) 90 mcg/act inhaler, Inhale 1-2 puffs every 4 (four) hours as needed for wheezing, Disp: 1 Inhaler, Rfl: 2    benztropine (COGENTIN) 0 5 mg tablet, Take 0 5 mg by mouth daily , Disp: , Rfl:     Blood Glucose Monitoring Suppl (ONE TOUCH ULTRA 2) w/Device KIT, by Does not apply route daily, Disp: 1 each, Rfl: 0    gabapentin (NEURONTIN) 100 mg capsule, Take 1 capsule (100 mg total) by mouth daily at bedtime, Disp: 90 capsule, Rfl: 1    Lancets (ONETOUCH ULTRASOFT) lancets, Use as instructed daily, Disp: 100 each, Rfl: 1    levothyroxine 88 mcg tablet, Take 1 tablet (88 mcg total) by mouth daily in the early morning, Disp: 90 tablet, Rfl: 1    metFORMIN (GLUCOPHAGE) 500 mg tablet, Take 2 tablets (1,000 mg total) by mouth 2 (two) times a day with meals, Disp: 360 tablet, Rfl: 1    mometasone (ASMANEX TWISTHALER) 220 MCG/INH inhaler, Inhale 1 puff every evening Rinse mouth after use   (Patient not taking: Reported on 12/2/2020), Disp: 1 Inhaler, Rfl: 3    nitrofurantoin (MACROBID) 100 mg capsule, Take 1 capsule (100 mg total) by mouth 2 (two) times a day, Disp: 14 capsule, Rfl: 0    omeprazole (PriLOSEC) 20 mg delayed release capsule, Take 1 capsule (20 mg total) by mouth daily before breakfast, Disp: 90 capsule, Rfl: 3    QUEtiapine (SEROquel) 200 mg tablet, Take 1 tablet (200 mg total) by mouth daily at bedtime, Disp: 90 tablet, Rfl: 1    rosuvastatin (CRESTOR) 5 mg tablet, Take 1 tablet (5 mg total) by mouth daily, Disp: 30 tablet, Rfl: 5    sertraline (ZOLOFT) 100 mg tablet, Take 2 tablets (200 mg total) by mouth daily at bedtime, Disp: 180 tablet, Rfl: 1    traZODone (DESYREL) 50 mg tablet, Take 1 tablet (50 mg total) by mouth daily at bedtime, Disp: 90 tablet, Rfl: 1    venlafaxine (EFFEXOR-XR) 37 5 mg 24 hr capsule, TAKE 1 CAPSULE BY MOUTH EVERY DAY (Patient taking differently: Take 150 mg by mouth daily at bedtime ), Disp: 90 capsule, Rfl: 0    Current Allergies     Allergies as of 02/04/2021 - Reviewed 02/04/2021   Allergen Reaction Noted    Darvon [propoxyphene]  01/26/2019    Fluoxetine  10/01/2012    Meclizine  10/01/2012    Morphine and related  10/01/2012    Oxycodone-acetaminophen  10/01/2012    Percolone [oxycodone]  03/20/2019            The following portions of the patient's history were reviewed and updated as appropriate: allergies, current medications, past family history, past medical history, past social history, past surgical history and problem list      Past Medical History:   Diagnosis Date    Anxiety     Asthma     Diabetes ([address] )     prediabetic    GERD (gastroesophageal reflux disease)     Hyperlipemia     Hypothyroidism     Kidney stones     Major depressive disorder     Migraine     Sleep apnea        Past Surgical History:   Procedure Laterality Date    ADENOIDECTOMY      DILATION AND CURETTAGE OF UTERUS      TX COLONOSCOPY FLX DX W/COLLJ SPEC WHEN PFRMD N/A 3/15/2019    Procedure: COLONOSCOPY;  Surgeon: Ori Rangel MD;  Location: [facility]; Service: Gastroenterology    SHOULDER SURGERY      TONSILLECTOMY      TUBAL LIGATION         Family History   Problem Relation Age of Onset   Desirae Salcedo ALS Mother     Venous thrombosis Father     Diabetes Father     Other Family         cerebral embolism    Diabetes Family     Hypertension Family     Kidney disease Family     Breast cancer Neg Hx     Colon cancer Neg Hx     Ovarian cancer Neg Hx     Uterine cancer Neg Hx          Medications have been verified  Objective   /78   Pulse 80   Temp 98 °F (36 7 °C)   Resp 16   Ht 5' 5" (1 651 m)   Wt 97 5 kg (215 lb)   LMP  (LMP Unknown)   SpO2 98%   BMI 35 78 kg/m²        Physical Exam     Physical Exam  Vitals signs and nursing note reviewed  Constitutional:       Appearance: Normal appearance  She is normal weight  HENT:      Head: Normocephalic and atraumatic        Right Ear: Tympanic membrane, ear canal and external ear normal       Left Ear: Tympanic membrane, ear canal and external ear normal       Nose: Nose normal  Mouth/Throat:      Mouth: Mucous membranes are moist       Pharynx: Oropharynx is clear  Eyes:      Extraocular Movements: Extraocular movements intact  Conjunctiva/sclera: Conjunctivae normal       Pupils: Pupils are equal, round, and reactive to light  Neck:      Musculoskeletal: Normal range of motion and neck supple  Cardiovascular:      Rate and Rhythm: Normal rate and regular rhythm  Pulses: Normal pulses  Heart sounds: Normal heart sounds  Pulmonary:      Effort: Pulmonary effort is normal       Breath sounds: Normal breath sounds  Abdominal:      General: Abdomen is flat  Bowel sounds are normal    Musculoskeletal: Normal range of motion  Comments: Left knee from  Medial mass 6cm x 4cm no erythema no swelling no ant/post drawer no valgus no varus no popliteal tenderness    Skin:     General: Skin is warm  Capillary Refill: Capillary refill takes less than 2 seconds  Neurological:      General: No focal deficit present  Mental Status: She is alert and oriented to person, place, and time     Psychiatric:         Mood and Affect: Mood normal

## 2021-02-05 ENCOUNTER — TELEMEDICINE (OUTPATIENT)
Dept: FAMILY MEDICINE CLINIC | Facility: CLINIC | Age: 61
End: 2021-02-05
Payer: COMMERCIAL

## 2021-02-05 ENCOUNTER — TELEPHONE (OUTPATIENT)
Dept: FAMILY MEDICINE CLINIC | Facility: CLINIC | Age: 61
End: 2021-02-05

## 2021-02-05 VITALS — WEIGHT: 215 LBS | BODY MASS INDEX: 35.78 KG/M2

## 2021-02-05 DIAGNOSIS — E05.90 HYPERTHYROIDISM: ICD-10-CM

## 2021-02-05 DIAGNOSIS — G47.33 OBSTRUCTIVE SLEEP APNEA (ADULT) (PEDIATRIC): Primary | ICD-10-CM

## 2021-02-05 DIAGNOSIS — E78.2 MIXED HYPERLIPIDEMIA: ICD-10-CM

## 2021-02-05 DIAGNOSIS — E06.3 HYPOTHYROIDISM DUE TO HASHIMOTO'S THYROIDITIS: ICD-10-CM

## 2021-02-05 DIAGNOSIS — G89.29 CHRONIC PAIN OF LEFT KNEE: Primary | ICD-10-CM

## 2021-02-05 DIAGNOSIS — M25.562 CHRONIC PAIN OF LEFT KNEE: Primary | ICD-10-CM

## 2021-02-05 DIAGNOSIS — E03.8 HYPOTHYROIDISM DUE TO HASHIMOTO'S THYROIDITIS: ICD-10-CM

## 2021-02-05 PROCEDURE — G2012 BRIEF CHECK IN BY MD/QHP: HCPCS | Performed by: FAMILY MEDICINE

## 2021-02-05 RX ORDER — LEVOTHYROXINE SODIUM 88 UG/1
88 TABLET ORAL
Qty: 90 TABLET | Refills: 1 | Status: SHIPPED | OUTPATIENT
Start: 2021-02-05 | End: 2021-03-15 | Stop reason: SDUPTHER

## 2021-02-05 NOTE — PROGRESS NOTES
Assessment/Plan:       Diagnoses and all orders for this visit:    Hypothyroidism due to Hashimoto's thyroiditis    Mixed hyperlipidemia    Hyperthyroidism  -     levothyroxine 88 mcg tablet; Take 1 tablet (88 mcg total) by mouth daily in the early morning            Subjective:        Patient ID: Zonia Mayfield is a 64 y o  female  HPI      The following portions of the patient's history were reviewed and updated as appropriate: allergies, current medications, past family history, past medical history, past social history, past surgical history and problem list       Review of Systems   HENT: Negative  Eyes: Negative  Respiratory: Negative  Cardiovascular: Negative  Gastrointestinal: Negative  Endocrine: Negative  Genitourinary: Negative  Musculoskeletal: Positive for arthralgias and gait problem  Allergic/Immunologic: Negative  Objective:      BMI Counseling: Body mass index is 35 78 kg/m²  The BMI is above normal  Nutrition recommendations include decreasing fast food intake  Exercise recommendations include exercising 3-5 times per week                 Wt 97 5 kg (215 lb) Comment: Per patient  LMP  (LMP Unknown)   BMI 35 78 kg/m²          Physical Exam

## 2021-02-05 NOTE — PROGRESS NOTES
Virtual Brief Visit  It was my intent to perform this visit via video technology but the patient was not able to do a video connection so the visit was completed via audio telephone only  Assessment/Plan: patient go for MRI of the left knee  Patient will ice and elevate left knee use Voltaren up to 3 times daily with Tylenol  Other guidance given at this time  Refill on levothyroxine for hypothyroidism  Follow-up routinely in 6 months or as needed  Time with patient 25 minutes    Problem List Items Addressed This Visit        Endocrine    Hypothyroidism - Primary    Relevant Medications    levothyroxine 88 mcg tablet       Other    Hyperlipidemia      Other Visit Diagnoses     Hyperthyroidism        Relevant Medications    levothyroxine 88 mcg tablet                Reason for visit is   Chief Complaint   Patient presents with    Knee Pain     Patient has had chronic left knee pain that has worsened over the past 6 months  Now pain is unbearable and knee is swollen and somewhat red  Pain level is 10  She was given Voltaren, but dosage is not helping with pain  Patient states that MRI ordered by us was never done because she had improved   Virtual Brief Visit        Encounter provider Sally Camejo DO    Provider located at [address] [place] [id number]    Recent Visits  Date Type Provider Dept   02/04/21 Telephone Edith Guerra, [address] Sandor Cramer recent visits within past 7 days and meeting all other requirements     Today's Visits  Date Type Provider Dept   02/05/21 Telemedicine [address] DO [address] [facility] today's visits and meeting all other requirements     Future Appointments  No visits were found meeting these conditions     Showing future appointments within next 150 days and meeting all other requirements        After connecting through telephone, the patient was identified by name and date of birth  Maria Del Rosario Anderson was informed that this is a telemedicine visit and that the visit is being conducted through telephone  My office door was closed  No one else was in the room  She acknowledged consent and understanding of privacy and security of the platform  The patient has agreed to participate and understands she can discontinue the visit at any time  Patient is aware this is a billable service  Subjective    Maria Del Rosario Anderson is a 64 y o  female  As below  Patient with significant left knee pain over the past 6 months worsening over the past couple of days  Patient with swelling and significant pain  Patient's pain is 10 at 10  Patient with some warmth and redness  Patient's pain is been worsening  Patient is using Voltaren without significant improvement  Patient did have x-ray which was unremarkable  Past Medical History:   Diagnosis Date    Anxiety     Asthma     Diabetes ([address] )     prediabetic    GERD (gastroesophageal reflux disease)     Hyperlipemia     Hypothyroidism     Kidney stones     Major depressive disorder     Migraine     Sleep apnea        Past Surgical History:   Procedure Laterality Date    ADENOIDECTOMY      DILATION AND CURETTAGE OF UTERUS      MA COLONOSCOPY FLX DX W/COLLJ SPEC WHEN PFRMD N/A 3/15/2019    Procedure: COLONOSCOPY;  Surgeon: Ruthann Mtz MD;  Location: [facility] GI [facility];   Service: Gastroenterology    SHOULDER SURGERY      TONSILLECTOMY      TUBAL LIGATION         Current Outpatient Medications   Medication Sig Dispense Refill    albuterol (2 5 mg/3 mL) 0 083 % nebulizer solution Take 1 vial (2 5 mg total) by nebulization every 4 (four) hours as needed for wheezing 20 vial 1    albuterol (Proventil HFA) 90 mcg/act inhaler Inhale 1-2 puffs every 4 (four) hours as needed for wheezing 1 Inhaler 2    benztropine (COGENTIN) 0 5 mg tablet Take 0 5 mg by mouth daily       Blood Glucose Monitoring Suppl (ONE TOUCH ULTRA 2) w/Device KIT by Does not apply route daily 1 each 0    diclofenac sodium (VOLTAREN) 50 mg EC tablet Take 1 tablet (50 mg total) by mouth 2 (two) times a day 20 tablet 0    gabapentin (NEURONTIN) 100 mg capsule Take 1 capsule (100 mg total) by mouth daily at bedtime 90 capsule 1    Lancets (ONETOUCH ULTRASOFT) lancets Use as instructed daily 100 each 1    levothyroxine 88 mcg tablet Take 1 tablet (88 mcg total) by mouth daily in the early morning 90 tablet 1    metFORMIN (GLUCOPHAGE) 500 mg tablet Take 2 tablets (1,000 mg total) by mouth 2 (two) times a day with meals 360 tablet 1    omeprazole (PriLOSEC) 20 mg delayed release capsule Take 1 capsule (20 mg total) by mouth daily before breakfast 90 capsule 3    QUEtiapine (SEROquel) 200 mg tablet Take 1 tablet (200 mg total) by mouth daily at bedtime 90 tablet 1    rosuvastatin (CRESTOR) 5 mg tablet Take 1 tablet (5 mg total) by mouth daily 30 tablet 5    sertraline (ZOLOFT) 100 mg tablet Take 2 tablets (200 mg total) by mouth daily at bedtime 180 tablet 1    traZODone (DESYREL) 50 mg tablet Take 1 tablet (50 mg total) by mouth daily at bedtime 90 tablet 1    venlafaxine (EFFEXOR-XR) 37 5 mg 24 hr capsule TAKE 1 CAPSULE BY MOUTH EVERY DAY (Patient taking differently: Take 150 mg by mouth daily at bedtime ) 90 capsule 0    mometasone (ASMANEX TWISTHALER) 220 MCG/INH inhaler Inhale 1 puff every evening Rinse mouth after use  (Patient not taking: Reported on 12/2/2020) 1 Inhaler 3     No current facility-administered medications for this visit  Allergies   Allergen Reactions    Darvon [Propoxyphene]     Fluoxetine     Meclizine     Morphine And Related     Oxycodone-Acetaminophen     Percolone [Oxycodone]        Review of Systems   HENT: Negative  Eyes: Negative  Respiratory: Negative  Cardiovascular: Negative  Gastrointestinal: Negative  Genitourinary: Negative  Musculoskeletal: Positive for arthralgias and gait problem         Vitals: 02/05/21 1038   Weight: 97 5 kg (215 lb)         I spent  25 minutes directly with the patient during this visit    VIRTUAL VISIT DISCLAIMER    Idalia Matthew acknowledges that she has consented to an online visit or consultation  She understands that the online visit is based solely on information provided by her, and that, in the absence of a face-to-face physical evaluation by the physician, the diagnosis she receives is both limited and provisional in terms of accuracy and completeness  This is not intended to replace a full medical face-to-face evaluation by the physician  Idalia Castro understands and accepts these terms

## 2021-02-09 ENCOUNTER — TELEPHONE (OUTPATIENT)
Dept: FAMILY MEDICINE CLINIC | Facility: CLINIC | Age: 61
End: 2021-02-09

## 2021-02-09 DIAGNOSIS — G47.33 OBSTRUCTIVE SLEEP APNEA (ADULT) (PEDIATRIC): Primary | ICD-10-CM

## 2021-02-11 ENCOUNTER — OFFICE VISIT (OUTPATIENT)
Dept: SLEEP CENTER | Facility: CLINIC | Age: 61
End: 2021-02-11
Payer: COMMERCIAL

## 2021-02-11 VITALS
WEIGHT: 214 LBS | HEART RATE: 108 BPM | DIASTOLIC BLOOD PRESSURE: 60 MMHG | BODY MASS INDEX: 35.65 KG/M2 | SYSTOLIC BLOOD PRESSURE: 122 MMHG | HEIGHT: 65 IN

## 2021-02-11 DIAGNOSIS — G47.33 OSA (OBSTRUCTIVE SLEEP APNEA): Primary | ICD-10-CM

## 2021-02-11 DIAGNOSIS — G47.33 OBSTRUCTIVE SLEEP APNEA (ADULT) (PEDIATRIC): ICD-10-CM

## 2021-02-11 PROCEDURE — 99243 OFF/OP CNSLTJ NEW/EST LOW 30: CPT | Performed by: INTERNAL MEDICINE

## 2021-02-11 NOTE — PROGRESS NOTES
Consultation - [address] : 1960  MRN: [id number]       Assessment:  The patient has mild obstructive sleep apnea, but is chronically drowsy and feels on refreshed after a night of sleep  Her symptoms may be related to her depression or possibly to the medications that she is taking, but in the presence of FELIX, it is incumbent to treat  I will start her on APAP with a range of 4 to 20 cm  Plan:  APAP 4 to 20 cm    Follow up:  Compliance check    History of Present Illness:   64 y  o female with a one year history of worsening anxiety and depression  There have been a number of life events which have caused her distress  In the meantime, she had mentioned to her primary doctor that she had snoring, which along with her daytime sleepiness prompted him to order a diagnostic study  Her diagnostic study demonstrated AHI = 13 9  There was only mild oxygen desaturation  The study was otherwise unremarkable  The patient has insomnia treated with quetiapine 225 mg at bedtime and uses trazodone 50 mg as needed            Review of Systems      Genitourinary none   Cardiology ankle/leg swelling   Gastrointestinal frequent heartburn/acid reflux   Neurology frequent headaches, awaken with headache, forgetfulness, poor concentration or confusion, , difficulty with memory and balance problems   Constitutional claustrophobia, fatigue and weight change   Integumentary none   Psychiatry anxiety, depression and mood change   Musculoskeletal none   Pulmonary frequent cough and snoring   ENT none   Endocrine excessive thirst   Hematological none         I have reviewed and updated the review of systems as necessary    Historical Information    Past Medical History:  Anxiety, depression, insomnia    Family History: non-contributory    Social History     Socioeconomic History    Marital status: Single     Spouse name: None    Number of children: None    Years of education: None    Highest education level: None   Occupational History    None   Social Needs    Financial resource strain: None    Food insecurity     Worry: None     Inability: None    Transportation needs     Medical: None     Non-medical: None   Tobacco Use    Smoking status: Never Smoker    Smokeless tobacco: Never Used   Substance and Sexual Activity    Alcohol use: No    Drug use: No    Sexual activity: Not Currently   Lifestyle    Physical activity     Days per week: None     Minutes per session: None    Stress: None   Relationships    Social connections     Talks on phone: None     Gets together: None     Attends Confucianist service: None     Active member of club or organization: None     Attends meetings of clubs or organizations: None     Relationship status: None    Intimate partner violence     Fear of current or ex partner: None     Emotionally abused: None     Physically abused: None     Forced sexual activity: None   Other Topics Concern    None   Social History Narrative    None         Sleep Schedule: unremarkable    Snoring:  Yes    Witnessed Apnea:  (the patient sleeps alone)    Medications/Allergies:    Current Outpatient Medications:     albuterol (2 5 mg/3 mL) 0 083 % nebulizer solution, Take 1 vial (2 5 mg total) by nebulization every 4 (four) hours as needed for wheezing, Disp: 20 vial, Rfl: 1    albuterol (Proventil HFA) 90 mcg/act inhaler, Inhale 1-2 puffs every 4 (four) hours as needed for wheezing, Disp: 1 Inhaler, Rfl: 2    benztropine (COGENTIN) 0 5 mg tablet, Take 0 5 mg by mouth daily , Disp: , Rfl:     Blood Glucose Monitoring Suppl (ONE TOUCH ULTRA 2) w/Device KIT, by Does not apply route daily, Disp: 1 each, Rfl: 0    diclofenac sodium (VOLTAREN) 50 mg EC tablet, Take 1 tablet (50 mg total) by mouth 2 (two) times a day, Disp: 20 tablet, Rfl: 0    gabapentin (NEURONTIN) 100 mg capsule, Take 1 capsule (100 mg total) by mouth daily at bedtime, Disp: 90 capsule, Rfl: 1    Lancets (ONETOUCH ULTRASOFT) lancets, Use as instructed daily, Disp: 100 each, Rfl: 1    levothyroxine 88 mcg tablet, Take 1 tablet (88 mcg total) by mouth daily in the early morning, Disp: 90 tablet, Rfl: 1    metFORMIN (GLUCOPHAGE) 500 mg tablet, Take 2 tablets (1,000 mg total) by mouth 2 (two) times a day with meals, Disp: 360 tablet, Rfl: 1    omeprazole (PriLOSEC) 20 mg delayed release capsule, Take 1 capsule (20 mg total) by mouth daily before breakfast, Disp: 90 capsule, Rfl: 3    QUEtiapine (SEROquel) 200 mg tablet, Take 1 tablet (200 mg total) by mouth daily at bedtime, Disp: 90 tablet, Rfl: 1    rosuvastatin (CRESTOR) 5 mg tablet, Take 1 tablet (5 mg total) by mouth daily, Disp: 30 tablet, Rfl: 5    sertraline (ZOLOFT) 100 mg tablet, Take 2 tablets (200 mg total) by mouth daily at bedtime, Disp: 180 tablet, Rfl: 1    traZODone (DESYREL) 50 mg tablet, Take 1 tablet (50 mg total) by mouth daily at bedtime, Disp: 90 tablet, Rfl: 1    venlafaxine (EFFEXOR-XR) 37 5 mg 24 hr capsule, TAKE 1 CAPSULE BY MOUTH EVERY DAY (Patient taking differently: Take 150 mg by mouth daily at bedtime ), Disp: 90 capsule, Rfl: 0    mometasone (ASMANEX TWISTHALER) 220 MCG/INH inhaler, Inhale 1 puff every evening Rinse mouth after use  (Patient not taking: Reported on 12/2/2020), Disp: 1 Inhaler, Rfl: 3        No notes on file                  Objective:    Vital Signs:   Vitals:    02/11/21 1232   BP: 122/60   Pulse: (!) 108   Weight: 97 1 kg (214 lb)   Height: 5' 5" (1 651 m)     Neck Circumference: 14      Chandler Sleepiness Scale:  Total score: 8    Physical Exam:    General: Alert, appropriate, cooperative, overweight    Head: NC/AT, no retrognathia    Nose: No septal deviation    Throat: Airway diminished, tongue base thickened, no tonsils visualized    Neck: Normal, no thyromegaly or lymphadenopathy, no JVD    Heart: RR, normal S1 and S2, no murmurs    Chest: Clear bilaterally    Extremity: No clubbing, cyanosis, no edema    Skin: Warm, dry    Neuro: No motor abnormalities, cranial nerves appear intact    Sleep Study Results:   AHI = 13 9  PAP Pressure: Auto PAP: 4 to 20 cm  DME Provider: [facility]    Counseling / Coordination of Care  A description of the counseling / coordination of care: We discussed the pathophysiology of obstructive sleep apnea as well as the possible treatment options  We also discussed the rationale for positive airway pressure therapy  Board Certified Sleep Specialist    Portions of the record may have been created with voice recognition software  Occasional wrong word or "sound a like" substitutions may have occurred due to the inherent limitations of voice recognition software  Read the chart carefully and recognize, using context, where substitutions have occurred

## 2021-02-15 ENCOUNTER — TELEPHONE (OUTPATIENT)
Dept: SLEEP CENTER | Facility: CLINIC | Age: 61
End: 2021-02-15

## 2021-02-17 DIAGNOSIS — E11.8 TYPE 2 DIABETES MELLITUS WITH COMPLICATION (HCC): ICD-10-CM

## 2021-02-26 ENCOUNTER — HOSPITAL ENCOUNTER (OUTPATIENT)
Dept: MRI IMAGING | Facility: HOSPITAL | Age: 61
Discharge: HOME | End: 2021-02-26
Payer: COMMERCIAL

## 2021-02-26 DIAGNOSIS — G89.29 CHRONIC PAIN OF LEFT KNEE: ICD-10-CM

## 2021-02-26 DIAGNOSIS — M25.562 CHRONIC PAIN OF LEFT KNEE: ICD-10-CM

## 2021-02-26 PROCEDURE — 73721 MRI JNT OF LWR EXTRE W/O DYE: CPT

## 2021-02-26 PROCEDURE — G1004 CDSM NDSC: HCPCS

## 2021-03-08 ENCOUNTER — OFFICE VISIT (OUTPATIENT)
Dept: OBGYN CLINIC | Facility: MEDICAL CENTER | Age: 61
End: 2021-03-08
Payer: COMMERCIAL

## 2021-03-08 VITALS
DIASTOLIC BLOOD PRESSURE: 80 MMHG | HEART RATE: 88 BPM | WEIGHT: 217 LBS | HEIGHT: 65 IN | TEMPERATURE: 97.8 F | SYSTOLIC BLOOD PRESSURE: 137 MMHG | BODY MASS INDEX: 36.15 KG/M2

## 2021-03-08 DIAGNOSIS — S83.92XA SPRAIN OF LEFT KNEE, UNSPECIFIED LIGAMENT, INITIAL ENCOUNTER: ICD-10-CM

## 2021-03-08 DIAGNOSIS — S83.242A OTHER TEAR OF MEDIAL MENISCUS OF LEFT KNEE AS CURRENT INJURY, INITIAL ENCOUNTER: Primary | ICD-10-CM

## 2021-03-08 PROCEDURE — 20610 DRAIN/INJ JOINT/BURSA W/O US: CPT | Performed by: SPECIALIST/TECHNOLOGIST

## 2021-03-08 PROCEDURE — 99244 OFF/OP CNSLTJ NEW/EST MOD 40: CPT | Performed by: SPECIALIST/TECHNOLOGIST

## 2021-03-08 RX ORDER — METHYLPREDNISOLONE ACETATE 40 MG/ML
2 INJECTION, SUSPENSION INTRA-ARTICULAR; INTRALESIONAL; INTRAMUSCULAR; SOFT TISSUE
Status: COMPLETED | OUTPATIENT
Start: 2021-03-08 | End: 2021-03-08

## 2021-03-08 RX ORDER — DICLOFENAC SODIUM 75 MG/1
75 TABLET, DELAYED RELEASE ORAL 2 TIMES DAILY
Qty: 60 TABLET | Refills: 1 | Status: SHIPPED | OUTPATIENT
Start: 2021-03-08 | End: 2021-06-23

## 2021-03-08 RX ORDER — LIDOCAINE HYDROCHLORIDE 10 MG/ML
3 INJECTION, SOLUTION INFILTRATION; PERINEURAL
Status: COMPLETED | OUTPATIENT
Start: 2021-03-08 | End: 2021-03-08

## 2021-03-08 RX ADMIN — LIDOCAINE HYDROCHLORIDE 3 ML: 10 INJECTION, SOLUTION INFILTRATION; PERINEURAL at 18:09

## 2021-03-08 RX ADMIN — METHYLPREDNISOLONE ACETATE 2 ML: 40 INJECTION, SUSPENSION INTRA-ARTICULAR; INTRALESIONAL; INTRAMUSCULAR; SOFT TISSUE at 18:09

## 2021-03-08 NOTE — PROGRESS NOTES
Assessment/Plan     1  Other tear of medial meniscus of left knee as current injury, initial encounter    2  Sprain of left knee, unspecified ligament, initial encounter      Orders Placed This Encounter   Procedures    Large joint arthrocentesis: L knee    Ambulatory referral to Physical Therapy     -CSI to left knee joint, patient tolerated injection well  -compression and ice for swelling and pain control  -diclofenac was ordered today  Patient was advised to discontinue over-the-counter NSAIDs while taking diclofenac  - may also take tylenol 1000mg up to three times a day should not exceed 3000mg of tylenol a day  - physical therapy was ordered at today's appointment for medial meniscus tear  Focus on hip and thigh strengthening  1-3   Times a week for 6 weeks  - a brace was offered to the patient today's appointment however she would like to hold off on the brace today  Return in about 1 week (around 3/15/2021) for Recheck in 6 weeks also  I answered all of the patient's questions during the visit and provided education of the patient's condition during the visit  The patient verbalized understanding of the information given and agrees with the plan  This note was dictated using [employer] software  It may contain errors including improperly dictated words  Please contact physician directly for any questions  History of Present Illness   Chief complaint:   Chief Complaint   Patient presents with    Left Knee - Pain       HPI: Itzel Hackett is a 63 y o  female that c/o left knee pain  She was referred by Madhu Israel PA-C  She has been experiencing left knee pain for approximately 7 months with insidious onset  She states that she was having increased pain when she was shoveling during the cyst arms  She indicates she has a constant mild left knee pain which is increased with activities  She indicates her pain is on the posterior medial aspect of her left knee    Stairs do increase her left knee pain and sitting does provide her with some pain relief  She is provided with temporary relief with ice  She does occasionally take 1 Tylenol to help relieve her pain  She denies any left knee instability  She denies any previous left knee injuries or surgeries  She denies any further injury or trauma to her left knee  She denies any distal paresthesias  ROS:    See HPI for musculoskeletal review  All other systems reviewed are negative     Historical Information   Past Medical History:   Diagnosis Date    Anxiety     Asthma     Diabetes ([address] )     prediabetic    GERD (gastroesophageal reflux disease)     Hyperlipemia     Hypothyroidism     Kidney stones     Major depressive disorder     Migraine     Sleep apnea      Past Surgical History:   Procedure Laterality Date    ADENOIDECTOMY      DILATION AND CURETTAGE OF UTERUS      MD COLONOSCOPY FLX DX W/COLLJ SPEC WHEN PFRMD N/A 3/15/2019    Procedure: COLONOSCOPY;  Surgeon: Agnes Valencia MD;  Location: [facility];   Service: Gastroenterology    SHOULDER SURGERY      TONSILLECTOMY      TUBAL LIGATION       Social History   Social History     Substance and Sexual Activity   Alcohol Use No     Social History     Substance and Sexual Activity   Drug Use No     Social History     Tobacco Use   Smoking Status Never Smoker   Smokeless Tobacco Never Used     Family History:   Family History   Problem Relation Age of Onset   Yennifer Martino ALS Mother     Venous thrombosis Father     Diabetes Father     Other Family         cerebral embolism    Diabetes Family     Hypertension Family     Kidney disease Family     Breast cancer Neg Hx     Colon cancer Neg Hx     Ovarian cancer Neg Hx     Uterine cancer Neg Hx        Current Outpatient Medications on File Prior to Visit   Medication Sig Dispense Refill    albuterol (2 5 mg/3 mL) 0 083 % nebulizer solution Take 1 vial (2 5 mg total) by nebulization every 4 (four) hours as needed for wheezing 20 vial 1    albuterol (Proventil HFA) 90 mcg/act inhaler Inhale 1-2 puffs every 4 (four) hours as needed for wheezing 1 Inhaler 2    benztropine (COGENTIN) 0 5 mg tablet Take 0 5 mg by mouth daily       Blood Glucose Monitoring Suppl (ONE TOUCH ULTRA 2) w/Device KIT by Does not apply route daily 1 each 0    diclofenac sodium (VOLTAREN) 50 mg EC tablet Take 1 tablet (50 mg total) by mouth 2 (two) times a day 20 tablet 0    gabapentin (NEURONTIN) 100 mg capsule Take 1 capsule (100 mg total) by mouth daily at bedtime 90 capsule 1    Lancets (ONETOUCH ULTRASOFT) lancets Use as instructed daily 100 each 1    levothyroxine 88 mcg tablet Take 1 tablet (88 mcg total) by mouth daily in the early morning 90 tablet 1    metFORMIN (GLUCOPHAGE) 500 mg tablet TAKE 2 TABLETS BY MOUTH TWICE A DAY WITH FOOD 360 tablet 1    mometasone (ASMANEX TWISTHALER) 220 MCG/INH inhaler Inhale 1 puff every evening Rinse mouth after use  (Patient not taking: Reported on 12/2/2020) 1 Inhaler 3    omeprazole (PriLOSEC) 20 mg delayed release capsule Take 1 capsule (20 mg total) by mouth daily before breakfast 90 capsule 3    QUEtiapine (SEROquel) 200 mg tablet Take 1 tablet (200 mg total) by mouth daily at bedtime 90 tablet 1    rosuvastatin (CRESTOR) 5 mg tablet Take 1 tablet (5 mg total) by mouth daily 30 tablet 5    sertraline (ZOLOFT) 100 mg tablet Take 2 tablets (200 mg total) by mouth daily at bedtime 180 tablet 1    traZODone (DESYREL) 50 mg tablet Take 1 tablet (50 mg total) by mouth daily at bedtime 90 tablet 1    venlafaxine (EFFEXOR-XR) 37 5 mg 24 hr capsule TAKE 1 CAPSULE BY MOUTH EVERY DAY (Patient taking differently: Take 150 mg by mouth daily at bedtime ) 90 capsule 0     No current facility-administered medications on file prior to visit        Allergies   Allergen Reactions    Darvon [Propoxyphene]     Fluoxetine     Meclizine     Morphine And Related     Oxycodone-Acetaminophen     Percolone [Oxycodone]        Current Outpatient Medications on File Prior to Visit   Medication Sig Dispense Refill    albuterol (2 5 mg/3 mL) 0 083 % nebulizer solution Take 1 vial (2 5 mg total) by nebulization every 4 (four) hours as needed for wheezing 20 vial 1    albuterol (Proventil HFA) 90 mcg/act inhaler Inhale 1-2 puffs every 4 (four) hours as needed for wheezing 1 Inhaler 2    benztropine (COGENTIN) 0 5 mg tablet Take 0 5 mg by mouth daily       Blood Glucose Monitoring Suppl (ONE TOUCH ULTRA 2) w/Device KIT by Does not apply route daily 1 each 0    diclofenac sodium (VOLTAREN) 50 mg EC tablet Take 1 tablet (50 mg total) by mouth 2 (two) times a day 20 tablet 0    gabapentin (NEURONTIN) 100 mg capsule Take 1 capsule (100 mg total) by mouth daily at bedtime 90 capsule 1    Lancets (ONETOUCH ULTRASOFT) lancets Use as instructed daily 100 each 1    levothyroxine 88 mcg tablet Take 1 tablet (88 mcg total) by mouth daily in the early morning 90 tablet 1    metFORMIN (GLUCOPHAGE) 500 mg tablet TAKE 2 TABLETS BY MOUTH TWICE A DAY WITH FOOD 360 tablet 1    mometasone (ASMANEX TWISTHALER) 220 MCG/INH inhaler Inhale 1 puff every evening Rinse mouth after use   (Patient not taking: Reported on 12/2/2020) 1 Inhaler 3    omeprazole (PriLOSEC) 20 mg delayed release capsule Take 1 capsule (20 mg total) by mouth daily before breakfast 90 capsule 3    QUEtiapine (SEROquel) 200 mg tablet Take 1 tablet (200 mg total) by mouth daily at bedtime 90 tablet 1    rosuvastatin (CRESTOR) 5 mg tablet Take 1 tablet (5 mg total) by mouth daily 30 tablet 5    sertraline (ZOLOFT) 100 mg tablet Take 2 tablets (200 mg total) by mouth daily at bedtime 180 tablet 1    traZODone (DESYREL) 50 mg tablet Take 1 tablet (50 mg total) by mouth daily at bedtime 90 tablet 1    venlafaxine (EFFEXOR-XR) 37 5 mg 24 hr capsule TAKE 1 CAPSULE BY MOUTH EVERY DAY (Patient taking differently: Take 150 mg by mouth daily at bedtime ) 90 capsule 0     No current facility-administered medications on file prior to visit  Objective   Vitals: Blood pressure 137/80, pulse 88, temperature 97 8 °F (36 6 °C), height 5' 5" (1 651 m), weight 98 4 kg (217 lb), not currently breastfeeding  ,Body mass index is 36 11 kg/m²  PE:  AAOx 3  WDWN  Hearing intact, no drainage from eyes  Regular rate  no audible wheezing  no abdominal distension  LE compartments soft, skin intact    leftknee:    Appearance:  no swelling   No ecchymosis  no obvious joint deformity   No effusion  Palpation/Tenderness:  +TTP over medial joint line   No TTP over lateral joint line   No TTP over patella  No TTP over patellar tendon  + TTP over pes anserine bursa  Active Range of Motion:  AROM: 0-130  Special Tests:  Medial Millicent's Test:  Positive  Lateral Millicent's Test:  Negative  Apley's compression test:  Negative  Lachman's Test:  negative  Anterior Drawer Test:  Negative  Patellar grind:  Negative  Valgus Stress Test:  negative  Varus Stress Test:  negative     No ipsilateral hip pain with ROM    leftLE:      LLE:  EHL/AT/GS/quads/hamstrings/iliopsoas 5/5, sensation grossly intact L4, L5, S1, palpable pedal pulse    Imaging Studies: I have personally reviewed pertinent reports  leftknee:  Mild left knee osteoarthritis and a small posterior horn medial meniscus tear    Large joint arthrocentesis: L knee  Universal Protocol:  Consent: Verbal consent obtained  Risks and benefits: risks, benefits and alternatives were discussed  Consent given by: patient  Time out: Immediately prior to procedure a "time out" was called to verify the correct patient, procedure, equipment, support staff and site/side marked as required    Timeout called at: 3/8/2021 6:05 PM   Site marked: the operative site was marked  Supporting Documentation  Indications: pain and diagnostic evaluation   Procedure Details  Location: knee - L knee  Preparation: Patient was prepped and draped in the usual sterile fashion  Needle size: 22 G  Ultrasound guidance: no  Approach: lateral  Medications administered: 3 mL lidocaine 1 %; 2 mL methylPREDNISolone acetate 40 mg/mL    Patient tolerance: patient tolerated the procedure well with no immediate complications  Dressing:  Sterile dressing applied

## 2021-03-08 NOTE — LETTER
March 8, 2021     RAL[facility] [address]    Patient: Idalia Castro   YOB: 1960   Date of Visit: 3/8/2021       Dear Dr Alejandra Liu: Thank you for referring Idalia Castro to me for evaluation  Below are my notes for this consultation  If you have questions, please do not hesitate to call me  I look forward to following your patient along with you  Sincerely,        Laney Mercer DO        CC: No Recipients  Laney Mercer DO  3/8/2021 10:53 PM  Sign when Signing Visit   Assessment/Plan     1  Other tear of medial meniscus of left knee as current injury, initial encounter    2  Sprain of left knee, unspecified ligament, initial encounter      Orders Placed This Encounter   Procedures    Large joint arthrocentesis: L knee    Ambulatory referral to Physical Therapy     -CSI to left knee joint, patient tolerated injection well  -compression and ice for swelling and pain control  -diclofenac was ordered today  Patient was advised to discontinue over-the-counter NSAIDs while taking diclofenac  - may also take tylenol 1000mg up to three times a day should not exceed 3000mg of tylenol a day  - physical therapy was ordered at today's appointment for medial meniscus tear  Focus on hip and thigh strengthening  1-3   Times a week for 6 weeks  - a brace was offered to the patient today's appointment however she would like to hold off on the brace today  Return in about 1 week (around 3/15/2021) for Recheck in 6 weeks also  I answered all of the patient's questions during the visit and provided education of the patient's condition during the visit  The patient verbalized understanding of the information given and agrees with the plan  This note was dictated using TrustGo software  It may contain errors including improperly dictated words  Please contact physician directly for any questions      History of Present Illness   Chief complaint: Chief Complaint   Patient presents with    Left Knee - Pain       HPI: Christa Jacome is a 63 y o  female that c/o left knee pain  She was referred by Andry Miguel PA-C  She has been experiencing left knee pain for approximately 7 months with insidious onset  She states that she was having increased pain when she was shoveling during the cyst arms  She indicates she has a constant mild left knee pain which is increased with activities  She indicates her pain is on the posterior medial aspect of her left knee  Stairs do increase her left knee pain and sitting does provide her with some pain relief  She is provided with temporary relief with ice  She does occasionally take 1 Tylenol to help relieve her pain  She denies any left knee instability  She denies any previous left knee injuries or surgeries  She denies any further injury or trauma to her left knee  She denies any distal paresthesias  ROS:    See HPI for musculoskeletal review  All other systems reviewed are negative     Historical Information   Past Medical History:   Diagnosis Date    Anxiety     Asthma     Diabetes ([address] )     prediabetic    GERD (gastroesophageal reflux disease)     Hyperlipemia     Hypothyroidism     Kidney stones     Major depressive disorder     Migraine     Sleep apnea      Past Surgical History:   Procedure Laterality Date    ADENOIDECTOMY      DILATION AND CURETTAGE OF UTERUS      UT COLONOSCOPY FLX DX W/COLLJ SPEC WHEN PFRMD N/A 3/15/2019    Procedure: COLONOSCOPY;  Surgeon: Kaela Bella MD;  Location: [facility];   Service: Gastroenterology    SHOULDER SURGERY      TONSILLECTOMY      TUBAL LIGATION       Social History   Social History     Substance and Sexual Activity   Alcohol Use No     Social History     Substance and Sexual Activity   Drug Use No     Social History     Tobacco Use   Smoking Status Never Smoker   Smokeless Tobacco Never Used     Family History:   Family History Problem Relation Age of Onset    ALS Mother     Venous thrombosis Father     Diabetes Father     Other Family         cerebral embolism    Diabetes Family     Hypertension Family     Kidney disease Family     Breast cancer Neg Hx     Colon cancer Neg Hx     Ovarian cancer Neg Hx     Uterine cancer Neg Hx        Current Outpatient Medications on File Prior to Visit   Medication Sig Dispense Refill    albuterol (2 5 mg/3 mL) 0 083 % nebulizer solution Take 1 vial (2 5 mg total) by nebulization every 4 (four) hours as needed for wheezing 20 vial 1    albuterol (Proventil HFA) 90 mcg/act inhaler Inhale 1-2 puffs every 4 (four) hours as needed for wheezing 1 Inhaler 2    benztropine (COGENTIN) 0 5 mg tablet Take 0 5 mg by mouth daily       Blood Glucose Monitoring Suppl (ONE TOUCH ULTRA 2) w/Device KIT by Does not apply route daily 1 each 0    diclofenac sodium (VOLTAREN) 50 mg EC tablet Take 1 tablet (50 mg total) by mouth 2 (two) times a day 20 tablet 0    gabapentin (NEURONTIN) 100 mg capsule Take 1 capsule (100 mg total) by mouth daily at bedtime 90 capsule 1    Lancets (ONETOUCH ULTRASOFT) lancets Use as instructed daily 100 each 1    levothyroxine 88 mcg tablet Take 1 tablet (88 mcg total) by mouth daily in the early morning 90 tablet 1    metFORMIN (GLUCOPHAGE) 500 mg tablet TAKE 2 TABLETS BY MOUTH TWICE A DAY WITH FOOD 360 tablet 1    mometasone (ASMANEX TWISTHALER) 220 MCG/INH inhaler Inhale 1 puff every evening Rinse mouth after use   (Patient not taking: Reported on 12/2/2020) 1 Inhaler 3    omeprazole (PriLOSEC) 20 mg delayed release capsule Take 1 capsule (20 mg total) by mouth daily before breakfast 90 capsule 3    QUEtiapine (SEROquel) 200 mg tablet Take 1 tablet (200 mg total) by mouth daily at bedtime 90 tablet 1    rosuvastatin (CRESTOR) 5 mg tablet Take 1 tablet (5 mg total) by mouth daily 30 tablet 5    sertraline (ZOLOFT) 100 mg tablet Take 2 tablets (200 mg total) by mouth daily at bedtime 180 tablet 1    traZODone (DESYREL) 50 mg tablet Take 1 tablet (50 mg total) by mouth daily at bedtime 90 tablet 1    venlafaxine (EFFEXOR-XR) 37 5 mg 24 hr capsule TAKE 1 CAPSULE BY MOUTH EVERY DAY (Patient taking differently: Take 150 mg by mouth daily at bedtime ) 90 capsule 0     No current facility-administered medications on file prior to visit  Allergies   Allergen Reactions    Darvon [Propoxyphene]     Fluoxetine     Meclizine     Morphine And Related     Oxycodone-Acetaminophen     Percolone [Oxycodone]        Current Outpatient Medications on File Prior to Visit   Medication Sig Dispense Refill    albuterol (2 5 mg/3 mL) 0 083 % nebulizer solution Take 1 vial (2 5 mg total) by nebulization every 4 (four) hours as needed for wheezing 20 vial 1    albuterol (Proventil HFA) 90 mcg/act inhaler Inhale 1-2 puffs every 4 (four) hours as needed for wheezing 1 Inhaler 2    benztropine (COGENTIN) 0 5 mg tablet Take 0 5 mg by mouth daily       Blood Glucose Monitoring Suppl (ONE TOUCH ULTRA 2) w/Device KIT by Does not apply route daily 1 each 0    diclofenac sodium (VOLTAREN) 50 mg EC tablet Take 1 tablet (50 mg total) by mouth 2 (two) times a day 20 tablet 0    gabapentin (NEURONTIN) 100 mg capsule Take 1 capsule (100 mg total) by mouth daily at bedtime 90 capsule 1    Lancets (ONETOUCH ULTRASOFT) lancets Use as instructed daily 100 each 1    levothyroxine 88 mcg tablet Take 1 tablet (88 mcg total) by mouth daily in the early morning 90 tablet 1    metFORMIN (GLUCOPHAGE) 500 mg tablet TAKE 2 TABLETS BY MOUTH TWICE A DAY WITH FOOD 360 tablet 1    mometasone (ASMANEX TWISTHALER) 220 MCG/INH inhaler Inhale 1 puff every evening Rinse mouth after use   (Patient not taking: Reported on 12/2/2020) 1 Inhaler 3    omeprazole (PriLOSEC) 20 mg delayed release capsule Take 1 capsule (20 mg total) by mouth daily before breakfast 90 capsule 3    QUEtiapine (SEROquel) 200 mg tablet Take 1 tablet (200 mg total) by mouth daily at bedtime 90 tablet 1    rosuvastatin (CRESTOR) 5 mg tablet Take 1 tablet (5 mg total) by mouth daily 30 tablet 5    sertraline (ZOLOFT) 100 mg tablet Take 2 tablets (200 mg total) by mouth daily at bedtime 180 tablet 1    traZODone (DESYREL) 50 mg tablet Take 1 tablet (50 mg total) by mouth daily at bedtime 90 tablet 1    venlafaxine (EFFEXOR-XR) 37 5 mg 24 hr capsule TAKE 1 CAPSULE BY MOUTH EVERY DAY (Patient taking differently: Take 150 mg by mouth daily at bedtime ) 90 capsule 0     No current facility-administered medications on file prior to visit  Objective   Vitals: Blood pressure 137/80, pulse 88, temperature 97 8 °F (36 6 °C), height 5' 5" (1 651 m), weight 98 4 kg (217 lb), not currently breastfeeding  ,Body mass index is 36 11 kg/m²  PE:  AAOx 3  WDWN  Hearing intact, no drainage from eyes  Regular rate  no audible wheezing  no abdominal distension  LE compartments soft, skin intact    leftknee:    Appearance:  no swelling   No ecchymosis  no obvious joint deformity   No effusion  Palpation/Tenderness:  +TTP over medial joint line   No TTP over lateral joint line   No TTP over patella  No TTP over patellar tendon  + TTP over pes anserine bursa  Active Range of Motion:  AROM: 0-130  Special Tests:  Medial Millicent's Test:  Positive  Lateral Millicent's Test:  Negative  Apley's compression test:  Negative  Lachman's Test:  negative  Anterior Drawer Test:  Negative  Patellar grind:  Negative  Valgus Stress Test:  negative  Varus Stress Test:  negative     No ipsilateral hip pain with ROM    leftLE:      LLE:  EHL/AT/GS/quads/hamstrings/iliopsoas 5/5, sensation grossly intact L4, L5, S1, palpable pedal pulse    Imaging Studies: I have personally reviewed pertinent reports      leftknee:  Mild left knee osteoarthritis and a small posterior horn medial meniscus tear    Large joint arthrocentesis: L knee  Universal Protocol:  Consent: Verbal consent obtained  Risks and benefits: risks, benefits and alternatives were discussed  Consent given by: patient  Time out: Immediately prior to procedure a "time out" was called to verify the correct patient, procedure, equipment, support staff and site/side marked as required    Timeout called at: 3/8/2021 6:05 PM   Site marked: the operative site was marked  Supporting Documentation  Indications: pain and diagnostic evaluation   Procedure Details  Location: knee - L knee  Preparation: Patient was prepped and draped in the usual sterile fashion  Needle size: 22 G  Ultrasound guidance: no  Approach: lateral  Medications administered: 3 mL lidocaine 1 %; 2 mL methylPREDNISolone acetate 40 mg/mL    Patient tolerance: patient tolerated the procedure well with no immediate complications  Dressing:  Sterile dressing applied

## 2021-03-12 ENCOUNTER — TELEPHONE (OUTPATIENT)
Dept: SLEEP CENTER | Facility: CLINIC | Age: 61
End: 2021-03-12

## 2021-03-15 ENCOUNTER — OFFICE VISIT (OUTPATIENT)
Dept: FAMILY MEDICINE CLINIC | Facility: CLINIC | Age: 61
End: 2021-03-15
Payer: COMMERCIAL

## 2021-03-15 VITALS
DIASTOLIC BLOOD PRESSURE: 80 MMHG | HEIGHT: 65 IN | TEMPERATURE: 97.2 F | WEIGHT: 214 LBS | SYSTOLIC BLOOD PRESSURE: 148 MMHG | BODY MASS INDEX: 35.65 KG/M2

## 2021-03-15 DIAGNOSIS — E11.8 TYPE 2 DIABETES MELLITUS WITH COMPLICATION (HCC): ICD-10-CM

## 2021-03-15 DIAGNOSIS — K21.00 GASTROESOPHAGEAL REFLUX DISEASE WITH ESOPHAGITIS WITHOUT HEMORRHAGE: ICD-10-CM

## 2021-03-15 DIAGNOSIS — E03.9 HYPOTHYROIDISM, UNSPECIFIED TYPE: ICD-10-CM

## 2021-03-15 DIAGNOSIS — K21.00 GASTROESOPHAGEAL REFLUX DISEASE WITH ESOPHAGITIS: ICD-10-CM

## 2021-03-15 DIAGNOSIS — S83.282D ACUTE LATERAL MENISCUS TEAR OF LEFT KNEE, SUBSEQUENT ENCOUNTER: ICD-10-CM

## 2021-03-15 DIAGNOSIS — E05.90 HYPERTHYROIDISM: ICD-10-CM

## 2021-03-15 DIAGNOSIS — E11.9 TYPE 2 DIABETES MELLITUS WITHOUT COMPLICATION, WITHOUT LONG-TERM CURRENT USE OF INSULIN (HCC): Primary | ICD-10-CM

## 2021-03-15 PROBLEM — S83.282A ACUTE LATERAL MENISCUS TEAR OF LEFT KNEE: Status: ACTIVE | Noted: 2021-03-15

## 2021-03-15 LAB — SL AMB POCT HEMOGLOBIN AIC: 9 (ref ?–6.5)

## 2021-03-15 PROCEDURE — 83036 HEMOGLOBIN GLYCOSYLATED A1C: CPT | Performed by: FAMILY MEDICINE

## 2021-03-15 PROCEDURE — 99214 OFFICE O/P EST MOD 30 MIN: CPT | Performed by: FAMILY MEDICINE

## 2021-03-15 PROCEDURE — 3052F HG A1C>EQUAL 8.0%<EQUAL 9.0%: CPT | Performed by: ORTHOPAEDIC SURGERY

## 2021-03-15 RX ORDER — OMEPRAZOLE 20 MG/1
20 CAPSULE, DELAYED RELEASE ORAL
Qty: 90 CAPSULE | Refills: 1 | Status: CANCELLED | OUTPATIENT
Start: 2021-03-15

## 2021-03-15 RX ORDER — LEVOTHYROXINE SODIUM 88 UG/1
88 TABLET ORAL
Qty: 90 TABLET | Refills: 1 | Status: CANCELLED | OUTPATIENT
Start: 2021-03-15

## 2021-03-15 RX ORDER — OMEPRAZOLE 20 MG/1
20 CAPSULE, DELAYED RELEASE ORAL
Qty: 90 CAPSULE | Refills: 1 | Status: SHIPPED | OUTPATIENT
Start: 2021-03-15 | End: 2021-06-21 | Stop reason: SDUPTHER

## 2021-03-15 RX ORDER — LEVOTHYROXINE SODIUM 88 UG/1
88 TABLET ORAL
Qty: 90 TABLET | Refills: 1 | Status: SHIPPED | OUTPATIENT
Start: 2021-03-15 | End: 2021-06-21 | Stop reason: SDUPTHER

## 2021-03-15 RX ORDER — DULAGLUTIDE 0.75 MG/.5ML
0.75 INJECTION, SOLUTION SUBCUTANEOUS WEEKLY
Qty: 4 PEN | Refills: 3 | Status: SHIPPED | OUTPATIENT
Start: 2021-03-15 | End: 2021-07-09 | Stop reason: SDUPTHER

## 2021-03-15 NOTE — PROGRESS NOTES
Assessment/Plan: A1c is  9 0  Patient will try take metformin twice daily as directed  Patient will start Trulicity as directed  Patient continue with current regimen for hypothyroidism, GERD  Refills given at this time  Labs reviewed  Blood pressure will be rechecked at follow-up  Patient with worsening of symptoms regarding depression anxiety due to recent news of not getting SSI  Follow-up in 3 months     Diagnoses and all orders for this visit:    Type 2 diabetes mellitus without complication, without long-term current use of insulin (HCC)  -     POCT hemoglobin A1c  -     Dulaglutide (Trulicity) 9 85 GA/6 8OX SOPN; Inject 0 5 mL (0 75 mg total) under the skin once a week    Hyperthyroidism  -     levothyroxine 88 mcg tablet; Take 1 tablet (88 mcg total) by mouth daily in the early morning    Gastroesophageal reflux disease with esophagitis  -     omeprazole (PriLOSEC) 20 mg delayed release capsule; Take 1 capsule (20 mg total) by mouth daily before breakfast    Hypothyroidism, unspecified type    Gastroesophageal reflux disease with esophagitis without hemorrhage    Acute lateral meniscus tear of left knee, subsequent encounter    Type 2 diabetes mellitus with complication (HCC)  -     metFORMIN (GLUCOPHAGE) 500 mg tablet; Take 2 tablets (1,000 mg total) by mouth 2 (two) times a day with meals    Other orders  -     Cancel: levothyroxine 88 mcg tablet; Take 1 tablet (88 mcg total) by mouth daily in the early morning  -     Cancel: omeprazole (PriLOSEC) 20 mg delayed release capsule; Take 1 capsule (20 mg total) by mouth daily before breakfast            Subjective:        Patient ID: Chang Rivera is a 63 y o  female  Patient is here to follow-up on his diabetes hypothyroidism GERD and left knee swelling  Patient still some swelling medially but no pain  patient did have x-ray as well as MRI left knee done  These were reviewed with the patient  Patient with canker sore  No fever  A1c is 9 0   Patient taking metformin daily most of the time  Patient with increased anxiety and stress due to not getting social sick  SSI  The following portions of the patient's history were reviewed and updated as appropriate: allergies, current medications, past family history, past medical history, past social history, past surgical history and problem list       Review of Systems   Constitutional: Negative  HENT: Negative  Eyes: Negative  Respiratory: Negative  Cardiovascular: Negative  Gastrointestinal: Negative  Endocrine: Negative  Genitourinary: Negative  Musculoskeletal: Negative  Skin: Negative  Allergic/Immunologic: Negative  Neurological: Negative  Hematological: Negative  Psychiatric/Behavioral: Positive for dysphoric mood  Negative for suicidal ideas  The patient is nervous/anxious  Objective:      BMI Counseling: Body mass index is 35 61 kg/m²  The BMI is above normal  Nutrition recommendations include decreasing portion sizes  Exercise recommendations include moderate physical activity 150 minutes/week  /80 (BP Location: Right arm, Patient Position: Sitting, Cuff Size: Adult)   Temp (!) 97 2 °F (36 2 °C) (Tympanic)   Ht 5' 5" (1 651 m)   Wt 97 1 kg (214 lb)   LMP  (LMP Unknown)   BMI 35 61 kg/m²          Physical Exam  Vitals signs and nursing note reviewed  Constitutional:       General: She is not in acute distress  Appearance: Normal appearance  She is not ill-appearing, toxic-appearing or diaphoretic  HENT:      Head: Normocephalic and atraumatic  Right Ear: Tympanic membrane, ear canal and external ear normal  There is no impacted cerumen  Left Ear: Tympanic membrane, ear canal and external ear normal  There is no impacted cerumen  Nose: Nose normal  No congestion or rhinorrhea        Mouth/Throat:      Mouth: Mucous membranes are moist       Pharynx: No oropharyngeal exudate or posterior oropharyngeal erythema  Eyes:      General: No scleral icterus  Right eye: No discharge  Left eye: No discharge  Extraocular Movements: Extraocular movements intact  Conjunctiva/sclera: Conjunctivae normal       Pupils: Pupils are equal, round, and reactive to light  Neck:      Musculoskeletal: Normal range of motion and neck supple  No neck rigidity or muscular tenderness  Vascular: No carotid bruit  Cardiovascular:      Rate and Rhythm: Normal rate and regular rhythm  Pulses: Normal pulses  Heart sounds: Normal heart sounds  No murmur  No friction rub  No gallop  Pulmonary:      Effort: Pulmonary effort is normal  No respiratory distress  Breath sounds: Normal breath sounds  No stridor  No wheezing, rhonchi or rales  Chest:      Chest wall: No tenderness  Abdominal:      General: Abdomen is flat  Bowel sounds are normal  There is no distension  Palpations: Abdomen is soft  Tenderness: There is no abdominal tenderness  There is no guarding or rebound  Musculoskeletal: Normal range of motion  General: No swelling, tenderness, deformity or signs of injury  Right lower leg: No edema  Left lower leg: No edema  Lymphadenopathy:      Cervical: No cervical adenopathy  Skin:     General: Skin is warm and dry  Capillary Refill: Capillary refill takes less than 2 seconds  Coloration: Skin is not jaundiced  Findings: No bruising, erythema, lesion or rash  Neurological:      General: No focal deficit present  Mental Status: She is alert and oriented to person, place, and time  Cranial Nerves: No cranial nerve deficit  Sensory: No sensory deficit  Motor: No weakness  Coordination: Coordination normal       Gait: Gait normal    Psychiatric:         Behavior: Behavior normal          Thought Content: Thought content normal          Judgment: Judgment normal       Comments:  The depressed

## 2021-03-23 ENCOUNTER — EVALUATION (OUTPATIENT)
Dept: PHYSICAL THERAPY | Facility: MEDICAL CENTER | Age: 61
End: 2021-03-23
Payer: COMMERCIAL

## 2021-03-23 DIAGNOSIS — M25.562 CHRONIC PAIN OF LEFT KNEE: Primary | ICD-10-CM

## 2021-03-23 DIAGNOSIS — G89.29 CHRONIC PAIN OF LEFT KNEE: Primary | ICD-10-CM

## 2021-03-23 PROCEDURE — 97161 PT EVAL LOW COMPLEX 20 MIN: CPT | Performed by: PHYSICAL MEDICINE & REHABILITATION

## 2021-03-23 PROCEDURE — 97112 NEUROMUSCULAR REEDUCATION: CPT | Performed by: PHYSICAL MEDICINE & REHABILITATION

## 2021-03-23 NOTE — PROGRESS NOTES
PT Evaluation     Today's date: 3/23/2021  Patient name: Pina Adamson  : 1960  MRN: [id number]  Referring provider: Acacia Varner DO  Dx:   Encounter Diagnosis     ICD-10-CM    1  Chronic pain of left knee  M25 562     G89 [id number]                   Assessment  Assessment details: Patient is a 63 y o  female who reports to outpatient physical therapy with chronic left knee pain  No further referral appears necessary at this time based upon examination results  Probable movement impairment diagnosis of decreased muscular coordination resulting in pathoanatomical symptoms of pain, decreased motion and decreased strength and limiting ability to work, ambulate, sit and walk  Skilled physical therapy is warranted for the application of the interventions found below in the planned intervention section  Prognosis is good given HEP compliance and attendance to physical therapy 2x for 8 weeks  Please contact me if you have any questions or recommendations  Thank you for the referral and the opportunity to share in Ana Rosa's care  Patient verbalized understanding of POC, HEP, and return demonstrated HEP  Impairments: abnormal coordination, abnormal gait, abnormal muscle firing, abnormal or restricted ROM, abnormal movement, activity intolerance, impaired balance, impaired physical strength, lacks appropriate home exercise program, pain with function and weight-bearing intolerance  Understanding of Dx/Px/POC: good   Prognosis: good    Goals  Impairment Goals  - Decrease pain by 50% in 4 weeks  - Increase left flexibility by 50% in 4 weeks  - Increase left lower extremity strength golbally to 4-/5 in 6 weeks  - Increase left hip abductor and external rotator strength strength to 3/5  6 weeks      Functional Goals  - Return to Prior Level of Function in 8 weeks  - Patient will be independent with HEP in 8 weeks    Plan  Patient would benefit from: skilled PT  Planned modality interventions: cryotherapy  Planned therapy interventions: joint mobilization, manual therapy, neuromuscular re-education, patient education, strengthening, stretching, therapeutic activities, therapeutic exercise, home exercise program, functional ROM exercises, Chavez taping and postural training  Frequency: 2-3x week  Treatment plan discussed with: patient        Subjective Evaluation    History of Present Illness  Mechanism of injury: Work/School: sitting a lot of time,   Home Life: stairs, walking is challenging,   Hobbies/exercise: walking,   Pain location: medial left knee,   HPI: Patient reports over the last 6 months she has had an increase in knee pain  She believes that her pain has been made worse following the snow and shoveling    Aggravating factors: stairs, walking,   Relieving factors: ice  PMH: DMII, asthma, hx of kidney stones, bilateral shoulder surgeries, hypothyroidism, anxiety   Pain  Current pain ratin  At best pain rating: 3  At worst pain ratin    Patient Goals  Patient goal: decrease pain to be able to walk        Objective     Static Posture     Comments  MMTs:  Hip flex (L1-L2): R: 4-/5, L: 3/5  Knee ext (L3-L4): R: 4-/5, L: 3+/5  Knee flex (S2-S3): R: 3+/5, L: 3/5  DF (L4): R: 4+/5, L: 3+/5  Standing PF (S1): R: 3/5, L: 3/5  Sidelying Hip Abd (L4-S1): R: 3-/5, L: 3-/5    ROM:  Hip flex (0-120): AROM: R: 94, L: 95:  Knee Ext (0): AROM: R: 0, L: 2:  Knee Flex (0-130): AROM: R: 115, L: 115:  SLR (0-90): AROM: R: 56, L: 52:  DF (0-15): AROM: R: 2, L: 3:                   Precautions: none      Manuals 3/23/2021                                                                Neuro Re-Ed                                                                                           HEP and Return Demo 10'            Ther Ex                                                                                                                     Ther Activity                                       Gait Training Modalities

## 2021-03-25 ENCOUNTER — APPOINTMENT (OUTPATIENT)
Dept: PHYSICAL THERAPY | Facility: MEDICAL CENTER | Age: 61
End: 2021-03-25
Payer: COMMERCIAL

## 2021-03-26 ENCOUNTER — OFFICE VISIT (OUTPATIENT)
Dept: OBGYN CLINIC | Facility: MEDICAL CENTER | Age: 61
End: 2021-03-26
Payer: COMMERCIAL

## 2021-03-26 VITALS
DIASTOLIC BLOOD PRESSURE: 70 MMHG | WEIGHT: 216 LBS | BODY MASS INDEX: 35.99 KG/M2 | SYSTOLIC BLOOD PRESSURE: 112 MMHG | HEIGHT: 65 IN | HEART RATE: 98 BPM | TEMPERATURE: 98.3 F

## 2021-03-26 DIAGNOSIS — S83.242A OTHER TEAR OF MEDIAL MENISCUS OF LEFT KNEE AS CURRENT INJURY, INITIAL ENCOUNTER: Primary | ICD-10-CM

## 2021-03-26 DIAGNOSIS — M70.52 PES ANSERINUS BURSITIS OF LEFT KNEE: ICD-10-CM

## 2021-03-26 DIAGNOSIS — M17.12 PRIMARY OSTEOARTHRITIS OF LEFT KNEE: ICD-10-CM

## 2021-03-26 PROCEDURE — 3008F BODY MASS INDEX DOCD: CPT | Performed by: ORTHOPAEDIC SURGERY

## 2021-03-26 PROCEDURE — 99213 OFFICE O/P EST LOW 20 MIN: CPT | Performed by: ORTHOPAEDIC SURGERY

## 2021-03-26 PROCEDURE — 1036F TOBACCO NON-USER: CPT | Performed by: ORTHOPAEDIC SURGERY

## 2021-03-26 PROCEDURE — 20610 DRAIN/INJ JOINT/BURSA W/O US: CPT | Performed by: ORTHOPAEDIC SURGERY

## 2021-03-26 RX ORDER — LIDOCAINE HYDROCHLORIDE 10 MG/ML
1 INJECTION, SOLUTION INFILTRATION; PERINEURAL
Status: COMPLETED | OUTPATIENT
Start: 2021-03-26 | End: 2021-03-26

## 2021-03-26 RX ORDER — METHYLPREDNISOLONE ACETATE 40 MG/ML
1 INJECTION, SUSPENSION INTRA-ARTICULAR; INTRALESIONAL; INTRAMUSCULAR; SOFT TISSUE
Status: COMPLETED | OUTPATIENT
Start: 2021-03-26 | End: 2021-03-26

## 2021-03-26 RX ADMIN — LIDOCAINE HYDROCHLORIDE 1 ML: 10 INJECTION, SOLUTION INFILTRATION; PERINEURAL at 16:04

## 2021-03-26 RX ADMIN — METHYLPREDNISOLONE ACETATE 1 ML: 40 INJECTION, SUSPENSION INTRA-ARTICULAR; INTRALESIONAL; INTRAMUSCULAR; SOFT TISSUE at 16:04

## 2021-03-26 NOTE — PROGRESS NOTES
Assessment/Plan:  1  Other tear of medial meniscus of left knee as current injury, initial encounter    2  Primary osteoarthritis of left knee    3  Pes anserinus bursitis of left knee      Orders Placed This Encounter   Procedures    Large joint arthrocentesis: L anserine bursa     ·  Received left anserine bursa  steroid injection today  Patient knows to ice and avoid strenuous activity for 1-2 days if needed  · Continue with physical therapy  · Continue Diclofenac and Tylenol  as needed for pain   · Bracing for comfort    Return in about 24 days (around 4/19/2021) for Recheck left knee   I answered all of the patient's questions during the visit and provided education of the patient's condition during the visit  The patient verbalized understanding of the information given and agrees with the plan  This note was dictated using [employer] software  It may contain errors including improperly dictated words  Please contact physician directly for any questions  Subjective   Chief Complaint:   Chief Complaint   Patient presents with    Left Knee - Follow-up       HPI  Mal Chatman is a 63 y o  female who presents for follow up for left knee pain  She had left knee steroid injection on 3/8/21 with relief  She has started physical therapy  She is having tenderness to the touch  over left pes anserine bursa region   She does have Diclofenac 75 mg at home but has not taken it since the left knee is doing well  Review of Systems  ROS:    See HPI for musculoskeletal review     All other systems reviewed are negative     History:  Past Medical History:   Diagnosis Date    Anxiety     Asthma     Diabetes ([address] )     prediabetic    GERD (gastroesophageal reflux disease)     Hyperlipemia     Hypothyroidism     Kidney stones     Major depressive disorder     Migraine     Sleep apnea      Past Surgical History:   Procedure Laterality Date    ADENOIDECTOMY      DILATION AND CURETTAGE OF UTERUS      AK COLONOSCOPY FLX DX W/COLLJ SPEC WHEN PFRMD N/A 3/15/2019    Procedure: COLONOSCOPY;  Surgeon: Yonny Cesar MD;  Location: [facility]; Service: Gastroenterology    SHOULDER SURGERY      TONSILLECTOMY      TUBAL LIGATION       Social History   Social History     Substance and Sexual Activity   Alcohol Use No     Social History     Substance and Sexual Activity   Drug Use No     Social History     Tobacco Use   Smoking Status Never Smoker   Smokeless Tobacco Never Used     Family History:   Family History   Problem Relation Age of Onset   [place]maline Folds ALS Mother     Venous thrombosis Father     Diabetes Father     Other Family         cerebral embolism    Diabetes Family     Hypertension Family     Kidney disease Family     Breast cancer Neg Hx     Colon cancer Neg Hx     Ovarian cancer Neg Hx     Uterine cancer Neg Hx        Current Outpatient Medications on File Prior to Visit   Medication Sig Dispense Refill    albuterol (2 5 mg/3 mL) 0 083 % nebulizer solution Take 1 vial (2 5 mg total) by nebulization every 4 (four) hours as needed for wheezing 20 vial 1    albuterol (Proventil HFA) 90 mcg/act inhaler Inhale 1-2 puffs every 4 (four) hours as needed for wheezing 1 Inhaler 2    benztropine (COGENTIN) 0 5 mg tablet Take 0 5 mg by mouth daily       Blood Glucose Monitoring Suppl (ONE TOUCH ULTRA 2) w/Device KIT by Does not apply route daily 1 each 0    diclofenac (VOLTAREN) 75 mg EC tablet Take 1 tablet (75 mg total) by mouth 2 (two) times a day 75 mg b i d  p r n   60 tablet 1    diclofenac sodium (VOLTAREN) 50 mg EC tablet Take 1 tablet (50 mg total) by mouth 2 (two) times a day 20 tablet 0    Dulaglutide (Trulicity) 0 93 XP/2 4CD SOPN Inject 0 5 mL (0 75 mg total) under the skin once a week 4 pen 3    gabapentin (NEURONTIN) 100 mg capsule Take 1 capsule (100 mg total) by mouth daily at bedtime 90 capsule 1    Lancets (ONETOUCH ULTRASOFT) lancets Use as instructed daily 100 each 1    levothyroxine 88 mcg tablet Take 1 tablet (88 mcg total) by mouth daily in the early morning 90 tablet 1    metFORMIN (GLUCOPHAGE) 500 mg tablet Take 2 tablets (1,000 mg total) by mouth 2 (two) times a day with meals 360 tablet 1    mometasone (ASMANEX TWISTHALER) 220 MCG/INH inhaler Inhale 1 puff every evening Rinse mouth after use  (Patient not taking: Reported on 3/15/2021) 1 Inhaler 3    omeprazole (PriLOSEC) 20 mg delayed release capsule Take 1 capsule (20 mg total) by mouth daily before breakfast 90 capsule 1    QUEtiapine (SEROquel) 200 mg tablet Take 1 tablet (200 mg total) by mouth daily at bedtime 90 tablet 1    rosuvastatin (CRESTOR) 5 mg tablet Take 1 tablet (5 mg total) by mouth daily 30 tablet 5    sertraline (ZOLOFT) 100 mg tablet Take 2 tablets (200 mg total) by mouth daily at bedtime 180 tablet 1    traZODone (DESYREL) 50 mg tablet Take 1 tablet (50 mg total) by mouth daily at bedtime 90 tablet 1    venlafaxine (EFFEXOR-XR) 37 5 mg 24 hr capsule TAKE 1 CAPSULE BY MOUTH EVERY DAY (Patient taking differently: Take 150 mg by mouth daily at bedtime ) 90 capsule 0     No current facility-administered medications on file prior to visit  Allergies   Allergen Reactions    Darvon [Propoxyphene]     Fluoxetine     Meclizine     Morphine And Related     Oxycodone-Acetaminophen     Percolone [Oxycodone]         Objective     /70   Pulse 98   Temp 98 3 °F (36 8 °C)   Ht 5' 5" (1 651 m)   Wt 98 kg (216 lb)   LMP  (LMP Unknown)   BMI 35 94 kg/m²      PE:  AAOx 3  WDWN  Hearing intact, no drainage from eyes  no audible wheezing  no abdominal distension  LE compartments soft, skin intact     Ortho Exam:  left Knee:   No erythema  no swelling  no effusion  no warmth  AROM: 0-130   TTP over pes anserinus bursa  Stable to varus/valgus stress      Large joint arthrocentesis: L anserine bursa  Universal Protocol:  Consent: Verbal consent obtained    Consent given by: patient  Time out: Immediately prior to procedure a "time out" was called to verify the correct patient, procedure, equipment, support staff and site/side marked as required    Timeout called at: 3/26/2021 4:03 PM   Patient understanding: patient states understanding of the procedure being performed  Site marked: the operative site was marked  Supporting Documentation  Indications: pain   Procedure Details  Location: knee - L anserine bursa  Preparation: Patient was prepped and draped in the usual sterile fashion  Needle size: 22 G  Ultrasound guidance: no  Approach: anteromedial  Medications administered: 1 mL lidocaine 1 %; 1 mL methylPREDNISolone acetate 40 mg/mL    Patient tolerance: patient tolerated the procedure well with no immediate complications  Dressing:  Sterile dressing applied        Scribe Attestation    I,:  Alfornia Lesch Kantzaridis am acting as a scribe while in the presence of the attending physician :       I,:  Gage Christianson, DO personally performed the services described in this documentation    as scribed in my presence :

## 2021-03-30 ENCOUNTER — OFFICE VISIT (OUTPATIENT)
Dept: PHYSICAL THERAPY | Facility: MEDICAL CENTER | Age: 61
End: 2021-03-30
Payer: COMMERCIAL

## 2021-03-30 DIAGNOSIS — M25.562 CHRONIC PAIN OF LEFT KNEE: Primary | ICD-10-CM

## 2021-03-30 DIAGNOSIS — G89.29 CHRONIC PAIN OF LEFT KNEE: Primary | ICD-10-CM

## 2021-03-30 PROCEDURE — 97110 THERAPEUTIC EXERCISES: CPT | Performed by: PHYSICAL MEDICINE & REHABILITATION

## 2021-03-30 PROCEDURE — 97140 MANUAL THERAPY 1/> REGIONS: CPT | Performed by: PHYSICAL MEDICINE & REHABILITATION

## 2021-03-30 PROCEDURE — 97112 NEUROMUSCULAR REEDUCATION: CPT | Performed by: PHYSICAL MEDICINE & REHABILITATION

## 2021-03-30 NOTE — PROGRESS NOTES
Daily Note     Today's date: 3/30/2021  Patient name: Vilma Farooq  : 1960  MRN: [id number]  Referring provider: Sepideh Gregg DO  Dx:   Encounter Diagnosis     ICD-10-CM    1  Chronic pain of left knee  M25 562     G89 29                   Subjective: Jono Johnson reported "I wasn't able to do the exercises very often at home "      Objective: See treatment diary below      Assessment: Tolerated treatment well  Patient would benefit from continued PT  Vilma Farooq was able to initiate her therapy program which shows progress towards her goals  She needed notable cueing for proper exercise from  Plan: Continue per plan of care        Precautions: none      Manuals 3/23/2021 2021           IASRICK  JR                                                  Neuro Re-Ed             Bridges  3x10           SLR  3x10           Sidelying Hip abd  3x10                                                  HEP and Return Demo 10'            Ther Ex             Prone Quad stretch  4x30"           Calf Stretch  4x30"                                                                                         Ther Activity                                       Gait Training                                       Modalities

## 2021-03-31 DIAGNOSIS — Z23 ENCOUNTER FOR IMMUNIZATION: ICD-10-CM

## 2021-04-01 ENCOUNTER — OFFICE VISIT (OUTPATIENT)
Dept: PHYSICAL THERAPY | Facility: MEDICAL CENTER | Age: 61
End: 2021-04-01
Payer: COMMERCIAL

## 2021-04-01 DIAGNOSIS — M25.562 CHRONIC PAIN OF LEFT KNEE: Primary | ICD-10-CM

## 2021-04-01 DIAGNOSIS — G89.29 CHRONIC PAIN OF LEFT KNEE: Primary | ICD-10-CM

## 2021-04-01 PROCEDURE — 97140 MANUAL THERAPY 1/> REGIONS: CPT | Performed by: PHYSICAL MEDICINE & REHABILITATION

## 2021-04-01 PROCEDURE — 97110 THERAPEUTIC EXERCISES: CPT | Performed by: PHYSICAL MEDICINE & REHABILITATION

## 2021-04-01 PROCEDURE — 97112 NEUROMUSCULAR REEDUCATION: CPT | Performed by: PHYSICAL MEDICINE & REHABILITATION

## 2021-04-01 NOTE — PROGRESS NOTES
Daily Note     Today's date: 2021  Patient name: Rodger Davis  : 1960  MRN: [id number]  Referring provider: Lennie Hood DO  Dx:   Encounter Diagnosis     ICD-10-CM    1  Chronic pain of left knee  M25 562     G89 29                   Subjective: Kulwant Penn reported "I am not feeling too much pain right now "      Objective: See treatment diary below      Assessment: Tolerated treatment well  Patient would benefit from continued PT  Kulwant Penn was able to progress her therapy program as seen below  This shows progress towards her goals  Plan: Continue per plan of care        Precautions: none      Manuals 3/23/2021 3/30/2021 2021          IASTM  JR JR                                                 Neuro Re-Ed             Bridges  3x10 3x10          SLR  3x10 3x10          Sidelying Hip abd  3x10 3x10                                                 HEP and Return Demo 10'            Ther Ex             Prone Quad stretch  4x30" 4x30"          Calf Stretch  4x30" 4x30"          Standing Hip Ext   3x10                                                                           Ther Activity                                       Gait Training                                       Modalities

## 2021-04-06 ENCOUNTER — OFFICE VISIT (OUTPATIENT)
Dept: PHYSICAL THERAPY | Facility: MEDICAL CENTER | Age: 61
End: 2021-04-06
Payer: COMMERCIAL

## 2021-04-06 DIAGNOSIS — M25.562 CHRONIC PAIN OF LEFT KNEE: Primary | ICD-10-CM

## 2021-04-06 DIAGNOSIS — G89.29 CHRONIC PAIN OF LEFT KNEE: Primary | ICD-10-CM

## 2021-04-06 PROCEDURE — 97140 MANUAL THERAPY 1/> REGIONS: CPT | Performed by: PHYSICAL MEDICINE & REHABILITATION

## 2021-04-06 PROCEDURE — 97112 NEUROMUSCULAR REEDUCATION: CPT | Performed by: PHYSICAL MEDICINE & REHABILITATION

## 2021-04-06 PROCEDURE — 97110 THERAPEUTIC EXERCISES: CPT | Performed by: PHYSICAL MEDICINE & REHABILITATION

## 2021-04-06 NOTE — PROGRESS NOTES
Daily Note     Today's date: 2021  Patient name: Charley Willard  : 1960  MRN: [id number]  Referring provider: Richelle Whelan DO  Dx:   Encounter Diagnosis     ICD-10-CM    1  Chronic pain of left knee  M25 562     G89 29                   Subjective: Nena Fernandez reported "I am doing a little bit better "      Objective: See treatment diary below      Assessment: Tolerated treatment well  Patient would benefit from continued PT  Nena Fernandez continues to report soreness along the medial portion of her knee  She displayed an increased prone knee flexion which shows progress towards her goals  Plan: Continue per plan of care        Precautions: none      Manuals 3/23/2021 3/30/2021 2021 2021         IASRICK  JR JR JR                                                Neuro Re-Ed             Bridges  3x10 3x10 3x10         SLR  3x10 3x10 3x10         Sidelying Hip abd  3x10 3x10 3x10                                                HEP and Return Demo 10'            Ther Ex             Prone Quad stretch  4x30" 4x30" 4x30"         Calf Stretch  4x30" 4x30" 4x30"         Standing Hip Ext   3x10 3x10                                                                          Ther Activity                                       Gait Training                                       Modalities

## 2021-04-08 ENCOUNTER — APPOINTMENT (OUTPATIENT)
Dept: PHYSICAL THERAPY | Facility: MEDICAL CENTER | Age: 61
End: 2021-04-08
Payer: COMMERCIAL

## 2021-04-10 DIAGNOSIS — E78.2 MIXED HYPERLIPIDEMIA: ICD-10-CM

## 2021-04-12 RX ORDER — ROSUVASTATIN CALCIUM 5 MG/1
TABLET, COATED ORAL
Qty: 30 TABLET | Refills: 5 | Status: SHIPPED | OUTPATIENT
Start: 2021-04-12 | End: 2021-10-20 | Stop reason: SDUPTHER

## 2021-04-13 ENCOUNTER — OFFICE VISIT (OUTPATIENT)
Dept: PHYSICAL THERAPY | Facility: MEDICAL CENTER | Age: 61
End: 2021-04-13
Payer: COMMERCIAL

## 2021-04-13 DIAGNOSIS — G89.29 CHRONIC PAIN OF LEFT KNEE: Primary | ICD-10-CM

## 2021-04-13 DIAGNOSIS — M25.562 CHRONIC PAIN OF LEFT KNEE: Primary | ICD-10-CM

## 2021-04-13 PROCEDURE — 97112 NEUROMUSCULAR REEDUCATION: CPT | Performed by: PHYSICAL MEDICINE & REHABILITATION

## 2021-04-13 PROCEDURE — 97110 THERAPEUTIC EXERCISES: CPT | Performed by: PHYSICAL MEDICINE & REHABILITATION

## 2021-04-13 PROCEDURE — 97140 MANUAL THERAPY 1/> REGIONS: CPT | Performed by: PHYSICAL MEDICINE & REHABILITATION

## 2021-04-13 NOTE — PROGRESS NOTES
Daily Note     Today's date: 2021  Patient name: Ne Monsivais  : 1960  MRN: [id number]  Referring provider: Renan Omalley DO  Dx:   Encounter Diagnosis     ICD-10-CM    1  Chronic pain of left knee  M25 562     G89 29                   Subjective: Emily Manjarrez reported "I am a little sore today "      Objective: See treatment diary below      Assessment: Tolerated treatment well  Patient would benefit from continued PT  Emily Manjarrez needed some verbal cueing for proper exercise form  She was able to increase her resistance for her exercises as seen below  She continues to report a decrease in pain following IASTM  Plan: Continue per plan of care        Precautions: none      Manuals 3/23/2021 3/30/2021 2021 2021 2021        IASTM  Khadraberny Noriega JR                                               Neuro Re-Ed             Bridges  3x10 3x10 3x10 15# 3x10        SLR  3x10 3x10 3x10 1# 3x10        Sidelying Hip abd  3x10 3x10 3x10 3x10                                               HEP and Return Demo 10'            Ther Ex             Prone Quad stretch  4x30" 4x30" 4x30" 4x30"        Calf Stretch  4x30" 4x30" 4x30" 4x30"        Standing Hip Ext   3x10 3x10 3x10                                                                         Ther Activity                                       Gait Training                                       Modalities

## 2021-04-15 ENCOUNTER — OFFICE VISIT (OUTPATIENT)
Dept: PHYSICAL THERAPY | Facility: MEDICAL CENTER | Age: 61
End: 2021-04-15
Payer: COMMERCIAL

## 2021-04-15 DIAGNOSIS — G89.29 CHRONIC PAIN OF LEFT KNEE: Primary | ICD-10-CM

## 2021-04-15 DIAGNOSIS — M25.562 CHRONIC PAIN OF LEFT KNEE: Primary | ICD-10-CM

## 2021-04-15 PROCEDURE — 97110 THERAPEUTIC EXERCISES: CPT | Performed by: PHYSICAL MEDICINE & REHABILITATION

## 2021-04-15 PROCEDURE — 97140 MANUAL THERAPY 1/> REGIONS: CPT | Performed by: PHYSICAL MEDICINE & REHABILITATION

## 2021-04-15 PROCEDURE — 97112 NEUROMUSCULAR REEDUCATION: CPT | Performed by: PHYSICAL MEDICINE & REHABILITATION

## 2021-04-15 NOTE — PROGRESS NOTES
Daily Note     Today's date: 4/15/2021  Patient name: Trevon Dowell  : 1960  MRN: [id number]  Referring provider: Danii Tijerina DO  Dx:   Encounter Diagnosis     ICD-10-CM    1  Chronic pain of left knee  M25 562     G89 29                   Subjective: Kathryn Cannon reported "I am doing a little bit better today than last time "      Objective: See treatment diary below      Assessment: Tolerated treatment well  Patient would benefit from continued PT  Kathryn Cannon was able to progress her therapy program as seen below  She was able to add leg press to her therapy program which shows progress towards her goals  Plan: Continue per plan of care        Precautions: none      Manuals 3/23/2021 3/30/2021 2021 2021 2021 4/15/2021       IASTM  JR JR JR JR JR                                              Neuro Re-Ed             Bridges  3x10 3x10 3x10 15# 3x10 15# 3x10       SLR  3x10 3x10 3x10 1# 3x10 1# 3x10        Sidelying Hip abd  3x10 3x10 3x10 3x10 3x10       Leg Pres      90# 3x10                                 HEP and Return Demo 10'            Ther Ex             Prone Quad stretch  4x30" 4x30" 4x30" 4x30" 4x30"       Calf Stretch  4x30" 4x30" 4x30" 4x30" 4x30"       Standing Hip Ext   3x10 3x10 3x10 3x10                                                                        Ther Activity                                       Gait Training                                       Modalities

## 2021-04-19 ENCOUNTER — OFFICE VISIT (OUTPATIENT)
Dept: OBGYN CLINIC | Facility: MEDICAL CENTER | Age: 61
End: 2021-04-19
Payer: COMMERCIAL

## 2021-04-19 VITALS
WEIGHT: 214 LBS | HEART RATE: 96 BPM | HEIGHT: 65 IN | SYSTOLIC BLOOD PRESSURE: 114 MMHG | TEMPERATURE: 98 F | BODY MASS INDEX: 35.65 KG/M2 | DIASTOLIC BLOOD PRESSURE: 79 MMHG

## 2021-04-19 DIAGNOSIS — M70.52 PES ANSERINUS BURSITIS OF LEFT KNEE: Primary | ICD-10-CM

## 2021-04-19 PROCEDURE — 1036F TOBACCO NON-USER: CPT | Performed by: ORTHOPAEDIC SURGERY

## 2021-04-19 PROCEDURE — 3008F BODY MASS INDEX DOCD: CPT | Performed by: ORTHOPAEDIC SURGERY

## 2021-04-19 PROCEDURE — 99213 OFFICE O/P EST LOW 20 MIN: CPT | Performed by: ORTHOPAEDIC SURGERY

## 2021-04-19 NOTE — PROGRESS NOTES
Assessment/Plan:  1  Pes anserinus bursitis of left knee      No orders of the defined types were placed in this encounter     -may transition from formal physical therapy to a HEP  -compression, ice, tylenol and diclofenac as needed for pain    Return if symptoms worsen or fail to improve  I answered all of the patient's questions during the visit and provided education of the patient's condition during the visit  The patient verbalized understanding of the information given and agrees with the plan  This note was dictated using [employer] software  It may contain errors including improperly dictated words  Please contact physician directly for any questions  Subjective   Chief Complaint:   Chief Complaint   Patient presents with    Left Knee - Follow-up       HPI  Tracy Johnson is a 63 y o  female who presents for follow up for   Follow-up evaluation of left knee  At her last appointment she was provided with a cortisone injection to the left  Pes anserine bursa  She states the cortisone injection provided her with 100% relief of her symptoms  She experiences no pain at this time  She has been compliant with formal physical therapy and a daily home exercise program   She no longer needs to take diclofenac or Tylenol for pain  She denies any further injury or trauma to her left knee  She denies any distal paresthesias  Review of Systems  ROS:    See HPI for musculoskeletal review     All other systems reviewed are negative     History:  Past Medical History:   Diagnosis Date    Anxiety     Asthma     Diabetes ([address] )     prediabetic    GERD (gastroesophageal reflux disease)     Hyperlipemia     Hypothyroidism     Kidney stones     Major depressive disorder     Migraine     Sleep apnea      Past Surgical History:   Procedure Laterality Date    ADENOIDECTOMY      DILATION AND CURETTAGE OF UTERUS      AK COLONOSCOPY FLX DX W/COLLJ SPEC WHEN PFRMD N/A 3/15/2019    Procedure: COLONOSCOPY; Surgeon: Jen Fernandes MD;  Location: [facility]; Service: Gastroenterology    SHOULDER SURGERY      TONSILLECTOMY      TUBAL LIGATION       Social History   Social History     Substance and Sexual Activity   Alcohol Use No     Social History     Substance and Sexual Activity   Drug Use No     Social History     Tobacco Use   Smoking Status Never Smoker   Smokeless Tobacco Never Used     Family History:   Family History   Problem Relation Age of Onset   [place] ALS Mother     Venous thrombosis Father     Diabetes Father     Other Family         cerebral embolism    Diabetes Family     Hypertension Family     Kidney disease Family     Breast cancer Neg Hx     Colon cancer Neg Hx     Ovarian cancer Neg Hx     Uterine cancer Neg Hx        Current Outpatient Medications on File Prior to Visit   Medication Sig Dispense Refill    albuterol (2 5 mg/3 mL) 0 083 % nebulizer solution Take 1 vial (2 5 mg total) by nebulization every 4 (four) hours as needed for wheezing 20 vial 1    albuterol (Proventil HFA) 90 mcg/act inhaler Inhale 1-2 puffs every 4 (four) hours as needed for wheezing 1 Inhaler 2    benztropine (COGENTIN) 0 5 mg tablet Take 0 5 mg by mouth daily       Blood Glucose Monitoring Suppl (ONE TOUCH ULTRA 2) w/Device KIT by Does not apply route daily 1 each 0    diclofenac (VOLTAREN) 75 mg EC tablet Take 1 tablet (75 mg total) by mouth 2 (two) times a day 75 mg b i d  p r n   60 tablet 1    diclofenac sodium (VOLTAREN) 50 mg EC tablet Take 1 tablet (50 mg total) by mouth 2 (two) times a day 20 tablet 0    Dulaglutide (Trulicity) 8 11 MM/9 4MQ SOPN Inject 0 5 mL (0 75 mg total) under the skin once a week 4 pen 3    gabapentin (NEURONTIN) 100 mg capsule Take 1 capsule (100 mg total) by mouth daily at bedtime 90 capsule 1    Lancets (ONETOUCH ULTRASOFT) lancets Use as instructed daily 100 each 1    levothyroxine 88 mcg tablet Take 1 tablet (88 mcg total) by mouth daily in the early morning 90 tablet 1    metFORMIN (GLUCOPHAGE) 500 mg tablet Take 2 tablets (1,000 mg total) by mouth 2 (two) times a day with meals 360 tablet 1    mometasone (ASMANEX TWISTHALER) 220 MCG/INH inhaler Inhale 1 puff every evening Rinse mouth after use  (Patient not taking: Reported on 3/15/2021) 1 Inhaler 3    omeprazole (PriLOSEC) 20 mg delayed release capsule Take 1 capsule (20 mg total) by mouth daily before breakfast 90 capsule 1    QUEtiapine (SEROquel) 200 mg tablet Take 1 tablet (200 mg total) by mouth daily at bedtime 90 tablet 1    rosuvastatin (CRESTOR) 5 mg tablet TAKE 1 TABLET BY MOUTH EVERY DAY 30 tablet 5    sertraline (ZOLOFT) 100 mg tablet Take 2 tablets (200 mg total) by mouth daily at bedtime 180 tablet 1    traZODone (DESYREL) 50 mg tablet Take 1 tablet (50 mg total) by mouth daily at bedtime 90 tablet 1    venlafaxine (EFFEXOR-XR) 37 5 mg 24 hr capsule TAKE 1 CAPSULE BY MOUTH EVERY DAY (Patient taking differently: Take 150 mg by mouth daily at bedtime ) 90 capsule 0     No current facility-administered medications on file prior to visit        Allergies   Allergen Reactions    Darvon [Propoxyphene]     Fluoxetine     Meclizine     Morphine And Related     Oxycodone-Acetaminophen     Percolone [Oxycodone]         Objective     /79   Pulse 96   Temp 98 °F (36 7 °C)   Ht 5' 5" (1 651 m)   Wt 97 1 kg (214 lb)   LMP  (LMP Unknown)   BMI 35 61 kg/m²      PE:  AAOx 3  WDWN  Hearing intact, no drainage from eyes  no audible wheezing  no abdominal distension  LE compartments soft, skin intact    Ortho Exam:  left Knee:   No erythema  mild swelling over anteromedial aspect of proximal lower leg  No TTP over pes anserine bursa  no effusion  no warmth  AROM: 0-130  Stable to varus/valgus stress

## 2021-04-20 ENCOUNTER — APPOINTMENT (OUTPATIENT)
Dept: PHYSICAL THERAPY | Facility: MEDICAL CENTER | Age: 61
End: 2021-04-20
Payer: COMMERCIAL

## 2021-04-20 NOTE — PROGRESS NOTES
PT Discharge    Today's date: 2021  Patient name: Ella Bowers  : 1960  MRN: [id number]  Referring provider: Talia Joya DO  Dx:   Encounter Diagnosis     ICD-10-CM    1  Chronic pain of left knee  M25 562     G89 29      Richard Ash has been D/Raghavendra to her HEP at this time      Start Time: 1630  Stop Time: 1710  Total time in clinic (min): 40 minutes

## 2021-04-22 ENCOUNTER — APPOINTMENT (OUTPATIENT)
Dept: PHYSICAL THERAPY | Facility: MEDICAL CENTER | Age: 61
End: 2021-04-22
Payer: COMMERCIAL

## 2021-04-27 ENCOUNTER — APPOINTMENT (OUTPATIENT)
Dept: PHYSICAL THERAPY | Facility: MEDICAL CENTER | Age: 61
End: 2021-04-27
Payer: COMMERCIAL

## 2021-04-29 ENCOUNTER — APPOINTMENT (OUTPATIENT)
Dept: PHYSICAL THERAPY | Facility: MEDICAL CENTER | Age: 61
End: 2021-04-29
Payer: COMMERCIAL

## 2021-05-26 ENCOUNTER — APPOINTMENT (OUTPATIENT)
Dept: LAB | Facility: HOSPITAL | Age: 61
End: 2021-05-26
Payer: COMMERCIAL

## 2021-05-26 ENCOUNTER — TRANSCRIBE ORDERS (OUTPATIENT)
Dept: ADMINISTRATIVE | Facility: HOSPITAL | Age: 61
End: 2021-05-26

## 2021-05-26 DIAGNOSIS — F33.1 MAJOR DEPRESSIVE DISORDER, RECURRENT EPISODE, MODERATE (HCC): ICD-10-CM

## 2021-05-26 DIAGNOSIS — F31.9 BIPOLAR AFFECTIVE DISORDER, REMISSION STATUS UNSPECIFIED (HCC): ICD-10-CM

## 2021-05-26 DIAGNOSIS — Z79.899 ENCOUNTER FOR LONG-TERM (CURRENT) USE OF OTHER MEDICATIONS: Primary | ICD-10-CM

## 2021-05-26 DIAGNOSIS — Z79.899 ENCOUNTER FOR LONG-TERM (CURRENT) USE OF OTHER MEDICATIONS: ICD-10-CM

## 2021-05-26 LAB
ALBUMIN SERPL BCP-MCNC: 3.6 G/DL (ref 3.5–5)
ALP SERPL-CCNC: 116 U/L (ref 46–116)
ALT SERPL W P-5'-P-CCNC: 28 U/L (ref 12–78)
ANION GAP SERPL CALCULATED.3IONS-SCNC: 12 MMOL/L (ref 4–13)
AST SERPL W P-5'-P-CCNC: 21 U/L (ref 5–45)
BASOPHILS # BLD AUTO: 0.04 THOUSANDS/ΜL (ref 0–0.1)
BASOPHILS NFR BLD AUTO: 1 % (ref 0–1)
BILIRUB SERPL-MCNC: 0.2 MG/DL (ref 0.2–1)
BUN SERPL-MCNC: 19 MG/DL (ref 5–25)
CALCIUM SERPL-MCNC: 8.8 MG/DL (ref 8.3–10.1)
CHLORIDE SERPL-SCNC: 105 MMOL/L (ref 100–108)
CHOLEST SERPL-MCNC: 201 MG/DL (ref 50–200)
CO2 SERPL-SCNC: 25 MMOL/L (ref 21–32)
CREAT SERPL-MCNC: 0.98 MG/DL (ref 0.6–1.3)
EOSINOPHIL # BLD AUTO: 0.22 THOUSAND/ΜL (ref 0–0.61)
EOSINOPHIL NFR BLD AUTO: 4 % (ref 0–6)
ERYTHROCYTE [DISTWIDTH] IN BLOOD BY AUTOMATED COUNT: 13.2 % (ref 11.6–15.1)
GFR SERPL CREATININE-BSD FRML MDRD: 63 ML/MIN/1.73SQ M
GLUCOSE P FAST SERPL-MCNC: 193 MG/DL (ref 65–99)
HCT VFR BLD AUTO: 36.1 % (ref 34.8–46.1)
HDLC SERPL-MCNC: 53 MG/DL
HGB BLD-MCNC: 11.5 G/DL (ref 11.5–15.4)
IMM GRANULOCYTES # BLD AUTO: 0.02 THOUSAND/UL (ref 0–0.2)
IMM GRANULOCYTES NFR BLD AUTO: 0 % (ref 0–2)
LDLC SERPL CALC-MCNC: 112 MG/DL (ref 0–100)
LYMPHOCYTES # BLD AUTO: 1.67 THOUSANDS/ΜL (ref 0.6–4.47)
LYMPHOCYTES NFR BLD AUTO: 27 % (ref 14–44)
MCH RBC QN AUTO: 29.5 PG (ref 26.8–34.3)
MCHC RBC AUTO-ENTMCNC: 31.9 G/DL (ref 31.4–37.4)
MCV RBC AUTO: 93 FL (ref 82–98)
MONOCYTES # BLD AUTO: 0.59 THOUSAND/ΜL (ref 0.17–1.22)
MONOCYTES NFR BLD AUTO: 10 % (ref 4–12)
NEUTROPHILS # BLD AUTO: 3.65 THOUSANDS/ΜL (ref 1.85–7.62)
NEUTS SEG NFR BLD AUTO: 58 % (ref 43–75)
NONHDLC SERPL-MCNC: 148 MG/DL
NRBC BLD AUTO-RTO: 0 /100 WBCS
PLATELET # BLD AUTO: 244 THOUSANDS/UL (ref 149–390)
PMV BLD AUTO: 9.7 FL (ref 8.9–12.7)
POTASSIUM SERPL-SCNC: 4.3 MMOL/L (ref 3.5–5.3)
PROT SERPL-MCNC: 7.1 G/DL (ref 6.4–8.2)
RBC # BLD AUTO: 3.9 MILLION/UL (ref 3.81–5.12)
SODIUM SERPL-SCNC: 142 MMOL/L (ref 136–145)
TRIGL SERPL-MCNC: 179 MG/DL
WBC # BLD AUTO: 6.19 THOUSAND/UL (ref 4.31–10.16)

## 2021-05-26 PROCEDURE — 36415 COLL VENOUS BLD VENIPUNCTURE: CPT

## 2021-05-26 PROCEDURE — 85025 COMPLETE CBC W/AUTO DIFF WBC: CPT

## 2021-05-26 PROCEDURE — 80061 LIPID PANEL: CPT

## 2021-05-26 PROCEDURE — 80053 COMPREHEN METABOLIC PANEL: CPT

## 2021-06-01 ENCOUNTER — ANNUAL EXAM (OUTPATIENT)
Dept: OBGYN CLINIC | Facility: CLINIC | Age: 61
End: 2021-06-01
Payer: COMMERCIAL

## 2021-06-01 VITALS
HEART RATE: 101 BPM | DIASTOLIC BLOOD PRESSURE: 68 MMHG | HEIGHT: 65 IN | BODY MASS INDEX: 35.65 KG/M2 | WEIGHT: 214 LBS | SYSTOLIC BLOOD PRESSURE: 132 MMHG

## 2021-06-01 DIAGNOSIS — N84.1 CERVICAL POLYP: ICD-10-CM

## 2021-06-01 DIAGNOSIS — Z12.31 ENCOUNTER FOR SCREENING MAMMOGRAM FOR BREAST CANCER: ICD-10-CM

## 2021-06-01 DIAGNOSIS — R30.0 DYSURIA: ICD-10-CM

## 2021-06-01 DIAGNOSIS — Z01.411 ENCOUNTER FOR GYNECOLOGICAL EXAMINATION WITH ABNORMAL FINDING: Primary | ICD-10-CM

## 2021-06-01 LAB
SL AMB  POCT GLUCOSE, UA: ABNORMAL
SL AMB LEUKOCYTE ESTERASE,UA: NEGATIVE
SL AMB POCT BILIRUBIN,UA: NEGATIVE
SL AMB POCT BLOOD,UA: ABNORMAL
SL AMB POCT CLARITY,UA: CLEAR
SL AMB POCT COLOR,UA: YELLOW
SL AMB POCT KETONES,UA: NEGATIVE
SL AMB POCT NITRITE,UA: ABNORMAL
SL AMB POCT PH,UA: 5
SL AMB POCT SPECIFIC GRAVITY,UA: 1.03
SL AMB POCT URINE PROTEIN: NEGATIVE
SL AMB POCT UROBILINOGEN: NEGATIVE

## 2021-06-01 PROCEDURE — 3008F BODY MASS INDEX DOCD: CPT | Performed by: NURSE PRACTITIONER

## 2021-06-01 PROCEDURE — 3061F NEG MICROALBUMINURIA REV: CPT | Performed by: NURSE PRACTITIONER

## 2021-06-01 PROCEDURE — 81002 URINALYSIS NONAUTO W/O SCOPE: CPT | Performed by: NURSE PRACTITIONER

## 2021-06-01 PROCEDURE — 0503F POSTPARTUM CARE VISIT: CPT | Performed by: NURSE PRACTITIONER

## 2021-06-01 PROCEDURE — 57500 BIOPSY OF CERVIX: CPT | Performed by: NURSE PRACTITIONER

## 2021-06-01 PROCEDURE — 99396 PREV VISIT EST AGE 40-64: CPT | Performed by: NURSE PRACTITIONER

## 2021-06-01 PROCEDURE — 88305 TISSUE EXAM BY PATHOLOGIST: CPT | Performed by: PATHOLOGY

## 2021-06-01 NOTE — PROGRESS NOTES
Assessment / Plan    1  Encounter for gynecological examination with abnormal finding  Well woman exam  2020 pap/hpv negative  Plan repeat   Up to date on colonoscopy    2  Encounter for screening mammogram for breast cancer  Order provided    - Mammo screening bilateral w 3d & cad; Future    3  Cervical polyp  Noted on exam today  Patient verbally agreed to removal and this was done  - Tissue Exam    4  Dysuria  Large blood on dip-- performed after having cervical polyp removed and may be contamination  The was not enough urine to send to lab  Patient will RTO tomorrow to give us a clean catch sample for UA dip  If still positive, will send for UA w/ C&S    - POCT urine dip        Subjective      Cami Ibrahim is a 63 y o  female who presents for her annual gynecologic exam     2020 pap/hpv negative  2019 mammo 3d, negative, 2/4  3/2019 colonoscopy- Q 5 years    Patient battles with long-term significant depression  Has been having even rougher time emotionally over the past year:  Lost her uncle to Covid, was very close to him  Her close neighbor-friends are moving  Still unemployed, waiting approval for disability  Periods are absent  Current contraception: post menopausal status  History of abnormal Pap smear: no  Family history of breast,uterine, ovarian or colon cancer: no    Menstrual History:  OB History        0    Para   0    Term   0       0    AB   0    Living   0       SAB   0    TAB   0    Ectopic   0    Multiple   0    Live Births   0           Obstetric Comments   Menarche 15              No LMP recorded (lmp unknown)   Patient is postmenopausal        The following portions of the patient's history were reviewed and updated as appropriate: allergies, current medications, past family history, past medical history, past social history, past surgical history and problem list     Review of Systems      Review of Systems   Constitutional: Negative for chills and fever    Respiratory: Negative for cough and shortness of breath  Gastrointestinal: Negative for abdominal distention, abdominal pain, blood in stool, constipation, diarrhea, nausea and vomiting  Genitourinary: Positive for difficulty urinating (urinates and only a little comes out)  Negative for dysuria, frequency, genital sores, hematuria, menstrual problem, pelvic pain, urgency, vaginal bleeding and vaginal discharge  Musculoskeletal: Negative for arthralgias and myalgias  Breasts:  Negative for skin changes, dimpling, asymmetry, nipple discharge, redness, tenderness or palpable masses      Objective      /68 (BP Location: Left arm, Patient Position: Sitting, Cuff Size: Standard)   Pulse 101   Ht 5' 5" (1 651 m)   Wt 97 1 kg (214 lb)   LMP  (LMP Unknown)   BMI 35 61 kg/m²   Physical Exam  Constitutional:       General: She is not in acute distress  Appearance: Normal appearance  She is well-developed  She is obese  She is not ill-appearing or diaphoretic  HENT:      Head: Normocephalic and atraumatic  Eyes:      Pupils: Pupils are equal, round, and reactive to light  Neck:      Musculoskeletal: Neck supple  Thyroid: No thyromegaly  Pulmonary:      Effort: Pulmonary effort is normal    Chest:      Breasts: Breasts are symmetrical          Right: No inverted nipple, mass, nipple discharge, skin change or tenderness  Left: No inverted nipple, mass, nipple discharge, skin change or tenderness  Abdominal:      General: There is no distension  Palpations: Abdomen is soft  There is no mass  Tenderness: There is no abdominal tenderness  There is no guarding or rebound  Genitourinary:     General: Normal vulva  Exam position: Lithotomy position  Labia:         Right: No rash, tenderness, lesion or injury  Left: No rash, tenderness, lesion or injury  Vagina: No signs of injury and foreign body   No vaginal discharge, erythema, tenderness or bleeding  Cervix: Lesion (EC polyp) present  No cervical motion tenderness, discharge or friability  Uterus: Not enlarged and not tender  Adnexa:         Right: No mass, tenderness or fullness  Left: No mass, tenderness or fullness  Lymphadenopathy:      Cervical: No cervical adenopathy  Upper Body:      Right upper body: No supraclavicular adenopathy  Left upper body: No supraclavicular adenopathy  Skin:     General: Skin is warm and dry  Neurological:      General: No focal deficit present  Mental Status: She is alert and oriented to person, place, and time  She is not disoriented  Psychiatric:         Mood and Affect: Mood normal          Behavior: Behavior normal          Thought Content: Thought content normal          Judgment: Judgment normal             Biopsy    Date/Time: 6/1/2021 5:47 PM  Performed by: TERELL Manning  Authorized by: TERELL Manning   Universal Protocol:  Consent: Verbal consent obtained  Risks and benefits: risks, benefits and alternatives were discussed  Consent given by: patient  Patient understanding: patient states understanding of the procedure being performed  Patient identity confirmed: verbally with patient      Procedure Details - Skin Biopsy:     Biopsy tissue type: mucous membrane    Body area: Anogenital    Anogenital location: cervix      Vaginal Lesion: No      Initial size (mm):  7    Final defect size (mm):  0 No

## 2021-06-21 ENCOUNTER — OFFICE VISIT (OUTPATIENT)
Dept: FAMILY MEDICINE CLINIC | Facility: CLINIC | Age: 61
End: 2021-06-21
Payer: COMMERCIAL

## 2021-06-21 VITALS
TEMPERATURE: 97.4 F | SYSTOLIC BLOOD PRESSURE: 130 MMHG | DIASTOLIC BLOOD PRESSURE: 72 MMHG | WEIGHT: 216 LBS | BODY MASS INDEX: 35.99 KG/M2 | HEIGHT: 65 IN

## 2021-06-21 DIAGNOSIS — E03.9 HYPOTHYROIDISM, UNSPECIFIED TYPE: ICD-10-CM

## 2021-06-21 DIAGNOSIS — F33.2 SEVERE RECURRENT MAJOR DEPRESSION WITHOUT PSYCHOTIC FEATURES (HCC): ICD-10-CM

## 2021-06-21 DIAGNOSIS — K21.00 GASTROESOPHAGEAL REFLUX DISEASE WITH ESOPHAGITIS WITHOUT HEMORRHAGE: ICD-10-CM

## 2021-06-21 DIAGNOSIS — T78.40XD ALLERGY, SUBSEQUENT ENCOUNTER: ICD-10-CM

## 2021-06-21 DIAGNOSIS — J45.909 ASTHMA, UNSPECIFIED ASTHMA SEVERITY, UNSPECIFIED WHETHER COMPLICATED, UNSPECIFIED WHETHER PERSISTENT: ICD-10-CM

## 2021-06-21 DIAGNOSIS — K21.00 GASTROESOPHAGEAL REFLUX DISEASE WITH ESOPHAGITIS: ICD-10-CM

## 2021-06-21 DIAGNOSIS — E11.9 TYPE 2 DIABETES MELLITUS WITHOUT COMPLICATION, WITHOUT LONG-TERM CURRENT USE OF INSULIN (HCC): Primary | ICD-10-CM

## 2021-06-21 DIAGNOSIS — E05.90 HYPERTHYROIDISM: ICD-10-CM

## 2021-06-21 DIAGNOSIS — E78.2 MIXED HYPERLIPIDEMIA: ICD-10-CM

## 2021-06-21 LAB — SL AMB POCT HEMOGLOBIN AIC: 8.8 (ref ?–6.5)

## 2021-06-21 PROCEDURE — 3052F HG A1C>EQUAL 8.0%<EQUAL 9.0%: CPT | Performed by: FAMILY MEDICINE

## 2021-06-21 PROCEDURE — 99214 OFFICE O/P EST MOD 30 MIN: CPT | Performed by: FAMILY MEDICINE

## 2021-06-21 PROCEDURE — 83036 HEMOGLOBIN GLYCOSYLATED A1C: CPT | Performed by: FAMILY MEDICINE

## 2021-06-21 RX ORDER — LEVOTHYROXINE SODIUM 88 UG/1
88 TABLET ORAL
Qty: 90 TABLET | Refills: 1 | Status: SHIPPED | OUTPATIENT
Start: 2021-06-21 | End: 2022-01-07 | Stop reason: SDUPTHER

## 2021-06-21 RX ORDER — VENLAFAXINE HYDROCHLORIDE 150 MG/1
150 CAPSULE, EXTENDED RELEASE ORAL DAILY
COMMUNITY
Start: 2021-04-28 | End: 2022-01-07 | Stop reason: SDUPTHER

## 2021-06-21 RX ORDER — OMEPRAZOLE 20 MG/1
20 CAPSULE, DELAYED RELEASE ORAL
Qty: 90 CAPSULE | Refills: 1 | Status: SHIPPED | OUTPATIENT
Start: 2021-06-21 | End: 2021-10-20 | Stop reason: SDUPTHER

## 2021-06-21 RX ORDER — FLUTICASONE PROPIONATE 50 MCG
1 SPRAY, SUSPENSION (ML) NASAL DAILY
COMMUNITY
End: 2021-06-21 | Stop reason: SDUPTHER

## 2021-06-21 RX ORDER — FLUTICASONE PROPIONATE 50 MCG
1 SPRAY, SUSPENSION (ML) NASAL DAILY
Qty: 16 G | Refills: 1 | Status: SHIPPED | OUTPATIENT
Start: 2021-06-21 | End: 2022-01-07 | Stop reason: SDUPTHER

## 2021-06-21 NOTE — PROGRESS NOTES
Assessment/Plan:  Labs reviewed  Patient go for laboratory studies prior to next visit  Patient follow-up with psychiatry appropriately  Patient did have COVID vaccine x2 A1c is 8 8 which is improving  The patient will start Trulicity to night  Follow-up in 6 months  Diagnoses and all orders for this visit:    Type 2 diabetes mellitus without complication, without long-term current use of insulin ([facility])  -     POCT hemoglobin A1c  -     CBC and differential; Future  -     Comprehensive metabolic panel; Future  -     TSH, 3rd generation with Free T4 reflex; Future  -     Microalbumin / creatinine urine ratio    Asthma, unspecified asthma severity, unspecified whether complicated, unspecified whether persistent  -     fluticasone (FLONASE) 50 mcg/act nasal spray; 1 spray into each nostril daily  -     CBC and differential; Future  -     Comprehensive metabolic panel; Future  -     TSH, 3rd generation with Free T4 reflex; Future  -     Microalbumin / creatinine urine ratio    Allergy, subsequent encounter  -     fluticasone (FLONASE) 50 mcg/act nasal spray; 1 spray into each nostril daily    Hyperthyroidism  -     levothyroxine 88 mcg tablet; Take 1 tablet (88 mcg total) by mouth daily in the early morning    Gastroesophageal reflux disease with esophagitis  -     omeprazole (PriLOSEC) 20 mg delayed release capsule; Take 1 capsule (20 mg total) by mouth daily before breakfast  -     CBC and differential; Future  -     Comprehensive metabolic panel; Future  -     TSH, 3rd generation with Free T4 reflex; Future  -     Microalbumin / creatinine urine ratio    Gastroesophageal reflux disease with esophagitis without hemorrhage    Hypothyroidism, unspecified type  -     CBC and differential; Future  -     Comprehensive metabolic panel; Future  -     TSH, 3rd generation with Free T4 reflex;  Future  -     Microalbumin / creatinine urine ratio    Severe recurrent major depression without psychotic features ([facility] )  - CBC and differential; Future  -     Comprehensive metabolic panel; Future  -     TSH, 3rd generation with Free T4 reflex; Future  -     Microalbumin / creatinine urine ratio    Mixed hyperlipidemia  -     CBC and differential; Future  -     Comprehensive metabolic panel; Future  -     TSH, 3rd generation with Free T4 reflex; Future  -     Microalbumin / creatinine urine ratio    Other orders  -     venlafaxine (EFFEXOR-XR) 150 mg 24 hr capsule; Take 150 mg by mouth daily  -     Discontinue: fluticasone (FLONASE) 50 mcg/act nasal spray; 1 spray into each nostril daily            Subjective:        Patient ID: Nuvia Mon is a 63 y o  female  Patient follow-up on depression as well as hypothyroidism, GERD, hyperlipidemia as well as depression  Patient recently has been more depressed overall  Patient does see psychiatry  Patient recently medications increased  No homicidal suicidal ideation at this time  Patient has have his some swelling knees bilaterally  Patient feeling well overall  Patient's A1c is 8 8  Patient with occasional nausea  Patient with occasional abdominal discomfort no chest pain shortness of breath  The patient has some visual disturbance  Labs are records reviewed  Patient not taking Trulicity presently        The following portions of the patient's history were reviewed and updated as appropriate: allergies, current medications, past family history, past medical history, past social history, past surgical history and problem list       Review of Systems   Constitutional: Negative  HENT: Negative  Eyes: Positive for visual disturbance  Respiratory: Negative  Cardiovascular: Negative  Gastrointestinal: Positive for abdominal pain  Endocrine: Negative  Genitourinary: Negative  Musculoskeletal: Positive for arthralgias  Skin: Negative  Allergic/Immunologic: Negative  Neurological: Negative  Hematological: Negative      Psychiatric/Behavioral: Negative  Objective:               /72 (BP Location: Left arm, Patient Position: Sitting, Cuff Size: Adult)   Temp (!) 97 4 °F (36 3 °C) (Tympanic)   Ht 5' 5" (1 651 m)   Wt 98 kg (216 lb)   LMP  (LMP Unknown)   BMI 35 94 kg/m²          Physical Exam  Vitals and nursing note reviewed  Constitutional:       General: She is not in acute distress  Appearance: Normal appearance  She is not ill-appearing, toxic-appearing or diaphoretic  HENT:      Head: Normocephalic and atraumatic  Right Ear: Tympanic membrane, ear canal and external ear normal  There is no impacted cerumen  Left Ear: Tympanic membrane, ear canal and external ear normal  There is no impacted cerumen  Nose: Nose normal  No congestion or rhinorrhea  Mouth/Throat:      Mouth: Mucous membranes are moist       Pharynx: No oropharyngeal exudate or posterior oropharyngeal erythema  Eyes:      General: No scleral icterus  Right eye: No discharge  Left eye: No discharge  Extraocular Movements: Extraocular movements intact  Conjunctiva/sclera: Conjunctivae normal       Pupils: Pupils are equal, round, and reactive to light  Neck:      Vascular: No carotid bruit  Cardiovascular:      Rate and Rhythm: Normal rate and regular rhythm  Pulses: Normal pulses  Heart sounds: Normal heart sounds  No murmur heard  No friction rub  No gallop  Pulmonary:      Effort: Pulmonary effort is normal  No respiratory distress  Breath sounds: Normal breath sounds  No stridor  No wheezing, rhonchi or rales  Chest:      Chest wall: No tenderness  Abdominal:      General: Abdomen is flat  Bowel sounds are normal  There is no distension  Palpations: Abdomen is soft  Tenderness: There is no abdominal tenderness  There is no guarding or rebound  Musculoskeletal:         General: No swelling, tenderness, deformity or signs of injury  Normal range of motion        Cervical back: Normal range of motion and neck supple  No rigidity  No muscular tenderness  Right lower leg: No edema  Left lower leg: No edema  Lymphadenopathy:      Cervical: No cervical adenopathy  Skin:     General: Skin is warm and dry  Capillary Refill: Capillary refill takes less than 2 seconds  Coloration: Skin is not jaundiced  Findings: No bruising, erythema, lesion or rash  Neurological:      Mental Status: She is alert and oriented to person, place, and time  Mental status is at baseline  Cranial Nerves: No cranial nerve deficit  Sensory: No sensory deficit  Motor: No weakness  Coordination: Coordination normal       Gait: Gait normal    Psychiatric:         Mood and Affect: Mood normal          Behavior: Behavior normal          Thought Content:  Thought content normal          Judgment: Judgment normal

## 2021-06-23 ENCOUNTER — APPOINTMENT (OUTPATIENT)
Dept: RADIOLOGY | Facility: MEDICAL CENTER | Age: 61
End: 2021-06-23
Payer: COMMERCIAL

## 2021-06-23 ENCOUNTER — OFFICE VISIT (OUTPATIENT)
Dept: URGENT CARE | Facility: MEDICAL CENTER | Age: 61
End: 2021-06-23
Payer: COMMERCIAL

## 2021-06-23 VITALS
WEIGHT: 216 LBS | DIASTOLIC BLOOD PRESSURE: 83 MMHG | TEMPERATURE: 98 F | SYSTOLIC BLOOD PRESSURE: 124 MMHG | HEIGHT: 65 IN | HEART RATE: 78 BPM | OXYGEN SATURATION: 98 % | BODY MASS INDEX: 35.99 KG/M2 | RESPIRATION RATE: 16 BRPM

## 2021-06-23 DIAGNOSIS — M25.561 ACUTE PAIN OF RIGHT KNEE: ICD-10-CM

## 2021-06-23 DIAGNOSIS — M25.561 ACUTE PAIN OF RIGHT KNEE: Primary | ICD-10-CM

## 2021-06-23 DIAGNOSIS — S49.91XA INJURY OF RIGHT SHOULDER, INITIAL ENCOUNTER: ICD-10-CM

## 2021-06-23 PROCEDURE — 99213 OFFICE O/P EST LOW 20 MIN: CPT | Performed by: PHYSICIAN ASSISTANT

## 2021-06-23 PROCEDURE — 73030 X-RAY EXAM OF SHOULDER: CPT

## 2021-06-23 PROCEDURE — 73564 X-RAY EXAM KNEE 4 OR MORE: CPT

## 2021-06-23 RX ORDER — QUETIAPINE FUMARATE 25 MG/1
TABLET, FILM COATED ORAL
COMMUNITY
Start: 2021-05-13

## 2021-06-23 NOTE — PATIENT INSTRUCTIONS
Patient was educated on right shoulder and right knee pain  Patient was educated on icing and heating right shoulder and right knee  Patient was told she can take OTC Tylenol and Anti-inflammatory for pain  Patient was told to follow up with ortho if pain persist    Knee Pain   WHAT YOU NEED TO KNOW:   Knee pain may start suddenly, or it may be a long-term problem  You may have pain on the side, front, or back of your knee  You may have knee stiffness and swelling  You may hear popping sounds or feel like your knee is giving way or locking up as you walk  You may feel pain when you sit, stand, walk, or climb up and down stairs  Knee pain can be caused by conditions such as obesity, inflammation, or strains or tears in ligaments or tendons  DISCHARGE INSTRUCTIONS:   Return to the emergency department if:   · Your pain is worse, even after treatment  · You cannot bend or straighten your leg completely  · The swelling around your knee does not go down even with treatment  · Your knee is painful and hot to the touch  Contact your healthcare provider if:   · You have questions or concerns about your condition or care  Medicines: You may need any of the following:  · NSAIDs  help decrease swelling and pain or fever  This medicine is available with or without a doctor's order  NSAIDs can cause stomach bleeding or kidney problems in certain people  If you take blood thinner medicine, always ask your healthcare provider if NSAIDs are safe for you  Always read the medicine label and follow directions  · Acetaminophen  decreases pain and fever  It is available without a doctor's order  Ask how much to take and how often to take it  Follow directions  Read the labels of all other medicines you are using to see if they also contain acetaminophen, or ask your doctor or pharmacist  Acetaminophen can cause liver damage if not taken correctly   Do not use more than 4 grams (4,000 milligrams) total of acetaminophen in one day  · Prescription pain medicine  may be given  Ask your healthcare provider how to take this medicine safely  Some prescription pain medicines contain acetaminophen  Do not take other medicines that contain acetaminophen without talking to your healthcare provider  Too much acetaminophen may cause liver damage  Prescription pain medicine may cause constipation  Ask your healthcare provider how to prevent or treat constipation  · Take your medicine as directed  Contact your healthcare provider if you think your medicine is not helping or if you have side effects  Tell him or her if you are allergic to any medicine  Keep a list of the medicines, vitamins, and herbs you take  Include the amounts, and when and why you take them  Bring the list or the pill bottles to follow-up visits  Carry your medicine list with you in case of an emergency  What you can do to manage your symptoms:   · Rest your knee so it can heal   Limit activities that increase your pain  Do low-impact exercises, such as walking or swimming  · Apply ice to help reduce swelling and pain  Use an ice pack, or put crushed ice in a plastic bag  Cover it with a towel before you apply it to your knee  Apply ice for 15 to 20 minutes every hour, or as directed  · Apply compression to help reduce swelling  Use a brace or bandage only as directed  · Elevate your knee to help decrease pain and swelling  Elevate your knee while you are sitting or lying down  Prop your leg on pillows to keep your knee above the level of your heart  · Prevent your knee from moving as directed  Your healthcare provider may put on a cast or splint  You may need to wear a leg brace to stabilize your knee  A leg brace can be adjusted to increase your range of motion as your knee heals  What you can do to prevent knee pain:   · Maintain a healthy weight  Extra weight increases your risk for knee pain   Ask your healthcare provider how much you should weigh  He or she can help you create a safe weight loss plan if you need to lose weight  · Exercise or train properly  Use the correct equipment for sports  Wear shoes that provide good support  Check your posture often as you exercise, play sports, or train for an event  This can help prevent stress and strain on your knees  Rest between sessions so you do not overwork your knees  Follow up with your healthcare provider within 24 hours or as directed: You may need follow-up treatments, such as steroid injections to decrease pain  Write down your questions so you remember to ask them during your visits  © Copyright [facility] [address] Information is for End User's use only and may not be sold, redistributed or otherwise used for commercial purposes  All illustrations and images included in CareNotes® are the copyrighted property of [employer]  or Ifeanyi Mack  The above information is an  only  It is not intended as medical advice for individual conditions or treatments  Talk to your doctor, nurse or pharmacist before following any medical regimen to see if it is safe and effective for you

## 2021-06-23 NOTE — PROGRESS NOTES
[email or web address]        NAME: Olivier Wick is a 63 y o  female  : 1960    MRN: [id number]  DATE: 2021  TIME: 3:09 PM    Assessment and Plan   Acute pain of right knee [M25 561]  1  Acute pain of right knee  XR knee 4+ vw right injury   2  Injury of right shoulder, initial encounter  XR shoulder 2+ vw right       Xray right shoulder- negative for fracture or dislocation pending radiology report    Xray of right knee- negative for fracture or dislocation pending radiology report  Patient Instructions       Patient was educated on right shoulder and right knee pain  Patient was educated on icing and heating right shoulder and right knee  Patient was told she can take OTC Tylenol and Anti-inflammatory for pain  Patient was told to follow up with ortho if pain persist  Chief Complaint     Chief Complaint   Patient presents with    Knee Pain     Pt c/o 3/10 right knee pain after slipping on her shoe yesterday and falling on her knee  Pt denies any head trauma  History of Present Illness       Patient reports on 21 she tripped and fell directly on her right knee and right shoulder  Patient reports pain with movement with right shoulder and right knee  Patient rates pain in right knee and right shoulder 3-4/10  Denies any locking in right knee  Denies any numbness or tingling in right arm  Denies chest pain or shortness of breath  Review of Systems   Review of Systems   Constitutional: Negative  Respiratory: Negative  Cardiovascular: Negative  Musculoskeletal:        Right shoulder pain     Right knee pain and swelling   Neurological: Negative  Psychiatric/Behavioral: Negative            Current Medications       Current Outpatient Medications:     albuterol (2 5 mg/3 mL) 0 083 % nebulizer solution, Take 1 vial (2 5 mg total) by nebulization every 4 (four) hours as needed for wheezing (Patient not taking: Reported on 2021), Disp: 20 vial, Rfl: 1    albuterol (Proventil HFA) 90 mcg/act inhaler, Inhale 1-2 puffs every 4 (four) hours as needed for wheezing, Disp: 1 Inhaler, Rfl: 2    benztropine (COGENTIN) 0 5 mg tablet, Take 0 5 mg by mouth daily , Disp: , Rfl:     Blood Glucose Monitoring Suppl (ONE TOUCH ULTRA 2) w/Device KIT, by Does not apply route daily, Disp: 1 each, Rfl: 0    Dulaglutide (Trulicity) 4 97 CH/0 7NP SOPN, Inject 0 5 mL (0 75 mg total) under the skin once a week (Patient not taking: Reported on 6/21/2021), Disp: 4 pen, Rfl: 3    fluticasone (FLONASE) 50 mcg/act nasal spray, 1 spray into each nostril daily, Disp: 16 g, Rfl: 1    gabapentin (NEURONTIN) 100 mg capsule, Take 1 capsule (100 mg total) by mouth daily at bedtime, Disp: 90 capsule, Rfl: 1    Lancets (ONETOUCH ULTRASOFT) lancets, Use as instructed daily, Disp: 100 each, Rfl: 1    levothyroxine 88 mcg tablet, Take 1 tablet (88 mcg total) by mouth daily in the early morning, Disp: 90 tablet, Rfl: 1    metFORMIN (GLUCOPHAGE) 500 mg tablet, Take 2 tablets (1,000 mg total) by mouth 2 (two) times a day with meals (Patient taking differently: Take 500 mg by mouth 2 (two) times a day with meals ), Disp: 360 tablet, Rfl: 1    mometasone (ASMANEX TWISTHALER) 220 MCG/INH inhaler, Inhale 1 puff every evening Rinse mouth after use   (Patient not taking: Reported on 6/21/2021), Disp: 1 Inhaler, Rfl: 3    omeprazole (PriLOSEC) 20 mg delayed release capsule, Take 1 capsule (20 mg total) by mouth daily before breakfast, Disp: 90 capsule, Rfl: 1    QUEtiapine (SEROquel) 200 mg tablet, Take 1 tablet (200 mg total) by mouth daily at bedtime, Disp: 90 tablet, Rfl: 1    QUEtiapine (SEROquel) 25 mg tablet, TAKE 1 TABLET BY MOUTH ONCE DAILY WITH 200MG AT BEDTIME AS NEEDED, Disp: , Rfl:     rosuvastatin (CRESTOR) 5 mg tablet, TAKE 1 TABLET BY MOUTH EVERY DAY, Disp: 30 tablet, Rfl: 5    sertraline (ZOLOFT) 100 mg tablet, Take 2 tablets (200 mg total) by mouth daily at bedtime, Disp: 180 tablet, Rfl: 1   traZODone (DESYREL) 50 mg tablet, Take 1 tablet (50 mg total) by mouth daily at bedtime, Disp: 90 tablet, Rfl: 1    venlafaxine (EFFEXOR-XR) 150 mg 24 hr capsule, Take 150 mg by mouth daily, Disp: , Rfl:     Current Allergies     Allergies as of 06/23/2021 - Reviewed 06/23/2021   Allergen Reaction Noted    Darvon [propoxyphene]  01/26/2019    Fluoxetine  10/01/2012    Meclizine  10/01/2012    Morphine and related  10/01/2012    Oxycodone-acetaminophen  10/01/2012    Percolone [oxycodone]  03/20/2019            The following portions of the patient's history were reviewed and updated as appropriate: allergies, current medications, past family history, past medical history, past social history, past surgical history and problem list      Past Medical History:   Diagnosis Date    Anxiety     Asthma     Diabetes ([address] )     prediabetic    GERD (gastroesophageal reflux disease)     Hyperlipemia     Hypothyroidism     Kidney stones     Major depressive disorder     Migraine     Sleep apnea        Past Surgical History:   Procedure Laterality Date    ADENOIDECTOMY      DILATION AND CURETTAGE OF UTERUS      KY COLONOSCOPY FLX DX W/COLLJ SPEC WHEN PFRMD N/A 3/15/2019    Procedure: COLONOSCOPY;  Surgeon: Emily Rivera MD;  Location: [facility]; Service: Gastroenterology    SHOULDER SURGERY      TONSILLECTOMY      TUBAL LIGATION         Family History   Problem Relation Age of Onset   Aaron Mckeon ALS Mother     Venous thrombosis Father     Diabetes Father     Other Family         cerebral embolism    Diabetes Family     Hypertension Family     Kidney disease Family     Breast cancer Neg Hx     Colon cancer Neg Hx     Ovarian cancer Neg Hx     Uterine cancer Neg Hx          Medications have been verified          Objective   /83   Pulse 78   Temp 98 °F (36 7 °C)   Resp 16   Ht 5' 5" (1 651 m)   Wt 98 kg (216 lb)   LMP  (LMP Unknown)   SpO2 98%   BMI 35 94 kg/m²   No LMP recorded (lmp unknown)  Patient is postmenopausal        Physical Exam     Physical Exam  Constitutional:       Appearance: Normal appearance  HENT:      Head: Normocephalic  Cardiovascular:      Rate and Rhythm: Normal rate and regular rhythm  Heart sounds: Normal heart sounds  Pulmonary:      Breath sounds: Normal breath sounds  Musculoskeletal:      Comments: Full right shoulder range of motion  Mild pain with resisted internal and external rotation in right shoulder  Negative empty can test in right shoulder  Mild pain over right para-spinals  NO pain over left  cervical para-spinals or cervical spine  Swelling and ecchymosis noted over right knee  Pain over medial and lateral joint line of right knee  No pain with resisted right knee flexion and extension  Positive Millicent in right knee  NO laxity with valgus or varus stress in right knee  Neurological:      Mental Status: She is alert and oriented to person, place, and time     Psychiatric:         Mood and Affect: Mood normal          Behavior: Behavior normal

## 2021-06-25 ENCOUNTER — TELEPHONE (OUTPATIENT)
Dept: FAMILY MEDICINE CLINIC | Facility: CLINIC | Age: 61
End: 2021-06-25

## 2021-06-25 DIAGNOSIS — G43.009 MIGRAINE WITHOUT AURA AND WITHOUT STATUS MIGRAINOSUS, NOT INTRACTABLE: Primary | ICD-10-CM

## 2021-06-25 RX ORDER — METHOCARBAMOL 500 MG/1
500 TABLET, FILM COATED ORAL 4 TIMES DAILY
Qty: 60 TABLET | Refills: 0 | Status: SHIPPED | OUTPATIENT
Start: 2021-06-25 | End: 2022-02-01 | Stop reason: ALTCHOICE

## 2021-06-25 NOTE — TELEPHONE ENCOUNTER
Patient called in stating she had a fall and was seen in urgent care and they did do xrays but they she would like to talk to you regarding this and she also may need to have a muscle relaxer sent in  Please review and she can be reached at [phone]

## 2021-06-29 ENCOUNTER — OFFICE VISIT (OUTPATIENT)
Dept: FAMILY MEDICINE CLINIC | Facility: CLINIC | Age: 61
End: 2021-06-29
Payer: COMMERCIAL

## 2021-06-29 ENCOUNTER — TELEPHONE (OUTPATIENT)
Dept: FAMILY MEDICINE CLINIC | Facility: CLINIC | Age: 61
End: 2021-06-29

## 2021-06-29 VITALS
BODY MASS INDEX: 36.06 KG/M2 | SYSTOLIC BLOOD PRESSURE: 120 MMHG | WEIGHT: 216.4 LBS | HEIGHT: 65 IN | TEMPERATURE: 97.1 F | DIASTOLIC BLOOD PRESSURE: 70 MMHG

## 2021-06-29 DIAGNOSIS — S80.01XS CONTUSION OF RIGHT KNEE, SEQUELA: Primary | ICD-10-CM

## 2021-06-29 PROCEDURE — 99214 OFFICE O/P EST MOD 30 MIN: CPT | Performed by: FAMILY MEDICINE

## 2021-06-29 RX ORDER — ACETAMINOPHEN AND CODEINE PHOSPHATE 300; 30 MG/1; MG/1
1 TABLET ORAL EVERY 4 HOURS PRN
Qty: 30 TABLET | Refills: 0 | Status: SHIPPED | OUTPATIENT
Start: 2021-06-29 | End: 2022-02-11

## 2021-06-29 NOTE — PROGRESS NOTES
Assessment/Plan:      Diagnoses and all orders for this visit:    Contusion of right knee, sequela  -     acetaminophen-codeine (TYLENOL #3) 300-30 mg per tablet; Take 1 tablet by mouth every 4 (four) hours as needed for moderate pain          I recommend she rest the knee as much as possible keep it elevated use ice as needed  Follow-up if not a lot better in 5-7 days  Subjective:     Patient ID: Trevon Dowell is a 63 y o  female  She suffered a fall about 7 days ago landed on her right knee and shoulder  She was seen the following day in the care now at [address] were done there was no fractures noted  She says the knee actually hurts worse now had trouble sleeping last night  Shoulder still hurts somewhat but is improving  Review of Systems   Constitutional: Negative  HENT: Negative  Eyes: Negative  Respiratory: Negative  Cardiovascular: Negative  Gastrointestinal: Negative  Endocrine: Negative  Genitourinary: Negative  Musculoskeletal: Positive for gait problem and joint swelling  Skin: Negative  Allergic/Immunologic: Negative  Hematological: Negative  Psychiatric/Behavioral: Negative  All other systems reviewed and are negative  Objective:     Physical Exam  Constitutional:       Appearance: She is well-developed  HENT:      Head: Normocephalic and atraumatic  Eyes:      Pupils: Pupils are equal, round, and reactive to light  Neck:      Vascular: No JVD  Cardiovascular:      Rate and Rhythm: Normal rate  Pulmonary:      Effort: Pulmonary effort is normal    Abdominal:      Palpations: Abdomen is soft  Musculoskeletal:      Cervical back: Normal range of motion  Comments: There is ecchymosis in her right knee in down the calf especially lateral aspect  The right knee is swollen with fluid  There is decreased range of motion  Neurovascular appears intact  The ligaments tendons appear strong and intact     Lymphadenopathy: Cervical: No cervical adenopathy  Neurological:      Mental Status: She is alert and oriented to person, place, and time

## 2021-07-09 ENCOUNTER — OFFICE VISIT (OUTPATIENT)
Dept: FAMILY MEDICINE CLINIC | Facility: CLINIC | Age: 61
End: 2021-07-09
Payer: COMMERCIAL

## 2021-07-09 VITALS
TEMPERATURE: 96 F | BODY MASS INDEX: 36.06 KG/M2 | SYSTOLIC BLOOD PRESSURE: 140 MMHG | HEIGHT: 65 IN | DIASTOLIC BLOOD PRESSURE: 74 MMHG | WEIGHT: 216.4 LBS

## 2021-07-09 DIAGNOSIS — M70.41 PREPATELLAR BURSITIS, RIGHT KNEE: Primary | ICD-10-CM

## 2021-07-09 DIAGNOSIS — E11.9 TYPE 2 DIABETES MELLITUS WITHOUT COMPLICATION, WITHOUT LONG-TERM CURRENT USE OF INSULIN (HCC): ICD-10-CM

## 2021-07-09 PROCEDURE — 3008F BODY MASS INDEX DOCD: CPT | Performed by: FAMILY MEDICINE

## 2021-07-09 PROCEDURE — 99213 OFFICE O/P EST LOW 20 MIN: CPT | Performed by: FAMILY MEDICINE

## 2021-07-09 PROCEDURE — 1036F TOBACCO NON-USER: CPT | Performed by: FAMILY MEDICINE

## 2021-07-09 RX ORDER — MELOXICAM 15 MG/1
15 TABLET ORAL DAILY
Qty: 30 TABLET | Refills: 1 | Status: SHIPPED | OUTPATIENT
Start: 2021-07-09 | End: 2022-02-01 | Stop reason: ALTCHOICE

## 2021-07-09 RX ORDER — DULAGLUTIDE 0.75 MG/.5ML
0.75 INJECTION, SOLUTION SUBCUTANEOUS WEEKLY
Qty: 2 ML | Refills: 5 | Status: SHIPPED | OUTPATIENT
Start: 2021-07-09 | End: 2021-10-20 | Stop reason: SDUPTHER

## 2021-07-09 NOTE — PROGRESS NOTES
Assessment/Plan: the patient will start meloxicam daily and ice 10 minutes at a time 4 times a day  Diagnoses and all orders for this visit:    Prepatellar bursitis, right knee  -     meloxicam (MOBIC) 15 mg tablet; Take 1 tablet (15 mg total) by mouth daily    Type 2 diabetes mellitus without complication, without long-term current use of insulin ([facility])  -     Dulaglutide (Trulicity) 8 58 EE/9 0YA SOPN; Inject 0 5 mL (0 75 mg total) under the skin once a week            Subjective:        Patient ID: Kiah Shannon is a 63 y o  female  Patient is here status post fall on right knee roughly 2 weeks ago  Patient did notice ecchymosis as well as swelling  Patient had decreased range of motion afterwards is improving  Patient was seen at urgent care and had x-ray done  Patient did use some Tylenol 3  Patient was using ice  Not using any NSAIDs  The following portions of the patient's history were reviewed and updated as appropriate: allergies, current medications, past family history, past medical history, past social history, past surgical history and problem list       Review of Systems   Constitutional: Negative  HENT: Negative  Eyes: Negative  Respiratory: Negative  Cardiovascular: Negative  Gastrointestinal: Negative  Endocrine: Negative  Genitourinary: Negative  Musculoskeletal: Positive for arthralgias  Skin: Negative  Allergic/Immunologic: Negative  Neurological: Negative  Hematological: Negative  Psychiatric/Behavioral: Negative  Objective:               /74 (BP Location: Left arm, Patient Position: Sitting, Cuff Size: Standard)   Temp (!) 96 °F (35 6 °C) (Tympanic)   Ht 5' 5" (1 651 m)   Wt 98 2 kg (216 lb 6 4 oz)   LMP  (LMP Unknown)   BMI 36 01 kg/m²          Physical Exam  Constitutional:       Appearance: Normal appearance  HENT:      Head: Normocephalic and atraumatic     Musculoskeletal:         General: Swelling and tenderness present  Comments: Right prepatellar bursitis   Neurological:      Mental Status: She is alert

## 2021-09-17 ENCOUNTER — OFFICE VISIT (OUTPATIENT)
Dept: URGENT CARE | Facility: CLINIC | Age: 61
End: 2021-09-17
Payer: COMMERCIAL

## 2021-09-17 VITALS
SYSTOLIC BLOOD PRESSURE: 139 MMHG | RESPIRATION RATE: 20 BRPM | DIASTOLIC BLOOD PRESSURE: 63 MMHG | TEMPERATURE: 96.5 F | OXYGEN SATURATION: 98 % | HEART RATE: 88 BPM

## 2021-09-17 DIAGNOSIS — M70.52 PES ANSERINUS BURSITIS OF LEFT KNEE: ICD-10-CM

## 2021-09-17 DIAGNOSIS — M17.12 OSTEOARTHRITIS OF LEFT KNEE, UNSPECIFIED OSTEOARTHRITIS TYPE: Primary | ICD-10-CM

## 2021-09-17 PROCEDURE — 99213 OFFICE O/P EST LOW 20 MIN: CPT | Performed by: PHYSICIAN ASSISTANT

## 2021-09-17 RX ORDER — MELOXICAM 15 MG/1
15 TABLET ORAL DAILY
Qty: 30 TABLET | Refills: 0 | Status: SHIPPED | OUTPATIENT
Start: 2021-09-17 | End: 2022-02-01 | Stop reason: ALTCHOICE

## 2021-09-17 NOTE — PATIENT INSTRUCTIONS
Take the meloxicam as instructed  Take with food  May safely take Tylenol in addition to the meloxicam if needed for other pain issues  Warm or cool compresses to the leg 5-10 minutes every 1-2 hours while awake as needed for pain control  We recommend you follow up with your orthopedist for further evaluation

## 2021-09-17 NOTE — PROGRESS NOTES
[facility]    NAME: Bette Garcia is a 63 y o  female  : 1960    MRN: [id number]  DATE: 2021  TIME: 12:51 PM    Assessment and Plan   Osteoarthritis of left knee, unspecified osteoarthritis type [M17 12]  1  Osteoarthritis of left knee, unspecified osteoarthritis type  meloxicam (MOBIC) 15 mg tablet   2  Pes anserinus bursitis of left knee         Patient Instructions   Patient Instructions   Take the meloxicam as instructed  Take with food  May safely take Tylenol in addition to the meloxicam if needed for other pain issues  Warm or cool compresses to the leg 5-10 minutes every 1-2 hours while awake as needed for pain control  We recommend you follow up with your orthopedist for further evaluation  Chief Complaint     Chief Complaint   Patient presents with    Knee Pain     Left knee pain for " a little while"  No injury  Hurts a lot when going up and down steps       History of Present Illness   Bette Garcia presents to the clinic c/o    82-year-old female who has been having pain medial aspect left knee for quite a while now  She finds going up and down the steps is worse  Has not been taking anything for pain at this time  She did have injury to her right knee back in  after having a trip and a fall  She was doing physical therapy  She said she was favoring her right knee quite a bit and thinks she over did it on her left knee  She has had cortisone injections in the past and wonders if she can have 1 done here  Denies any acute injury  to left knee  Review of Systems   Review of Systems   Constitutional: Positive for activity change  Negative for appetite change, chills and fever  Musculoskeletal: Positive for arthralgias, gait problem and joint swelling  Skin: Negative for color change         Current Medications     Long-Term Medications   Medication Sig Dispense Refill    Blood Glucose Monitoring Suppl (ONE TOUCH ULTRA 2) w/Device KIT by Does not apply route daily 1 each 0    Dulaglutide (Trulicity) 4 89 YT/7 1CS SOPN Inject 0 5 mL (0 75 mg total) under the skin once a week 2 mL 5    fluticasone (FLONASE) 50 mcg/act nasal spray 1 spray into each nostril daily 16 g 1    gabapentin (NEURONTIN) 100 mg capsule Take 1 capsule (100 mg total) by mouth daily at bedtime 90 capsule 1    Lancets (ONETOUCH ULTRASOFT) lancets Use as instructed daily 100 each 1    levothyroxine 88 mcg tablet Take 1 tablet (88 mcg total) by mouth daily in the early morning 90 tablet 1    meloxicam (MOBIC) 15 mg tablet Take 1 tablet (15 mg total) by mouth daily 30 tablet 1    meloxicam (MOBIC) 15 mg tablet Take 1 tablet (15 mg total) by mouth daily 30 tablet 0    metFORMIN (GLUCOPHAGE) 500 mg tablet Take 2 tablets (1,000 mg total) by mouth 2 (two) times a day with meals (Patient taking differently: Take 500 mg by mouth 2 (two) times a day with meals ) 360 tablet 1    methocarbamol (ROBAXIN) 500 mg tablet Take 1 tablet (500 mg total) by mouth 4 (four) times a day 60 tablet 0    mometasone (ASMANEX TWISTHALER) 220 MCG/INH inhaler Inhale 1 puff every evening Rinse mouth after use   (Patient not taking: Reported on 6/29/2021) 1 Inhaler 3    omeprazole (PriLOSEC) 20 mg delayed release capsule Take 1 capsule (20 mg total) by mouth daily before breakfast 90 capsule 1    QUEtiapine (SEROquel) 200 mg tablet Take 1 tablet (200 mg total) by mouth daily at bedtime 90 tablet 1    QUEtiapine (SEROquel) 25 mg tablet TAKE 1 TABLET BY MOUTH ONCE DAILY WITH 200MG AT BEDTIME AS NEEDED      rosuvastatin (CRESTOR) 5 mg tablet TAKE 1 TABLET BY MOUTH EVERY DAY 30 tablet 5    sertraline (ZOLOFT) 100 mg tablet Take 2 tablets (200 mg total) by mouth daily at bedtime 180 tablet 1    traZODone (DESYREL) 50 mg tablet Take 1 tablet (50 mg total) by mouth daily at bedtime 90 tablet 1    venlafaxine (EFFEXOR-XR) 150 mg 24 hr capsule Take 150 mg by mouth daily         Current Allergies Allergies as of 09/17/2021 - Reviewed 09/17/2021   Allergen Reaction Noted    Darvon [propoxyphene]  01/26/2019    Fluoxetine  10/01/2012    Meclizine  10/01/2012    Morphine and related  10/01/2012    Oxycodone-acetaminophen  10/01/2012    Percolone [oxycodone]  03/20/2019          The following portions of the patient's history were reviewed and updated as appropriate: allergies, current medications, past family history, past medical history, past social history, past surgical history and problem list   Past Medical History:   Diagnosis Date    Anxiety     Asthma     Diabetes ([address] )     prediabetic    GERD (gastroesophageal reflux disease)     Hyperlipemia     Hypothyroidism     Kidney stones     Major depressive disorder     Migraine     Sleep apnea      Past Surgical History:   Procedure Laterality Date    ADENOIDECTOMY      DILATION AND CURETTAGE OF UTERUS      OK COLONOSCOPY FLX DX W/COLLJ SPEC WHEN PFRMD N/A 3/15/2019    Procedure: COLONOSCOPY;  Surgeon: Shelby Holly MD;  Location: [facility]; Service: Gastroenterology    SHOULDER SURGERY      TONSILLECTOMY      TUBAL LIGATION       Family History   Problem Relation Age of Onset    ALS Mother     Venous thrombosis Father     Diabetes Father     Other Family         cerebral embolism    Diabetes Family     Hypertension Family     Kidney disease Family     Breast cancer Neg Hx     Colon cancer Neg Hx     Ovarian cancer Neg Hx     Uterine cancer Neg Hx        Objective   /63   Pulse 88   Temp (!) 96 5 °F (35 8 °C) (Tympanic)   Resp 20   LMP  (LMP Unknown)   SpO2 98%   No LMP recorded (lmp unknown)  Patient is postmenopausal        Physical Exam     Physical Exam  Vitals and nursing note reviewed  Constitutional:       General: She is not in acute distress  Appearance: She is well-developed  She is obese  She is not ill-appearing, toxic-appearing or diaphoretic        Comments: Mild antalgic gait Cardiovascular:      Rate and Rhythm: Normal rate  Pulmonary:      Effort: Pulmonary effort is normal  No respiratory distress  Musculoskeletal:      Left knee: Swelling, bony tenderness and crepitus present  No effusion, erythema or ecchymosis  Normal range of motion  Tenderness present over the medial joint line  No lateral joint line, MCL, LCL, ACL, PCL or patellar tendon tenderness  Comments: Some TTP and fullness near pes anserinus bursa   Skin:     General: Skin is warm and dry  Findings: No bruising or erythema  Neurological:      Mental Status: She is alert and oriented to person, place, and time     Psychiatric:         Mood and Affect: Mood normal          Behavior: Behavior normal

## 2021-10-20 ENCOUNTER — OFFICE VISIT (OUTPATIENT)
Dept: FAMILY MEDICINE CLINIC | Facility: CLINIC | Age: 61
End: 2021-10-20
Payer: COMMERCIAL

## 2021-10-20 DIAGNOSIS — K21.00 GASTROESOPHAGEAL REFLUX DISEASE WITH ESOPHAGITIS: ICD-10-CM

## 2021-10-20 DIAGNOSIS — E78.2 MIXED HYPERLIPIDEMIA: ICD-10-CM

## 2021-10-20 DIAGNOSIS — F32.A DEPRESSION, UNSPECIFIED DEPRESSION TYPE: ICD-10-CM

## 2021-10-20 DIAGNOSIS — J06.9 ACUTE UPPER RESPIRATORY INFECTION: ICD-10-CM

## 2021-10-20 DIAGNOSIS — E11.9 TYPE 2 DIABETES MELLITUS WITHOUT COMPLICATION, WITHOUT LONG-TERM CURRENT USE OF INSULIN (HCC): ICD-10-CM

## 2021-10-20 DIAGNOSIS — U07.1 COVID-19: Primary | ICD-10-CM

## 2021-10-20 PROCEDURE — 99213 OFFICE O/P EST LOW 20 MIN: CPT | Performed by: FAMILY MEDICINE

## 2021-10-20 PROCEDURE — U0003 INFECTIOUS AGENT DETECTION BY NUCLEIC ACID (DNA OR RNA); SEVERE ACUTE RESPIRATORY SYNDROME CORONAVIRUS 2 (SARS-COV-2) (CORONAVIRUS DISEASE [COVID-19]), AMPLIFIED PROBE TECHNIQUE, MAKING USE OF HIGH THROUGHPUT TECHNOLOGIES AS DESCRIBED BY CMS-2020-01-R: HCPCS | Performed by: FAMILY MEDICINE

## 2021-10-20 PROCEDURE — U0005 INFEC AGEN DETEC AMPLI PROBE: HCPCS | Performed by: FAMILY MEDICINE

## 2021-10-20 RX ORDER — DULAGLUTIDE 0.75 MG/.5ML
0.75 INJECTION, SOLUTION SUBCUTANEOUS WEEKLY
Qty: 2 ML | Refills: 5 | Status: SHIPPED | OUTPATIENT
Start: 2021-10-20 | End: 2022-06-21

## 2021-10-20 RX ORDER — ROSUVASTATIN CALCIUM 5 MG/1
5 TABLET, COATED ORAL DAILY
Qty: 90 TABLET | Refills: 1 | Status: SHIPPED | OUTPATIENT
Start: 2021-10-20 | End: 2022-01-07 | Stop reason: SDUPTHER

## 2021-10-20 RX ORDER — BENZTROPINE MESYLATE 0.5 MG/1
0.5 TABLET ORAL DAILY
Qty: 90 TABLET | Refills: 1 | Status: SHIPPED | OUTPATIENT
Start: 2021-10-20 | End: 2022-01-07 | Stop reason: SDUPTHER

## 2021-10-20 RX ORDER — AMOXICILLIN 500 MG/1
500 TABLET, FILM COATED ORAL 3 TIMES DAILY
Qty: 21 TABLET | Refills: 0 | Status: SHIPPED | OUTPATIENT
Start: 2021-10-20 | End: 2021-10-27

## 2021-10-20 RX ORDER — OMEPRAZOLE 20 MG/1
20 CAPSULE, DELAYED RELEASE ORAL
Qty: 90 CAPSULE | Refills: 1 | Status: SHIPPED | OUTPATIENT
Start: 2021-10-20 | End: 2022-01-07 | Stop reason: SDUPTHER

## 2021-10-20 RX ORDER — BUSPIRONE HYDROCHLORIDE 10 MG/1
TABLET ORAL
COMMUNITY
Start: 2021-08-19 | End: 2022-01-07 | Stop reason: SDUPTHER

## 2021-10-21 ENCOUNTER — TELEPHONE (OUTPATIENT)
Dept: FAMILY MEDICINE CLINIC | Facility: CLINIC | Age: 61
End: 2021-10-21

## 2021-10-21 LAB — SARS-COV-2 RNA RESP QL NAA+PROBE: NEGATIVE

## 2021-11-18 ENCOUNTER — OFFICE VISIT (OUTPATIENT)
Dept: OBGYN CLINIC | Facility: OTHER | Age: 61
End: 2021-11-18
Payer: COMMERCIAL

## 2021-11-18 VITALS
HEIGHT: 65 IN | WEIGHT: 210 LBS | DIASTOLIC BLOOD PRESSURE: 81 MMHG | BODY MASS INDEX: 34.99 KG/M2 | SYSTOLIC BLOOD PRESSURE: 123 MMHG | HEART RATE: 91 BPM

## 2021-11-18 DIAGNOSIS — S83.242A OTHER TEAR OF MEDIAL MENISCUS OF LEFT KNEE AS CURRENT INJURY, INITIAL ENCOUNTER: Primary | ICD-10-CM

## 2021-11-18 PROCEDURE — 3079F DIAST BP 80-89 MM HG: CPT | Performed by: FAMILY MEDICINE

## 2021-11-18 PROCEDURE — 20610 DRAIN/INJ JOINT/BURSA W/O US: CPT | Performed by: FAMILY MEDICINE

## 2021-11-18 PROCEDURE — 3074F SYST BP LT 130 MM HG: CPT | Performed by: FAMILY MEDICINE

## 2021-11-18 PROCEDURE — 99214 OFFICE O/P EST MOD 30 MIN: CPT | Performed by: FAMILY MEDICINE

## 2021-11-18 RX ORDER — LIDOCAINE HYDROCHLORIDE 10 MG/ML
4 INJECTION, SOLUTION INFILTRATION; PERINEURAL
Status: COMPLETED | OUTPATIENT
Start: 2021-11-18 | End: 2021-11-18

## 2021-11-18 RX ORDER — TRIAMCINOLONE ACETONIDE 40 MG/ML
40 INJECTION, SUSPENSION INTRA-ARTICULAR; INTRAMUSCULAR
Status: COMPLETED | OUTPATIENT
Start: 2021-11-18 | End: 2021-11-18

## 2021-11-18 RX ADMIN — TRIAMCINOLONE ACETONIDE 40 MG: 40 INJECTION, SUSPENSION INTRA-ARTICULAR; INTRAMUSCULAR at 14:39

## 2021-11-18 RX ADMIN — LIDOCAINE HYDROCHLORIDE 4 ML: 10 INJECTION, SOLUTION INFILTRATION; PERINEURAL at 14:39

## 2022-01-06 ENCOUNTER — APPOINTMENT (OUTPATIENT)
Dept: LAB | Facility: CLINIC | Age: 62
End: 2022-01-06
Payer: COMMERCIAL

## 2022-01-06 DIAGNOSIS — E78.2 MIXED HYPERLIPIDEMIA: ICD-10-CM

## 2022-01-06 DIAGNOSIS — E11.9 TYPE 2 DIABETES MELLITUS WITHOUT COMPLICATION, WITHOUT LONG-TERM CURRENT USE OF INSULIN (HCC): Primary | ICD-10-CM

## 2022-01-06 DIAGNOSIS — E03.9 HYPOTHYROIDISM, UNSPECIFIED TYPE: ICD-10-CM

## 2022-01-06 DIAGNOSIS — F33.2 SEVERE RECURRENT MAJOR DEPRESSION WITHOUT PSYCHOTIC FEATURES (HCC): ICD-10-CM

## 2022-01-06 DIAGNOSIS — E11.9 TYPE 2 DIABETES MELLITUS WITHOUT COMPLICATION, WITHOUT LONG-TERM CURRENT USE OF INSULIN (HCC): ICD-10-CM

## 2022-01-06 DIAGNOSIS — J45.909 ASTHMA, UNSPECIFIED ASTHMA SEVERITY, UNSPECIFIED WHETHER COMPLICATED, UNSPECIFIED WHETHER PERSISTENT: ICD-10-CM

## 2022-01-06 DIAGNOSIS — K21.00 GASTROESOPHAGEAL REFLUX DISEASE WITH ESOPHAGITIS: ICD-10-CM

## 2022-01-06 LAB
ALBUMIN SERPL BCP-MCNC: 3.7 G/DL (ref 3.5–5)
ALP SERPL-CCNC: 119 U/L (ref 46–116)
ALT SERPL W P-5'-P-CCNC: 38 U/L (ref 12–78)
ANION GAP SERPL CALCULATED.3IONS-SCNC: 5 MMOL/L (ref 4–13)
AST SERPL W P-5'-P-CCNC: 18 U/L (ref 5–45)
BASOPHILS # BLD AUTO: 0.06 THOUSANDS/ΜL (ref 0–0.1)
BASOPHILS NFR BLD AUTO: 1 % (ref 0–1)
BILIRUB SERPL-MCNC: 0.95 MG/DL (ref 0.2–1)
BUN SERPL-MCNC: 19 MG/DL (ref 5–25)
CALCIUM SERPL-MCNC: 9.1 MG/DL (ref 8.3–10.1)
CHLORIDE SERPL-SCNC: 105 MMOL/L (ref 100–108)
CO2 SERPL-SCNC: 27 MMOL/L (ref 21–32)
CREAT SERPL-MCNC: 1.03 MG/DL (ref 0.6–1.3)
CREAT UR-MCNC: 169 MG/DL
EOSINOPHIL # BLD AUTO: 0.4 THOUSAND/ΜL (ref 0–0.61)
EOSINOPHIL NFR BLD AUTO: 6 % (ref 0–6)
ERYTHROCYTE [DISTWIDTH] IN BLOOD BY AUTOMATED COUNT: 13.2 % (ref 11.6–15.1)
GFR SERPL CREATININE-BSD FRML MDRD: 59 ML/MIN/1.73SQ M
GLUCOSE P FAST SERPL-MCNC: 143 MG/DL (ref 65–99)
HCT VFR BLD AUTO: 38 % (ref 34.8–46.1)
HGB BLD-MCNC: 12.3 G/DL (ref 11.5–15.4)
IMM GRANULOCYTES # BLD AUTO: 0.01 THOUSAND/UL (ref 0–0.2)
IMM GRANULOCYTES NFR BLD AUTO: 0 % (ref 0–2)
LYMPHOCYTES # BLD AUTO: 1.73 THOUSANDS/ΜL (ref 0.6–4.47)
LYMPHOCYTES NFR BLD AUTO: 26 % (ref 14–44)
MCH RBC QN AUTO: 29.2 PG (ref 26.8–34.3)
MCHC RBC AUTO-ENTMCNC: 32.4 G/DL (ref 31.4–37.4)
MCV RBC AUTO: 90 FL (ref 82–98)
MICROALBUMIN UR-MCNC: 12 MG/L (ref 0–20)
MICROALBUMIN/CREAT 24H UR: 7 MG/G CREATININE (ref 0–30)
MONOCYTES # BLD AUTO: 0.53 THOUSAND/ΜL (ref 0.17–1.22)
MONOCYTES NFR BLD AUTO: 8 % (ref 4–12)
NEUTROPHILS # BLD AUTO: 3.91 THOUSANDS/ΜL (ref 1.85–7.62)
NEUTS SEG NFR BLD AUTO: 59 % (ref 43–75)
NRBC BLD AUTO-RTO: 0 /100 WBCS
PLATELET # BLD AUTO: 296 THOUSANDS/UL (ref 149–390)
PMV BLD AUTO: 9.8 FL (ref 8.9–12.7)
POTASSIUM SERPL-SCNC: 4.1 MMOL/L (ref 3.5–5.3)
PROT SERPL-MCNC: 7.6 G/DL (ref 6.4–8.2)
RBC # BLD AUTO: 4.21 MILLION/UL (ref 3.81–5.12)
SODIUM SERPL-SCNC: 137 MMOL/L (ref 136–145)
TSH SERPL DL<=0.05 MIU/L-ACNC: 1.74 UIU/ML (ref 0.36–3.74)
WBC # BLD AUTO: 6.64 THOUSAND/UL (ref 4.31–10.16)

## 2022-01-06 PROCEDURE — 82570 ASSAY OF URINE CREATININE: CPT | Performed by: FAMILY MEDICINE

## 2022-01-06 PROCEDURE — 82043 UR ALBUMIN QUANTITATIVE: CPT | Performed by: FAMILY MEDICINE

## 2022-01-06 PROCEDURE — 85025 COMPLETE CBC W/AUTO DIFF WBC: CPT

## 2022-01-06 PROCEDURE — 84443 ASSAY THYROID STIM HORMONE: CPT

## 2022-01-06 PROCEDURE — 80053 COMPREHEN METABOLIC PANEL: CPT

## 2022-01-06 PROCEDURE — 83036 HEMOGLOBIN GLYCOSYLATED A1C: CPT

## 2022-01-06 PROCEDURE — 36415 COLL VENOUS BLD VENIPUNCTURE: CPT

## 2022-01-07 ENCOUNTER — OFFICE VISIT (OUTPATIENT)
Dept: FAMILY MEDICINE CLINIC | Facility: CLINIC | Age: 62
End: 2022-01-07
Payer: COMMERCIAL

## 2022-01-07 VITALS
TEMPERATURE: 96.7 F | RESPIRATION RATE: 18 BRPM | BODY MASS INDEX: 35.16 KG/M2 | HEIGHT: 65 IN | SYSTOLIC BLOOD PRESSURE: 138 MMHG | HEART RATE: 102 BPM | OXYGEN SATURATION: 96 % | DIASTOLIC BLOOD PRESSURE: 82 MMHG | WEIGHT: 211 LBS

## 2022-01-07 DIAGNOSIS — T78.40XD ALLERGY, SUBSEQUENT ENCOUNTER: ICD-10-CM

## 2022-01-07 DIAGNOSIS — E03.9 HYPOTHYROIDISM, UNSPECIFIED TYPE: Primary | ICD-10-CM

## 2022-01-07 DIAGNOSIS — E78.2 MIXED HYPERLIPIDEMIA: ICD-10-CM

## 2022-01-07 DIAGNOSIS — K21.00 GASTROESOPHAGEAL REFLUX DISEASE WITH ESOPHAGITIS: ICD-10-CM

## 2022-01-07 DIAGNOSIS — J45.909 ASTHMA, UNSPECIFIED ASTHMA SEVERITY, UNSPECIFIED WHETHER COMPLICATED, UNSPECIFIED WHETHER PERSISTENT: ICD-10-CM

## 2022-01-07 DIAGNOSIS — D50.8 OTHER IRON DEFICIENCY ANEMIA: ICD-10-CM

## 2022-01-07 DIAGNOSIS — F32.A DEPRESSION, UNSPECIFIED DEPRESSION TYPE: ICD-10-CM

## 2022-01-07 DIAGNOSIS — G43.009 MIGRAINE WITHOUT AURA AND WITHOUT STATUS MIGRAINOSUS, NOT INTRACTABLE: ICD-10-CM

## 2022-01-07 DIAGNOSIS — E11.8 TYPE 2 DIABETES MELLITUS WITH COMPLICATION (HCC): ICD-10-CM

## 2022-01-07 DIAGNOSIS — Z23 NEED FOR IMMUNIZATION AGAINST INFLUENZA: ICD-10-CM

## 2022-01-07 DIAGNOSIS — E11.9 TYPE 2 DIABETES MELLITUS WITHOUT COMPLICATION, WITHOUT LONG-TERM CURRENT USE OF INSULIN (HCC): ICD-10-CM

## 2022-01-07 DIAGNOSIS — E05.90 HYPERTHYROIDISM: ICD-10-CM

## 2022-01-07 DIAGNOSIS — F33.2 SEVERE RECURRENT MAJOR DEPRESSION WITHOUT PSYCHOTIC FEATURES (HCC): ICD-10-CM

## 2022-01-07 DIAGNOSIS — K21.00 GASTROESOPHAGEAL REFLUX DISEASE WITH ESOPHAGITIS WITHOUT HEMORRHAGE: ICD-10-CM

## 2022-01-07 LAB
EST. AVERAGE GLUCOSE BLD GHB EST-MCNC: 171 MG/DL
HBA1C MFR BLD: 7.6 %

## 2022-01-07 PROCEDURE — 3079F DIAST BP 80-89 MM HG: CPT | Performed by: FAMILY MEDICINE

## 2022-01-07 PROCEDURE — 90682 RIV4 VACC RECOMBINANT DNA IM: CPT

## 2022-01-07 PROCEDURE — 90471 IMMUNIZATION ADMIN: CPT

## 2022-01-07 PROCEDURE — 99214 OFFICE O/P EST MOD 30 MIN: CPT | Performed by: FAMILY MEDICINE

## 2022-01-07 PROCEDURE — 3051F HG A1C>EQUAL 7.0%<8.0%: CPT | Performed by: FAMILY MEDICINE

## 2022-01-07 PROCEDURE — 3075F SYST BP GE 130 - 139MM HG: CPT | Performed by: FAMILY MEDICINE

## 2022-01-07 RX ORDER — DULAGLUTIDE 0.75 MG/.5ML
0.75 INJECTION, SOLUTION SUBCUTANEOUS WEEKLY
Qty: 2 ML | Refills: 5 | Status: CANCELLED | OUTPATIENT
Start: 2022-01-07

## 2022-01-07 RX ORDER — SERTRALINE HYDROCHLORIDE 100 MG/1
200 TABLET, FILM COATED ORAL
Qty: 180 TABLET | Refills: 1 | Status: SHIPPED | OUTPATIENT
Start: 2022-01-07

## 2022-01-07 RX ORDER — TRAZODONE HYDROCHLORIDE 50 MG/1
50 TABLET ORAL
Qty: 90 TABLET | Refills: 1 | Status: SHIPPED | OUTPATIENT
Start: 2022-01-07 | End: 2022-07-18

## 2022-01-07 RX ORDER — BENZTROPINE MESYLATE 0.5 MG/1
0.5 TABLET ORAL DAILY
Qty: 90 TABLET | Refills: 1 | Status: SHIPPED | OUTPATIENT
Start: 2022-01-07

## 2022-01-07 RX ORDER — LEVOTHYROXINE SODIUM 88 UG/1
88 TABLET ORAL
Qty: 90 TABLET | Refills: 1 | Status: SHIPPED | OUTPATIENT
Start: 2022-01-07 | End: 2022-07-18

## 2022-01-07 RX ORDER — ALBUTEROL SULFATE 90 UG/1
1-2 AEROSOL, METERED RESPIRATORY (INHALATION) EVERY 4 HOURS PRN
Qty: 36 G | Refills: 1 | Status: SHIPPED | OUTPATIENT
Start: 2022-01-07

## 2022-01-07 RX ORDER — ROSUVASTATIN CALCIUM 5 MG/1
5 TABLET, COATED ORAL DAILY
Qty: 90 TABLET | Refills: 1 | Status: SHIPPED | OUTPATIENT
Start: 2022-01-07 | End: 2022-06-21 | Stop reason: SDUPTHER

## 2022-01-07 RX ORDER — DEXAMETHASONE 4 MG/1
2 TABLET ORAL 2 TIMES DAILY
Qty: 36 G | Refills: 1 | Status: SHIPPED | OUTPATIENT
Start: 2022-01-07

## 2022-01-07 RX ORDER — BUSPIRONE HYDROCHLORIDE 10 MG/1
10 TABLET ORAL DAILY
Qty: 90 TABLET | Refills: 1 | Status: SHIPPED | OUTPATIENT
Start: 2022-01-07

## 2022-01-07 RX ORDER — OMEPRAZOLE 20 MG/1
20 CAPSULE, DELAYED RELEASE ORAL
Qty: 90 CAPSULE | Refills: 1 | Status: SHIPPED | OUTPATIENT
Start: 2022-01-07 | End: 2022-06-21 | Stop reason: SDUPTHER

## 2022-01-07 RX ORDER — VENLAFAXINE HYDROCHLORIDE 150 MG/1
150 CAPSULE, EXTENDED RELEASE ORAL DAILY
Qty: 90 CAPSULE | Refills: 1 | Status: SHIPPED | OUTPATIENT
Start: 2022-01-07 | End: 2022-07-18

## 2022-01-07 RX ORDER — QUETIAPINE FUMARATE 200 MG/1
200 TABLET, FILM COATED ORAL
Qty: 90 TABLET | Refills: 1 | Status: SHIPPED | OUTPATIENT
Start: 2022-01-07

## 2022-01-07 RX ORDER — ALBUTEROL SULFATE 2.5 MG/3ML
2.5 SOLUTION RESPIRATORY (INHALATION) EVERY 4 HOURS PRN
Qty: 75 ML | Refills: 2 | Status: SHIPPED | OUTPATIENT
Start: 2022-01-07

## 2022-01-07 RX ORDER — FLUTICASONE PROPIONATE 50 MCG
1 SPRAY, SUSPENSION (ML) NASAL DAILY
Qty: 16 G | Refills: 1 | Status: SHIPPED | OUTPATIENT
Start: 2022-01-07

## 2022-01-07 NOTE — PROGRESS NOTES
Assessment/Plan:  TSH is 1 7  GFR 59  Glucose 143  CBC normal normal urine microalbumin A1c is 7 6  Hypothyroidism stable continue with current regimen of levothyroxine refills given at this time  Diabetes improving with A1c 7 6  Will increase Trulicity as noted  Blood pressure stable at this time  No anemia noted  Asthma stable  No GERD symptoms  The continue omeprazole  Refills given for chronic conditions a  Follow-up in 6 months  Patient will follow-up with ophthalmology for routine  Patient does saw ophthalmologist   No numbness or tingling of the feet  Flu shot given at this time  Refills given at this time  Follow-up in 6 months       Diagnoses and all orders for this visit:    Hypothyroidism, unspecified type    Depression, unspecified depression type  -     albuterol (2 5 mg/3 mL) 0 083 % nebulizer solution; Take 3 mL (2 5 mg total) by nebulization every 4 (four) hours as needed for wheezing  -     benztropine (COGENTIN) 0 5 mg tablet; Take 1 tablet (0 5 mg total) by mouth daily  -     busPIRone (BUSPAR) 10 mg tablet; Take 1 tablet (10 mg total) by mouth in the morning  -     QUEtiapine (SEROquel) 200 mg tablet; Take 1 tablet (200 mg total) by mouth daily at bedtime  -     sertraline (ZOLOFT) 100 mg tablet; Take 2 tablets (200 mg total) by mouth daily at bedtime  -     traZODone (DESYREL) 50 mg tablet; Take 1 tablet (50 mg total) by mouth daily at bedtime  -     venlafaxine (EFFEXOR-XR) 150 mg 24 hr capsule; Take 1 capsule (150 mg total) by mouth daily    Asthma, unspecified asthma severity, unspecified whether complicated, unspecified whether persistent  -     albuterol (Proventil HFA) 90 mcg/act inhaler; Inhale 1-2 puffs every 4 (four) hours as needed for wheezing  -     fluticasone (FLONASE) 50 mcg/act nasal spray; 1 spray into each nostril daily  -     fluticasone (Flovent HFA) 110 MCG/ACT inhaler; Inhale 2 puffs 2 (two) times a day Rinse mouth after use      Type 2 diabetes mellitus without complication, without long-term current use of insulin (HCC)  -     Dulaglutide 1 5 MG/0 5ML SOPN; Inject 0 5 mL (1 5 mg total) under the skin once a week    Allergy, subsequent encounter  -     fluticasone (FLONASE) 50 mcg/act nasal spray; 1 spray into each nostril daily    Hyperthyroidism  -     levothyroxine 88 mcg tablet; Take 1 tablet (88 mcg total) by mouth daily in the early morning    Type 2 diabetes mellitus with complication (HCC)  -     metFORMIN (GLUCOPHAGE) 500 mg tablet; Take 2 tablets (1,000 mg total) by mouth 2 (two) times a day with meals    Gastroesophageal reflux disease with esophagitis  -     omeprazole (PriLOSEC) 20 mg delayed release capsule; Take 1 capsule (20 mg total) by mouth daily before breakfast    Mixed hyperlipidemia  -     rosuvastatin (CRESTOR) 5 mg tablet; Take 1 tablet (5 mg total) by mouth daily    Gastroesophageal reflux disease with esophagitis without hemorrhage    Other iron deficiency anemia    Severe recurrent major depression without psychotic features ([facility])    Migraine without aura and without status migrainosus, not intractable            Subjective:        Patient ID: Nancy Franco is a 63 y o  female  Patient follow-up on diabetes hypothyroidism depression etcetera  The patient with ongoing depressive symptoms at this time  No suicidal ideation at this time  No change urination or defecation  The patient with left knee pain  Patient did have steroid injection in November  The patient has noticed improvement but now has recurring pain  Patient have laboratory studies done  Asthma has been stable overall  No GERD symptoms  No numbness or tingling of the feet  Patient did see ophthalmologist roughly 1 month ago          The following portions of the patient's history were reviewed and updated as appropriate: allergies, current medications, past family history, past medical history, past social history, past surgical history and problem list       Review of Systems   Constitutional: Negative  HENT: Negative  Eyes: Negative  Respiratory: Negative  Cardiovascular: Negative  Gastrointestinal: Negative  Endocrine: Negative  Genitourinary: Negative  Musculoskeletal: Positive for arthralgias  Skin: Negative  Allergic/Immunologic: Negative  Neurological: Negative  Hematological: Negative  Psychiatric/Behavioral: Positive for dysphoric mood and sleep disturbance  Negative for self-injury and suicidal ideas  Objective:      BMI Counseling: Body mass index is 35 11 kg/m²  The BMI is above normal  Nutrition recommendations include decreasing portion sizes  Exercise recommendations include exercising 3-5 times per week  Rationale for BMI follow-up plan is due to patient being overweight or obese  /82 (BP Location: Right arm, Patient Position: Sitting, Cuff Size: Adult)   Pulse 102   Temp (!) 96 7 °F (35 9 °C) (Tympanic)   Resp 18   Ht 5' 5" (1 651 m)   Wt 95 7 kg (211 lb)   LMP  (LMP Unknown)   SpO2 96%   BMI 35 11 kg/m²          Physical Exam  Vitals and nursing note reviewed  Constitutional:       General: She is not in acute distress  Appearance: She is not ill-appearing, toxic-appearing or diaphoretic  HENT:      Head: Normocephalic and atraumatic  Right Ear: Tympanic membrane, ear canal and external ear normal  There is no impacted cerumen  Left Ear: Tympanic membrane, ear canal and external ear normal  There is no impacted cerumen  Nose: Nose normal  No congestion or rhinorrhea  Mouth/Throat:      Mouth: Mucous membranes are moist       Pharynx: No oropharyngeal exudate or posterior oropharyngeal erythema  Eyes:      General: No scleral icterus  Right eye: No discharge  Left eye: No discharge  Extraocular Movements: Extraocular movements intact  Conjunctiva/sclera: Conjunctivae normal       Pupils: Pupils are equal, round, and reactive to light  Neck:      Vascular: No carotid bruit  Cardiovascular:      Rate and Rhythm: Normal rate and regular rhythm  Pulses: Normal pulses  Heart sounds: Normal heart sounds  No murmur heard  No friction rub  No gallop  Pulmonary:      Effort: Pulmonary effort is normal  No respiratory distress  Breath sounds: Normal breath sounds  No stridor  No wheezing, rhonchi or rales  Chest:      Chest wall: No tenderness  Abdominal:      General: Abdomen is flat  Bowel sounds are normal  There is no distension  Palpations: Abdomen is soft  Tenderness: There is no abdominal tenderness  There is no guarding or rebound  Musculoskeletal:         General: Tenderness present  No swelling, deformity or signs of injury  Cervical back: Normal range of motion and neck supple  No rigidity  No muscular tenderness  Right lower leg: No edema  Left lower leg: No edema  Lymphadenopathy:      Cervical: No cervical adenopathy  Skin:     General: Skin is warm and dry  Capillary Refill: Capillary refill takes less than 2 seconds  Coloration: Skin is not jaundiced  Findings: No bruising, erythema, lesion or rash  Neurological:      Mental Status: She is alert and oriented to person, place, and time  Mental status is at baseline  Cranial Nerves: No cranial nerve deficit  Sensory: No sensory deficit  Motor: No weakness  Coordination: Coordination normal       Gait: Gait normal    Psychiatric:         Mood and Affect: Mood normal          Behavior: Behavior normal          Thought Content:  Thought content normal          Judgment: Judgment normal

## 2022-01-20 ENCOUNTER — TELEMEDICINE (OUTPATIENT)
Dept: FAMILY MEDICINE CLINIC | Facility: CLINIC | Age: 62
End: 2022-01-20
Payer: COMMERCIAL

## 2022-01-20 VITALS — BODY MASS INDEX: 35.16 KG/M2 | HEIGHT: 65 IN | WEIGHT: 211 LBS

## 2022-01-20 DIAGNOSIS — U07.1 COVID-19: Primary | ICD-10-CM

## 2022-01-20 PROCEDURE — G2012 BRIEF CHECK IN BY MD/QHP: HCPCS | Performed by: FAMILY MEDICINE

## 2022-01-20 NOTE — PROGRESS NOTES
COVID-19 Outpatient Progress Note    Assessment/Plan:    Problem List Items Addressed This Visit     None      Visit Diagnoses     COVID-19    -  Primary         Disposition:     Patient has COVID-19 infection  Based off CDC guidelines, they were recommended to isolate for 5 days from the date of the positive test  If they remain asymptomatic, isolation may be ended followed by 5 days of wearing a mask when around othes to minimize risk of infecting others  If they have a fever, continue to stay home until fever resolves for at least 24 hours  I have spent 23 minutes directly with the patient  Greater than 50% of this time was spent in counseling/coordination of care regarding: instructions for management and patient and family education  Encounter provider Carroll Beasley DO    Provider located at [address] [place] [id number]    Recent Visits  No visits were found meeting these conditions  Showing recent visits within past 7 days and meeting all other requirements  Today's Visits  Date Type Provider Dept   01/20/22 Telemedicine Davide Gonzales DO [address] [facility] today's visits and meeting all other requirements  Future Appointments  No visits were found meeting these conditions  Showing future appointments within next 150 days and meeting all other requirements     This virtual check-in was done via telephone and she agrees to proceed  Patient agrees to participate in a virtual check in via telephone or video visit instead of presenting to the office to address urgent/immediate medical needs  Patient is aware this is a billable service  After connecting through Telephone, the patient was identified by name and date of birth  Rodger Davis was informed that this was a telemedicine visit and that the exam was being conducted confidentially over secure lines  No one else was in the room   Rodger Davis acknowledged consent and understanding of privacy and security of the telemedicine visit  I informed the patient that I have reviewed her record in Epic and presented the opportunity for her to ask any questions regarding the visit today  The patient agreed to participate  It was my intent to perform this visit via video technology but the patient was not able to do a video connection so the visit was completed via audio telephone only  Verification of patient location:  Patient is located in the following state in which I hold an active license: [place]    Subjective:   Lexie Mooney is a 63 y o  female who has been screened for COVID-19  Symptom change since last report: improving  Patient's symptoms include fatigue, sore throat, cough, myalgias and headache  Patient denies fever, chills, malaise, congestion, rhinorrhea, anosmia, loss of taste, shortness of breath, chest tightness, abdominal pain, nausea, vomiting and diarrhea  - Date of symptom onset: 1/19/2022      COVID-19 vaccination status: Fully vaccinated with booster    Grey Greco has been staying home and has isolated themselves in her home  She is taking care to not share personal items and is cleaning all surfaces that are touched often, like counters, tabletops, and doorknobs using household cleaning sprays or wipes  She is wearing a mask when she leaves her room       Lab Results   Component Value Date    SARSCOV2 Negative 10/20/2021    SARSCOV2 Not Detected 12/02/2020     Past Medical History:   Diagnosis Date    Anxiety     Asthma     Diabetes ([address] )     prediabetic    GERD (gastroesophageal reflux disease)     Hyperlipemia     Hypothyroidism     Kidney stones     Major depressive disorder     Migraine     Sleep apnea      Past Surgical History:   Procedure Laterality Date    ADENOIDECTOMY      DILATION AND CURETTAGE OF UTERUS      MA COLONOSCOPY FLX DX W/COLLJ SPEC WHEN PFRMD N/A 3/15/2019    Procedure: COLONOSCOPY;  Surgeon: Joselito Butts Tiffani Mahan MD;  Location: [facility]; Service: Gastroenterology    SHOULDER SURGERY      TONSILLECTOMY      TUBAL LIGATION       Current Outpatient Medications   Medication Sig Dispense Refill    acetaminophen-codeine (TYLENOL #3) 300-30 mg per tablet Take 1 tablet by mouth every 4 (four) hours as needed for moderate pain 30 tablet 0    albuterol (2 5 mg/3 mL) 0 083 % nebulizer solution Take 3 mL (2 5 mg total) by nebulization every 4 (four) hours as needed for wheezing 75 mL 2    albuterol (Proventil HFA) 90 mcg/act inhaler Inhale 1-2 puffs every 4 (four) hours as needed for wheezing 36 g 1    benztropine (COGENTIN) 0 5 mg tablet Take 1 tablet (0 5 mg total) by mouth daily 90 tablet 1    Blood Glucose Monitoring Suppl (ONE TOUCH ULTRA 2) w/Device KIT by Does not apply route daily 1 each 0    busPIRone (BUSPAR) 10 mg tablet Take 1 tablet (10 mg total) by mouth in the morning 90 tablet 1    Dulaglutide (Trulicity) 1 02 SA/0 6DB SOPN Inject 0 5 mL (0 75 mg total) under the skin once a week 2 mL 5    Dulaglutide 1 5 MG/0 5ML SOPN Inject 0 5 mL (1 5 mg total) under the skin once a week 2 mL 5    fluticasone (FLONASE) 50 mcg/act nasal spray 1 spray into each nostril daily 16 g 1    fluticasone (Flovent HFA) 110 MCG/ACT inhaler Inhale 2 puffs 2 (two) times a day Rinse mouth after use   36 g 1    gabapentin (NEURONTIN) 100 mg capsule Take 1 capsule (100 mg total) by mouth daily at bedtime 90 capsule 1    Lancets (ONETOUCH ULTRASOFT) lancets Use as instructed daily 100 each 1    levothyroxine 88 mcg tablet Take 1 tablet (88 mcg total) by mouth daily in the early morning 90 tablet 1    meloxicam (MOBIC) 15 mg tablet Take 1 tablet (15 mg total) by mouth daily 30 tablet 1    meloxicam (MOBIC) 15 mg tablet Take 1 tablet (15 mg total) by mouth daily 30 tablet 0    metFORMIN (GLUCOPHAGE) 500 mg tablet Take 2 tablets (1,000 mg total) by mouth 2 (two) times a day with meals 360 tablet 1    omeprazole (PriLOSEC) 20 mg delayed release capsule Take 1 capsule (20 mg total) by mouth daily before breakfast 90 capsule 1    QUEtiapine (SEROquel) 200 mg tablet Take 1 tablet (200 mg total) by mouth daily at bedtime 90 tablet 1    QUEtiapine (SEROquel) 25 mg tablet TAKE 1 TABLET BY MOUTH ONCE DAILY WITH 200MG AT BEDTIME AS NEEDED      rosuvastatin (CRESTOR) 5 mg tablet Take 1 tablet (5 mg total) by mouth daily 90 tablet 1    sertraline (ZOLOFT) 100 mg tablet Take 2 tablets (200 mg total) by mouth daily at bedtime 180 tablet 1    traZODone (DESYREL) 50 mg tablet Take 1 tablet (50 mg total) by mouth daily at bedtime 90 tablet 1    venlafaxine (EFFEXOR-XR) 150 mg 24 hr capsule Take 1 capsule (150 mg total) by mouth daily 90 capsule 1    methocarbamol (ROBAXIN) 500 mg tablet Take 1 tablet (500 mg total) by mouth 4 (four) times a day (Patient not taking: Reported on 11/18/2021 ) 60 tablet 0     No current facility-administered medications for this visit  Allergies   Allergen Reactions    Darvon [Propoxyphene]     Fluoxetine     Meclizine     Morphine And Related     Oxycodone-Acetaminophen     Percolone [Oxycodone]        Review of Systems   Constitutional: Positive for fatigue  Negative for chills and fever  HENT: Positive for sore throat  Negative for congestion and rhinorrhea  Eyes: Negative  Respiratory: Positive for cough  Negative for chest tightness and shortness of breath  Cardiovascular: Negative  Gastrointestinal: Negative  Negative for abdominal pain, diarrhea, nausea and vomiting  Endocrine: Negative  Genitourinary: Negative  Musculoskeletal: Positive for myalgias  Skin: Negative  Allergic/Immunologic: Negative  Neurological: Positive for headaches  Hematological: Negative  Psychiatric/Behavioral: Negative  Objective:    Vitals:    01/20/22 1532   Weight: 95 7 kg (211 lb)   Height: 5' 5" (1 651 m)       Physical Exam  Nursing note reviewed     Neurological: Mental Status: She is alert  Psychiatric:         Mood and Affect: Mood normal          Behavior: Behavior normal          VIRTUAL VISIT DISCLAIMER    Danika Valenzuela verbally agrees to participate in [employer]  Pt is aware that [employer] could be limited without vital signs or the ability to perform a full hands-on physical Mal Lipoma understands she or the provider may request at any time to terminate the video visit and request the patient to seek care or treatment in person

## 2022-01-24 NOTE — NURSING NOTE
Patient has appeared to sleep through the night without interruption  Will continue to monitor closely  22-Jan-2022

## 2022-01-31 ENCOUNTER — TELEPHONE (OUTPATIENT)
Dept: OBGYN CLINIC | Facility: HOSPITAL | Age: 62
End: 2022-01-31

## 2022-01-31 NOTE — TELEPHONE ENCOUNTER
Dr Candelario Acevedo - emergency appt - extreme pain    Patient has 2/3 appt but states she is in extreme pain and is asking if you could fit her in on 2/1 in [place]

## 2022-02-01 ENCOUNTER — OFFICE VISIT (OUTPATIENT)
Dept: OBGYN CLINIC | Facility: OTHER | Age: 62
End: 2022-02-01
Payer: COMMERCIAL

## 2022-02-01 VITALS
HEART RATE: 108 BPM | WEIGHT: 210 LBS | DIASTOLIC BLOOD PRESSURE: 82 MMHG | BODY MASS INDEX: 34.99 KG/M2 | SYSTOLIC BLOOD PRESSURE: 136 MMHG | HEIGHT: 65 IN

## 2022-02-01 DIAGNOSIS — S83.242A OTHER TEAR OF MEDIAL MENISCUS OF LEFT KNEE AS CURRENT INJURY, INITIAL ENCOUNTER: Primary | ICD-10-CM

## 2022-02-01 PROCEDURE — 99213 OFFICE O/P EST LOW 20 MIN: CPT | Performed by: FAMILY MEDICINE

## 2022-02-01 NOTE — PROGRESS NOTES
1  Other tear of medial meniscus of left knee as current injury, initial encounter  Ambulatory Referral to Orthopedic Surgery    Diclofenac Sodium (VOLTAREN) 1 %     Orders Placed This Encounter   Procedures    Ambulatory Referral to Orthopedic Surgery        Imaging Studies (I personally reviewed images in PACS and report):  MRI of left knee 02/26/2021:  Horizontal tear posterior horn medial meniscus without fluid or displaced meniscal fragment  Mild tricompartmental degenerative arthritis  Small joint effusion      X-ray of left knee 02/04/2021:  No acute osseous abnormality    IMPRESSION:  Left knee pain  Posterior horn medial meniscus tear  Mild tricompartmental arthritis         Repeat X-ray next visit: None    Return for please schedule follow up with Doctor Aiden Bethea   Patient Instructions     I reviewed my clinical findings and MRI findings with Nancy Franco  MRI demonstrating left horizontal tear posterior horn medial meniscus without fluid or displaced meniscal fragment  Mild tricompartmental degenerative arthritis  Patient has received two corticosteroid injections for pain with minimal improvement most recent 11/18/2021  Plan to refer to sports ortho surgery for further evaluation  Educated risks of mixing NSAIDS ( (non-steroidal anti-inflammatory pills including advil, ibuprofen, motrin, meloxicam, celecoxib, aleve, naproxen, and aspirin containing products) with each other or with steroids (such as prednisone, medrol)  Explained risks of mixing these medications including stomach ulcer, severe internal bleeding, and kidney failure  Instructed not to take NSAIDS if have history of stomach ulcers, kidney issues, or uncontrolled hypertension  Instructed patient to use only one brand as prescribed  Only take NSAIDS once not taking daily once as needed for pain  Never take these medications together  Never take these medications the same day   For severe pain and only if you have no liver problems, you may add Tylenol (also known as acetaminophen) maximum of 1,000  Mg per dose every 6 hours but no more 3 doses or 3,000 mg per day  Patient expressed understanding and agreed to plan  Knee Exercises   AMBULATORY CARE:   What you need to know about knee exercises:  Knee exercises help strengthen the muscles around your knee  Strong muscles can help reduce pain and decrease your risk of future injury  Knee exercises also help you heal after an injury or surgery  · Start slow  These are beginning exercises  Ask your healthcare provider if you need to see a physical therapist for more advanced exercises  As you get stronger, you may be able to do more sets of each exercise or add weights  · Stop if you feel pain  It is normal to feel some discomfort at first  Regular exercise will help decrease your discomfort over time  · Do the exercises on both legs  Do this so both knees remain strong  · Warm up before you do knee exercises  Walk or ride a stationary bike for 5 or 10 minutes to warm your muscles  How to perform knee stretches safely:  Always stretch before you do strengthening exercises  Do these stretching exercises again after you do the strengthening exercises  Do these stretches 4 or 5 days a week, or as directed  · Standing calf stretch: Face a wall and place both palms flat on the wall, or hold the back of a chair for balance  Keep a slight bend in your knees  Take a big step backward with one leg  Keep your other leg directly under you  Keep both heels flat and press your hips forward  Hold the stretch for 30 seconds, and then relax for 30 seconds  Switch legs  Repeat 2 or 3 times on each leg  · Standing quadriceps stretch:  Stand and place one hand against a wall or hold the back of a chair for balance  With your weight on one leg, bend your other leg and grab your ankle  Bring your heel toward your buttocks  Hold the stretch for 30 to 60 seconds  Switch legs  Repeat 2 or 3 times on each leg  · Sitting hamstring stretch:  Sit with both legs straight in front of you  Do not point or flex your toes  Place your palms on the floor and slide your hands forward until you feel the stretch  Do not round your back  Hold the stretch for 30 seconds  Repeat 2 or 3 times  How to perform knee strengthening exercises safely:  Do these exercises 4 or 5 days a week, or as directed  · Standing half squats:  Stand with your feet shoulder-width apart  Lean your back against a wall or hold the back of a chair for balance, if needed  Slowly sit down about 10 inches, as if you are going to sit in a chair  Your body weight should be mostly over your heels  Hold the squat for 5 seconds, then rise to a standing position  Do 3 sets of 10 squats to strengthen your buttocks and thighs  · Standing hamstring curls: Face a wall and place both palms flat on the wall, or hold the back of a chair for balance  With your weight on one leg, lift your other foot as close to your buttocks as you can  Hold for 5 seconds and then lower your leg  Do 2 sets of 10 curls on each leg  This exercise strengthens the muscles in the back of your thigh  · Standing calf raises:  Face a wall and place both palms flat on the wall, or hold the back of a chair for balance  Stand up straight, and do not lean  Place all your weight on one leg by lifting the other foot off the floor  Raise the heel of the foot that is on the floor as high as you can and then lower it  Do 2 sets of 10 calf raises on each leg to strengthen your calf muscles  · Straight leg lifts:  Lie on your stomach with straight legs  Fold your arms in front of you and rest your head in your arms  Tighten your leg muscles and raise one leg as high as you can  Hold for 5 seconds, then lower your leg  Do 2 sets of 10 lifts on each leg to strengthen your buttocks  · Sitting leg lifts:  Sit in a chair   Slowly straighten and raise one leg  Squeeze your thigh muscles and hold for 5 seconds  Relax and return your foot to the floor  Do 2 sets of 10 lifts on each leg  This helps strengthen the muscles in the front of your thigh  Contact your healthcare provider if:   · You have new pain or your pain becomes worse  · You have questions or concerns about your condition or care  © Copyright [address] 2021 Information is for End User's use only and may not be sold, redistributed or otherwise used for commercial purposes  All illustrations and images included in CareNotes® are the copyrighted property of [employer]  or [employer]   The above information is an  only  It is not intended as medical advice for individual conditions or treatments  Talk to your doctor, nurse or pharmacist before following any medical regimen to see if it is safe and effective for you  CHIEF COMPLAINT:  Left knee pain     HPI:  Ivana Skaggs is a 63 y o  female  who presents for follow up left knee pain  MRI demonstrating left horizontal tear posterior horn medial meniscus without fluid or displaced meniscal fragment  Mild tricompartmental degenerative arthritis  Patient has received two corticosteroid injections for pain with minimal improvement most recent 11/18/2021  Visit 2/1/2022 : Today reports  moderate constant pain over her left knee pointing to medial joint line  Has tried two CS injection as well as taking advil with minimal relief of pain  Most recent CS injection only lasting a few weeks  Denies locking denies instability  Aggravated with walking upstairs  Review of Systems   Constitutional: Positive for unexpected weight change (weight gain )  Negative for chills and fever  HENT: Negative for ear pain and sore throat  Eyes: Negative for pain and visual disturbance  Respiratory: Negative for cough and shortness of breath  Cardiovascular: Negative for chest pain and palpitations  Gastrointestinal: Negative for abdominal pain and vomiting  Genitourinary: Negative for dysuria and hematuria  Musculoskeletal: Positive for arthralgias, joint swelling and myalgias  Negative for back pain  Skin: Negative for color change and rash  Neurological: Negative for seizures and syncope  Psychiatric/Behavioral: The patient is nervous/anxious  All other systems reviewed and are negative  Following history reviewed and update:    Past Medical History:   Diagnosis Date    Anxiety     Asthma     Diabetes ([address] )     prediabetic    GERD (gastroesophageal reflux disease)     Hyperlipemia     Hypothyroidism     Kidney stones     Major depressive disorder     Migraine     Sleep apnea      Past Surgical History:   Procedure Laterality Date    ADENOIDECTOMY      DILATION AND CURETTAGE OF UTERUS      NE COLONOSCOPY FLX DX W/COLLJ SPEC WHEN PFRMD N/A 3/15/2019    Procedure: COLONOSCOPY;  Surgeon: Shelly Salvador MD;  Location: [facility] GI [facility];   Service: Gastroenterology    SHOULDER SURGERY      TONSILLECTOMY      TUBAL LIGATION       Social History   Social History     Substance and Sexual Activity   Alcohol Use No     Social History     Substance and Sexual Activity   Drug Use No     Social History     Tobacco Use   Smoking Status Never Smoker   Smokeless Tobacco Never Used     Family History   Problem Relation Age of Onset    ALS Mother     Venous thrombosis Father     Diabetes Father     Other Family         cerebral embolism    Diabetes Family     Hypertension Family     Kidney disease Family     Breast cancer Neg Hx     Colon cancer Neg Hx     Ovarian cancer Neg Hx     Uterine cancer Neg Hx      Allergies   Allergen Reactions    Darvon [Propoxyphene]     Fluoxetine     Meclizine     Morphine And Related     Oxycodone-Acetaminophen     Percolone [Oxycodone]           Physical Exam  /82 (BP Location: Left arm, Patient Position: Sitting, Cuff Size: Adult) Pulse (!) 108   Ht 5' 5" (1 651 m)   Wt 95 3 kg (210 lb)   LMP  (LMP Unknown)   BMI 34 95 kg/m²     Constitutional:  see vital signs  Gen: well-developed, normocephalic/atraumatic, well-groomed  Eyes: No inflammation or discharge of conjunctiva or lids; sclera clear   Pharynx: no inflammation, lesion, or mass of lips  Neck: supple, no masses, non-distended  MSK: no inflammation, lesion, mass, or clubbing of nails and digits except for other than mentioned below  SKIN: no visible rashes or skin lesions  Pulmonary/Chest: Effort normal  No respiratory distress     NEURO: cranial nerves grossly intact  PSYCH:  Alert and oriented to person, place, and time; recent and remote memory intact; mood normal, no depression, anxiety, or agitation, judgment and insight good and intact     Ortho Exam    Erythema: no  Swelling: no  Increased Warmth: no  Tenderness: + posterior medial joint line reproduces chief complaint of pain  Flexion: intact  Extension: intact  [facility]: negative     Procedures

## 2022-02-01 NOTE — PATIENT INSTRUCTIONS
I reviewed my clinical findings and MRI findings with Vilma Farooq  MRI demonstrating left horizontal tear posterior horn medial meniscus without fluid or displaced meniscal fragment  Mild tricompartmental degenerative arthritis  Patient has received two corticosteroid injections for pain with minimal improvement most recent 11/18/2021  Plan to refer to sports ortho surgery for further evaluation  Educated risks of mixing NSAIDS ( (non-steroidal anti-inflammatory pills including advil, ibuprofen, motrin, meloxicam, celecoxib, aleve, naproxen, and aspirin containing products) with each other or with steroids (such as prednisone, medrol)  Explained risks of mixing these medications including stomach ulcer, severe internal bleeding, and kidney failure  Instructed not to take NSAIDS if have history of stomach ulcers, kidney issues, or uncontrolled hypertension  Instructed patient to use only one brand as prescribed  Only take NSAIDS once not taking daily once as needed for pain  Never take these medications together  Never take these medications the same day  For severe pain and only if you have no liver problems, you may add Tylenol (also known as acetaminophen) maximum of 1,000  Mg per dose every 6 hours but no more 3 doses or 3,000 mg per day  Patient expressed understanding and agreed to plan  Knee Exercises   AMBULATORY CARE:   What you need to know about knee exercises:  Knee exercises help strengthen the muscles around your knee  Strong muscles can help reduce pain and decrease your risk of future injury  Knee exercises also help you heal after an injury or surgery  · Start slow  These are beginning exercises  Ask your healthcare provider if you need to see a physical therapist for more advanced exercises  As you get stronger, you may be able to do more sets of each exercise or add weights  · Stop if you feel pain    It is normal to feel some discomfort at first  Regular exercise will help decrease your discomfort over time  · Do the exercises on both legs  Do this so both knees remain strong  · Warm up before you do knee exercises  Walk or ride a stationary bike for 5 or 10 minutes to warm your muscles  How to perform knee stretches safely:  Always stretch before you do strengthening exercises  Do these stretching exercises again after you do the strengthening exercises  Do these stretches 4 or 5 days a week, or as directed  · Standing calf stretch: Face a wall and place both palms flat on the wall, or hold the back of a chair for balance  Keep a slight bend in your knees  Take a big step backward with one leg  Keep your other leg directly under you  Keep both heels flat and press your hips forward  Hold the stretch for 30 seconds, and then relax for 30 seconds  Switch legs  Repeat 2 or 3 times on each leg  · Standing quadriceps stretch:  Stand and place one hand against a wall or hold the back of a chair for balance  With your weight on one leg, bend your other leg and grab your ankle  Bring your heel toward your buttocks  Hold the stretch for 30 to 60 seconds  Switch legs  Repeat 2 or 3 times on each leg  · Sitting hamstring stretch:  Sit with both legs straight in front of you  Do not point or flex your toes  Place your palms on the floor and slide your hands forward until you feel the stretch  Do not round your back  Hold the stretch for 30 seconds  Repeat 2 or 3 times  How to perform knee strengthening exercises safely:  Do these exercises 4 or 5 days a week, or as directed  · Standing half squats:  Stand with your feet shoulder-width apart  Lean your back against a wall or hold the back of a chair for balance, if needed  Slowly sit down about 10 inches, as if you are going to sit in a chair  Your body weight should be mostly over your heels  Hold the squat for 5 seconds, then rise to a standing position   Do 3 sets of 10 squats to strengthen your buttocks and thighs  · Standing hamstring curls: Face a wall and place both palms flat on the wall, or hold the back of a chair for balance  With your weight on one leg, lift your other foot as close to your buttocks as you can  Hold for 5 seconds and then lower your leg  Do 2 sets of 10 curls on each leg  This exercise strengthens the muscles in the back of your thigh  · Standing calf raises:  Face a wall and place both palms flat on the wall, or hold the back of a chair for balance  Stand up straight, and do not lean  Place all your weight on one leg by lifting the other foot off the floor  Raise the heel of the foot that is on the floor as high as you can and then lower it  Do 2 sets of 10 calf raises on each leg to strengthen your calf muscles  · Straight leg lifts:  Lie on your stomach with straight legs  Fold your arms in front of you and rest your head in your arms  Tighten your leg muscles and raise one leg as high as you can  Hold for 5 seconds, then lower your leg  Do 2 sets of 10 lifts on each leg to strengthen your buttocks  · Sitting leg lifts:  Sit in a chair  Slowly straighten and raise one leg  Squeeze your thigh muscles and hold for 5 seconds  Relax and return your foot to the floor  Do 2 sets of 10 lifts on each leg  This helps strengthen the muscles in the front of your thigh  Contact your healthcare provider if:   · You have new pain or your pain becomes worse  · You have questions or concerns about your condition or care  © Copyright [employer] 2021 Information is for End User's use only and may not be sold, redistributed or otherwise used for commercial purposes  All illustrations and images included in CareNotes® are the copyrighted property of A D A M , Inc  or [facility] Jamie Tariq   The above information is an  only  It is not intended as medical advice for individual conditions or treatments   Talk to your doctor, nurse or pharmacist before following any medical regimen to see if it is safe and effective for you

## 2022-02-09 ENCOUNTER — APPOINTMENT (OUTPATIENT)
Dept: RADIOLOGY | Facility: OTHER | Age: 62
End: 2022-02-09
Payer: COMMERCIAL

## 2022-02-09 ENCOUNTER — OFFICE VISIT (OUTPATIENT)
Dept: OBGYN CLINIC | Facility: OTHER | Age: 62
End: 2022-02-09
Payer: COMMERCIAL

## 2022-02-09 VITALS
DIASTOLIC BLOOD PRESSURE: 76 MMHG | SYSTOLIC BLOOD PRESSURE: 114 MMHG | HEIGHT: 65 IN | WEIGHT: 214.8 LBS | BODY MASS INDEX: 35.79 KG/M2 | HEART RATE: 94 BPM

## 2022-02-09 DIAGNOSIS — S83.242A OTHER TEAR OF MEDIAL MENISCUS OF LEFT KNEE AS CURRENT INJURY, INITIAL ENCOUNTER: ICD-10-CM

## 2022-02-09 DIAGNOSIS — M25.562 LEFT KNEE PAIN, UNSPECIFIED CHRONICITY: Primary | ICD-10-CM

## 2022-02-09 DIAGNOSIS — M25.561 RIGHT KNEE PAIN, UNSPECIFIED CHRONICITY: ICD-10-CM

## 2022-02-09 DIAGNOSIS — M25.562 LEFT KNEE PAIN, UNSPECIFIED CHRONICITY: ICD-10-CM

## 2022-02-09 DIAGNOSIS — S83.282D ACUTE LATERAL MENISCUS TEAR OF LEFT KNEE, SUBSEQUENT ENCOUNTER: ICD-10-CM

## 2022-02-09 PROCEDURE — 99214 OFFICE O/P EST MOD 30 MIN: CPT | Performed by: ORTHOPAEDIC SURGERY

## 2022-02-09 PROCEDURE — 73564 X-RAY EXAM KNEE 4 OR MORE: CPT

## 2022-02-09 PROCEDURE — 73562 X-RAY EXAM OF KNEE 3: CPT

## 2022-02-09 RX ORDER — CEFAZOLIN SODIUM 2 G/50ML
2000 SOLUTION INTRAVENOUS ONCE
Status: CANCELLED | OUTPATIENT
Start: 2022-03-24 | End: 2022-02-09

## 2022-02-09 RX ORDER — CHLORHEXIDINE GLUCONATE 0.12 MG/ML
15 RINSE ORAL ONCE
Status: CANCELLED | OUTPATIENT
Start: 2022-03-24 | End: 2022-02-09

## 2022-02-09 NOTE — PROGRESS NOTES
Orthopaedic Surgery - Office Note  Danika Valenzuela (69 y o  female)   : 1960   MRN: [id number]  Encounter Date: 2022    Chief Complaint   Patient presents with    Left Knee - Pain       Assessment / Plan  L knee medial meniscus tear    · After discussion of risk and benefits patient agreed to proceed with a left knee arthroscopy with partial medial meniscectomy versus repair  Consent was signed in the office at this time surgery will be scheduled in the near future  · Continue with ice and analgesics as needed for pain  · Continue with activity as tolerated  · Home exercise program as tolerated  Return for follow up 4-5 days postoperatively  History of Present Illness  Danika Valenzuela is a 63 y o  female who presents for evaluation of ongoing left knee pain for over a year that started after a fall  She cannot recall specifically what happened with the fall but states she has been having increased discomfort since  She has seen Dr Gema Harrison who did perform an injection into the left knee with steroids in 2021 and then into the pes anserine bursa the in 2021  She also received an MRI study that showed that she had a meniscus tear in 2021  She did go through physical therapy briefly but this seemed to only worsened her pain  She tried to do home exercise program following this again limited by her pain  She does take Tylenol and naproxen occasionally which she says does not help  She did use Voltaren gel which also did not provide any benefit to her pain  She did follow-up with Dr Mati Jacinto on 2022 who referred her to discuss surgical treatment since conservative treatment has continue to fail her  She denies any radiation of her pain from her hip or her back at this time she denies any distal numbness or tingling  Review of Systems  Pertinent items are noted in HPI  All other systems were reviewed and are negative      Physical Exam  /76 Pulse 94   Ht 5' 5" (1 651 m)   Wt 97 4 kg (214 lb 12 8 oz)   LMP  (LMP Unknown)   BMI 35 74 kg/m²   Cons: Appears well  No apparent distress  Psych: Alert  Oriented x3  Mood and affect normal   Eyes: PERRLA, EOMI  Resp: Normal effort  No audible wheezing or stridor  CV: Palpable pulse  No discernable arrhythmia  No LE edema  Lymph:  No palpable cervical, axillary, or inguinal lymphadenopathy  Skin: Warm  No palpable masses  No visible lesions  Neuro: Normal muscle tone  Normal and symmetric DTR's  Left Knee Exam  Alignment:  Normal knee alignment  Inspection:  Mild swelling L knee, no erythema   Palpation:  Moderate tenderness at Medial joint line of the knee and mildly distal to this area     ROM:  Knee Extension 0°  Knee Flexion 120°  Strength:  5/5 quadriceps and hamstrings  Stability:  No objective knee instability  Stable Varus / Valgus stress, Lachman, and Posterior drawer  Tests:  (+) Millicent  Patella:  Patella tracks centrally with crepitus  Neurovascular:  Sensation intact in DP/SP/Burns/Sa/T nerve distributions  Sensation intact in all digital nerve distributions  Toes warm and perfused  Gait:  Antalgic  Studies Reviewed  I have personally reviewed pertinent films in PACS  XR of left knee - From 02/09/2022 shows only move very mild narrowing of the medial joint line with no notable spurring or chondrosis  MRI of left knee from 02/26/2021 shows horizontal tear of the posterior horn and medial meniscus  Procedures  No procedures today  Medical, Surgical, Family, and Social History  The patient's medical history, family history, and social history, were reviewed and updated as appropriate      Past Medical History:   Diagnosis Date    Anxiety     Asthma     Diabetes ([address] )     prediabetic    GERD (gastroesophageal reflux disease)     Hyperlipemia     Hypothyroidism     Kidney stones     Major depressive disorder     Migraine     Sleep apnea        Past Surgical History:   Procedure Laterality Date    ADENOIDECTOMY      DILATION AND CURETTAGE OF UTERUS      LA COLONOSCOPY FLX DX W/COLLJ SPEC WHEN PFRMD N/A 3/15/2019    Procedure: COLONOSCOPY;  Surgeon: Ti Draper MD;  Location: [facility];   Service: Gastroenterology    SHOULDER SURGERY      TONSILLECTOMY      TUBAL LIGATION         Family History   Problem Relation Age of Onset    ALS Mother     Venous thrombosis Father     Diabetes Father     Other Family         cerebral embolism    Diabetes Family     Hypertension Family     Kidney disease Family     Breast cancer Neg Hx     Colon cancer Neg Hx     Ovarian cancer Neg Hx     Uterine cancer Neg Hx        Social History     Occupational History    Not on file   Tobacco Use    Smoking status: Never Smoker    Smokeless tobacco: Never Used   Vaping Use    Vaping Use: Never used   Substance and Sexual Activity    Alcohol use: No    Drug use: No    Sexual activity: Not Currently       Allergies   Allergen Reactions    Darvon [Propoxyphene]     Fluoxetine     Meclizine     Morphine And Related     Oxycodone-Acetaminophen     Percolone [Oxycodone]          Current Outpatient Medications:     acetaminophen-codeine (TYLENOL #3) 300-30 mg per tablet, Take 1 tablet by mouth every 4 (four) hours as needed for moderate pain, Disp: 30 tablet, Rfl: 0    albuterol (2 5 mg/3 mL) 0 083 % nebulizer solution, Take 3 mL (2 5 mg total) by nebulization every 4 (four) hours as needed for wheezing, Disp: 75 mL, Rfl: 2    albuterol (Proventil HFA) 90 mcg/act inhaler, Inhale 1-2 puffs every 4 (four) hours as needed for wheezing, Disp: 36 g, Rfl: 1    benztropine (COGENTIN) 0 5 mg tablet, Take 1 tablet (0 5 mg total) by mouth daily, Disp: 90 tablet, Rfl: 1    Blood Glucose Monitoring Suppl (ONE TOUCH ULTRA 2) w/Device KIT, by Does not apply route daily, Disp: 1 each, Rfl: 0    busPIRone (BUSPAR) 10 mg tablet, Take 1 tablet (10 mg total) by mouth in the morning, Disp: 90 tablet, Rfl: 1    Diclofenac Sodium (VOLTAREN) 1 %, Apply 2 g topically 4 (four) times a day, Disp: 50 g, Rfl: 1    Dulaglutide (Trulicity) 1 07 SS/6 7RB SOPN, Inject 0 5 mL (0 75 mg total) under the skin once a week, Disp: 2 mL, Rfl: 5    Dulaglutide 1 5 MG/0 5ML SOPN, Inject 0 5 mL (1 5 mg total) under the skin once a week, Disp: 2 mL, Rfl: 5    fluticasone (FLONASE) 50 mcg/act nasal spray, 1 spray into each nostril daily, Disp: 16 g, Rfl: 1    fluticasone (Flovent HFA) 110 MCG/ACT inhaler, Inhale 2 puffs 2 (two) times a day Rinse mouth after use , Disp: 36 g, Rfl: 1    gabapentin (NEURONTIN) 100 mg capsule, Take 1 capsule (100 mg total) by mouth daily at bedtime, Disp: 90 capsule, Rfl: 1    Lancets (ONETOUCH ULTRASOFT) lancets, Use as instructed daily, Disp: 100 each, Rfl: 1    levothyroxine 88 mcg tablet, Take 1 tablet (88 mcg total) by mouth daily in the early morning, Disp: 90 tablet, Rfl: 1    metFORMIN (GLUCOPHAGE) 500 mg tablet, Take 2 tablets (1,000 mg total) by mouth 2 (two) times a day with meals, Disp: 360 tablet, Rfl: 1    omeprazole (PriLOSEC) 20 mg delayed release capsule, Take 1 capsule (20 mg total) by mouth daily before breakfast, Disp: 90 capsule, Rfl: 1    QUEtiapine (SEROquel) 200 mg tablet, Take 1 tablet (200 mg total) by mouth daily at bedtime, Disp: 90 tablet, Rfl: 1    QUEtiapine (SEROquel) 25 mg tablet, TAKE 1 TABLET BY MOUTH ONCE DAILY WITH 200MG AT BEDTIME AS NEEDED, Disp: , Rfl:     rosuvastatin (CRESTOR) 5 mg tablet, Take 1 tablet (5 mg total) by mouth daily, Disp: 90 tablet, Rfl: 1    sertraline (ZOLOFT) 100 mg tablet, Take 2 tablets (200 mg total) by mouth daily at bedtime, Disp: 180 tablet, Rfl: 1    traZODone (DESYREL) 50 mg tablet, Take 1 tablet (50 mg total) by mouth daily at bedtime, Disp: 90 tablet, Rfl: 1    venlafaxine (EFFEXOR-XR) 150 mg 24 hr capsule, Take 1 capsule (150 mg total) by mouth daily, Disp: 90 capsule, Rfl: 1      Alben Bodily ISIDRA Paz    Scribe Attestation    I,:   am acting as a scribe while in the presence of the attending physician :       I,:   personally performed the services described in this documentation    as scribed in my presence :

## 2022-02-10 ENCOUNTER — OFFICE VISIT (OUTPATIENT)
Dept: URGENT CARE | Facility: CLINIC | Age: 62
End: 2022-02-10
Payer: COMMERCIAL

## 2022-02-10 ENCOUNTER — TELEPHONE (OUTPATIENT)
Dept: OBGYN CLINIC | Facility: OTHER | Age: 62
End: 2022-02-10

## 2022-02-10 VITALS
DIASTOLIC BLOOD PRESSURE: 88 MMHG | RESPIRATION RATE: 20 BRPM | HEART RATE: 96 BPM | OXYGEN SATURATION: 97 % | SYSTOLIC BLOOD PRESSURE: 141 MMHG | TEMPERATURE: 97.6 F

## 2022-02-10 DIAGNOSIS — J06.9 UPPER RESPIRATORY TRACT INFECTION, UNSPECIFIED TYPE: Primary | ICD-10-CM

## 2022-02-10 PROCEDURE — 99213 OFFICE O/P EST LOW 20 MIN: CPT | Performed by: NURSE PRACTITIONER

## 2022-02-10 NOTE — PROGRESS NOTES
[address] [facility]        NAME: Eder Arias is a 63 y o  female  : 1960    MRN: [id number]  DATE: February 10, 2022  TIME: 2:17 PM    Assessment and Plan   Upper respiratory tract infection, unspecified type [J06 9]  1  Upper respiratory tract infection, unspecified type       Discussed the beginning of URI vs allergies  Reviewed viral vs bacterial   Aware no antibiotics recommended at this time for mild uri symptoms for 12 hours  Reviewed due to covid 3 weeks ago the antivirals for high risk covid patients are not relevant at this time  F/u with pcp with new or worsening symptoms   Reviewed otc medication for symptom relief  Discussed cold symptoms are still contagious and it is up to her family to determine if she should continue on her planned travels  F/u with ortho in regards to pain and if she needs to reschedule surgery  She states she needs a EKG prior to surgery - advised to wait until official surgery date for ekg   Pt in agreement with plan of care       Patient Instructions     Follow up with PCP in 3-5 days  Proceed to  ER if symptoms worsen  Chief Complaint     Chief Complaint   Patient presents with    Cold Like Symptoms     Patient c/o chest congestion and a cough since this am   Positive for COVID on 22         History of Present Illness   Eder Arias presents to the clinic c/o    Was seen by orthopedics for a left knee pain - surgery scheduled for Monday  Started with come mild nasal congestion and occasional dry cough/chest congestion that started last night/this morning  She takes claritin (2 tabs) nightly  Has not started any other medications for her symptoms  Typically takes mucinex and advil when she is ill but hasn't started anything yet  States her sister who is a nurse and her niece who is a PA told her that because she is a diabetic and "high risk" there are medication we can prescribe her medication to limit the illness    She is unaware of what medication they were referring to  She tested positive covid on 1/18 with an at home test - states she had "all the symptoms"  Congestion, cough, headache, fatigue  Has been feeling much better recently and was perfect yesterday  Has plans to go up to [place] tomorrow for a "surprise" 62th bday part for her  Also had surgery scheduled for 2/14 for her knee - but that may have to be rescheduled as she just told them she had covid on 1/18  She states she also has plans to go to [facility] 3/11 so she states surgery will have to wait until after that if they reschedule it  She is super concerned as she has knee pain and nothing is helping the pain - does not think she can deal with the pain until then  Has tried Tylenol #3 with no improvement  Review of Systems   Review of Systems   All other systems reviewed and are negative  Current Medications     Long-Term Medications   Medication Sig Dispense Refill    benztropine (COGENTIN) 0 5 mg tablet Take 1 tablet (0 5 mg total) by mouth daily 90 tablet 1    Blood Glucose Monitoring Suppl (ONE TOUCH ULTRA 2) w/Device KIT by Does not apply route daily 1 each 0    busPIRone (BUSPAR) 10 mg tablet Take 1 tablet (10 mg total) by mouth in the morning 90 tablet 1    Diclofenac Sodium (VOLTAREN) 1 % Apply 2 g topically 4 (four) times a day 50 g 1    Dulaglutide (Trulicity) 6 80 GR/7 7CI SOPN Inject 0 5 mL (0 75 mg total) under the skin once a week 2 mL 5    Dulaglutide 1 5 MG/0 5ML SOPN Inject 0 5 mL (1 5 mg total) under the skin once a week 2 mL 5    fluticasone (FLONASE) 50 mcg/act nasal spray 1 spray into each nostril daily 16 g 1    fluticasone (Flovent HFA) 110 MCG/ACT inhaler Inhale 2 puffs 2 (two) times a day Rinse mouth after use   36 g 1    gabapentin (NEURONTIN) 100 mg capsule Take 1 capsule (100 mg total) by mouth daily at bedtime 90 capsule 1    Lancets (ONETOUCH ULTRASOFT) lancets Use as instructed daily 100 each 1    levothyroxine 88 mcg tablet Take 1 tablet (88 mcg total) by mouth daily in the early morning 90 tablet 1    metFORMIN (GLUCOPHAGE) 500 mg tablet Take 2 tablets (1,000 mg total) by mouth 2 (two) times a day with meals 360 tablet 1    omeprazole (PriLOSEC) 20 mg delayed release capsule Take 1 capsule (20 mg total) by mouth daily before breakfast 90 capsule 1    QUEtiapine (SEROquel) 200 mg tablet Take 1 tablet (200 mg total) by mouth daily at bedtime 90 tablet 1    QUEtiapine (SEROquel) 25 mg tablet TAKE 1 TABLET BY MOUTH ONCE DAILY WITH 200MG AT BEDTIME AS NEEDED      rosuvastatin (CRESTOR) 5 mg tablet Take 1 tablet (5 mg total) by mouth daily 90 tablet 1    sertraline (ZOLOFT) 100 mg tablet Take 2 tablets (200 mg total) by mouth daily at bedtime 180 tablet 1    traZODone (DESYREL) 50 mg tablet Take 1 tablet (50 mg total) by mouth daily at bedtime 90 tablet 1    venlafaxine (EFFEXOR-XR) 150 mg 24 hr capsule Take 1 capsule (150 mg total) by mouth daily 90 capsule 1       Current Allergies     Allergies as of 02/10/2022 - Reviewed 02/10/2022   Allergen Reaction Noted    Darvon [propoxyphene]  01/26/2019    Fluoxetine  10/01/2012    Meclizine  10/01/2012    Morphine and related  10/01/2012    Oxycodone-acetaminophen  10/01/2012    Percolone [oxycodone]  03/20/2019            The following portions of the patient's history were reviewed and updated as appropriate: allergies, current medications, past family history, past medical history, past social history, past surgical history and problem list     Objective   /88   Pulse 96   Temp 97 6 °F (36 4 °C) (Tympanic)   Resp 20   LMP  (LMP Unknown)   SpO2 97%        Physical Exam     Physical Exam  Vitals and nursing note reviewed  Constitutional:       Appearance: Normal appearance  She is well-developed  HENT:      Head: Normocephalic and atraumatic        Right Ear: Tympanic membrane, ear canal and external ear normal       Left Ear: Tympanic membrane, ear canal and external ear normal       Nose: Nose normal       Mouth/Throat:      Mouth: Mucous membranes are moist    Eyes:      General: Lids are normal       Extraocular Movements: Extraocular movements intact  Conjunctiva/sclera: Conjunctivae normal       Pupils: Pupils are equal, round, and reactive to light  Cardiovascular:      Rate and Rhythm: Normal rate and regular rhythm  Pulses: Normal pulses  Heart sounds: Normal heart sounds, S1 normal and S2 normal    Pulmonary:      Effort: Pulmonary effort is normal       Breath sounds: Normal breath sounds  Musculoskeletal:      Cervical back: Normal range of motion and neck supple  Skin:     General: Skin is warm and dry  Neurological:      Mental Status: She is alert and oriented to person, place, and time  Psychiatric:         Speech: Speech normal          Behavior: Behavior normal  Behavior is cooperative  Thought Content:  Thought content normal          Judgment: Judgment normal

## 2022-02-10 NOTE — PATIENT INSTRUCTIONS
Cold Symptoms   AMBULATORY CARE:   Cold symptoms  include sneezing, dry throat, a stuffy nose, headache, watery eyes, and a cough  Your cough may be dry, or you may cough up mucus  You may also have muscle aches, joint pain, and tiredness  Rarely, you may have a fever  Cold symptoms occur from inflammation in your upper respiratory system caused by a virus  Most colds go away without treatment  Seek care immediately if:   · You have increased tiredness and weakness  · You are unable to eat  · Your heart is beating much faster than usual for you  · You see white spots in the back of your throat and your neck is swollen and sore to the touch  · You see pinpoint or larger reddish-purple dots on your skin  Contact your healthcare provider if:   · You have a fever higher than 102°F (38 9°C)  · You have new or worsening shortness of breath  · You have thick nasal drainage for more than 2 days  · Your symptoms do not improve or get worse within 5 days  · You have questions or concerns about your condition or care  Treatment for cold symptoms  may include NSAIDS to decrease muscle aches and fever  Cold medicines may also be given to decrease coughing, nasal stuffiness, sneezing, and a runny nose  Manage your cold symptoms: The following may help relieve cold symptoms, such as a dry throat and congestion:  · Gargle with mouthwash or warm salt water as directed  · Suck on throat lozenges or hard candy  · Use a cold or warm vaporizer or humidifier to ease your breathing  · Rest for at least 2 days and then as needed to decrease tiredness and weakness  · Use petroleum based jelly around your nostrils to decrease irritation from blowing your nose  · Drink plenty of liquids  Liquids will help thin and loosen thick mucus so you can cough it up  Liquids will also keep you hydrated   Ask your healthcare provider which liquids are best for you and how much to drink each day     Prevent the spread of germs  by washing your hands often  You can spread your cold germs to others for at least 3 days after your symptoms start  Do not share items, such as eating utensils  Cover your nose and mouth when you cough or sneeze using the crook of your elbow instead of your hands  Throw used tissues in the garbage  Do not smoke:  Smoking may worsen your symptoms and increase the length of time you feel sick  Talk with your healthcare provider if you need help to stop smoking  Follow up with your doctor as directed:  Write down your questions so you remember to ask them during your visits  © Copyright [employer] 2021 Information is for End User's use only and may not be sold, redistributed or otherwise used for commercial purposes  All illustrations and images included in CareNotes® are the copyrighted property of [employer]  or Ifeanyi Mack  The above information is an  only  It is not intended as medical advice for individual conditions or treatments  Talk to your doctor, nurse or pharmacist before following any medical regimen to see if it is safe and effective for you

## 2022-02-10 NOTE — TELEPHONE ENCOUNTER
Patient calling because she woke up not feeling well with symptoms of a cold  She is going to head over to the care now facility to see if she can get a covid test since she has surgery scheduled for Monday with Dr Darwin Salinas  She did state she took a home test 1/18/2022 which did test positive  I did advise her of our 7 week reschedule protocol but I wanted to make sure with PAT first that is still in effect even with home tests      Please advise

## 2022-02-11 ENCOUNTER — TELEMEDICINE (OUTPATIENT)
Dept: FAMILY MEDICINE CLINIC | Facility: CLINIC | Age: 62
End: 2022-02-11
Payer: COMMERCIAL

## 2022-02-11 DIAGNOSIS — S83.282D ACUTE LATERAL MENISCUS TEAR OF LEFT KNEE, SUBSEQUENT ENCOUNTER: Primary | ICD-10-CM

## 2022-02-11 PROCEDURE — G2012 BRIEF CHECK IN BY MD/QHP: HCPCS | Performed by: FAMILY MEDICINE

## 2022-02-11 RX ORDER — CELECOXIB 200 MG/1
200 CAPSULE ORAL 2 TIMES DAILY
Qty: 60 CAPSULE | Refills: 1 | Status: SHIPPED | OUTPATIENT
Start: 2022-02-11 | End: 2022-02-24 | Stop reason: ALTCHOICE

## 2022-02-11 RX ORDER — TRAMADOL HYDROCHLORIDE 50 MG/1
50 TABLET ORAL EVERY 6 HOURS PRN
Qty: 30 TABLET | Refills: 0 | Status: SHIPPED | OUTPATIENT
Start: 2022-02-11 | End: 2022-03-24 | Stop reason: HOSPADM

## 2022-02-11 NOTE — PROGRESS NOTES
Assessment/Plan:  Patient will try Celebrex and tramadol for pain  It was my intent to perform this visit via video technology but the patient was not able to do a video connection so the visit was completed via audio telephone only  Diagnoses and all orders for this visit:    Acute lateral meniscus tear of left knee, subsequent encounter  -     celecoxib (CeleBREX) 200 mg capsule; Take 1 capsule (200 mg total) by mouth 2 (two) times a day  -     traMADol (Ultram) 50 mg tablet; Take 1 tablet (50 mg total) by mouth every 6 (six) hours as needed for severe pain            Subjective:        Patient ID: Ricardo Arrieta is a 63 y o  female  Patient having severe left knee pain  Patient not able to ambulate well  Patient surgery for left knee pushed off from Monday to sometime in March  Patient needs medication to help with her discomfort  Patient has tried topical gels as well as NSAIDs Tylenol, Tylenol 3 without significant improvement  The following portions of the patient's history were reviewed and updated as appropriate: allergies, current medications, past family history, past medical history, past social history, past surgical history and problem list       Review of Systems   Musculoskeletal: Positive for arthralgias and gait problem  Objective:               LMP  (LMP Unknown)          Physical Exam  Nursing note reviewed  Neurological:      Mental Status: She is alert  Psychiatric:         Thought Content: Thought content normal          Virtual Brief Visit    Patient is located in the following state in which I hold an active license [place]      Assessment/Plan:    Problem List Items Addressed This Visit        Musculoskeletal and Integument    Acute lateral meniscus tear of left knee - Primary    Relevant Medications    celecoxib (CeleBREX) 200 mg capsule    traMADol (Ultram) 50 mg tablet          Recent Visits  No visits were found meeting these conditions    Showing recent visits within past 7 days and meeting all other requirements  Today's Visits  Date Type Provider Dept   02/11/22 Telemedicine Corrina Yanez DO [address] [facility] today's visits and meeting all other requirements  Future Appointments  No visits were found meeting these conditions    Showing future appointments within next 150 days and meeting all other requirements         I spent 22 minutes directly with the patient during this visit

## 2022-02-18 ENCOUNTER — TELEPHONE (OUTPATIENT)
Dept: FAMILY MEDICINE CLINIC | Facility: CLINIC | Age: 62
End: 2022-02-18

## 2022-02-18 DIAGNOSIS — S83.282D ACUTE LATERAL MENISCUS TEAR OF LEFT KNEE, SUBSEQUENT ENCOUNTER: Primary | ICD-10-CM

## 2022-02-18 RX ORDER — HYDROCODONE BITARTRATE AND ACETAMINOPHEN 5; 325 MG/1; MG/1
1 TABLET ORAL EVERY 6 HOURS PRN
COMMUNITY
End: 2022-02-18 | Stop reason: SDUPTHER

## 2022-02-18 RX ORDER — HYDROCODONE BITARTRATE AND ACETAMINOPHEN 5; 325 MG/1; MG/1
TABLET ORAL
Qty: 30 TABLET | Refills: 0 | Status: SHIPPED | OUTPATIENT
Start: 2022-02-18 | End: 2022-03-24 | Stop reason: HOSPADM

## 2022-02-18 NOTE — TELEPHONE ENCOUNTER
Patient is currently on tramadol 50 mg for knee pain and she states it is not working and you did discuss possible vicodin as back up  Patient is wondering if this can be sent to her pharmacy to use as needed  [address]  Patient would also like handicap parking until surgery because it is hard to walk from car to store  Surgery date 03/21/2022

## 2022-02-21 ENCOUNTER — TELEPHONE (OUTPATIENT)
Dept: ADMINISTRATIVE | Facility: OTHER | Age: 62
End: 2022-02-21

## 2022-02-21 NOTE — TELEPHONE ENCOUNTER
----- Message from Pina Esquivel sent at 2/21/2022  6:17 AM EST -----  Regarding: CARE GAP REQUEST  Contact: [phone]  02/21/22 6:17 AM    Hello, our patient Danika Valenzuela has had Mammogram completed/performed  Please assist in updating the patient chart by pulling the Care Everywhere (CE) document  The date of service is 02/17/22       Thank you,  Pina Esquivel  [facility]

## 2022-02-22 NOTE — TELEPHONE ENCOUNTER
Upon review of the In Basket request we were able to locate, review, and update the patient chart as requested for Mammogram     Any additional questions or concerns should be emailed to the Practice Liaisons via [email or web address] email, please do not reply via In Basket      Thank you  Ash Gooden

## 2022-02-23 ENCOUNTER — TELEPHONE (OUTPATIENT)
Dept: FAMILY MEDICINE CLINIC | Facility: CLINIC | Age: 62
End: 2022-02-23

## 2022-02-23 NOTE — TELEPHONE ENCOUNTER
The stop Celebrex and try diclofenac 75 mg twice daily with food number 60 with 1 refill    Okay to give placard

## 2022-02-23 NOTE — TELEPHONE ENCOUNTER
PATIENT CALLED HER KNEE SURGERY WAS CANCELED BUT PATIENT IS HAVING A LOT OF PAIN SHE WOULD LIKE TO KNOW IF YOU CAN GIVE HER SOMETHING THAT WILL HELP WITH THE PAIN TILL HER SURGERY WHICH IS ON 03/24/22, AND ALSO SHE WOULD LIKE TO HAVE A DISABILITY PLACARD JUST FOR NOW TO HELP HER WITH EVERYDAY RUNNING AROUND

## 2022-02-24 ENCOUNTER — TELEPHONE (OUTPATIENT)
Dept: OBGYN CLINIC | Facility: OTHER | Age: 62
End: 2022-02-24

## 2022-02-24 RX ORDER — DICLOFENAC SODIUM 75 MG/1
75 TABLET, DELAYED RELEASE ORAL 2 TIMES DAILY
COMMUNITY
End: 2022-03-22

## 2022-02-24 NOTE — TELEPHONE ENCOUNTER
Left message for pt to call back,  I put Voltaren into her med list, need to know what pharmacy and also that she will need to pickup placard

## 2022-02-24 NOTE — TELEPHONE ENCOUNTER
Left message on machine for patient to get new bloodwork and needs to get H&P for new surgery date - completed by PCP  I told her I can call to get this scheduled or if she'd like can call to set this up herself

## 2022-02-25 ENCOUNTER — TELEPHONE (OUTPATIENT)
Dept: FAMILY MEDICINE CLINIC | Facility: CLINIC | Age: 62
End: 2022-02-25

## 2022-02-25 NOTE — TELEPHONE ENCOUNTER
Patient called stating that the voltaren and the vicodin are not helping her knee pain, and wants to know if there is something else that she can have?     Please advise

## 2022-02-28 NOTE — TELEPHONE ENCOUNTER
Left message for patient returning her call about H&P appointment      I gave her my number to call me back

## 2022-03-18 ENCOUNTER — OFFICE VISIT (OUTPATIENT)
Dept: FAMILY MEDICINE CLINIC | Facility: CLINIC | Age: 62
End: 2022-03-18
Payer: COMMERCIAL

## 2022-03-18 ENCOUNTER — APPOINTMENT (OUTPATIENT)
Dept: LAB | Facility: CLINIC | Age: 62
End: 2022-03-18
Payer: COMMERCIAL

## 2022-03-18 VITALS
HEART RATE: 84 BPM | HEIGHT: 65 IN | BODY MASS INDEX: 35.65 KG/M2 | TEMPERATURE: 96.5 F | OXYGEN SATURATION: 96 % | WEIGHT: 214 LBS | DIASTOLIC BLOOD PRESSURE: 72 MMHG | RESPIRATION RATE: 18 BRPM | SYSTOLIC BLOOD PRESSURE: 120 MMHG

## 2022-03-18 DIAGNOSIS — S83.282D ACUTE LATERAL MENISCUS TEAR OF LEFT KNEE, SUBSEQUENT ENCOUNTER: ICD-10-CM

## 2022-03-18 DIAGNOSIS — Z01.818 PREOP EXAMINATION: Primary | ICD-10-CM

## 2022-03-18 DIAGNOSIS — K21.00 GASTROESOPHAGEAL REFLUX DISEASE WITH ESOPHAGITIS WITHOUT HEMORRHAGE: ICD-10-CM

## 2022-03-18 DIAGNOSIS — S83.242A OTHER TEAR OF MEDIAL MENISCUS OF LEFT KNEE AS CURRENT INJURY, INITIAL ENCOUNTER: ICD-10-CM

## 2022-03-18 DIAGNOSIS — E11.9 TYPE 2 DIABETES MELLITUS WITHOUT COMPLICATION, WITHOUT LONG-TERM CURRENT USE OF INSULIN (HCC): ICD-10-CM

## 2022-03-18 DIAGNOSIS — Z01.812 PRE-OPERATIVE LABORATORY EXAMINATION: ICD-10-CM

## 2022-03-18 LAB
ANION GAP SERPL CALCULATED.3IONS-SCNC: 4 MMOL/L (ref 4–13)
BASOPHILS # BLD AUTO: 0.05 THOUSANDS/ΜL (ref 0–0.1)
BASOPHILS NFR BLD AUTO: 1 % (ref 0–1)
BUN SERPL-MCNC: 17 MG/DL (ref 5–25)
CALCIUM SERPL-MCNC: 8.9 MG/DL (ref 8.3–10.1)
CHLORIDE SERPL-SCNC: 108 MMOL/L (ref 100–108)
CO2 SERPL-SCNC: 27 MMOL/L (ref 21–32)
CREAT SERPL-MCNC: 1 MG/DL (ref 0.6–1.3)
EOSINOPHIL # BLD AUTO: 0.36 THOUSAND/ΜL (ref 0–0.61)
EOSINOPHIL NFR BLD AUTO: 6 % (ref 0–6)
ERYTHROCYTE [DISTWIDTH] IN BLOOD BY AUTOMATED COUNT: 13.2 % (ref 11.6–15.1)
GFR SERPL CREATININE-BSD FRML MDRD: 61 ML/MIN/1.73SQ M
GLUCOSE P FAST SERPL-MCNC: 131 MG/DL (ref 65–99)
HCT VFR BLD AUTO: 36.4 % (ref 34.8–46.1)
HGB BLD-MCNC: 11.6 G/DL (ref 11.5–15.4)
IMM GRANULOCYTES # BLD AUTO: 0.03 THOUSAND/UL (ref 0–0.2)
IMM GRANULOCYTES NFR BLD AUTO: 1 % (ref 0–2)
LYMPHOCYTES # BLD AUTO: 1.85 THOUSANDS/ΜL (ref 0.6–4.47)
LYMPHOCYTES NFR BLD AUTO: 28 % (ref 14–44)
MCH RBC QN AUTO: 29.1 PG (ref 26.8–34.3)
MCHC RBC AUTO-ENTMCNC: 31.9 G/DL (ref 31.4–37.4)
MCV RBC AUTO: 92 FL (ref 82–98)
MONOCYTES # BLD AUTO: 0.51 THOUSAND/ΜL (ref 0.17–1.22)
MONOCYTES NFR BLD AUTO: 8 % (ref 4–12)
NEUTROPHILS # BLD AUTO: 3.8 THOUSANDS/ΜL (ref 1.85–7.62)
NEUTS SEG NFR BLD AUTO: 56 % (ref 43–75)
NRBC BLD AUTO-RTO: 0 /100 WBCS
PLATELET # BLD AUTO: 260 THOUSANDS/UL (ref 149–390)
PMV BLD AUTO: 10 FL (ref 8.9–12.7)
POTASSIUM SERPL-SCNC: 4.5 MMOL/L (ref 3.5–5.3)
RBC # BLD AUTO: 3.98 MILLION/UL (ref 3.81–5.12)
SL AMB POCT HEMOGLOBIN AIC: 7.4 (ref ?–6.5)
SODIUM SERPL-SCNC: 139 MMOL/L (ref 136–145)
WBC # BLD AUTO: 6.6 THOUSAND/UL (ref 4.31–10.16)

## 2022-03-18 PROCEDURE — 83036 HEMOGLOBIN GLYCOSYLATED A1C: CPT | Performed by: FAMILY MEDICINE

## 2022-03-18 PROCEDURE — 99214 OFFICE O/P EST MOD 30 MIN: CPT | Performed by: FAMILY MEDICINE

## 2022-03-18 PROCEDURE — 85025 COMPLETE CBC W/AUTO DIFF WBC: CPT

## 2022-03-18 PROCEDURE — 3078F DIAST BP <80 MM HG: CPT | Performed by: FAMILY MEDICINE

## 2022-03-18 PROCEDURE — 3074F SYST BP LT 130 MM HG: CPT | Performed by: FAMILY MEDICINE

## 2022-03-18 PROCEDURE — 3051F HG A1C>EQUAL 7.0%<8.0%: CPT | Performed by: FAMILY MEDICINE

## 2022-03-18 PROCEDURE — 36415 COLL VENOUS BLD VENIPUNCTURE: CPT

## 2022-03-18 PROCEDURE — 80048 BASIC METABOLIC PNL TOTAL CA: CPT

## 2022-03-18 NOTE — H&P (VIEW-ONLY)
Assessment/Plan:  A1c 7 4  TSH another CMP CBC relatively normal with slight elevation of blood sugar  Patient will be going for further blood work today for preop clearance  EKG shows normal sinus rhythm  If blood work normal today patient will be out low risk for cardiopulmonary event  Okay to proceed with surgery  The patient will stop all NSAIDs  Diagnoses and all orders for this visit:    Type 2 diabetes mellitus without complication, without long-term current use of insulin ([facility])  -     POCT hemoglobin A1c  -     Ambulatory Referral to Diabetic Education; Future    Preop examination    Acute lateral meniscus tear of left knee, subsequent encounter    Gastroesophageal reflux disease with esophagitis without hemorrhage            Subjective:        Patient ID: Jg Sarmiento is a 63 y o  female  Patient is here for left knee surgery meniscal surgery on left knee to be done by Dr Maurice Landers on March 24th  Patient without any chest pain shortness breath palpitations syncope or dizziness  No new bleeding issues  No fevers or chills or other URI or significant cough noted at this time  Patient with normal urination defecation  Last A1c done today 7 4  Patient will be going for other laboratory studies for preop clearance  The following portions of the patient's history were reviewed and updated as appropriate: allergies, current medications, past family history, past medical history, past social history, past surgical history and problem list       Review of Systems   Constitutional: Negative  HENT: Negative  Eyes: Negative  Respiratory: Negative  Cardiovascular: Negative  Gastrointestinal: Negative  Endocrine: Negative  Genitourinary: Negative  Musculoskeletal: Positive for arthralgias and gait problem  Skin: Negative  Allergic/Immunologic: Negative  Hematological: Negative  Psychiatric/Behavioral: Negative              Objective:               /72 (BP Location: Right arm, Patient Position: Sitting, Cuff Size: Large)   Pulse 84   Temp (!) 96 5 °F (35 8 °C) (Tympanic)   Resp 18   Ht 5' 5" (1 651 m)   Wt 97 1 kg (214 lb)   LMP  (LMP Unknown)   SpO2 96%   BMI 35 61 kg/m²          Physical Exam  Vitals and nursing note reviewed  Constitutional:       General: She is not in acute distress  Appearance: Normal appearance  She is not ill-appearing, toxic-appearing or diaphoretic  HENT:      Head: Normocephalic and atraumatic  Right Ear: Tympanic membrane, ear canal and external ear normal  There is no impacted cerumen  Left Ear: Tympanic membrane, ear canal and external ear normal  There is no impacted cerumen  Nose: Nose normal  No congestion or rhinorrhea  Mouth/Throat:      Mouth: Mucous membranes are moist       Pharynx: No oropharyngeal exudate or posterior oropharyngeal erythema  Eyes:      General: No scleral icterus  Right eye: No discharge  Left eye: No discharge  Extraocular Movements: Extraocular movements intact  Conjunctiva/sclera: Conjunctivae normal       Pupils: Pupils are equal, round, and reactive to light  Neck:      Vascular: No carotid bruit  Cardiovascular:      Rate and Rhythm: Normal rate and regular rhythm  Pulses: Normal pulses  Heart sounds: Normal heart sounds  No murmur heard  No friction rub  No gallop  Pulmonary:      Effort: Pulmonary effort is normal  No respiratory distress  Breath sounds: Normal breath sounds  No stridor  No wheezing, rhonchi or rales  Chest:      Chest wall: No tenderness  Abdominal:      General: Abdomen is flat  Bowel sounds are normal  There is no distension  Palpations: Abdomen is soft  Tenderness: There is no abdominal tenderness  There is no guarding or rebound  Musculoskeletal:         General: Tenderness present  No swelling, deformity or signs of injury        Cervical back: Normal range of motion and neck supple  No rigidity  No muscular tenderness  Right lower leg: No edema  Left lower leg: No edema  Lymphadenopathy:      Cervical: No cervical adenopathy  Skin:     General: Skin is warm and dry  Capillary Refill: Capillary refill takes less than 2 seconds  Coloration: Skin is not jaundiced  Findings: No bruising, erythema, lesion or rash  Neurological:      Mental Status: She is alert and oriented to person, place, and time  Mental status is at baseline  Cranial Nerves: No cranial nerve deficit  Sensory: No sensory deficit  Motor: No weakness  Coordination: Coordination normal       Gait: Gait abnormal    Psychiatric:         Mood and Affect: Mood normal          Behavior: Behavior normal          Thought Content:  Thought content normal          Judgment: Judgment normal

## 2022-03-18 NOTE — PROGRESS NOTES
Assessment/Plan:  A1c 7 4  TSH another CMP CBC relatively normal with slight elevation of blood sugar  Patient will be going for further blood work today for preop clearance  EKG shows normal sinus rhythm  If blood work normal today patient will be out low risk for cardiopulmonary event  Okay to proceed with surgery  The patient will stop all NSAIDs  Diagnoses and all orders for this visit:    Type 2 diabetes mellitus without complication, without long-term current use of insulin ([facility])  -     POCT hemoglobin A1c  -     Ambulatory Referral to Diabetic Education; Future    Preop examination    Acute lateral meniscus tear of left knee, subsequent encounter    Gastroesophageal reflux disease with esophagitis without hemorrhage            Subjective:        Patient ID: Sasha Pressley is a 63 y o  female  Patient is here for left knee surgery meniscal surgery on left knee to be done by Dr Verónica Wilson on March 24th  Patient without any chest pain shortness breath palpitations syncope or dizziness  No new bleeding issues  No fevers or chills or other URI or significant cough noted at this time  Patient with normal urination defecation  Last A1c done today 7 4  Patient will be going for other laboratory studies for preop clearance  The following portions of the patient's history were reviewed and updated as appropriate: allergies, current medications, past family history, past medical history, past social history, past surgical history and problem list       Review of Systems   Constitutional: Negative  HENT: Negative  Eyes: Negative  Respiratory: Negative  Cardiovascular: Negative  Gastrointestinal: Negative  Endocrine: Negative  Genitourinary: Negative  Musculoskeletal: Positive for arthralgias and gait problem  Skin: Negative  Allergic/Immunologic: Negative  Hematological: Negative  Psychiatric/Behavioral: Negative              Objective:               /72 (BP Location: Right arm, Patient Position: Sitting, Cuff Size: Large)   Pulse 84   Temp (!) 96 5 °F (35 8 °C) (Tympanic)   Resp 18   Ht 5' 5" (1 651 m)   Wt 97 1 kg (214 lb)   LMP  (LMP Unknown)   SpO2 96%   BMI 35 61 kg/m²          Physical Exam  Vitals and nursing note reviewed  Constitutional:       General: She is not in acute distress  Appearance: Normal appearance  She is not ill-appearing, toxic-appearing or diaphoretic  HENT:      Head: Normocephalic and atraumatic  Right Ear: Tympanic membrane, ear canal and external ear normal  There is no impacted cerumen  Left Ear: Tympanic membrane, ear canal and external ear normal  There is no impacted cerumen  Nose: Nose normal  No congestion or rhinorrhea  Mouth/Throat:      Mouth: Mucous membranes are moist       Pharynx: No oropharyngeal exudate or posterior oropharyngeal erythema  Eyes:      General: No scleral icterus  Right eye: No discharge  Left eye: No discharge  Extraocular Movements: Extraocular movements intact  Conjunctiva/sclera: Conjunctivae normal       Pupils: Pupils are equal, round, and reactive to light  Neck:      Vascular: No carotid bruit  Cardiovascular:      Rate and Rhythm: Normal rate and regular rhythm  Pulses: Normal pulses  Heart sounds: Normal heart sounds  No murmur heard  No friction rub  No gallop  Pulmonary:      Effort: Pulmonary effort is normal  No respiratory distress  Breath sounds: Normal breath sounds  No stridor  No wheezing, rhonchi or rales  Chest:      Chest wall: No tenderness  Abdominal:      General: Abdomen is flat  Bowel sounds are normal  There is no distension  Palpations: Abdomen is soft  Tenderness: There is no abdominal tenderness  There is no guarding or rebound  Musculoskeletal:         General: Tenderness present  No swelling, deformity or signs of injury        Cervical back: Normal range of motion and neck supple  No rigidity  No muscular tenderness  Right lower leg: No edema  Left lower leg: No edema  Lymphadenopathy:      Cervical: No cervical adenopathy  Skin:     General: Skin is warm and dry  Capillary Refill: Capillary refill takes less than 2 seconds  Coloration: Skin is not jaundiced  Findings: No bruising, erythema, lesion or rash  Neurological:      Mental Status: She is alert and oriented to person, place, and time  Mental status is at baseline  Cranial Nerves: No cranial nerve deficit  Sensory: No sensory deficit  Motor: No weakness  Coordination: Coordination normal       Gait: Gait abnormal    Psychiatric:         Mood and Affect: Mood normal          Behavior: Behavior normal          Thought Content:  Thought content normal          Judgment: Judgment normal

## 2022-03-21 ENCOUNTER — APPOINTMENT (OUTPATIENT)
Dept: PREADMISSION TESTING | Facility: HOSPITAL | Age: 62
End: 2022-03-21
Payer: COMMERCIAL

## 2022-03-22 ENCOUNTER — ANESTHESIA EVENT (OUTPATIENT)
Dept: PERIOP | Facility: HOSPITAL | Age: 62
End: 2022-03-22
Payer: COMMERCIAL

## 2022-03-22 RX ORDER — ACETAMINOPHEN 325 MG/1
650 TABLET ORAL EVERY 6 HOURS PRN
COMMUNITY

## 2022-03-22 NOTE — PRE-PROCEDURE INSTRUCTIONS
Pre-Surgery Instructions:   Medication Instructions    acetaminophen (TYLENOL) 325 mg tablet Instructed patient per Anesthesia Guidelines   albuterol (2 5 mg/3 mL) 0 083 % nebulizer solution Instructed patient per Anesthesia Guidelines   albuterol (Proventil HFA) 90 mcg/act inhaler Instructed patient per Anesthesia Guidelines   benztropine (COGENTIN) 0 5 mg tablet Instructed patient per Anesthesia Guidelines   busPIRone (BUSPAR) 10 mg tablet Instructed patient per Anesthesia Guidelines   Dulaglutide 1 5 MG/0 5ML SOPN Instructed patient per Anesthesia Guidelines   fluticasone (FLONASE) 50 mcg/act nasal spray Instructed patient per Anesthesia Guidelines   gabapentin (NEURONTIN) 100 mg capsule Instructed patient per Anesthesia Guidelines   levothyroxine 88 mcg tablet Instructed patient per Anesthesia Guidelines   metFORMIN (GLUCOPHAGE) 500 mg tablet Instructed patient per Anesthesia Guidelines   omeprazole (PriLOSEC) 20 mg delayed release capsule Instructed patient per Anesthesia Guidelines   QUEtiapine (SEROquel) 200 mg tablet Instructed patient per Anesthesia Guidelines   rosuvastatin (CRESTOR) 5 mg tablet Instructed patient per Anesthesia Guidelines   sertraline (ZOLOFT) 100 mg tablet Instructed patient per Anesthesia Guidelines   traMADol (Ultram) 50 mg tablet Instructed patient per Anesthesia Guidelines   traZODone (DESYREL) 50 mg tablet Instructed patient per Anesthesia Guidelines   venlafaxine (EFFEXOR-XR) 150 mg 24 hr capsule Instructed patient per Anesthesia Guidelines  Pt instructed to take gabapentin, levothyroxine, omeprazole, and effexor the morning of surgery with a small sip of water  [facility] preop instructions reviewed with pt  Pt has surgical soap

## 2022-03-24 ENCOUNTER — VBI (OUTPATIENT)
Dept: ADMINISTRATIVE | Facility: OTHER | Age: 62
End: 2022-03-24

## 2022-03-24 ENCOUNTER — HOSPITAL ENCOUNTER (OUTPATIENT)
Facility: HOSPITAL | Age: 62
Setting detail: OUTPATIENT SURGERY
Discharge: HOME | End: 2022-03-24
Attending: ORTHOPAEDIC SURGERY | Admitting: ORTHOPAEDIC SURGERY
Payer: COMMERCIAL

## 2022-03-24 ENCOUNTER — ANESTHESIA (OUTPATIENT)
Dept: PERIOP | Facility: HOSPITAL | Age: 62
End: 2022-03-24
Payer: COMMERCIAL

## 2022-03-24 VITALS
TEMPERATURE: 97.6 F | HEART RATE: 89 BPM | HEIGHT: 65 IN | BODY MASS INDEX: 35.45 KG/M2 | WEIGHT: 212.74 LBS | DIASTOLIC BLOOD PRESSURE: 66 MMHG | RESPIRATION RATE: 18 BRPM | OXYGEN SATURATION: 93 % | SYSTOLIC BLOOD PRESSURE: 135 MMHG

## 2022-03-24 DIAGNOSIS — S83.282D ACUTE LATERAL MENISCUS TEAR OF LEFT KNEE, SUBSEQUENT ENCOUNTER: Primary | ICD-10-CM

## 2022-03-24 LAB
GLUCOSE SERPL-MCNC: 138 MG/DL (ref 65–140)
GLUCOSE SERPL-MCNC: 157 MG/DL (ref 65–140)

## 2022-03-24 PROCEDURE — 82948 REAGENT STRIP/BLOOD GLUCOSE: CPT

## 2022-03-24 PROCEDURE — 29881 ARTHRS KNE SRG MNISECTMY M/L: CPT | Performed by: ORTHOPAEDIC SURGERY

## 2022-03-24 RX ORDER — ONDANSETRON 2 MG/ML
INJECTION INTRAMUSCULAR; INTRAVENOUS AS NEEDED
Status: DISCONTINUED | OUTPATIENT
Start: 2022-03-24 | End: 2022-03-24

## 2022-03-24 RX ORDER — ONDANSETRON 4 MG/1
4 TABLET, ORALLY DISINTEGRATING ORAL EVERY 8 HOURS PRN
Qty: 15 TABLET | Refills: 0 | Status: SHIPPED | OUTPATIENT
Start: 2022-03-24

## 2022-03-24 RX ORDER — NAPROXEN 500 MG/1
500 TABLET ORAL 2 TIMES DAILY WITH MEALS
Qty: 60 TABLET | Refills: 0 | Status: SHIPPED | OUTPATIENT
Start: 2022-03-24 | End: 2022-06-21

## 2022-03-24 RX ORDER — CEFAZOLIN SODIUM 2 G/50ML
2000 SOLUTION INTRAVENOUS ONCE
Status: COMPLETED | OUTPATIENT
Start: 2022-03-24 | End: 2022-03-24

## 2022-03-24 RX ORDER — KETOROLAC TROMETHAMINE 30 MG/ML
INJECTION, SOLUTION INTRAMUSCULAR; INTRAVENOUS AS NEEDED
Status: DISCONTINUED | OUTPATIENT
Start: 2022-03-24 | End: 2022-03-24

## 2022-03-24 RX ORDER — CHLORHEXIDINE GLUCONATE 0.12 MG/ML
15 RINSE ORAL ONCE
Status: COMPLETED | OUTPATIENT
Start: 2022-03-24 | End: 2022-03-24

## 2022-03-24 RX ORDER — FENTANYL CITRATE/PF 50 MCG/ML
25 SYRINGE (ML) INJECTION
Status: DISCONTINUED | OUTPATIENT
Start: 2022-03-24 | End: 2022-03-24 | Stop reason: HOSPADM

## 2022-03-24 RX ORDER — PROPOFOL 10 MG/ML
INJECTION, EMULSION INTRAVENOUS AS NEEDED
Status: DISCONTINUED | OUTPATIENT
Start: 2022-03-24 | End: 2022-03-24

## 2022-03-24 RX ORDER — ONDANSETRON 2 MG/ML
4 INJECTION INTRAMUSCULAR; INTRAVENOUS ONCE AS NEEDED
Status: DISCONTINUED | OUTPATIENT
Start: 2022-03-24 | End: 2022-03-24 | Stop reason: HOSPADM

## 2022-03-24 RX ORDER — MEPERIDINE HYDROCHLORIDE 25 MG/ML
12.5 INJECTION INTRAMUSCULAR; INTRAVENOUS; SUBCUTANEOUS
Status: DISCONTINUED | OUTPATIENT
Start: 2022-03-24 | End: 2022-03-24 | Stop reason: HOSPADM

## 2022-03-24 RX ORDER — LIDOCAINE HYDROCHLORIDE 20 MG/ML
INJECTION, SOLUTION EPIDURAL; INFILTRATION; INTRACAUDAL; PERINEURAL AS NEEDED
Status: DISCONTINUED | OUTPATIENT
Start: 2022-03-24 | End: 2022-03-24

## 2022-03-24 RX ORDER — SODIUM CHLORIDE, SODIUM LACTATE, POTASSIUM CHLORIDE, CALCIUM CHLORIDE 600; 310; 30; 20 MG/100ML; MG/100ML; MG/100ML; MG/100ML
125 INJECTION, SOLUTION INTRAVENOUS CONTINUOUS
Status: DISCONTINUED | OUTPATIENT
Start: 2022-03-24 | End: 2022-03-24 | Stop reason: HOSPADM

## 2022-03-24 RX ORDER — HYDROCODONE BITARTRATE AND ACETAMINOPHEN 5; 325 MG/1; MG/1
1 TABLET ORAL EVERY 4 HOURS PRN
Status: DISCONTINUED | OUTPATIENT
Start: 2022-03-24 | End: 2022-03-24 | Stop reason: HOSPADM

## 2022-03-24 RX ORDER — HYDROMORPHONE HCL/PF 1 MG/ML
0.5 SYRINGE (ML) INJECTION
Status: DISCONTINUED | OUTPATIENT
Start: 2022-03-24 | End: 2022-03-24 | Stop reason: HOSPADM

## 2022-03-24 RX ORDER — HYDROCODONE BITARTRATE AND ACETAMINOPHEN 5; 325 MG/1; MG/1
1 TABLET ORAL EVERY 4 HOURS PRN
Qty: 45 TABLET | Refills: 0 | Status: SHIPPED | OUTPATIENT
Start: 2022-03-24 | End: 2022-04-03

## 2022-03-24 RX ORDER — DEXAMETHASONE SODIUM PHOSPHATE 4 MG/ML
INJECTION, SOLUTION INTRA-ARTICULAR; INTRALESIONAL; INTRAMUSCULAR; INTRAVENOUS; SOFT TISSUE AS NEEDED
Status: DISCONTINUED | OUTPATIENT
Start: 2022-03-24 | End: 2022-03-24

## 2022-03-24 RX ADMIN — ONDANSETRON 4 MG: 2 INJECTION INTRAMUSCULAR; INTRAVENOUS at 13:00

## 2022-03-24 RX ADMIN — ONDANSETRON 4 MG: 2 INJECTION INTRAMUSCULAR; INTRAVENOUS at 12:15

## 2022-03-24 RX ADMIN — HYDROCODONE BITARTRATE AND ACETAMINOPHEN 1 TABLET: 5; 325 TABLET ORAL at 15:14

## 2022-03-24 RX ADMIN — KETOROLAC TROMETHAMINE 30 MG: 30 INJECTION, SOLUTION INTRAMUSCULAR at 13:02

## 2022-03-24 RX ADMIN — LIDOCAINE HYDROCHLORIDE 3 ML: 20 INJECTION, SOLUTION EPIDURAL; INFILTRATION; INTRACAUDAL; PERINEURAL at 12:11

## 2022-03-24 RX ADMIN — PROPOFOL 170 MG: 10 INJECTION, EMULSION INTRAVENOUS at 12:11

## 2022-03-24 RX ADMIN — CEFAZOLIN SODIUM 2000 MG: 2 SOLUTION INTRAVENOUS at 11:59

## 2022-03-24 RX ADMIN — DEXAMETHASONE SODIUM PHOSPHATE 4 MG: 4 INJECTION, SOLUTION INTRA-ARTICULAR; INTRALESIONAL; INTRAMUSCULAR; INTRAVENOUS; SOFT TISSUE at 12:14

## 2022-03-24 RX ADMIN — CHLORHEXIDINE GLUCONATE 0.12% ORAL RINSE 15 ML: 1.2 LIQUID ORAL at 08:54

## 2022-03-24 RX ADMIN — SODIUM CHLORIDE, SODIUM LACTATE, POTASSIUM CHLORIDE, AND CALCIUM CHLORIDE: .6; .31; .03; .02 INJECTION, SOLUTION INTRAVENOUS at 12:14

## 2022-03-24 RX ADMIN — SODIUM CHLORIDE, SODIUM LACTATE, POTASSIUM CHLORIDE, AND CALCIUM CHLORIDE 125 ML/HR: .6; .31; .03; .02 INJECTION, SOLUTION INTRAVENOUS at 09:10

## 2022-03-24 NOTE — ANESTHESIA PREPROCEDURE EVALUATION
Procedure:  ARTHROSCOPY KNEE w/ partial medial menisectomy vs  repair (Left Knee)    Relevant Problems   ANESTHESIA  no morphine -  nausea      CARDIO   (+) Hyperlipidemia   (+) Migraine headache      ENDO   (+) Hypothyroidism   (+) Type 2 diabetes mellitus without complication, without long-term current use of insulin (HCC)      GI/HEPATIC   (+) Esophageal reflux disease      HEMATOLOGY   (+) Anemia      MUSCULOSKELETAL   (+) Prepatellar bursitis, right knee      NEURO/PSYCH   (+) Anxiety   (+) Migraine headache   (+) Severe recurrent major depression without psychotic features (HCC)      PULMONARY   (+) Asthma   (+) FELIX (obstructive sleep apnea)        Physical Exam    Airway    Mallampati score: II  TM Distance: >3 FB  Neck ROM: full     Dental   No notable dental hx upper dentures,     Cardiovascular  Rhythm: regular, Rate: normal, Cardiovascular exam normal    Pulmonary  Pulmonary exam normal Breath sounds clear to auscultation,     Other Findings        Anesthesia Plan  ASA Score- 3     Anesthesia Type- general with ASA Monitors  Additional Monitors:   Airway Plan:     Comment: IA injection   Plan Factors-    Chart reviewed  Existing labs reviewed  Patient summary reviewed  Patient is not a current smoker  Induction- intravenous  Postoperative Plan-     Informed Consent- Anesthetic plan and risks discussed with patient

## 2022-03-24 NOTE — INTERVAL H&P NOTE
H&P reviewed  After examining the patient I find no changes in the patients condition since the H&P had been written      Vitals:    03/24/22 1116   BP: 146/71   Pulse: 87   Resp: 18   Temp:    SpO2: 99%

## 2022-03-24 NOTE — ANESTHESIA POSTPROCEDURE EVALUATION
Post-Op Assessment Note    CV Status:  Stable  Pain Score: 1    Pain management: adequate     Mental Status:  Alert and awake   Hydration Status:  Euvolemic   PONV Controlled:  Controlled   Airway Patency:  Patent      Post Op Vitals Reviewed: Yes      Staff: Anesthesiologist         No complications documented      BP      Temp      Pulse     Resp      SpO2      /66   Pulse 89   Temp 97 6 °F (36 4 °C) (Temporal)   Resp 18   Ht 5' 5" (1 651 m)   Wt 96 5 kg (212 lb 11 9 oz)   LMP  (LMP Unknown)   SpO2 93%   BMI 35 40 kg/m²

## 2022-03-24 NOTE — DISCHARGE INSTRUCTIONS
POSTOPERATIVE INSTRUCTIONS following KNEE SURGERY    MEDICATIONS:  · Resume all home medications unless otherwise instructed by your surgeon  · Pain Medication: Hydrocodone/apap 5/325  mg, 1-2 tablets every 4 hours as needed  · If you were given a regional anesthetic (nerve block), please begin taking the pain medication as soon as you get home, even if you have minimal or no pain  DO NOT WAIT FOR THE NERVE BLOCK TO WEAR OFF  · Possible side effects include nausea, constipation, and urinary retention  If you experience these side effects, please call our office for assistance  · Pain med refills are authorized only during office hours (8am-4pm, Mon-Fri)  · Anti-Inflammatory:  Resume your home anti-inflammatory medication  · Take with food  Stop if you experience nausea, reflux, or stomach pain  · Nausea Medication:  Zofran ODT 4 mg, 1 tablet every 6 hours as needed  · Fill prescription ONLY if you expericnce severe nausea  · Blood Clot Prevention:  Aspirin 325 mg, 1 tablet twice daily for 2 weeks  · Pump your foot up and down 20 times per hour while you are less mobile  WOUND CARE:  · Keep the dressing clean and dry  Light drainage may occur the first 2 days postop  · You may remove the dressings and get the incision wet in the shower 72 hours after surgery  DO NOT remove steri-strips or sutures  DO NOT immerse the incision under water  Carefully pat the incision dry  If there is wound drainage, re-apply a fresh dry gauze dressing  · Please call our office ([phone]) if you experience either of the following:  · Sudden increase in swelling, redness, or warmth at the surgical site  · Excessive incisional drainage that persists beyond the 3rd day after surgery  · Oral temperature greater than 101 degrees, not relieved with Tylenol  · Shortness of breath, chest pain, nausea, or any other concerning symptoms    SWELLING CONTROL:  · Cold Therapy:   The cold therapy device may be used either continuously or only as needed, according to your preference  Do not let the pad directly touch your skin  Alternatively, apply ice (20 min on, 20 min off) as often as you feel is necessary  · Elevation:  Elevate the entire leg above heart level  Place pillows under your ankle to keep your knee straight  · Compression:  Apply ACE wraps or a thigh-length compression stocking as needed  RANGE OF MOTION:  · You are allowed FULL RANGE OF MOTION as tolerated  IMMOBILIZATION:  · None  You are allowed full range of motion as tolerated  ACTIVITY:   · BEAR FULL WEIGHT AS TOLERATED on the operative leg  Use crutches to assist only as needed  · Using Crutches on Stairs:  Going up, lead with your "good" (nonoperative) leg  Going down, lead with your "bad" (operative) leg  Use a hand rail when available  · Knee Extension:  Place a rolled towel or pillow under your ankle for 20-30 minutes 3-5 times per day  This will help to maintain full knee extension  · Quad Sets:  Sit or lie with your knee straight  Tighten your quadriceps (front thigh) muscle  Hold for 3 seconds, then relax  Repeat 20 times per hour while awake  PHYSICAL THERAPY:  · You will be given a physical therapy prescription when you are seen in the office for your postoperative appointment  FOLLOW-UP APPOINTMENT:  · 4-5 days after surgery with:    Dr Bobby Parr, [address] [facility]  [address] [place] [address], [address] [place] [address], [facility], [address] [facility]  [phone] ([facility])  [phone] (After-Hours)

## 2022-03-24 NOTE — OP NOTE
OPERATIVE REPORT    PATIENT NAME: Kaila Holly   :  1960  MRN: [id number]  Pt Location: [facility] OR ROOM 01    SURGERY DATE: 3/24/2022    SURGEON(S) and ROLE:  Primary: Deborah Lord MD  Assisting: Francis Rios MD    NOTE:  I was present for the entire procedure and performed all essential portions of the surgery  PREOPERATIVE DIAGNOSES:  Left Knee  Medial Meniscus Tear    POSTOPERATIVE DIAGNOSES:  Left Knee  Medial Meniscus Tear    PROCEDURES:  Left Knee Arthroscopy with:  Partial Medial Meniscectomy      ANESTHESIA TYPE:  General LMA and Intra-articular block    ANESTHESIA STAFF:   No anesthesia staff entered  ESTIMATED BLOOD LOSS:  5 mL    TOURNIQUET TIME:  Not used    PERIOPERATIVE ANTIBIOTICS:  cefazolin, 2 grams    IMPLANTS:  none    * No implants in log *    SPECIMENS:  * No specimens in log *    DRAINS:  None      OPERATIVE INDICATIONS:  The patient is a 63 y o  female with left knee pain and a medial meniscus tear  Surgical treatment was indicated due to persistent symptoms despite non-surgical treatment  After a thorough discussion of the potential risks, benefits, and alternative treatments, the patient agreed to proceed with surgery  The patient understands that the risks of surgery include, but are not limited to: infection, neurovascular injury, wound healing complications, venous thromboembolism, persistent pain, stiffness, instability, recurrence of symptoms, potential need for additional surgeries, and loss of limb or life  Oral and written consent for surgery was obtained from the patient preoperatively  EXAM UNDER ANESTHESIA:  Neutral alignment  ROM:  0-135 degrees  Ligaments stable to varus stress / valgus stress / Lachman / posterior drawer; negative pivot-shift  Patella tracking normal  without crepitus  PROCEDURE AND TECHNIQUE:  On the day of surgery, the patient was identified in the preoperative holding area  The operative site was marked by the surgeon    The patient was taken into the operating room  A time-out was conducted to confirm the patient's identity, the operative site, and the proposed procedure  The patient was anesthetized, and perioperative antibiotics were administered  The patient was positioned supine on the OR table  All bony prominences were padded  A tourniquet was not used  The operative site was prepped and draped using standard sterile technique  An anterolateral portal was established, and the arthroscope was inserted into the knee joint  An anteromedial portal was established under direct visualization, and diagnostic arthroscopy was performed  The joint demonstrated no synovitis or loose bodies  In the patellofemoral compartment, the trochlea demonstrated pristine articular cartilage  The patella demonstrated pristine articular cartilage  The patella tracking was normal        In the medial compartment, the medial femoral condyle demonstrated pristine articular cartilage  The medial tibial plateau demonstrated pristine articular cartilage  The medial meniscus had a horizontal cleavage tear involving the posterior horn and body  The torn portion of the meniscus was removed and debrided to a stable base with a basket and motorized shaver  50% of the meniscus width remained intact  In the lateral compartment, the lateral femoral condyle demonstrated pristine articular cartilage  The lateral tibial plateau demonstrated pristine articular cartilage  The lateral meniscus was intact       In the intercondylar notch, the PCL was intact  The ACL was intact  At the conclusion of the procedure, the instruments were withdrawn  The portals and incisions were closed with absorbable sutures and steri-strips  A sterile dressing was applied  The surgical drapes were removed  A soft bandage was applied to the operative knee    The patient was awakened from anesthesia and transported to the PACU in stable condition        COMPLICATIONS:  None    PATIENT DISPOSITION:  PACU       SIGNATURE:  Stella Busby MD  DATE:  March 24, 2022  TIME:  1:01 PM

## 2022-03-30 ENCOUNTER — OFFICE VISIT (OUTPATIENT)
Dept: OBGYN CLINIC | Facility: OTHER | Age: 62
End: 2022-03-30

## 2022-03-30 VITALS
SYSTOLIC BLOOD PRESSURE: 129 MMHG | HEIGHT: 65 IN | HEART RATE: 96 BPM | DIASTOLIC BLOOD PRESSURE: 77 MMHG | WEIGHT: 212 LBS | BODY MASS INDEX: 35.32 KG/M2

## 2022-03-30 DIAGNOSIS — S83.282D ACUTE LATERAL MENISCUS TEAR OF LEFT KNEE, SUBSEQUENT ENCOUNTER: Primary | ICD-10-CM

## 2022-03-30 PROCEDURE — 99024 POSTOP FOLLOW-UP VISIT: CPT | Performed by: ORTHOPAEDIC SURGERY

## 2022-03-30 NOTE — PROGRESS NOTES
Orthopaedic Surgery - Office Note  Sina De Oliveira (89 y o  female)   : 1960   MRN: [id number]  Encounter Date: 3/30/2022    Chief Complaint   Patient presents with    Left Knee - Post-op       Assessment / Plan  S/P L knee arthroscopy with partial medial menisectomy on 3/24/22    · Continue with ICE and analgesics PRN  · Progress from the [facility] to normal gait training as tolerated  · Start PT for ROM, strengthening, modalities and gait training of the L knee at this time  Return in about 4 weeks (around 2022)  History of Present Illness  Sina De Oliveira is a 63 y o  female who presents S/P L knee arthroscopy with partial medial menisectomy on 3/24/22  She is still having some pain in the knee at this time which seems to be controlled with the naproxen and frequent icing  She still has Vicodin left over as she is not using as much at all at this time  She also does use compression and takes aspirin twice a day as a blood thinner as instructed  She is denying any distal numbness or tingling at this time  Review of Systems  Pertinent items are noted in HPI  All other systems were reviewed and are negative  Physical Exam  /77   Pulse 96   Ht 5' 5" (1 651 m)   Wt 96 2 kg (212 lb)   LMP  (LMP Unknown)   BMI 35 28 kg/m²   Cons: Appears well  No apparent distress  Psych: Alert  Oriented x3  Mood and affect normal   Eyes: PERRLA, EOMI  Resp: Normal effort  No audible wheezing or stridor  CV: Palpable pulse  No discernable arrhythmia  No LE edema  Lymph:  No palpable cervical, axillary, or inguinal lymphadenopathy  Skin: Warm  No palpable masses  No visible lesions  Neuro: Normal muscle tone  Normal and symmetric DTR's  Left Knee Exam  Alignment:  Normal knee alignment  Inspection:  Mild L knee swelling, incisions healing well  Palpation:  moderate generalized tenderness at the anterior aspect of the knee around the incision     ROM:  Knee Extension 0   Knee Flexion 80   Strength:  Able to actively extend knee against gravity  Stability:  Not tested  Tests:  No pertinent positive or negative tests  Patella:  Not tested  Neurovascular:  Sensation intact in DP/SP/Burns/Sa/T nerve distributions  Sensation intact in all digital nerve distributions  Toes warm and perfused  Gait:  Steady with the Cane    Studies Reviewed  No studies to review    Procedures  No procedures today  Medical, Surgical, Family, and Social History  The patient's medical history, family history, and social history, were reviewed and updated as appropriate  Past Medical History:   Diagnosis Date    Anxiety     Arthritis     Asthma     Claustrophobia     CPAP (continuous positive airway pressure) dependence     Depression     Diabetes mellitus ([facility] [address] )     Disease of thyroid gland     hypo    GERD (gastroesophageal reflux disease)     Headache     History of COVID-19     Hyperlipemia     Kidney stone     Kidney stones     Major depressive disorder     Motion sickness     Risk for falls     Sleep apnea     Use of cane as ambulatory aid     Wears glasses     Wears partial dentures     upper partial       Past Surgical History:   Procedure Laterality Date    ADENOIDECTOMY      COLONOSCOPY      DILATION AND CURETTAGE OF UTERUS      OK COLONOSCOPY FLX DX W/COLLJ SPEC WHEN PFRMD N/A 3/15/2019    Procedure: COLONOSCOPY;  Surgeon: Nils Moritz, MD;  Location: [facility] GI LAB;   Service: Gastroenterology    OK KNEE SCOPE,MED/LAT MENISECTOMY Left 3/24/2022    Procedure: ARTHROSCOPY KNEE w/ partial medial menisectomy;  Surgeon: Symone Zamorano MD;  Location: [facility] OR;  Service: Orthopedics    ROTATOR CUFF REPAIR Right     SHOULDER SURGERY Left     impingement    TONSILLECTOMY      TUBAL LIGATION         Family History   Problem Relation Age of Onset    ALS Mother     Venous thrombosis Father     Diabetes Father     Other Family         cerebral embolism    Diabetes Family  Hypertension Family     Kidney disease Family     Breast cancer Neg Hx     Colon cancer Neg Hx     Ovarian cancer Neg Hx     Uterine cancer Neg Hx        Social History     Occupational History    Not on file   Tobacco Use    Smoking status: Never Smoker    Smokeless tobacco: Never Used   Vaping Use    Vaping Use: Never used   Substance and Sexual Activity    Alcohol use: No    Drug use: No    Sexual activity: Not on file       Allergies   Allergen Reactions    Darvon [Propoxyphene] Dizziness    Fluoxetine Other (See Comments)     suicidal    Meclizine Dizziness    Morphine And Related Nausea Only    Oxycodone-Acetaminophen Other (See Comments) and Tachycardia     The whole house was moving   M D C  Holdings [Oxycodone] Other (See Comments) and Tachycardia     The whole house was moving         Current Outpatient Medications:     acetaminophen (TYLENOL) 325 mg tablet, Take 650 mg by mouth every 6 (six) hours as needed for mild pain, Disp: , Rfl:     albuterol (2 5 mg/3 mL) 0 083 % nebulizer solution, Take 3 mL (2 5 mg total) by nebulization every 4 (four) hours as needed for wheezing, Disp: 75 mL, Rfl: 2    albuterol (Proventil HFA) 90 mcg/act inhaler, Inhale 1-2 puffs every 4 (four) hours as needed for wheezing, Disp: 36 g, Rfl: 1    benztropine (COGENTIN) 0 5 mg tablet, Take 1 tablet (0 5 mg total) by mouth daily, Disp: 90 tablet, Rfl: 1    Blood Glucose Monitoring Suppl (ONE TOUCH ULTRA 2) w/Device KIT, by Does not apply route daily, Disp: 1 each, Rfl: 0    busPIRone (BUSPAR) 10 mg tablet, Take 1 tablet (10 mg total) by mouth in the morning (Patient taking differently: Take 10 mg by mouth as needed  ), Disp: 90 tablet, Rfl: 1    Dulaglutide 1 5 MG/0 5ML SOPN, Inject 0 5 mL (1 5 mg total) under the skin once a week, Disp: 2 mL, Rfl: 5    fluticasone (FLONASE) 50 mcg/act nasal spray, 1 spray into each nostril daily, Disp: 16 g, Rfl: 1    gabapentin (NEURONTIN) 100 mg capsule, Take 1 capsule (100 mg total) by mouth daily at bedtime, Disp: 90 capsule, Rfl: 1    HYDROcodone-acetaminophen (Norco) 5-325 mg per tablet, Take 1 tablet by mouth every 4 (four) hours as needed for pain for up to 10 days Max Daily Amount: 6 tablets, Disp: 45 tablet, Rfl: 0    Lancets (ONETOUCH ULTRASOFT) lancets, Use as instructed daily, Disp: 100 each, Rfl: 1    levothyroxine 88 mcg tablet, Take 1 tablet (88 mcg total) by mouth daily in the early morning, Disp: 90 tablet, Rfl: 1    metFORMIN (GLUCOPHAGE) 500 mg tablet, Take 2 tablets (1,000 mg total) by mouth 2 (two) times a day with meals, Disp: 360 tablet, Rfl: 1    naproxen (NAPROSYN) 500 mg tablet, Take 1 tablet (500 mg total) by mouth 2 (two) times a day with meals, Disp: 60 tablet, Rfl: 0    omeprazole (PriLOSEC) 20 mg delayed release capsule, Take 1 capsule (20 mg total) by mouth daily before breakfast, Disp: 90 capsule, Rfl: 1    ondansetron (ZOFRAN-ODT) 4 mg disintegrating tablet, Take 1 tablet (4 mg total) by mouth every 8 (eight) hours as needed for nausea or vomiting, Disp: 15 tablet, Rfl: 0    QUEtiapine (SEROquel) 200 mg tablet, Take 1 tablet (200 mg total) by mouth daily at bedtime, Disp: 90 tablet, Rfl: 1    QUEtiapine (SEROquel) 25 mg tablet, TAKE 1 TABLET BY MOUTH ONCE DAILY WITH 200MG AT BEDTIME AS NEEDED, Disp: , Rfl:     rosuvastatin (CRESTOR) 5 mg tablet, Take 1 tablet (5 mg total) by mouth daily, Disp: 90 tablet, Rfl: 1    sertraline (ZOLOFT) 100 mg tablet, Take 2 tablets (200 mg total) by mouth daily at bedtime, Disp: 180 tablet, Rfl: 1    traZODone (DESYREL) 50 mg tablet, Take 1 tablet (50 mg total) by mouth daily at bedtime (Patient taking differently: Take 50 mg by mouth daily at bedtime 1-2 tablets prn ), Disp: 90 tablet, Rfl: 1    venlafaxine (EFFEXOR-XR) 150 mg 24 hr capsule, Take 1 capsule (150 mg total) by mouth daily, Disp: 90 capsule, Rfl: 1    Dulaglutide (Trulicity) 9 90 YK/9 2CA SOPN, Inject 0 5 mL (0 75 mg total) under the skin once a week (Patient not taking: Reported on 3/30/2022 ), Disp: 2 mL, Rfl: 5    fluticasone (Flovent HFA) 110 MCG/ACT inhaler, Inhale 2 puffs 2 (two) times a day Rinse mouth after use   (Patient not taking: Reported on 3/30/2022 ), Disp: 36 g, Rfl: Biju Haque PA-C    Scribe Attestation    I,:  Neida Amanda PA-C am acting as a scribe while in the presence of the attending physician :       I,:  Mark Goodpasture, MD personally performed the services described in this documentation    as scribed in my presence :

## 2022-03-31 ENCOUNTER — TELEPHONE (OUTPATIENT)
Dept: OBGYN CLINIC | Facility: HOSPITAL | Age: 62
End: 2022-03-31

## 2022-04-01 ENCOUNTER — EVALUATION (OUTPATIENT)
Dept: PHYSICAL THERAPY | Facility: CLINIC | Age: 62
End: 2022-04-01
Payer: COMMERCIAL

## 2022-04-01 DIAGNOSIS — S83.242D ACUTE MEDIAL MENISCUS TEAR OF LEFT KNEE, SUBSEQUENT ENCOUNTER: Primary | ICD-10-CM

## 2022-04-01 PROCEDURE — 97110 THERAPEUTIC EXERCISES: CPT | Performed by: PHYSICAL THERAPIST

## 2022-04-01 PROCEDURE — 97161 PT EVAL LOW COMPLEX 20 MIN: CPT | Performed by: PHYSICAL THERAPIST

## 2022-04-01 NOTE — PROGRESS NOTES
PT Evaluation     Today's date: 2022  Patient name: Monroe Casey  : 1960  MRN: [id number]  Referring provider: Harrison Lock PA-C  Dx:   Encounter Diagnosis     ICD-10-CM    1  Acute medial meniscus tear of left knee, subsequent encounter  S83 [id number]                   Assessment  Assessment details: Monroe Casey is a 63 y o  female referred to outpatient physical therapy S/P L knee arthroscopy with partial medial menisectomy on 3/24/22  Impairments include pain, decreased L knee ROM, decreased LLE strength, decreased flexibility, antalgic gait, and impaired balance  These impairments are limiting patients functional ability to perform ambulation, stair navigation, and standing  Pt is restricted in participating in ADLs  Pt would benefit from skilled physical therapy to address the limitations above to allow return to prior level of function  At this point in time, no further referral necessary based upon examination results  Impairments: abnormal gait, abnormal muscle tone, abnormal or restricted ROM, abnormal movement, activity intolerance, impaired balance, impaired physical strength, lacks appropriate home exercise program and pain with function  Understanding of Dx/Px/POC: good  Goals  Short term goals 2-3 weeks    1) Patient will demonstrate ability to perform HEP  2) Patient will improve pain at worst to 1/10 on NPRS  3) Patient will increased L knee flexion to WNL  Long term goals 4-8 weeks    1) Patient will demonstrate independence with comprehensive HEP  2) Patient improve FOTO score to equal or greater than expected values  3) Patient will demonstrate ability to ambulate without AD  4) Patient will improve TUG score to < 13 5 s to decrease risk for falls  5) Patient will improve 5xSTS to <12 s to decrease risk for falls  6) Patient will demonstrate ability to perform reciprocal stair navigation         Plan  Plan details: Plan of care was discussed with patient at time of evaluation  Pt will be seen 2x a week for 8 weeks  Patient would benefit from: skilled physical therapy  Planned modality interventions: cryotherapy, TENS, thermotherapy: hydrocollator packs and low level laser therapy  Other planned modality interventions: PRN, BFR  Planned therapy interventions: balance, flexibility, functional ROM exercises, home exercise program, joint mobilization, manual therapy, neuromuscular re-education, strengthening, therapeutic activities, stretching, therapeutic exercise, gait training, patient education and abdominal trunk stabilization  Frequency: 2x week  Duration in weeks: 8  Treatment plan discussed with: patient        Subjective Evaluation    History of Present Illness  Date of surgery: 3/24/2022  Mechanism of injury: surgery  Mechanism of injury: Patient presents to outpatient physical therapy S/P L knee arthroscopy with partial medial menisectomy on 3/24/22  Per MD referral, pt has no restrictions  Pt is currently ambulating with a SPC at time of initial evaluation  Patient Denies episodes of numbness or tingling in LLE  Pain described as throbbing in lateral and medial aspect of the knee and all around  Pain rating currently 0/10, at best 0/10 and at worst 3/10  Pt reports she is more uncomfortable in the knee  States she is sleeping on the couch  Patient states limitations with ambulation, stair navigation (pt has not attempted), standing, sleeping, and lifting  Pt can stand roughly 1-2 minutes without requiring a chair to sit  Pt is not currently working but is planning on working when she feels better  Aggravating factors include activity  Patient states use of ice, and naproxen for relief of symptoms  Pt goals for therapy include being able to feel strong, do steps, carry her groceries in the house, and clean the house  Pt would like to not need the cane                 Not a recurrent problem   Quality of life: good    Pain  Current pain ratin  At best pain ratin  At worst pain rating: 3  Quality: throbbing  Aggravating factors: stair climbing and walking    Social Support  Stairs in house: yes (20 steps)     Treatments  No previous or current treatments  Patient Goals  Patient goals for therapy: decreased edema, decreased pain, improved balance, increased motion, return to sport/leisure activities, return to work, independence with ADLs/IADLs and increased strength          Objective     Tenderness   Left Knee   Tenderness in the inferior patella, lateral joint line, medial joint line, patellar tendon and pes anserinus       Neurological Testing     Sensation     Knee   Left Knee   Intact: light touch    Right Knee   Intact: light touch     Active Range of Motion   Left Knee   Flexion: 100 degrees   Extension: 0 degrees     Right Knee   Flexion: 130 degrees   Extension: 3 degrees     Passive Range of Motion   Left Knee   Flexion: 120 degrees   Extension: 0 degrees     Mobility   Patellar Mobility:   Left Knee   Hypomobile: left medial, left lateral, left superior and left inferior    Strength/Myotome Testing     Left Hip   Planes of Motion   Flexion: 3+    Right Hip   Planes of Motion   Flexion: 4    Left Knee   Flexion: 3+  Extension: 4-  Quadriceps contraction: poor    Right Knee   Flexion: 4+  Extension: 4  Quadriceps contraction: poor    Left Ankle/Foot   Dorsiflexion: 4-    Right Ankle/Foot   Dorsiflexion: 4    Swelling     Left Knee Girth Measurement (cm)   Joint line: 48 cm    Right Knee Girth Measurement (cm)   Joint line: 48 cm    Functional Assessment        Comments  5xSTS: 29 36 s with UE support  TU 99 s with use of SPC             Precautions: Arthritis, DM, GERD,  S/P L knee arthroscopy with partial medial menisectomy on 3/24/22- no restrictions      Manuals             L knee PROM             L patella PROM                                       Neuro Re-Ed Ther Ex             Beata            Supine heel slides HEP            SAQ HEP            LAQ             SLR flexion             SL abd             Side stepping             Leg press             Ther Activity             Mini squats             Lateral step downs             Step ups             Gait Training                                       Modalities

## 2022-04-05 ENCOUNTER — OFFICE VISIT (OUTPATIENT)
Dept: PHYSICAL THERAPY | Facility: CLINIC | Age: 62
End: 2022-04-05
Payer: COMMERCIAL

## 2022-04-05 DIAGNOSIS — S83.242D ACUTE MEDIAL MENISCUS TEAR OF LEFT KNEE, SUBSEQUENT ENCOUNTER: Primary | ICD-10-CM

## 2022-04-05 PROCEDURE — 97140 MANUAL THERAPY 1/> REGIONS: CPT | Performed by: PHYSICAL THERAPIST

## 2022-04-05 PROCEDURE — 97110 THERAPEUTIC EXERCISES: CPT | Performed by: PHYSICAL THERAPIST

## 2022-04-05 NOTE — PROGRESS NOTES
Daily Note     Today's date: 2022  Patient name: Eleuterio Roca  : 1960  MRN: [id number]  Referring provider: Sherine Rivera PA-C  Dx:   Encounter Diagnosis     ICD-10-CM    1  Acute medial meniscus tear of left knee, subsequent encounter  [id number]                   Subjective: Pt reports she was feeling pretty good on Saturday so she did not ambulate with her SPC  States by the end of the night she was in so much pain in the L knee  States she did not do any of the exercises due to her symptoms  States she is having pain in her L knee today which she did not have at time of evaluation  Objective: See treatment diary below      Assessment: Good tolerance to treatment session with minimal pain symptoms  Increased difficulty with SAQ as fatigued set in due to increased quadricep weakness  Verbal and tactile cues provided to improve form during hip abduction strengthening  Educated pt to continue originally HEP  Educated pt on proper stair navigation mechanics and how to ascend/descend with use of SPC  Reviewed HEP and all questions answered at this time  Plan: Continue per plan of care        Precautions: Arthritis, DM, GERD,  S/P L knee arthroscopy with partial medial menisectomy on 3/24/22- no restrictions      Manuals  4/5           L knee PROM  AG           L patella PROM  AG                                     Neuro Re-Ed                                                                                                        Ther Ex             Bike  NV           Quad sets HEP 5" 3x10           Supine heel slides HEP 5" x20            SAQ HEP 3x10            LAQ             SLR flexion             SL abd  2x10            Side stepping             Leg press             Pt education  AG stair navigation           Ther Activity             Mini squats             Lateral step downs             Step ups  4" x15           Gait Training                                       Modalities Cryo  10' post

## 2022-04-07 ENCOUNTER — OFFICE VISIT (OUTPATIENT)
Dept: PHYSICAL THERAPY | Facility: CLINIC | Age: 62
End: 2022-04-07
Payer: COMMERCIAL

## 2022-04-07 DIAGNOSIS — S83.242D ACUTE MEDIAL MENISCUS TEAR OF LEFT KNEE, SUBSEQUENT ENCOUNTER: Primary | ICD-10-CM

## 2022-04-07 PROCEDURE — 97140 MANUAL THERAPY 1/> REGIONS: CPT

## 2022-04-07 PROCEDURE — 97110 THERAPEUTIC EXERCISES: CPT

## 2022-04-07 NOTE — PROGRESS NOTES
Daily Note     Today's date: 2022  Patient name: Maria Del Rosario Anderson  : 1960  MRN: [id number]  Referring provider: Vernon Raines PA-C  Dx:   Encounter Diagnosis     ICD-10-CM    1  Acute medial meniscus tear of left knee, subsequent encounter  [id number]                   Subjective: Patient reported having less pain in L knee compared to last visit  Stairs are her greatest challenge at this time  Objective: See treatment diary below  Progressed program as reflected below  Assessment: 2 weeks post-op  Making great progress with ROM at this time  Recumbent bike added with no difficulty achieving full revolutions  Still with quad inhibition as expected at this time post-op  Good control with progression of step ups to 6" step  Will continue to work on maintaining mobility and increasing quad strength to help decrease pain with overall function and stairs  Plan: Continue per plan of care             Precautions: Arthritis, DM, GERD, S/P L knee arthroscopy with partial medial menisectomy on 3/24/22 - no restrictions    Manuals           L knee PROM  AG EH          L patella PROM  AG EH                                    Neuro Re-Ed                                                                                                        Ther Ex             Bike  NV 5 min          Quad sets HEP 5" 3x10 5" hold, 3x10          Supine heel slides HEP 5" x20  5" hold,   3x10          SAQ HEP 3x10  3x10          LAQ   2x10          SLR flexion             SL abd  2x10  2x10          Side stepping             Leg press             Pt education  AG stair navigation                        Ther Activity             Mini squats             Lateral step downs             Step ups  4" x15  4" and    6"   x10 ea                       Gait Training                                       Modalities             Cryo  10' post 10 min post

## 2022-04-12 ENCOUNTER — OFFICE VISIT (OUTPATIENT)
Dept: PHYSICAL THERAPY | Facility: CLINIC | Age: 62
End: 2022-04-12
Payer: COMMERCIAL

## 2022-04-12 DIAGNOSIS — S83.242D ACUTE MEDIAL MENISCUS TEAR OF LEFT KNEE, SUBSEQUENT ENCOUNTER: Primary | ICD-10-CM

## 2022-04-12 PROCEDURE — 97110 THERAPEUTIC EXERCISES: CPT

## 2022-04-12 PROCEDURE — 97140 MANUAL THERAPY 1/> REGIONS: CPT

## 2022-04-12 NOTE — PROGRESS NOTES
Daily Note     Today's date: 2022  Patient name: Maicol Mccain  : 1960  MRN: [id number]  Referring provider: Yemi Villatoro PA-C  Dx:   Encounter Diagnosis     ICD-10-CM    1  Acute medial meniscus tear of left knee, subsequent encounter  P56 [id number]                   Subjective: Patient continued to deny significant or if any L knee pain  Still feels limited by stairs  Her goal is to be able to carry groceries up stairs without difficulty  Objective: See treatment diary below  Progressed program as reflected below  Assessment: Nearing 3 weeks post-op  No pain at end range knee flex with passive overpressure  Still with quad inhibition as expected at this time post-op, however is improving  Muscle fatigue as expected by the end of session  Will continue to work on maintaining mobility and increasing quad strength to help decrease pain with overall function and stairs  Plan: Continue per plan of care             Precautions: Arthritis, DM, GERD, S/P L knee arthroscopy with partial medial menisectomy on 3/24/22 - no restrictions    Manuals          L knee PROM   EH EH         L patella PROM  [facility]                                   Neuro Re-Ed                                                                                                        Ther Ex             Bike  NV 5 min 7 min         Quad sets HEP 5" 3x10 5" hold, 3x10 5" hold, 3x10         Supine heel slides HEP 5" x20  5" hold,   3x10 5" hold, 3x10         SAQ HEP 3x10  3x10 3x10         LAQ   2x10 2#  3x10         SLR flexion             SL abd  2x10  2x10 2x10         Side stepping             Leg press     55#   3x10         Pt education  AG stair navigation                        Ther Activity             Mini squats             Lateral step downs    NV         Step ups  4" x15  4" and    6"   x10 ea  6"   2x10                      Gait Training                                       Modalities Cryo  10' post 10 min post 10 min post

## 2022-04-14 ENCOUNTER — OFFICE VISIT (OUTPATIENT)
Dept: PHYSICAL THERAPY | Facility: CLINIC | Age: 62
End: 2022-04-14
Payer: COMMERCIAL

## 2022-04-14 DIAGNOSIS — S83.242D ACUTE MEDIAL MENISCUS TEAR OF LEFT KNEE, SUBSEQUENT ENCOUNTER: Primary | ICD-10-CM

## 2022-04-14 PROCEDURE — 97110 THERAPEUTIC EXERCISES: CPT | Performed by: PHYSICAL THERAPIST

## 2022-04-14 PROCEDURE — 97140 MANUAL THERAPY 1/> REGIONS: CPT | Performed by: PHYSICAL THERAPIST

## 2022-04-14 NOTE — PROGRESS NOTES
Daily Note     Today's date: 2022  Patient name: Maria Del Rosario Anderson  : 1960  MRN: [id number]  Referring provider: Vernon Raines PA-C  Dx:   Encounter Diagnosis     ICD-10-CM    1  Acute medial meniscus tear of left knee, subsequent encounter  J49 [id number]                   Subjective: Pt reports increased soreness in her L knee due to walking through two stores  Pt reports she is noticing one of her incisions seems to be getting bumpy and taller  Objective: See treatment diary below  Assessment: Pt is now 3 weeks post op today  Assess lateral incision and educated pt on scar massage techniques with good understanding  Decreased tolerance to treatment session due to increased LLE fatigue and soreness pre session  New interventions not performed this session secondary to increased LE and full body fatigue  Good tolerance to interventions performed with increased fatigue as anticipated  Plan: Continue per plan of care             Precautions: Arthritis, DM, GERD, S/P L knee arthroscopy with partial medial menisectomy on 3/24/22 - no restrictions    Manuals         L knee PROM  AG [address] [facility] EH AG        L patella PROM  AG  EH AG                                  Neuro Re-Ed                                                                                                        Ther Ex             Bike  NV 5 min 7 min 4'        Quad sets HEP 5" 3x10 5" hold, 3x10 5" hold, 3x10 5" hold, 3x10        Supine heel slides HEP 5" x20  5" hold,   3x10 5" hold, 3x10 HEP        SAQ HEP 3x10  3x10 3x10 3x10         LAQ   2x10 2#  3x10 2#  3x10        SLR flexion             SL abd  2x10  2x10 2x10 2x10         Side stepping             Leg press     55#   3x10 55#  2x10         Pt education  AG stair navigation                        Ther Activity             Mini squats             Lateral step downs    NV NV        Step ups  4" x15  4" and    6"   x10 ea  6"   2x10 6" 2x10 Gait Training                                       Modalities             Cryo  10' post 10 min post 10 min post 10' post

## 2022-04-19 ENCOUNTER — OFFICE VISIT (OUTPATIENT)
Dept: PHYSICAL THERAPY | Facility: CLINIC | Age: 62
End: 2022-04-19
Payer: COMMERCIAL

## 2022-04-19 DIAGNOSIS — S83.242D ACUTE MEDIAL MENISCUS TEAR OF LEFT KNEE, SUBSEQUENT ENCOUNTER: Primary | ICD-10-CM

## 2022-04-19 PROCEDURE — 97110 THERAPEUTIC EXERCISES: CPT | Performed by: PHYSICAL THERAPIST

## 2022-04-19 PROCEDURE — 97140 MANUAL THERAPY 1/> REGIONS: CPT | Performed by: PHYSICAL THERAPIST

## 2022-04-19 NOTE — PROGRESS NOTES
Daily Note     Today's date: 2022  Patient name: Richelle Huitron  : 1960  MRN: [id number]  Referring provider: Dagmar Hagan PA-C  Dx:   Encounter Diagnosis     ICD-10-CM    1  Acute medial meniscus tear of left knee, subsequent encounter  S83 283D                   Subjective: Pt reports soreness and fatigue in her legs today due to a lot of cleaning yesterday  States she had trouble getting down to the ground and then trying to stand back up; required use of a table and wall to stand  Objective: See treatment diary below  Provided clamshells and orange TB as part of HEP  Assessment: Introduced SLR flexion this session; significant difficulty demonstrated secondary to quadricep weakness therefore only 10 repetitions performed  Decreased weight performed with leg press due to increased soreness and LE fatigue  Increased soreness in L hip during hip abduction strengthening; verbal cues provided for correct form  Overall, good tolerance to treatment session with increased fatigue and soreness post session  Plan: Continue per plan of care             Precautions: Arthritis, DM, GERD, S/P L knee arthroscopy with partial medial menisectomy on 3/24/22 - no restrictions    Manuals        L knee PROM  AG [address] [facility] EH AG AG       L patella PROM  AG EH EH AG AG                                 Neuro Re-Ed                                                                                                        Ther Ex             Bike  NV 5 min 7 min 4' 6'       Quad sets HEP 5" 3x10 5" hold, 3x10 5" hold, 3x10 5" hold, 3x10 HEP       Supine heel slides HEP 5" x20  5" hold,   3x10 5" hold, 3x10 HEP        SAQ HEP 3x10  3x10 3x10 3x10  1#  2x10        LAQ   2x10 2#  3x10 2#  3x10 2#  3x10        SLR flexion      x10        SL abd  2x10  2x10 2x10 2x10  2x10       L clamshells      OTB   2x10       Side stepping             Leg press     55#   3x10 55#  2x10  45#  3x10       Pt education  AG stair navigation                        Ther Activity             Mini squats             Lateral step downs    NV NV NV       Step ups  4" x15  4" and    6"   x10 ea  6"   2x10 6" 2x10  6" 2x10                     Gait Training                                       Modalities             Cryo  10' post 10 min post 10 min post 10' post 10' post

## 2022-04-21 ENCOUNTER — APPOINTMENT (OUTPATIENT)
Dept: PHYSICAL THERAPY | Facility: CLINIC | Age: 62
End: 2022-04-21
Payer: COMMERCIAL

## 2022-04-24 NOTE — TELEPHONE ENCOUNTER
Spoke to patient  Did advise that she was recommended to see Dr Elva Roca for follow up  She stated that in the last month the pain has gotten really bad  Denies redness, heat to touch, or swelling  Stated she has done everything for it up until this point  She is asking for visco injections and stated she needs to be seen ASAP  Please advise if she can see you on Wednesday  Also please advise if you can order the visco or if she needs to see Dr Elva Roca and have them ordered by her  Dr Elva Roca has no availability until the end of the month  No

## 2022-04-26 ENCOUNTER — OFFICE VISIT (OUTPATIENT)
Dept: PHYSICAL THERAPY | Facility: CLINIC | Age: 62
End: 2022-04-26
Payer: COMMERCIAL

## 2022-04-26 DIAGNOSIS — S83.242D ACUTE MEDIAL MENISCUS TEAR OF LEFT KNEE, SUBSEQUENT ENCOUNTER: Primary | ICD-10-CM

## 2022-04-26 PROCEDURE — 97110 THERAPEUTIC EXERCISES: CPT | Performed by: PHYSICAL THERAPIST

## 2022-04-26 PROCEDURE — 97140 MANUAL THERAPY 1/> REGIONS: CPT | Performed by: PHYSICAL THERAPIST

## 2022-04-26 NOTE — PROGRESS NOTES
Daily Note     Today's date: 2022  Patient name: Eleuterio Roca  : 1960  MRN: [id number]  Referring provider: Sherine Rivera PA-C  Dx:   Encounter Diagnosis     ICD-10-CM    1  Acute medial meniscus tear of left knee, subsequent encounter  S81 944A                   Subjective: Pt reports she was sick the last 3-4 days so she was not doing all her exercises  States increased soreness in L knee today  Objective: See treatment diary below  Assessment: Increased reliance on UE support during step ups secondary to decreased strength and increased fatigue  Increased difficulty with mini squats due to decreased form; consistent cues provided to improve form and decrease anterior tibial translation  Pt demonstrates improved ambulation without use of SPC during session with decreased trunk lean and increased stance time on LLE  Pt is demonstrating good progress with skilled PT and would continue to benefit to maximize function  Plan: Continue per plan of care             Precautions: Arthritis, DM, GERD, S/P L knee arthroscopy with partial medial menisectomy on 3/24/22 - no restrictions    Manuals       L knee PROM  AG EH EH AG AG AG      L patella PROM  AG [address] [facility] EH AG AG AG                                Neuro Re-Ed             Rockerboard             Tandem stance             SLS                                                                 Ther Ex             Bike  NV 5 min 7 min 4' 6' 5'      Quad sets HEP 5" 3x10 5" hold, 3x10 5" hold, 3x10 5" hold, 3x10 HEP       Supine heel slides HEP 5" x20  5" hold,   3x10 5" hold, 3x10 HEP        SAQ HEP 3x10  3x10 3x10 3x10  1#  2x10  1#  3x10      LAQ   2x10 2#  3x10 2#  3x10 2#  3x10  2 5#  3x10      SLR flexion      x10  2x10       SL abd  2x10  2x10 2x10 2x10  2x10 3x10       L clamshells      OTB   2x10 OTB   2x10      Side stepping             Leg press     55#   3x10 55#  2x10  45#  3x10 45#  3x10      Supine L HS stretch       4x20"       Side stepping       NV      Pt education  AG stair navigation                        Ther Activity             Mini squats       x15      Lateral step downs    NV NV NV       Step ups  4" x15  4" and    6"   x10 ea  6"   2x10 6" 2x10  6" 2x10  6" 2x10                   Gait Training                                       Modalities             Cryo  10' post 10 min post 10 min post 10' post 10' post 10' post

## 2022-04-28 ENCOUNTER — OFFICE VISIT (OUTPATIENT)
Dept: PHYSICAL THERAPY | Facility: CLINIC | Age: 62
End: 2022-04-28
Payer: COMMERCIAL

## 2022-04-28 DIAGNOSIS — S83.242D ACUTE MEDIAL MENISCUS TEAR OF LEFT KNEE, SUBSEQUENT ENCOUNTER: Primary | ICD-10-CM

## 2022-04-28 PROCEDURE — 97110 THERAPEUTIC EXERCISES: CPT | Performed by: PHYSICAL THERAPIST

## 2022-04-28 PROCEDURE — 97140 MANUAL THERAPY 1/> REGIONS: CPT | Performed by: PHYSICAL THERAPIST

## 2022-04-28 NOTE — PROGRESS NOTES
Daily Note     Today's date: 2022  Patient name: Lexa Washburn  : 1960  MRN: [id number]  Referring provider: Yemi Singh PA-C  Dx:   Encounter Diagnosis     ICD-10-CM    1  Acute medial meniscus tear of left knee, subsequent encounter  P55 599D                   Subjective: Pt reports she feels good today  States increased soreness in the knee following last session  States she did a lot of walking yesterday which went well  Objective: See treatment diary below  Assessment: Pt is ambulating without an AD during today's session with mild trendelenburg noted  Improved tolerance to treatment session and pt able to complete increased number of interventions and repetitions  Improvements noted with mini squat form requiring decreased verbal cues; decreased anterior tibial translation demonstrated however decreased form shown with increased fatigue by last 5 reps  Increased fatigue at end of session as anticipated  Pt will have a follow up with her surgeon next Tuesday May 3rd  Plan: Continue per plan of care             Precautions: Arthritis, DM, GERD, S/P L knee arthroscopy with partial medial menisectomy on 3/24/22 - no restrictions    Manuals      L knee PROM  AG EH EH AG AG AG AG     L patella PROM  AG [address] [facility] EH AG AG AG AG                               Neuro Re-Ed             Rockerboard             Tandem stance             SLS                                                                 Ther Ex             Bike  NV 5 min 7 min 4' 6' 5' 5'     Quad sets HEP 5" 3x10 5" hold, 3x10 5" hold, 3x10 5" hold, 3x10 HEP       Supine heel slides HEP 5" x20  5" hold,   3x10 5" hold, 3x10 HEP        SAQ HEP 3x10  3x10 3x10 3x10  1#  2x10  1#  3x10 1 5#  3x10     LAQ   2x10 2#  3x10 2#  3x10 2#  3x10  2 5#  3x10 2 5#  3x10      SLR flexion      x10  2x10  3x10      SL abd  2x10  2x10 2x10 2x10  2x10 3x10  3x10      L clamshells      OTB   2x10 OTB   2x10 OTB 3x10      Side stepping             Leg press st 6      55#   3x10 55#  2x10  45#  3x10 45#  3x10 45#  3x10     Supine L HS stretch       4x20"  3x30"      HS curls         OTB  2x10     Pt education  AG stair navigation                        Ther Activity             Mini squats       x15 x15     Lateral step downs    NV NV NV       Step ups  4" x15  4" and    6"   x10 ea  6"   2x10 6" 2x10  6" 2x10  6" 2x10 6" 2x10                   Gait Training                                       Modalities             Cryo  10' post 10 min post 10 min post 10' post 10' post 10' post 10' post

## 2022-05-03 ENCOUNTER — APPOINTMENT (OUTPATIENT)
Dept: PHYSICAL THERAPY | Facility: CLINIC | Age: 62
End: 2022-05-03
Payer: COMMERCIAL

## 2022-05-04 ENCOUNTER — OFFICE VISIT (OUTPATIENT)
Dept: FAMILY MEDICINE CLINIC | Facility: CLINIC | Age: 62
End: 2022-05-04
Payer: COMMERCIAL

## 2022-05-04 DIAGNOSIS — B34.9 VIRAL SYNDROME: Primary | ICD-10-CM

## 2022-05-04 DIAGNOSIS — J02.9 SORE THROAT: ICD-10-CM

## 2022-05-04 PROCEDURE — 0241U HB NFCT DS VIR RESP RNA 4 TRGT: CPT | Performed by: FAMILY MEDICINE

## 2022-05-04 PROCEDURE — 99213 OFFICE O/P EST LOW 20 MIN: CPT | Performed by: FAMILY MEDICINE

## 2022-05-04 NOTE — PROGRESS NOTES
Assessment/Plan: supportive care recommended  Patient will rest and increase fluids and use Sudafed and Mucinex as needed  Patient will the the have viral panel done at the present time  Diagnoses and all orders for this visit:    Viral syndrome            Subjective:        Patient ID: Manish Carl is a 63 y o  female  Patient is here with 1 week history of sore throat postnasal drip achiness myalgias headache along with fatigue  Patient has notice decreased in smell  Decreased taste noted  Patient did home COVID test which was negative  This was done last week  No nausea or vomiting  Patient also with cough  Patient is using Mucinex  The following portions of the patient's history were reviewed and updated as appropriate: allergies, current medications, past family history, past medical history, past social history, past surgical history and problem list       Review of Systems   Constitutional: Positive for fatigue  Negative for fever  HENT: Positive for congestion, postnasal drip, rhinorrhea and sore throat  Eyes: Negative  Respiratory: Positive for cough  Cardiovascular: Negative  Gastrointestinal: Negative  Endocrine: Negative  Genitourinary: Negative  Musculoskeletal: Positive for arthralgias  Skin: Negative  Allergic/Immunologic: Negative  Neurological: Positive for headaches  Hematological: Negative  Psychiatric/Behavioral: Negative  Objective:               LMP  (LMP Unknown)          Physical Exam  Vitals and nursing note reviewed  Constitutional:       General: She is not in acute distress  Appearance: Normal appearance  She is ill-appearing  She is not toxic-appearing or diaphoretic  HENT:      Head: Normocephalic and atraumatic  Right Ear: Tympanic membrane, ear canal and external ear normal  There is no impacted cerumen        Left Ear: Tympanic membrane, ear canal and external ear normal  There is no impacted cerumen  Nose: Congestion and rhinorrhea present  Mouth/Throat:      Mouth: Mucous membranes are moist       Pharynx: Oropharyngeal exudate present  No posterior oropharyngeal erythema  Eyes:      General: No scleral icterus  Right eye: No discharge  Left eye: No discharge  Extraocular Movements: Extraocular movements intact  Conjunctiva/sclera: Conjunctivae normal       Pupils: Pupils are equal, round, and reactive to light  Neck:      Vascular: No carotid bruit  Cardiovascular:      Rate and Rhythm: Normal rate and regular rhythm  Pulses: Normal pulses  Heart sounds: Normal heart sounds  No murmur heard  No friction rub  No gallop  Pulmonary:      Effort: Pulmonary effort is normal  No respiratory distress  Breath sounds: Normal breath sounds  No stridor  No wheezing, rhonchi or rales  Chest:      Chest wall: No tenderness  Musculoskeletal:         General: No swelling, tenderness, deformity or signs of injury  Cervical back: Normal range of motion and neck supple  No rigidity  No muscular tenderness  Right lower leg: No edema  Left lower leg: No edema  Lymphadenopathy:      Cervical: No cervical adenopathy  Skin:     General: Skin is warm and dry  Capillary Refill: Capillary refill takes less than 2 seconds  Coloration: Skin is not jaundiced  Findings: No bruising, erythema, lesion or rash  Neurological:      Mental Status: She is alert and oriented to person, place, and time  Mental status is at baseline  Cranial Nerves: No cranial nerve deficit  Sensory: No sensory deficit  Motor: No weakness  Coordination: Coordination normal       Gait: Gait normal    Psychiatric:         Mood and Affect: Mood normal          Behavior: Behavior normal          Thought Content:  Thought content normal          Judgment: Judgment normal

## 2022-05-05 ENCOUNTER — APPOINTMENT (OUTPATIENT)
Dept: PHYSICAL THERAPY | Facility: CLINIC | Age: 62
End: 2022-05-05
Payer: COMMERCIAL

## 2022-05-05 LAB
FLUAV RNA RESP QL NAA+PROBE: NEGATIVE
FLUBV RNA RESP QL NAA+PROBE: NEGATIVE
RSV RNA RESP QL NAA+PROBE: NEGATIVE
SARS-COV-2 RNA RESP QL NAA+PROBE: NEGATIVE

## 2022-05-10 ENCOUNTER — APPOINTMENT (OUTPATIENT)
Dept: PHYSICAL THERAPY | Facility: CLINIC | Age: 62
End: 2022-05-10
Payer: COMMERCIAL

## 2022-05-10 ENCOUNTER — TELEPHONE (OUTPATIENT)
Dept: FAMILY MEDICINE CLINIC | Facility: CLINIC | Age: 62
End: 2022-05-10

## 2022-05-10 DIAGNOSIS — J06.9 UPPER RESPIRATORY TRACT INFECTION, UNSPECIFIED TYPE: Primary | ICD-10-CM

## 2022-05-10 RX ORDER — AMOXICILLIN AND CLAVULANATE POTASSIUM 875; 125 MG/1; MG/1
1 TABLET, FILM COATED ORAL 2 TIMES DAILY
Qty: 28 TABLET | Refills: 0 | Status: SHIPPED | OUTPATIENT
Start: 2022-05-10 | End: 2022-05-24

## 2022-05-10 NOTE — TELEPHONE ENCOUNTER
Patient call and stated that she is still sick from last thur  She would like to speak to you   Patient c/o cough, sore throat,  Congestion

## 2022-05-12 ENCOUNTER — OFFICE VISIT (OUTPATIENT)
Dept: PHYSICAL THERAPY | Facility: CLINIC | Age: 62
End: 2022-05-12
Payer: COMMERCIAL

## 2022-05-12 DIAGNOSIS — S83.242D ACUTE MEDIAL MENISCUS TEAR OF LEFT KNEE, SUBSEQUENT ENCOUNTER: Primary | ICD-10-CM

## 2022-05-12 PROCEDURE — 97110 THERAPEUTIC EXERCISES: CPT | Performed by: PHYSICAL THERAPIST

## 2022-05-12 NOTE — PROGRESS NOTES
Daily Note     Today's date: 2022  Patient name: Piotr Meng  : 1960  MRN: [id number]  Referring provider: Aneta Mckeon PA-C  Dx:   Encounter Diagnosis     ICD-10-CM    1  Acute medial meniscus tear of left knee, subsequent encounter  S83 932D                   Subjective: Pt reports she has been in bed for roughly a week due to a viral infection so she has not done any exercises  Objective: See treatment diary below  Assessment:  Increased time to complete interventions secondary to significant whole body fatigue  Held on functional interventions due to fatigue and soreness in L knee  Pt will be rescheduling her follow up with her surgeon due to having to cancel when sick  Resume POC as tolerated next session  Plan: Continue per plan of care             Precautions: Arthritis, DM, GERD, S/P L knee arthroscopy with partial medial menisectomy on 3/24/22 - no restrictions    Manuals     L knee PROM  AG  EH AG AG AG AG AG    L patella PROM  AG [address] [facility] EH AG AG AG AG AG                              Neuro Re-Ed             [facility]             Tandem stance             SLS                                                                 Ther Ex             Bike  NV 5 min 7 min 4' 6' 5' 5' 5'    Quad sets HEP 5" 3x10 5" hold, 3x10 5" hold, 3x10 5" hold, 3x10 HEP       Supine heel slides HEP 5" x20  5" hold,   3x10 5" hold, 3x10 HEP        SAQ HEP 3x10  3x10 3x10 3x10  1#  2x10  1#  3x10 1 5#  3x10 1 5#  3x10    LAQ   2x10 2#  3x10 2#  3x10 2#  3x10  2 5#  3x10 2 5#  3x10  2 5#  3x10     SLR flexion      x10  2x10  3x10  3x10    SL abd  2x10  2x10 2x10 2x10  2x10 3x10  3x10  3x10    L clamshells      OTB   2x10 OTB   2x10 OTB 3x10  OTB 3x10     Side stepping             Leg press st 6      55#   3x10 55#  2x10  45#  3x10 45#  3x10 45#  3x10 45#  3x10    Supine L HS stretch       4x20"  3x30"  3x30"     HS curls         OTB  2x10 OTB 2x10     Pt education  AG stair navigation                        Ther Activity             Mini squats       x15 x15 NV    Lateral step downs    NV NV NV   NV    Step ups  4" x15  4" and    6"   x10 ea  6"   2x10 6" 2x10  6" 2x10  6" 2x10 6" 2x10  NV                 Gait Training                                       Modalities             Cryo  10' post 10 min post 10 min post 10' post 10' post 10' post 10' post 10' post

## 2022-05-17 ENCOUNTER — APPOINTMENT (OUTPATIENT)
Dept: PHYSICAL THERAPY | Facility: CLINIC | Age: 62
End: 2022-05-17
Payer: COMMERCIAL

## 2022-05-18 ENCOUNTER — OFFICE VISIT (OUTPATIENT)
Dept: PHYSICAL THERAPY | Facility: CLINIC | Age: 62
End: 2022-05-18
Payer: COMMERCIAL

## 2022-05-18 DIAGNOSIS — S83.242D ACUTE MEDIAL MENISCUS TEAR OF LEFT KNEE, SUBSEQUENT ENCOUNTER: Primary | ICD-10-CM

## 2022-05-18 PROCEDURE — 97140 MANUAL THERAPY 1/> REGIONS: CPT

## 2022-05-18 PROCEDURE — 97110 THERAPEUTIC EXERCISES: CPT

## 2022-05-18 NOTE — PROGRESS NOTES
Daily Note     Today's date: 2022  Patient name: Mal Chatman  : 1960  MRN: [id number]  Referring provider: Marija Ann PA-C  Dx:   Encounter Diagnosis     ICD-10-CM    1  Acute medial meniscus tear of left knee, subsequent encounter  [id number] [id number]                   Subjective: Patient reported L knee continues to feel weak and does attribute this to recent bout of illness and decrease in activity  She is still pleased with her progress thus far  Objective: See treatment diary below  Assessment: Continued to hold some exercises secondary to generalized fatigue and weakness and patient still battling some residual symptoms for recent illness  Some discomfort into end range ext with OP  Will consider adding back to routine more functional strengthening as her symptoms improve with recent illness  Plan: Continue per plan of care             Precautions: Arthritis, DM, GERD, S/P L knee arthroscopy with partial medial menisectomy on 3/24/22 - no restrictions    Manuals    L knee PROM  AG EH EH AG AG AG AG AG EH   L patella PROM  AG [address] [facility] EH AG AG AG AG AG EH                             Neuro Re-Ed             Rockerboard             Tandem stance             SLS                                                                 Ther Ex             Bike  NV 5 min 7 min 4' 6' 5' 5' 5' 5 min  L1   Quad sets HEP 5" 3x10 5" hold, 3x10 5" hold, 3x10 5" hold, 3x10 HEP       Supine heel slides HEP 5" x20  5" hold,   3x10 5" hold, 3x10 HEP        SAQ HEP 3x10  3x10 3x10 3x10  1#  2x10  1#  3x10 1 5#  3x10 1 5#  3x10 NV   LAQ   2x10 2#  3x10 2#  3x10 2#  3x10  2 5#  3x10 2 5#  3x10  2 5#  3x10  2 5#  3x10   SLR flexion      x10  2x10  3x10  3x10 3x10   SL abd  2x10  2x10 2x10 2x10  2x10 3x10  3x10  3x10 3x10   L clamshells      OTB   2x10 OTB   2x10 OTB 3x10  OTB 3x10  OTB  3x10   Side stepping             Leg press   st 6      55#   3x10 55#  2x10  45#  3x10 45#  3x10 45#  3x10 45#  3x10  45#   3x10   Supine L HS stretch       4x20"  3x30"  3x30"   30"x3   HS curls         OTB  2x10 OTB 2x10   OTB   2x10   Pt education  AG stair navigation                        Ther Activity             Mini squats       x15 x15 NV NV   Lateral step downs    NV NV NV   NV NV   Step ups  4" x15  4" and    6"   x10 ea  6"   2x10 6" 2x10  6" 2x10  6" 2x10 6" 2x10  NV  NV                Gait Training                                       Modalities             Cryo  10' post 10 min post 10 min post 10' post 10' post 10' post 10' post 10' post  10 min post

## 2022-05-20 ENCOUNTER — OFFICE VISIT (OUTPATIENT)
Dept: PHYSICAL THERAPY | Facility: CLINIC | Age: 62
End: 2022-05-20
Payer: COMMERCIAL

## 2022-05-20 DIAGNOSIS — S83.242D ACUTE MEDIAL MENISCUS TEAR OF LEFT KNEE, SUBSEQUENT ENCOUNTER: Primary | ICD-10-CM

## 2022-05-20 PROCEDURE — 97140 MANUAL THERAPY 1/> REGIONS: CPT | Performed by: PHYSICAL THERAPIST

## 2022-05-20 PROCEDURE — 97110 THERAPEUTIC EXERCISES: CPT | Performed by: PHYSICAL THERAPIST

## 2022-05-20 PROCEDURE — 97530 THERAPEUTIC ACTIVITIES: CPT | Performed by: PHYSICAL THERAPIST

## 2022-05-20 NOTE — PROGRESS NOTES
PT Re-Evaluation     Today's date: 2022  Patient name: Piotr Meng  : 1960  MRN: [id number]  Referring provider: Aneta Mckeon PA-C  Dx:   Encounter Diagnosis     ICD-10-CM    1  Acute medial meniscus tear of left knee, subsequent encounter  S83 [id number]                   Assessment  Assessment details: Piotr Meng is a 63 y o  female referred to outpatient physical therapy S/P L knee arthroscopy with partial medial menisectomy on 3/24/22  Pt reports 50% improvement which correlates to improved FOTO outcomes  Improved include decreased pain, improved ROM, improved LE strength, and improved tolerance to activity  Impairments that remain include pain, decreased L knee ROM, decreased LLE strength, decreased flexibility, antalgic gait, and impaired balance  These impairments are limiting patients functional ability to perform ambulation, stair navigation, and standing  Pt is restricted in participating in ADLs  Pt would benefit from skilled physical therapy to address the limitations above to allow return to prior level of function  Impairments: abnormal gait, abnormal muscle tone, abnormal or restricted ROM, abnormal movement, activity intolerance, impaired balance, impaired physical strength, lacks appropriate home exercise program and pain with function  Understanding of Dx/Px/POC: good  Goals  Short term goals 2-3 weeks    1) Patient will demonstrate ability to perform HEP  (MET)  2) Patient will improve pain at worst to 1/10 on NPRS  (PROGRESSING)  3) Patient will increased L knee flexion to WNL  (MET)      Long term goals 4-8 weeks    1) Patient will demonstrate independence with comprehensive HEP  (PROGRESSING)  2) Patient improve FOTO score to equal or greater than expected values   (PROGRESSING)  3) Patient will demonstrate ability to ambulate without AD  (MET)  4) Patient will improve TUG score to < 13 5 s to decrease risk for falls  (MET)  5) Patient will improve 5xSTS to <12 s to decrease risk for falls  (PROGRESSING)  6) Patient will demonstrate ability to perform reciprocal stair navigation  (PROGRESSING)      Plan  Plan details: Plan of care was discussed with patient at time of evaluation  Pt will be seen 2x a week for 8 weeks  Patient would benefit from: skilled physical therapy  Planned modality interventions: cryotherapy, TENS, thermotherapy: hydrocollator packs and low level laser therapy  Other planned modality interventions: PRN, BFR  Planned therapy interventions: balance, flexibility, functional ROM exercises, home exercise program, joint mobilization, manual therapy, neuromuscular re-education, strengthening, therapeutic activities, stretching, therapeutic exercise, gait training, patient education and abdominal trunk stabilization  Frequency: 2x week  Duration in weeks: 8  Treatment plan discussed with: patient        Subjective Evaluation    History of Present Illness  Date of surgery: 3/24/2022  Mechanism of injury: surgery  Mechanism of injury: Patient presents to outpatient physical therapy S/P L knee arthroscopy with partial medial menisectomy on 3/24/22  Per MD referral, pt has no restrictions  Pt is currently ambulating with a SPC at time of initial evaluation  Patient Denies episodes of numbness or tingling in LLE  Pain described as throbbing in lateral and medial aspect of the knee and all around  Pain rating currently 0/10, at best 0/10 and at worst 3/10  Pt reports she is more uncomfortable in the knee  States she is sleeping on the couch  Patient states limitations with ambulation, stair navigation (pt has not attempted), standing, sleeping, and lifting  Pt can stand roughly 1-2 minutes without requiring a chair to sit  Pt is not currently working but is planning on working when she feels better  Aggravating factors include activity  Patient states use of ice, and naproxen for relief of symptoms       Pt goals for therapy include being able to feel strong, do steps, carry her groceries in the house, and clean the house  Pt would like to not need the cane  22: Pt reports she is very sore today with some swelling and discomfort due to washing all her planting pots  Pt reports she is still sleeping on the couch due to all the effort for the steps  Pt reports she can stand 15-20 minutes before she needs to sit due to fatigue  Pt has been performing stair navigation but it is still challenging  Not a recurrent problem   Quality of life: good    Pain  Current pain ratin  At best pain ratin  At worst pain ratin  Quality: throbbing  Aggravating factors: stair climbing and walking    Social Support  Stairs in house: yes (20 steps)     Treatments  No previous or current treatments  Patient Goals  Patient goals for therapy: decreased edema, decreased pain, improved balance, increased motion, return to sport/leisure activities, return to work, independence with ADLs/IADLs and increased strength          Objective     Tenderness   Left Knee   Tenderness in the inferior patella, lateral joint line, medial joint line, patellar tendon and pes anserinus       Neurological Testing     Sensation     Knee   Left Knee   Intact: light touch    Right Knee   Intact: light touch     Active Range of Motion   Left Knee   Flexion: 125 degrees   Extension: 0 degrees     Right Knee   Flexion: 130 degrees   Extension: 3 degrees     Passive Range of Motion   Left Knee   Flexion: 120 degrees   Extension: 0 degrees     Mobility   Patellar Mobility:   Left Knee   Hypomobile: left medial, left lateral, left superior and left inferior    Strength/Myotome Testing     Left Hip   Planes of Motion   Flexion: 4-    Right Hip   Planes of Motion   Flexion: 4    Left Knee   Flexion: 4-  Extension: 4-  Quadriceps contraction: fair    Right Knee   Flexion: 4+  Extension: 4  Quadriceps contraction: poor    Left Ankle/Foot   Dorsiflexion: 4-    Right Ankle/Foot   Dorsiflexion: 4    Swelling     Left Knee Girth Measurement (cm)   Joint line: 48 cm    Right Knee Girth Measurement (cm)   Joint line: 48 cm    Functional Assessment        Comments  5xSTS: 29 36 s with UE support 22: 20 19 s without UE support   TU 99 s with use of SPC 22: 11 32 s without AD                 Precautions: Arthritis, DM, GERD, S/P L knee arthroscopy with partial medial menisectomy on 3/24/22 - no restrictions    Manuals    L knee PROM AG    AG AG AG AG AG EH   L patella PROM AG    AG AG AG AG AG EH                Re-eval AG            Neuro Re-Ed             Rockerboard             Tandem stance             SLS                                                                 Ther Ex             Bike 5'    4' 6' 5' 5' 5' 5 min  L1   Quad sets     5" hold, 3x10 HEP       Supine heel slides     HEP        SAQ HEP    3x10  1#  2x10  1#  3x10 1 5#  3x10 1 5#  3x10 NV   LAQ     2#  3x10 2#  3x10  2 5#  3x10 2 5#  3x10  2 5#  3x10  2 5#  3x10   SLR flexion 2x10      x10  2x10  3x10  3x10 3x10   SL abd     2x10  2x10 3x10  3x10  3x10 3x10   L clamshells OTB 3x10      OTB   2x10 OTB   2x10 OTB 3x10  OTB 3x10  OTB  3x10   Side stepping             Leg press   st 6  55#  3x10     55#  2x10  45#  3x10 45#  3x10 45#  3x10 45#  3x10  45#   3x10   Supine L HS stretch 30"x3      4x20"  3x30"  3x30"   30"x3   HS curls  OTB 3x10        OTB  2x10 OTB 2x10   OTB   2x10   Pt education AG                         Ther Activity             Mini squats 2x10      x15 x15 NV NV   Lateral step downs     NV NV   NV NV   Step ups 6" 2x10     6" 2x10  6" 2x10  6" 2x10 6" 2x10  NV  NV                Gait Training                                       Modalities             Cryo 10' post     10' post 10' post 10' post 10' post 10' post  10 min post

## 2022-05-24 ENCOUNTER — TELEPHONE (OUTPATIENT)
Dept: OBGYN CLINIC | Facility: MEDICAL CENTER | Age: 62
End: 2022-05-24

## 2022-05-24 ENCOUNTER — OFFICE VISIT (OUTPATIENT)
Dept: PHYSICAL THERAPY | Facility: CLINIC | Age: 62
End: 2022-05-24
Payer: COMMERCIAL

## 2022-05-24 DIAGNOSIS — S83.242D ACUTE MEDIAL MENISCUS TEAR OF LEFT KNEE, SUBSEQUENT ENCOUNTER: Primary | ICD-10-CM

## 2022-05-24 PROCEDURE — 97140 MANUAL THERAPY 1/> REGIONS: CPT

## 2022-05-24 PROCEDURE — 97530 THERAPEUTIC ACTIVITIES: CPT

## 2022-05-24 PROCEDURE — 97110 THERAPEUTIC EXERCISES: CPT

## 2022-05-24 NOTE — TELEPHONE ENCOUNTER
Patient calling for forced appointment for her first [facility] appointment  Her first one was scheduled on 2022, she had caught a   Bad virus and was sick for 2 weeks, missed first 3 PT appointments but started OP physical therapy on 2022  Stating She needs an appointment to be checked  Sent to [facility] for forced appt      Patient: Segundo Camacho    MRN: [id number]    :  1960    Phone: [phone]    Location: [facility]

## 2022-05-24 NOTE — PROGRESS NOTES
Daily Note     Today's date: 2022  Patient name: Manish Carl  : 1960  MRN: [id number]  Referring provider: Joey Weems PA-C  Dx:   Encounter Diagnosis     ICD-10-CM    1  Acute medial meniscus tear of left knee, subsequent encounter  [id number]                   Subjective: Patient continues to note improvement in L knee  Objective: See treatment diary below  Assessment: Making good progress at this time with current program  No pain at end range OP with flex or ext  Improved endurance overall today despite continued proximal weakness  Skilled PT remains appropriate to help achieve more pain free function and improve overall endurance  Plan: Continue per plan of care  Progress treatment as tolerated              Precautions: Arthritis, DM, GERD, S/P L knee arthroscopy with partial medial menisectomy on 3/24/22 - no restrictions    Manuals    L knee PROM AG EH    AG AG AG AG EH   L patella PROM AG EH    AG AG AG AG EH                Re-eval AG            Neuro Re-Ed             Rockerboard             Tandem stance             SLS                                                                 Ther Ex             Bike 5' 5 min  L1    6' 5' 5' 5' 5 min  L1   Quad sets      HEP       Supine heel slides             SAQ HEP     1#  2x10  1#  3x10 1 5#  3x10 1 5#  3x10 NV   LAQ      2#  3x10  2 5#  3x10 2 5#  3x10  2 5#  3x10  2 5#  3x10   SLR flexion 2x10  3x10    x10  2x10  3x10  3x10 3x10   SL abd      2x10 3x10  3x10  3x10 3x10   L clamshells OTB 3x10  OTB  3x10    OTB   2x10 OTB   2x10 OTB 3x10  OTB 3x10  OTB  3x10   Side stepping             Leg press   st 6  55#  3x10   55#   3x10    45#  3x10 45#  3x10 45#  3x10 45#  3x10  45#   3x10   Supine L HS stretch 30"x3  30"x3     4x20"  3x30"  3x30"   30"x3   HS curls  OTB 3x10   OTB   3x10      OTB  2x10 OTB 2x10   OTB   2x10   Pt education AG                         Ther Activity             Mini squats 2x10 2x10     x15 x15 NV NV   Lateral step downs      NV   NV NV   Step ups 6" 2x10   6"   2x10    6" 2x10  6" 2x10 6" 2x10  NV  NV                Gait Training                                       Modalities             Cryo 10' post  10 min   post    10' post 10' post 10' post 10' post  10 min post

## 2022-05-26 ENCOUNTER — OFFICE VISIT (OUTPATIENT)
Dept: PHYSICAL THERAPY | Facility: CLINIC | Age: 62
End: 2022-05-26
Payer: COMMERCIAL

## 2022-05-26 DIAGNOSIS — S83.242D ACUTE MEDIAL MENISCUS TEAR OF LEFT KNEE, SUBSEQUENT ENCOUNTER: Primary | ICD-10-CM

## 2022-05-26 PROCEDURE — 97530 THERAPEUTIC ACTIVITIES: CPT | Performed by: PHYSICAL THERAPIST

## 2022-05-26 PROCEDURE — 97110 THERAPEUTIC EXERCISES: CPT | Performed by: PHYSICAL THERAPIST

## 2022-05-26 NOTE — PROGRESS NOTES
Daily Note     Today's date: 2022  Patient name: Beth Ledesma  : 1960  MRN: [id number]  Referring provider: Jyotsna Puga PA-C  Dx:   Encounter Diagnosis     ICD-10-CM    1  Acute medial meniscus tear of left knee, subsequent encounter  Z00 323D                   Subjective: Pt reports she is sore in the knee today from doing a lot of cleaning of the house yesterday  Objective: See treatment diary below  Assessment: Overall, improved tolerance to activity with ability to perform increased number of interventions  Significant difficulty with standing hip abduction due to decreased eccentric control bilaterally and increased hip flexor compensations secondary to continued proximal weakness  Increased soreness in medial aspect of L knee during side stepping and tandem balance  Required frequent UE assist to reset LOB when on rocker board  Plan: Continue per plan of care  Progress treatment as tolerated              Precautions: Arthritis, DM, GERD, S/P L knee arthroscopy with partial medial menisectomy on 3/24/22 - no restrictions    Manuals    L knee PROM AG EH AG   AG AG AG AG EH   L patella PROM AG EH AG   AG AG AG AG EH                Re-eval AG            Neuro Re-Ed             Rockerboard   1' A/P   M/L          Tandem stance   2x30"          SLS                                                                 Ther Ex             Bike 5' 5 min  L1 5'    6' 5' 5' 5' 5 min  L1   Quad sets      HEP       Supine heel slides             SAQ HEP     1#  2x10  1#  3x10 1 5#  3x10 1 5#  3x10 NV   LAQ      2#  3x10  2 5#  3x10 2 5#  3x10  2 5#  3x10  2 5#  3x10   SLR flexion 2x10  3x10 3x10    x10  2x10  3x10  3x10 3x10   SL abd      2x10 3x10  3x10  3x10 3x10   L clamshells OTB 3x10  OTB  3x10 Pink   3x10     OTB   2x10 OTB   2x10 OTB 3x10  OTB 3x10  OTB  3x10   Side stepping   25ftx2          Leg press   st 6  55#  3x10   55#   3x10 55#  3x10 45#  3x10 45#  3x10 45#  3x10 45#  3x10  45#   3x10   Supine L HS stretch 30"x3  30"x3 3x30"     4x20"  3x30"  3x30"   30"x3   HS curls  OTB 3x10   OTB   3x10 Pink 2x10      OTB  2x10 OTB 2x10   OTB   2x10   Standing hip abd   OTB   2x10           Pt education AG  AG                       Ther Activity             Mini squats 2x10 2x10 2x10    x15 x15 NV NV   Lateral step downs      NV   NV NV   Step ups 6" 2x10   6"   2x10 6"   3x10   6" 2x10  6" 2x10 6" 2x10  NV  NV                Gait Training                                       Modalities             Cryo 10' post  10 min   post 10' post    10' post 10' post 10' post 10' post  10 min post

## 2022-06-01 ENCOUNTER — APPOINTMENT (OUTPATIENT)
Dept: PHYSICAL THERAPY | Facility: CLINIC | Age: 62
End: 2022-06-01
Payer: COMMERCIAL

## 2022-06-02 ENCOUNTER — OFFICE VISIT (OUTPATIENT)
Dept: PHYSICAL THERAPY | Facility: CLINIC | Age: 62
End: 2022-06-02
Payer: COMMERCIAL

## 2022-06-02 DIAGNOSIS — S83.242D ACUTE MEDIAL MENISCUS TEAR OF LEFT KNEE, SUBSEQUENT ENCOUNTER: Primary | ICD-10-CM

## 2022-06-02 PROCEDURE — 97110 THERAPEUTIC EXERCISES: CPT

## 2022-06-02 PROCEDURE — 97530 THERAPEUTIC ACTIVITIES: CPT

## 2022-06-02 NOTE — PROGRESS NOTES
Daily Note     Today's date: 2022  Patient name: Zonia Mayfield  : 1960  MRN: [id number]  Referring provider: Nani Mireles PA-C  Dx:   Encounter Diagnosis     ICD-10-CM    1  Acute medial meniscus tear of left knee, subsequent encounter  C02 [id number]                   Subjective: Patient noted no new complaints  Objective: See treatment diary below      Assessment: Tolerated treatment fair  Patient was challenged with standing balance with rocker board m/l and tandem stance but still able to complete with  time for rocker bord m/l balance  Resume leg press nv not available when needed this session  Patient would benefit from continued PT      Plan: Continue per plan of care        Precautions: Arthritis, DM, GERD, S/P L knee arthroscopy with partial medial menisectomy on 3/24/22 - no restrictions    Manuals    L knee PROM AG EH AG AF  AG AG AG AG EH   L patella PROM AG EH AG AF  AG AG AG AG EH                Re-eval AG            Neuro Re-Ed             Rockerboard   1' A/P   M/L 1' a/p m/l         Tandem stance   2x30" 2 x 30"          SLS                                                                 Ther Ex             Bike 5' 5 min  L1 5'  5'  6' 5' 5' 5' 5 min  L1   Quad sets      HEP       Supine heel slides             SAQ HEP     1#  2x10  1#  3x10 1 5#  3x10 1 5#  3x10 NV   LAQ      2#  3x10  2 5#  3x10 2 5#  3x10  2 5#  3x10  2 5#  3x10   SLR flexion 2x10  3x10 3x10  3x10  x10  2x10  3x10  3x10 3x10   SL abd      2x10 3x10  3x10  3x10 3x10   L clamshells OTB 3x10  OTB  3x10 Pink   3x10   Pink 3x10  OTB   2x10 OTB   2x10 OTB 3x10  OTB 3x10  OTB  3x10   Side stepping   25ftx2 2 laps rail         Leg press   st 6  55#  3x10   55#   3x10 55#  3x10  NV  45#  3x10 45#  3x10 45#  3x10 45#  3x10  45#   3x10   Supine L HS stretch 30"x3  30"x3 3x30"  3 x30"   4x20"  3x30"  3x30"   30"x3   HS curls  OTB 3x10   OTB   3x10 Pink 2x10  Pink 2x10 OTB  2x10 OTB 2x10   OTB   2x10   Standing hip abd   OTB   2x10  OTB 2x10         Pt education AG  AG                       Ther Activity             Mini squats 2x10 2x10 2x10 2x10   x15 x15 NV NV   Lateral step downs      NV   NV NV   Step ups 6" 2x10   6"   2x10 6"   3x10 6"   3x10  6" 2x10  6" 2x10 6" 2x10  NV  NV                Gait Training                                       Modalities             Cryo 10' post  10 min   post 10' post  10' post  10' post 10' post 10' post 10' post  10 min post

## 2022-06-03 ENCOUNTER — APPOINTMENT (OUTPATIENT)
Dept: PHYSICAL THERAPY | Facility: CLINIC | Age: 62
End: 2022-06-03
Payer: COMMERCIAL

## 2022-06-07 ENCOUNTER — OFFICE VISIT (OUTPATIENT)
Dept: PHYSICAL THERAPY | Facility: CLINIC | Age: 62
End: 2022-06-07
Payer: COMMERCIAL

## 2022-06-07 ENCOUNTER — ANNUAL EXAM (OUTPATIENT)
Dept: OBGYN CLINIC | Facility: CLINIC | Age: 62
End: 2022-06-07
Payer: COMMERCIAL

## 2022-06-07 VITALS
WEIGHT: 212 LBS | SYSTOLIC BLOOD PRESSURE: 124 MMHG | HEIGHT: 65 IN | BODY MASS INDEX: 35.32 KG/M2 | DIASTOLIC BLOOD PRESSURE: 70 MMHG

## 2022-06-07 DIAGNOSIS — Z01.419 ENCOUNTER FOR GYNECOLOGICAL EXAMINATION WITHOUT ABNORMAL FINDING: Primary | ICD-10-CM

## 2022-06-07 DIAGNOSIS — S83.242D ACUTE MEDIAL MENISCUS TEAR OF LEFT KNEE, SUBSEQUENT ENCOUNTER: Primary | ICD-10-CM

## 2022-06-07 DIAGNOSIS — Z12.31 ENCOUNTER FOR SCREENING MAMMOGRAM FOR BREAST CANCER: ICD-10-CM

## 2022-06-07 PROCEDURE — 97110 THERAPEUTIC EXERCISES: CPT | Performed by: PHYSICAL THERAPIST

## 2022-06-07 PROCEDURE — 97140 MANUAL THERAPY 1/> REGIONS: CPT | Performed by: PHYSICAL THERAPIST

## 2022-06-07 PROCEDURE — 99396 PREV VISIT EST AGE 40-64: CPT | Performed by: NURSE PRACTITIONER

## 2022-06-07 PROCEDURE — 0503F POSTPARTUM CARE VISIT: CPT | Performed by: NURSE PRACTITIONER

## 2022-06-07 NOTE — PATIENT INSTRUCTIONS
Weight Management   WHAT YOU NEED TO KNOW:   Being overweight increases your risk of health conditions such as heart disease, high blood pressure, type 2 diabetes, and certain types of cancer  It can also increase your risk for osteoarthritis, sleep apnea, and other respiratory problems  Aim for a slow, steady weight loss  Even a small amount of weight loss can lower your risk of health problems  DISCHARGE INSTRUCTIONS:   How to lose weight safely:  A safe and healthy way to lose weight is to eat fewer calories and get regular exercise  You can lose up about 1 pound a week by decreasing the number of calories you eat by 500 calories each day  You can decrease calories by eating smaller portion sizes or by cutting out high-calorie foods  Read labels to find out how many calories are in the foods you eat  You can also burn calories with exercise such as walking, swimming, or biking  You will be more likely to keep weight off if you make these changes part of your lifestyle  Exercise at least 30 minutes per day on most days of the week  You can also fit in more physical activity by taking the stairs instead of the elevator or parking farther away from stores  Ask your healthcare provider about the best exercise plan for you  Healthy meal plan for weight management:  A healthy meal plan includes a variety of foods, contains fewer calories, and helps you stay healthy  A healthy meal plan includes the following:     Eat whole-grain foods more often  A healthy meal plan should contain fiber  Fiber is the part of grains, fruits, and vegetables that is not broken down by your body  Whole-grain foods are healthy and provide extra fiber in your diet  Some examples of whole-grain foods are whole-wheat breads and pastas, oatmeal, brown rice, and bulgur  Eat a variety of vegetables every day  Include dark, leafy greens such as spinach, kale, ruperto greens, and mustard greens   Eat yellow and orange vegetables such as carrots, sweet potatoes, and winter squash  Eat a variety of fruits every day  Choose fresh or canned fruit (canned in its own juice or light syrup) instead of juice  Fruit juice has very little or no fiber  Eat low-fat dairy foods  Drink fat-free (skim) milk or 1% milk  Eat fat-free yogurt and low-fat cottage cheese  Try low-fat cheeses such as mozzarella and other reduced-fat cheeses  Choose meat and other protein foods that are low in fat  Choose beans or other legumes such as split peas or lentils  Choose fish, skinless poultry (chicken or turkey), or lean cuts of red meat (beef or pork)  Before you cook meat or poultry, cut off any visible fat  Use less fat and oil  Try baking foods instead of frying them  Add less fat, such as margarine, sour cream, regular salad dressing, and mayonnaise to foods  Eat fewer high-fat foods  Some examples of high-fat foods include french fries, doughnuts, ice cream, and cakes  Eat fewer sweets  Limit foods and drinks that are high in sugar  This includes candy, cookies, regular soda, and sweetened drinks  Ways to decrease calories:   Eat smaller portions  Use a small plate with smaller servings  Do not eat second helpings  When you eat at a restaurant, ask for a box and place half of your meal in the box before you eat  Share an entrée with someone else  Replace high-calorie snacks with healthy, low-calorie snacks  Choose fresh fruit, vegetables, fat-free rice cakes, or air-popped popcorn instead of potato chips, nuts, or chocolate  Choose water or calorie-free drinks instead of soda or sweetened drinks  Do not shop for groceries when you are hungry  You may be more likely to make unhealthy food choices  Take a grocery list of healthy foods and shop after you have eaten  Eat regular meals  Do not skip meals  Skipping meals can lead to overeating later in the day  This can make it harder for you to lose weight   Eat a healthy snack in place of a meal if you do not have time to eat a regular meal  Talk with a dietitian to help you create a meal plan and schedule that is right for you  Other things to consider as you try to lose weight:   Be aware of situations that may give you the urge to overeat, such as eating while watching television  Find ways to avoid these situations  For example, read a book, go for a walk, or do crafts  Meet with a weight loss support group or friends who are also trying to lose weight  This may help you stay motivated to continue working on your weight loss goals  © Copyright [employer] 2022 Information is for End User's use only and may not be sold, redistributed or otherwise used for commercial purposes  All illustrations and images included in CareNotes® are the copyrighted property of [employer]  or Ifeanyi Mack  The above information is an  only  It is not intended as medical advice for individual conditions or treatments  Talk to your doctor, nurse or pharmacist before following any medical regimen to see if it is safe and effective for you

## 2022-06-07 NOTE — PROGRESS NOTES
Assessment / Plan    1  Encounter for gynecological examination without abnormal finding  Normal well woman exam  2020 pap/hpv negative  Repeat next year  She is up to date on colonoscopy  2  Encounter for screening mammogram for breast cancer  Order provided for next year    - Mammo screening bilateral w 3d & cad; Future        Subjective      Pina Adamson is a 63 y o  female who presents for her annual gynecologic exam     64 yo G0 PMF presents for routine GYN exam     No problems to report  2020 pap/hpv negative  Plan repeat 2022 mammo negative  3/2019 colonoscopy- Q 5 years    Torn meniscus on left repaired  Has been out of work 2 years  Still awaiting approval for disability  Periods are absent  Current contraception: post menopausal status  History of abnormal Pap smear: no  Family history of breast,uterine, ovarian or colon cancer: no    Menstrual History:  OB History        0    Para   0    Term   0       0    AB   0    Living   0       SAB   0    IAB   0    Ectopic   0    Multiple   0    Live Births   0           Obstetric Comments   Menarche 17  Menopause early 46s              No LMP recorded (lmp unknown)  Patient is postmenopausal        The following portions of the patient's history were reviewed and updated as appropriate: allergies, current medications, past family history, past medical history, past social history, past surgical history and problem list     Review of Systems      Review of Systems   Constitutional: Negative for chills and fever  Respiratory: Negative for cough and shortness of breath  Gastrointestinal: Negative for abdominal distention, abdominal pain, blood in stool, constipation, diarrhea, nausea and vomiting  Genitourinary: Negative for difficulty urinating, dysuria, frequency, genital sores, hematuria, menstrual problem, pelvic pain, urgency, vaginal bleeding and vaginal discharge          Occasional urge incontinence- not a bother Musculoskeletal: Negative for arthralgias and myalgias  Breasts:  Negative for skin changes, dimpling, asymmetry, nipple discharge, redness, tenderness or palpable masses      Objective      /70 (BP Location: Right arm, Patient Position: Sitting, Cuff Size: Adult)   Ht 5' 5" (1 651 m)   Wt 96 2 kg (212 lb)   LMP  (LMP Unknown)   BMI 35 28 kg/m²   Physical Exam  Constitutional:       General: She is not in acute distress  Appearance: Normal appearance  She is well-developed  She is obese  She is not ill-appearing or diaphoretic  HENT:      Head: Normocephalic and atraumatic  Eyes:      Pupils: Pupils are equal, round, and reactive to light  Neck:      Thyroid: No thyromegaly  Pulmonary:      Effort: Pulmonary effort is normal    Chest:   Breasts: Breasts are symmetrical       Right: No inverted nipple, mass, nipple discharge, skin change, tenderness or supraclavicular adenopathy  Left: No inverted nipple, mass, nipple discharge, skin change, tenderness or supraclavicular adenopathy  Abdominal:      General: There is no distension  Palpations: Abdomen is soft  There is no mass  Tenderness: There is no abdominal tenderness  There is no guarding or rebound  Genitourinary:     General: Normal vulva  Exam position: Lithotomy position  Labia:         Right: No rash, tenderness, lesion or injury  Left: No rash, tenderness, lesion or injury  Vagina: No signs of injury and foreign body  No vaginal discharge, erythema, tenderness or bleeding  Cervix: No cervical motion tenderness, discharge or friability  Uterus: Not enlarged and not tender  Adnexa:         Right: No mass or tenderness  Left: No mass or tenderness  Rectum: Normal    Musculoskeletal:      Cervical back: Neck supple  Lymphadenopathy:      Cervical: No cervical adenopathy  Upper Body:      Right upper body: No supraclavicular adenopathy        Left upper body: No supraclavicular adenopathy  Skin:     General: Skin is warm and dry  Neurological:      General: No focal deficit present  Mental Status: She is alert and oriented to person, place, and time  Psychiatric:         Mood and Affect: Mood normal          Behavior: Behavior normal          Thought Content:  Thought content normal          Judgment: Judgment normal

## 2022-06-07 NOTE — PROGRESS NOTES
Daily Note     Today's date: 2022  Patient name: Christa Jacome  : 1960  MRN: [id number]  Referring provider: Erin Harden PA-C  Dx:   Encounter Diagnosis     ICD-10-CM    1  Acute medial meniscus tear of left knee, subsequent encounter  [id number]                   Subjective: Pt reports she is sore today in the inner part of her L knee  States it was bothering her over the weekend and she did not do her exercises because she was away  States she was ambulating all weekend without an AD  Objective: See treatment diary below      Assessment: Performed IASTM to L pes anserinus and medial compartment secondary to increased swelling and tenderness; increased relief of symptoms during ambulation post manual therapy  Verbal cues provided during standing hip abduction to improve eccentric control and decrease hip flexor compensations  Educated pt to increase her ambulation distance as tolerated  Plan: Continue per plan of care        Precautions: Arthritis, DM, GERD, S/P L knee arthroscopy with partial medial menisectomy on 3/24/22 - no restrictions    Manuals         L knee PROM AG EH AG AF AG        L patella PROM AG EH AG AF AG        IASTM L pes anserine and medial joint line     AG        Re-eval AG            Neuro Re-Ed             Rockerboard   1' A/P   M/L 1' a/p m/l NV        Tandem stance   2x30" 2 x 30"  NV        SLS                                                                 Ther Ex             Bike 5' 5 min  L1 5'  5' 5'        Quad sets             Supine heel slides             SAQ HEP            LAQ             SLR flexion 2x10  3x10 3x10  3x10 3x10         SL abd             L clamshells OTB 3x10  OTB  3x10 Pink   3x10   Pink 3x10 Pink 3x10         Side stepping   25ftx2 2 laps rail 25ftx4        Leg press   st 6  55#  3x10   55#   3x10 55#  3x10  NV 55#  3x10        Supine L HS stretch 30"x3  30"x3 3x30"  3 x30" 3x30"         HS curls  OTB 3x10   OTB 3x10 Pink 2x10  Pink 2x10 Pink 3x10         Standing hip abd   OTB   2x10  OTB 2x10 OTB 2x10         Pt education AG  AG                       Ther Activity             Mini squats 2x10 2x10 2x10 2x10         Lateral step downs             Step ups 6" 2x10   6"   2x10 6"   3x10 6"   3x10 6"   3x10                     Gait Training                                       Modalities             Cryo 10' post  10 min   post 10' post  10' post 10' post

## 2022-06-09 ENCOUNTER — OFFICE VISIT (OUTPATIENT)
Dept: PHYSICAL THERAPY | Facility: CLINIC | Age: 62
End: 2022-06-09
Payer: COMMERCIAL

## 2022-06-09 DIAGNOSIS — S83.242D ACUTE MEDIAL MENISCUS TEAR OF LEFT KNEE, SUBSEQUENT ENCOUNTER: Primary | ICD-10-CM

## 2022-06-09 PROCEDURE — 97110 THERAPEUTIC EXERCISES: CPT | Performed by: PHYSICAL THERAPIST

## 2022-06-09 PROCEDURE — 97140 MANUAL THERAPY 1/> REGIONS: CPT | Performed by: PHYSICAL THERAPIST

## 2022-06-09 NOTE — PROGRESS NOTES
Daily Note     Today's date: 2022  Patient name: Chanel Rodgers  : 1960  MRN: [id number]  Referring provider: Tanner Navarrete PA-C  Dx:   Encounter Diagnosis     ICD-10-CM    1  Acute medial meniscus tear of left knee, subsequent encounter  [id number]                   Subjective: Pt reports the manual therapy that was done last time seemed to really help with her soreness overall  States she feels pretty good today  Objective: See treatment diary below      Assessment: Pt demonstrates improved speed of interventions secondary to requiring decreased rest breaks overall  Continues to require increased cues for hip abduction strengthening to improve overall form; decrease in form noted with increased fatigue  Educated pt on taking rest breaks even if they are short  Continues to feel increased relief with IASTM to left pes anserine and medial joint line  Overall, pt demonstrates improved tolerance to treatment sessions with continued fatigue as anticipated  Plan: Continue per plan of care        Precautions: Arthritis, DM, GERD, S/P L knee arthroscopy with partial medial menisectomy on 3/24/22 - no restrictions    Manuals        L knee PROM AG EH AG AF AG        L patella PROM AG EH AG AF AG        IASTM L pes anserine and medial joint line     AG AG       Re-eval AG            Neuro Re-Ed             Rockerboard   1' A/P   M/L 1' a/p m/l NV        Tandem stance   2x30" 2 x 30"  NV        SLS                                                                 Ther Ex             Bike 5' 5 min  L1 5'  5' 5' 5'       Quad sets             Supine heel slides             SAQ HEP            LAQ             SLR flexion 2x10  3x10 3x10  3x10 3x10  3x10        SL abd             L clamshells OTB 3x10  OTB  3x10 Pink   3x10   Pink 3x10 Pink 3x10  Pink 3x10        Side stepping   25ftx2 2 laps rail 25ftx4 25ftx4       Leg press   st 6  55#  3x10   55#   3x10 55#  3x10  NV 55#  3x10 55#  3x10       Supine L HS stretch 30"x3  30"x3 3x30"  3 x30" 3x30"  3x30"        HS curls  OTB 3x10   OTB   3x10 Pink 2x10  Pink 2x10 Pink 3x10  Pink 3x10        Standing hip abd   OTB   2x10  OTB 2x10 OTB 2x10  OTB 3x10        Pt education AG  AG                       Ther Activity             Mini squats 2x10 2x10 2x10 2x10         Lateral step downs             Step ups 6" 2x10   6"   2x10 6"   3x10 6"   3x10 6"   3x10 6"   3x10                    Gait Training                                       Modalities             Cryo 10' post  10 min   post 10' post  10' post 10' post 10' post

## 2022-06-14 ENCOUNTER — OFFICE VISIT (OUTPATIENT)
Dept: OBGYN CLINIC | Facility: MEDICAL CENTER | Age: 62
End: 2022-06-14

## 2022-06-14 ENCOUNTER — OFFICE VISIT (OUTPATIENT)
Dept: PHYSICAL THERAPY | Facility: CLINIC | Age: 62
End: 2022-06-14
Payer: COMMERCIAL

## 2022-06-14 VITALS
HEIGHT: 65 IN | WEIGHT: 212 LBS | DIASTOLIC BLOOD PRESSURE: 79 MMHG | HEART RATE: 92 BPM | SYSTOLIC BLOOD PRESSURE: 118 MMHG | BODY MASS INDEX: 35.32 KG/M2

## 2022-06-14 DIAGNOSIS — S83.242D ACUTE MEDIAL MENISCUS TEAR OF LEFT KNEE, SUBSEQUENT ENCOUNTER: Primary | ICD-10-CM

## 2022-06-14 DIAGNOSIS — M25.562 CHRONIC PAIN OF LEFT KNEE: ICD-10-CM

## 2022-06-14 DIAGNOSIS — G89.29 CHRONIC PAIN OF LEFT KNEE: ICD-10-CM

## 2022-06-14 DIAGNOSIS — S83.242A OTHER TEAR OF MEDIAL MENISCUS OF LEFT KNEE AS CURRENT INJURY, INITIAL ENCOUNTER: Primary | ICD-10-CM

## 2022-06-14 PROCEDURE — 97110 THERAPEUTIC EXERCISES: CPT | Performed by: PHYSICAL THERAPIST

## 2022-06-14 PROCEDURE — 99024 POSTOP FOLLOW-UP VISIT: CPT | Performed by: ORTHOPAEDIC SURGERY

## 2022-06-14 PROCEDURE — 97140 MANUAL THERAPY 1/> REGIONS: CPT | Performed by: PHYSICAL THERAPIST

## 2022-06-14 NOTE — PROGRESS NOTES
Daily Note     Today's date: 2022  Patient name: Maria Del Rosario Anderson  : 1960  MRN: [id number]  Referring provider: Vernon Raines PA-C  Dx:   Encounter Diagnosis     ICD-10-CM    1  Acute medial meniscus tear of left knee, subsequent encounter  S83 [id number]                   Subjective: Pt reports she is tired today due to gardening yesterday  Pt saw her surgeon today and had a good visit  States her L toe is bothering her due to wearing flip flops  Objective: See treatment diary below      Assessment: Decreased tolerance to side stepping secondary to L toe pain  Pt unable to tolerate longer hold times during rocker board therefore only 30 seconds performed  Required rest breaks between sets due to increased knee soreness  Educated pt to continue performing HEP and motivating herself  Plan: Continue per plan of care  Precautions: Arthritis, DM, GERD, S/P L knee arthroscopy with partial medial menisectomy on 3/24/22 - no restrictions    Manuals       L knee PROM AG EH AG AF AG        L patella PROM AG EH AG AF AG        IASTM L pes anserine and medial joint line     AG AG AG      Re-eval AG            Neuro Re-Ed             Rockerboard   1' A/P   M/L 1' a/p m/l NV  1x30" ea         Tandem stance   2x30" 2 x 30"  NV  3x30" LLE in back      SLS                                                                 Ther Ex             Bike 5' 5 min  L1 5'  5' 5' 5' 5'      Quad sets             Supine heel slides             SAQ HEP            LAQ             SLR flexion 2x10  3x10 3x10  3x10 3x10  3x10  3x10      SL abd             L clamshells OTB 3x10  OTB  3x10 Pink   3x10   Pink 3x10 Pink 3x10  Pink 3x10  Pink 3x10       Side stepping   25ftx2 2 laps rail 25ftx4 25ftx4 25ftx2      Leg press   st 6  55#  3x10   55#   3x10 55#  3x10  NV 55#  3x10 55#  3x10 65#  3x10       Supine L HS stretch 30"x3  30"x3 3x30"  3 x30" 3x30"  3x30"  3x30"       HS curls  OTB 3x10 OTB   3x10 Pink 2x10  Pink 2x10 Pink 3x10  Pink 3x10  Pink 3x10       Standing hip abd   OTB   2x10  OTB 2x10 OTB 2x10  OTB 3x10  OTB   2x10       Pt education AG  AG    AG                   Ther Activity             Mini squats 2x10 2x10 2x10 2x10   2x10       Lateral step downs             Step ups 6" 2x10   6"   2x10 6"   3x10 6"   3x10 6"   3x10 6"   3x10 6"   3x10                   Gait Training                                       Modalities             Cryo 10' post  10 min   post 10' post  10' post 10' post 10' post  10' post

## 2022-06-14 NOTE — PROGRESS NOTES
Orthopaedic Surgery - Office Note  Hosea Das (54 y o  female)   : 1960   MRN: [id number]  Encounter Date: 2022    Chief Complaint   Patient presents with    Left Knee - Follow-up       Assessment / Plan  S/p L knee arthroscopy with partial medial meniscectomy (DOS: 3/24/22)    · Overall patient is doing well  · Activity as tolerated  · Continue outpatient PT and transition to HEP  · Anti-inflammatories or Tylenol prn pain  Return if symptoms worsen or fail to improve  History of Present Illness  Hosea Das is a 63 y o  female who presents 3 months post op from [address]  Overall she is Doing very well  She reports significantly improved pain in the knee  She states that prior to surgery she had episodes of pain that were 10/10, but now her pain never goes above a 2/10  She occasionally has pain in the medial knee, worse with heavy activity  No numbness or tingling  No other complaints  Review of Systems  Pertinent items are noted in HPI  All other systems were reviewed and are negative  Physical Exam  /79   Pulse 92   Ht 5' 5" (1 651 m)   Wt 96 2 kg (212 lb)   LMP  (LMP Unknown)   BMI 35 28 kg/m²   Cons: Appears well  No apparent distress  Psych: Alert  Oriented x3  Mood and affect normal   Eyes: PERRLA, EOMI  Resp: Normal effort  No audible wheezing or stridor  CV: Palpable pulse  No discernable arrhythmia  No LE edema  Lymph:  No palpable cervical, axillary, or inguinal lymphadenopathy  Skin: Warm  No palpable masses  No visible lesions  Neuro: Normal muscle tone  Normal and symmetric DTR's  Left Knee Exam  Alignment:  Normal knee alignment  Inspection:  No swelling  No edema  No erythema  Incisions healed  Palpation:  No tenderness  No effusion  ROM:  Knee Extension 0  Knee Flexion 130  Strength:  5/5 quadriceps and hamstrings  Stability:  No objective knee instability  Stable Varus / Valgus stress, Lachman, and Posterior drawer    Tests:  No pertinent positive or negative tests  (-) Millicent  Patella:  Not tested  Neurovascular:  Sensation intact in DP/SP/Burns/Sa/T nerve distributions  2+ DP & PT pulses  Gait:  Normal       Studies Reviewed  No studies to review    Procedures  No procedures today  Medical, Surgical, Family, and Social History  The patient's medical history, family history, and social history, were reviewed and updated as appropriate  Past Medical History:   Diagnosis Date    Anxiety     Arthritis     Asthma     Claustrophobia     CPAP (continuous positive airway pressure) dependence     Depression     Diabetes mellitus ([address] )     Disease of thyroid gland     hypo    GERD (gastroesophageal reflux disease)     Headache     History of COVID-19     Hyperlipemia     Kidney stone     Kidney stones     Major depressive disorder     Motion sickness     Risk for falls     Sleep apnea     Use of cane as ambulatory aid     Wears glasses     Wears partial dentures     upper partial       Past Surgical History:   Procedure Laterality Date    ADENOIDECTOMY      COLONOSCOPY      DILATION AND CURETTAGE OF UTERUS      MS COLONOSCOPY FLX DX W/COLLJ SPEC WHEN PFRMD N/A 3/15/2019    Procedure: COLONOSCOPY;  Surgeon: Celso Chatman MD;  Location: [facility] GI LAB;   Service: Gastroenterology    MS KNEE SCOPE,MED/LAT MENISECTOMY Left 3/24/2022    Procedure: ARTHROSCOPY KNEE w/ partial medial menisectomy;  Surgeon: Jean Claude Llamas MD;  Location[facility] OR;  Service: Orthopedics    ROTATOR CUFF REPAIR Right     SHOULDER SURGERY Left     impingement    TONSILLECTOMY      TUBAL LIGATION         Family History   Problem Relation Age of Onset    ALS Mother     Venous thrombosis Father     Diabetes Father     Other Family         cerebral embolism    Diabetes Family     Hypertension Family     Kidney disease Family     Breast cancer Neg Hx     Colon cancer Neg Hx     Ovarian cancer Neg Hx     Uterine cancer Neg Hx Social History     Occupational History    Not on file   Tobacco Use    Smoking status: Never Smoker    Smokeless tobacco: Never Used   Vaping Use    Vaping Use: Never used   Substance and Sexual Activity    Alcohol use: No    Drug use: No    Sexual activity: Not Currently       Allergies   Allergen Reactions    Darvon [Propoxyphene] Dizziness    Fluoxetine Other (See Comments)     suicidal    Meclizine Dizziness    Morphine And Related Nausea Only    Oxycodone-Acetaminophen Other (See Comments) and Tachycardia     The whole house was moving   M D C  Holdings [Oxycodone] Other (See Comments) and Tachycardia     The whole house was moving         Current Outpatient Medications:     albuterol (2 5 mg/3 mL) 0 083 % nebulizer solution, Take 3 mL (2 5 mg total) by nebulization every 4 (four) hours as needed for wheezing, Disp: 75 mL, Rfl: 2    albuterol (Proventil HFA) 90 mcg/act inhaler, Inhale 1-2 puffs every 4 (four) hours as needed for wheezing, Disp: 36 g, Rfl: 1    benztropine (COGENTIN) 0 5 mg tablet, Take 1 tablet (0 5 mg total) by mouth daily, Disp: 90 tablet, Rfl: 1    busPIRone (BUSPAR) 10 mg tablet, Take 1 tablet (10 mg total) by mouth in the morning (Patient taking differently: Take 10 mg by mouth as needed), Disp: 90 tablet, Rfl: 1    Dulaglutide (Trulicity) 3 01 KO/6 7JR SOPN, Inject 0 5 mL (0 75 mg total) under the skin once a week, Disp: 2 mL, Rfl: 5    fluticasone (FLONASE) 50 mcg/act nasal spray, 1 spray into each nostril daily, Disp: 16 g, Rfl: 1    gabapentin (NEURONTIN) 100 mg capsule, Take 1 capsule (100 mg total) by mouth daily at bedtime, Disp: 90 capsule, Rfl: 1    levothyroxine 88 mcg tablet, Take 1 tablet (88 mcg total) by mouth daily in the early morning, Disp: 90 tablet, Rfl: 1    metFORMIN (GLUCOPHAGE) 500 mg tablet, Take 2 tablets (1,000 mg total) by mouth 2 (two) times a day with meals, Disp: 360 tablet, Rfl: 1    omeprazole (PriLOSEC) 20 mg delayed release capsule, Take 1 capsule (20 mg total) by mouth daily before breakfast, Disp: 90 capsule, Rfl: 1    QUEtiapine (SEROquel) 200 mg tablet, Take 1 tablet (200 mg total) by mouth daily at bedtime, Disp: 90 tablet, Rfl: 1    QUEtiapine (SEROquel) 25 mg tablet, TAKE 1 TABLET BY MOUTH ONCE DAILY WITH 200MG AT BEDTIME AS NEEDED, Disp: , Rfl:     rosuvastatin (CRESTOR) 5 mg tablet, Take 1 tablet (5 mg total) by mouth daily, Disp: 90 tablet, Rfl: 1    sertraline (ZOLOFT) 100 mg tablet, Take 2 tablets (200 mg total) by mouth daily at bedtime, Disp: 180 tablet, Rfl: 1    traZODone (DESYREL) 50 mg tablet, Take 1 tablet (50 mg total) by mouth daily at bedtime (Patient taking differently: Take 50 mg by mouth daily at bedtime 1-2 tablets prn), Disp: 90 tablet, Rfl: 1    venlafaxine (EFFEXOR-XR) 150 mg 24 hr capsule, Take 1 capsule (150 mg total) by mouth daily, Disp: 90 capsule, Rfl: 1    acetaminophen (TYLENOL) 325 mg tablet, Take 650 mg by mouth every 6 (six) hours as needed for mild pain (Patient not taking: No sig reported), Disp: , Rfl:     Blood Glucose Monitoring Suppl (ONE TOUCH ULTRA 2) w/Device KIT, by Does not apply route daily (Patient not taking: No sig reported), Disp: 1 each, Rfl: 0    Dulaglutide 1 5 MG/0 5ML SOPN, Inject 0 5 mL (1 5 mg total) under the skin once a week (Patient not taking: No sig reported), Disp: 2 mL, Rfl: 5    fluticasone (Flovent HFA) 110 MCG/ACT inhaler, Inhale 2 puffs 2 (two) times a day Rinse mouth after use   (Patient not taking: Reported on 6/14/2022), Disp: 36 g, Rfl: 1    Lancets (ONETOUCH ULTRASOFT) lancets, Use as instructed daily (Patient not taking: No sig reported), Disp: 100 each, Rfl: 1    naproxen (NAPROSYN) 500 mg tablet, Take 1 tablet (500 mg total) by mouth 2 (two) times a day with meals (Patient not taking: No sig reported), Disp: 60 tablet, Rfl: 0    ondansetron (ZOFRAN-ODT) 4 mg disintegrating tablet, Take 1 tablet (4 mg total) by mouth every 8 (eight) hours as needed for nausea or vomiting (Patient not taking: No sig reported), Disp: 15 tablet, Rfl: 0      Flonnie Harada, MD    Scribe Attestation    I,:   am acting as a scribe while in the presence of the attending physician :       I,:   personally performed the services described in this documentation    as scribed in my presence :

## 2022-06-15 ENCOUNTER — OFFICE VISIT (OUTPATIENT)
Dept: PHYSICAL THERAPY | Facility: CLINIC | Age: 62
End: 2022-06-15
Payer: COMMERCIAL

## 2022-06-15 DIAGNOSIS — S83.242D ACUTE MEDIAL MENISCUS TEAR OF LEFT KNEE, SUBSEQUENT ENCOUNTER: Primary | ICD-10-CM

## 2022-06-15 PROCEDURE — 97110 THERAPEUTIC EXERCISES: CPT

## 2022-06-15 PROCEDURE — 97140 MANUAL THERAPY 1/> REGIONS: CPT

## 2022-06-15 PROCEDURE — 97530 THERAPEUTIC ACTIVITIES: CPT

## 2022-06-15 NOTE — PROGRESS NOTES
Daily Note     Today's date: 6/15/2022  Patient name: Idalia Castro  : 1960  MRN: [id number]  Referring provider: Carrie Rm PA-C  Dx:   Encounter Diagnosis     ICD-10-CM    1  Acute medial meniscus tear of left knee, subsequent encounter  H02 440X                   Subjective: Patient reported that her LB is bothering her more today  Objective: See treatment diary below  Held some TE and decreased resistance on leg press secondary to LB pain  Assessment: Continued tenderness over L medial joint line, addressed with IASTM  If patient was limited she felt symptoms more in LB vs L LE fatigue  Continues to demonstrate glute med weakness which may be contributing to stress on lumbar spine and knee  Will continue to advance program as able to help build functional strength and maintain gained mobility  Plan: Continue per plan of care  Precautions: Arthritis, DM, GERD, S/P L knee arthroscopy with partial medial menisectomy on 3/24/22 - no restrictions    Manuals 5/20 5/24 5/26 6/2 6/7 6/9 6/14 6/15     L knee PROM AG EH AG AF AG        L patella PROM AG EH AG AF AG        IASTM L pes anserine and medial joint line     AG AG AG EH     Re-eval AG                         Neuro Re-Ed             Rockerboard   1' A/P   M/L 1' a/p m/l NV  1x30" ea    NV     Tandem stance   2x30" 2 x 30"  NV  3x30" LLE in back NV     SLS                                                                 Ther Ex             Bike 5' 5 min  L1 5'  5' 5' 5' 5' 5 min  L1     Quad sets             Supine heel slides             SAQ HEP            LAQ             SLR flexion 2x10  3x10 3x10  3x10 3x10  3x10  3x10 3x10     SL abd             L clamshells OTB 3x10  OTB  3x10 Pink   3x10   Pink 3x10 Pink 3x10  Pink 3x10  Pink 3x10  Pink  3x10     Side stepping   25ftx2 2 laps rail 25ftx4 25ftx4 25ftx2 NV     Leg press   st 6  55#  3x10   55#   3x10 55#  3x10  NV 55#  3x10 55#  3x10 65#  3x10   55#   3x10     Supine L HS stretch 30"x3  30"x3 3x30"  3 x30" 3x30"  3x30"  3x30"   30"x3     HS curls  OTB 3x10   OTB   3x10 Pink 2x10  Pink 2x10 Pink 3x10  Pink 3x10  Pink 3x10   Pink   3x10     Standing hip abd   OTB   2x10  OTB 2x10 OTB 2x10  OTB 3x10  OTB   2x10   OTB   2x10   bilat     Pt education AG  AG    AG                   Ther Activity             Mini squats 2x10 2x10 2x10 2x10   2x10  2x10     Lateral step downs             Step ups 6" 2x10   6"   2x10 6"   3x10 6"   3x10 6"   3x10 6"   3x10 6"   3x10  6"   3x10                  Gait Training                                       Modalities             Cryo 10' post  10 min   post 10' post  10' post 10' post 10' post  10' post  10 min post and LB

## 2022-06-16 ENCOUNTER — APPOINTMENT (OUTPATIENT)
Dept: PHYSICAL THERAPY | Facility: CLINIC | Age: 62
End: 2022-06-16
Payer: COMMERCIAL

## 2022-06-21 ENCOUNTER — OFFICE VISIT (OUTPATIENT)
Dept: FAMILY MEDICINE CLINIC | Facility: CLINIC | Age: 62
End: 2022-06-21
Payer: COMMERCIAL

## 2022-06-21 VITALS
OXYGEN SATURATION: 94 % | HEIGHT: 65 IN | SYSTOLIC BLOOD PRESSURE: 120 MMHG | DIASTOLIC BLOOD PRESSURE: 70 MMHG | BODY MASS INDEX: 34.92 KG/M2 | HEART RATE: 92 BPM | TEMPERATURE: 97.7 F | WEIGHT: 209.6 LBS | RESPIRATION RATE: 16 BRPM

## 2022-06-21 DIAGNOSIS — K21.00 GASTROESOPHAGEAL REFLUX DISEASE WITH ESOPHAGITIS WITHOUT HEMORRHAGE: ICD-10-CM

## 2022-06-21 DIAGNOSIS — M79.641 RIGHT HAND PAIN: ICD-10-CM

## 2022-06-21 DIAGNOSIS — E03.9 HYPOTHYROIDISM, UNSPECIFIED TYPE: Primary | ICD-10-CM

## 2022-06-21 DIAGNOSIS — K21.00 GASTROESOPHAGEAL REFLUX DISEASE WITH ESOPHAGITIS: ICD-10-CM

## 2022-06-21 DIAGNOSIS — E11.9 TYPE 2 DIABETES MELLITUS WITHOUT COMPLICATION, WITHOUT LONG-TERM CURRENT USE OF INSULIN (HCC): ICD-10-CM

## 2022-06-21 DIAGNOSIS — E78.2 MIXED HYPERLIPIDEMIA: ICD-10-CM

## 2022-06-21 DIAGNOSIS — J30.2 SEASONAL ALLERGIES: ICD-10-CM

## 2022-06-21 LAB
BACTERIA UR QL AUTO: ABNORMAL /HPF
BILIRUB UR QL STRIP: NEGATIVE
CLARITY UR: CLEAR
COLOR UR: YELLOW
GLUCOSE UR STRIP-MCNC: NEGATIVE MG/DL
HGB UR QL STRIP.AUTO: NEGATIVE
KETONES UR STRIP-MCNC: NEGATIVE MG/DL
LEUKOCYTE ESTERASE UR QL STRIP: ABNORMAL
MUCOUS THREADS UR QL AUTO: ABNORMAL
NITRITE UR QL STRIP: NEGATIVE
NON-SQ EPI CELLS URNS QL MICRO: ABNORMAL /HPF
PH UR STRIP.AUTO: 5.5 [PH]
PROT UR STRIP-MCNC: ABNORMAL MG/DL
RBC #/AREA URNS AUTO: ABNORMAL /HPF
SL AMB POCT HEMOGLOBIN AIC: 7 (ref ?–6.5)
SP GR UR STRIP.AUTO: 1.02 (ref 1–1.03)
UROBILINOGEN UR STRIP-ACNC: <2 MG/DL
WBC #/AREA URNS AUTO: ABNORMAL /HPF

## 2022-06-21 PROCEDURE — 3051F HG A1C>EQUAL 7.0%<8.0%: CPT | Performed by: FAMILY MEDICINE

## 2022-06-21 PROCEDURE — 99214 OFFICE O/P EST MOD 30 MIN: CPT | Performed by: FAMILY MEDICINE

## 2022-06-21 PROCEDURE — 81001 URINALYSIS AUTO W/SCOPE: CPT | Performed by: FAMILY MEDICINE

## 2022-06-21 PROCEDURE — 83036 HEMOGLOBIN GLYCOSYLATED A1C: CPT | Performed by: FAMILY MEDICINE

## 2022-06-21 PROCEDURE — 87086 URINE CULTURE/COLONY COUNT: CPT | Performed by: FAMILY MEDICINE

## 2022-06-21 RX ORDER — OMEPRAZOLE 20 MG/1
20 CAPSULE, DELAYED RELEASE ORAL
Qty: 90 CAPSULE | Refills: 1 | Status: SHIPPED | OUTPATIENT
Start: 2022-06-21

## 2022-06-21 RX ORDER — ROSUVASTATIN CALCIUM 5 MG/1
5 TABLET, COATED ORAL DAILY
Qty: 90 TABLET | Refills: 1 | Status: SHIPPED | OUTPATIENT
Start: 2022-06-21

## 2022-06-21 RX ORDER — LORATADINE 10 MG/1
10 TABLET ORAL 2 TIMES DAILY
Qty: 180 TABLET | Refills: 2 | Status: SHIPPED | OUTPATIENT
Start: 2022-06-21

## 2022-06-21 NOTE — PROGRESS NOTES
Assessment/Plan:  The A1c 7 0  CMP CBC TSH reviewed at this time  The urine microalbumin normal   Patient go for x-ray of the right hand due to pain  Patient continue with current regimen for GERD and refills given at this time  Diabetes stable with A1c of 7 0  Hyperlipidemia as well as GERD stable  Continue current regimen  The patient is still feeling depressed and is going to counseling  Continue with current regimen of medications  Patient use allergy medication loratadine for allergies  Refills given  Patient follow-up in 3 months       Diagnoses and all orders for this visit:    Hypothyroidism, unspecified type    Gastroesophageal reflux disease with esophagitis  -     omeprazole (PriLOSEC) 20 mg delayed release capsule; Take 1 capsule (20 mg total) by mouth daily before breakfast    Type 2 diabetes mellitus without complication, without long-term current use of insulin (HCC)  -     Dulaglutide 1 5 MG/0 5ML SOPN; Inject 0 5 mL (1 5 mg total) under the skin once a week  -     POCT hemoglobin A1c    Mixed hyperlipidemia  -     rosuvastatin (CRESTOR) 5 mg tablet; Take 1 tablet (5 mg total) by mouth daily    Gastroesophageal reflux disease with esophagitis without hemorrhage    Right hand pain  -     XR hand 3+ vw right; Future    Seasonal allergies  -     loratadine (CLARITIN) 10 mg tablet; Take 1 tablet (10 mg total) by mouth 2 (two) times a day            Subjective:        Patient ID: Michael Braun is a 63 y o  female  Patient follow-up on GERD, diabetes hypothyroidism  Patient reflux stable with medication  No visual disturbance  No chest pain or shortness of breath abdominal pain or problems urinating or defecating  No new foot related issues  Patient's A1c is 7 0 today  Patient status post arthroscopic surgery left knee  Patient with some depressive symptoms          The following portions of the patient's history were reviewed and updated as appropriate: allergies, current medications, past family history, past medical history, past social history, past surgical history and problem list       Review of Systems   Constitutional: Negative  HENT: Negative  Eyes: Negative  Respiratory: Negative  Cardiovascular: Negative  Gastrointestinal: Negative  Endocrine: Negative  Genitourinary: Negative  Musculoskeletal: Negative  Skin: Negative  Allergic/Immunologic: Negative  Neurological: Negative  Hematological: Negative  Psychiatric/Behavioral: Positive for dysphoric mood  Objective:               /70 (BP Location: Right arm, Patient Position: Sitting, Cuff Size: Standard)   Pulse 92   Temp 97 7 °F (36 5 °C) (Temporal)   Resp 16   Ht 5' 5" (1 651 m)   Wt 95 1 kg (209 lb 9 6 oz)   LMP  (LMP Unknown)   SpO2 94%   BMI 34 88 kg/m²          Physical Exam  Vitals and nursing note reviewed  Constitutional:       General: She is not in acute distress  Appearance: Normal appearance  She is not ill-appearing, toxic-appearing or diaphoretic  HENT:      Head: Normocephalic and atraumatic  Right Ear: Tympanic membrane, ear canal and external ear normal  There is no impacted cerumen  Left Ear: Tympanic membrane, ear canal and external ear normal  There is no impacted cerumen  Nose: Nose normal  No congestion or rhinorrhea  Mouth/Throat:      Mouth: Mucous membranes are moist       Pharynx: No oropharyngeal exudate or posterior oropharyngeal erythema  Eyes:      General: No scleral icterus  Right eye: No discharge  Left eye: No discharge  Extraocular Movements: Extraocular movements intact  Conjunctiva/sclera: Conjunctivae normal       Pupils: Pupils are equal, round, and reactive to light  Neck:      Vascular: No carotid bruit  Cardiovascular:      Rate and Rhythm: Normal rate and regular rhythm  Pulses: Normal pulses  Heart sounds: Normal heart sounds  No murmur heard      No friction rub  No gallop  Pulmonary:      Effort: Pulmonary effort is normal  No respiratory distress  Breath sounds: Normal breath sounds  No stridor  No wheezing, rhonchi or rales  Chest:      Chest wall: No tenderness  Musculoskeletal:         General: No swelling, tenderness, deformity or signs of injury  Normal range of motion  Cervical back: Normal range of motion and neck supple  No rigidity  No muscular tenderness  Right lower leg: No edema  Left lower leg: No edema  Lymphadenopathy:      Cervical: No cervical adenopathy  Skin:     General: Skin is warm and dry  Capillary Refill: Capillary refill takes less than 2 seconds  Coloration: Skin is not jaundiced  Findings: No bruising, erythema, lesion or rash  Neurological:      Mental Status: She is alert and oriented to person, place, and time  Mental status is at baseline  Cranial Nerves: No cranial nerve deficit  Sensory: No sensory deficit  Motor: No weakness  Coordination: Coordination normal       Gait: Gait normal    Psychiatric:         Behavior: Behavior normal          Thought Content:  Thought content normal          Judgment: Judgment normal       Comments: Mildly depressed

## 2022-06-21 NOTE — PATIENT INSTRUCTIONS
10% - bad control"> 10% - bad control,Hemoglobin A1c (HbA1c) greater than 10% indicating poor diabetic control,Haemoglobin A1c greater than 10% indicating poor diabetic control">   Diabetes Mellitus Type 2 in Adults, Ambulatory Care   GENERAL INFORMATION:   Diabetes mellitus type 2  is a disease that affects how your body uses glucose (sugar)  Insulin helps move sugar out of the blood so it can be used for energy  Normally, when the blood sugar level increases, the pancreas makes more insulin  Type 2 diabetes develops because either the body cannot make enough insulin, or it cannot use the insulin correctly  After many years, your pancreas may stop making insulin  Common symptoms include the following:   · More hunger or thirst than usual     · Frequent urination     · Weight loss without trying     · Blurred vision  Seek immediate care for the following symptoms:   · Severe abdominal pain, or pain that spreads to your back  You may also be vomiting  · Trouble staying awake or focusing    · Shaking or sweating    · Blurred or double vision    · Breath has a fruity, sweet smell    · Breathing is deep and labored, or rapid and shallow    · Heartbeat is fast and weak  Treatment for diabetes mellitus type 2  includes keeping your blood sugar at a normal level  You must eat the right foods, and exercise regularly  You may also need medicine if you cannot control your blood sugar level with nutrition and exercise  Manage diabetes mellitus type 2:   · Check your blood sugar level  You will be taught how to check a small drop of blood in a glucose monitor  Ask your healthcare provider when and how often to check during the day  Ask your healthcare provider what your blood sugar levels should be when you check them  · Keep track of carbohydrates (sugar and starchy foods)  Your blood sugar level can get too high if you eat too many carbohydrates   Your dietitian will help you plan meals and snacks that have the right amount of carbohydrates  · Eat low-fat foods  Some examples are skinless chicken and low-fat milk  · Eat less sodium (salt)  Some examples of high-sodium foods to limit are soy sauce, potato chips, and soup  Do not add salt to food you cook  Limit your use of table salt  · Eat high-fiber foods  Foods that are a good source of fiber include vegetables, whole grain bread, and beans  · Limit alcohol  Alcohol affects your blood sugar level and can make it harder to manage your diabetes  Women should limit alcohol to 1 drink a day  Men should limit alcohol to 2 drinks a day  A drink of alcohol is 12 ounces of beer, 5 ounces of wine, or 1½ ounces of liquor  · Get regular exercise  Exercise can help keep your blood sugar level steady, decrease your risk of heart disease, and help you lose weight  Exercise for at least 30 minutes, 5 days a week  Include muscle strengthening activities 2 days each week  Work with your healthcare provider to create an exercise plan  · Check your feet each day  for injuries or open sores  Ask your healthcare provider for activities you can do if you have an open sore  · Quit smoking  If you smoke, it is never too late to quit  Smoking can worsen the problems that may occur with diabetes  Ask your healthcare provider for information about how to stop smoking if you are having trouble quitting  · Ask about your weight:  Ask healthcare providers if you need to lose weight, and how much to lose  Ask them to help you with a weight loss program  Even a 10 to 15 pound weight loss can help you manage your blood sugar level  · Carry medical alert identification  Wear medical alert jewelry or carry a card that says you have diabetes  Ask your healthcare provider where to get these items  · Ask about vaccines  Diabetes puts you at risk of serious illness if you get the flu, pneumonia, or hepatitis   Ask your healthcare provider if you should get a flu, pneumonia, or hepatitis B vaccine, and when to get the vaccine  Follow up with your healthcare provider as directed:  Write down your questions so you remember to ask them during your visits  CARE AGREEMENT:   You have the right to help plan your care  Learn about your health condition and how it may be treated  Discuss treatment options with your caregivers to decide what care you want to receive  You always have the right to refuse treatment  The above information is an  only  It is not intended as medical advice for individual conditions or treatments  Talk to your doctor, nurse or pharmacist before following any medical regimen to see if it is safe and effective for you  © 2014 [address] is for End User's use only and may not be sold, redistributed or otherwise used for commercial purposes  All illustrations and images included in CareNotes® are the copyrighted property of [employer] D [employer]  or [place]

## 2022-06-21 NOTE — PROGRESS NOTES
Diabetic Foot Exam    Patient's shoes and socks removed  Right Foot/Ankle   Right Foot Inspection  Skin Exam: skin normal, skin intact and dry skin  No warmth, no callus, no erythema, no maceration, no abnormal color, no pre-ulcer, no ulcer and no callus  Toe Exam: ROM and strength within normal limits  Sensory   Monofilament testing: intact    Vascular  Capillary refills: < 3 seconds  The right DP pulse is 2+  The right PT pulse is 2+  Left Foot/Ankle  Left Foot Inspection  Skin Exam: skin normal, skin intact and dry skin  No warmth, no erythema, no maceration, normal color, no pre-ulcer, no ulcer and no callus  Toe Exam: ROM and strength within normal limits  Sensory   Monofilament testing: intact    Vascular  Capillary refills: < 3 seconds  The left DP pulse is 2+  The left PT pulse is 2+       Assign Risk Category  No deformity present  No loss of protective sensation  No weak pulses  Risk: 0

## 2022-06-22 ENCOUNTER — OFFICE VISIT (OUTPATIENT)
Dept: PHYSICAL THERAPY | Facility: CLINIC | Age: 62
End: 2022-06-22
Payer: COMMERCIAL

## 2022-06-22 DIAGNOSIS — S83.242D ACUTE MEDIAL MENISCUS TEAR OF LEFT KNEE, SUBSEQUENT ENCOUNTER: Primary | ICD-10-CM

## 2022-06-22 PROCEDURE — 97530 THERAPEUTIC ACTIVITIES: CPT

## 2022-06-22 PROCEDURE — 97110 THERAPEUTIC EXERCISES: CPT

## 2022-06-22 PROCEDURE — 97140 MANUAL THERAPY 1/> REGIONS: CPT

## 2022-06-22 NOTE — PROGRESS NOTES
Daily Note     Today's date: 2022  Patient name: Idalia Castro  : 1960  MRN: [id number]  Referring provider: Carrie Rm PA-C  Dx:   Encounter Diagnosis     ICD-10-CM    1  Acute medial meniscus tear of left knee, subsequent encounter  [id number]                   Subjective: Patient reported having less LB pain compared to last visit as she has not been gardening  Objective: See treatment diary below  Progressed step height and leg press resistance  Assessment: Continued medial joint line restrictions and tenderness that responds well to IASTM  Muscle fatigue present with progression of step height, requiring single HHA to complete  Will continue to work on advancing functional strengthening needed for return to walking program and safely increase distance while avoiding symptoms  Plan: Continue per plan of care  Precautions: Arthritis, DM, GERD, S/P L knee arthroscopy with partial medial menisectomy on 3/24/22 - no restrictions    Manuals 5/20 5/24 5/26 6/2 6/7 6/9 6/14 6/15 6/22    L knee PROM AG EH AG AF AG        L patella PROM AG EH AG AF AG        IASTM L pes anserine and medial joint line     AG AG AG [facility] EH    Re-eval AG                         Neuro Re-Ed             Rockerboard   1' A/P   M/L 1' a/p m/l NV  1x30" ea    NV NV    Tandem stance   2x30" 2 x 30"  NV  3x30" LLE in back NV NV    SLS                                                                 Ther Ex             Bike 5' 5 min  L1 5'  5' 5' 5' 5' 5 min  L1 5 min  L1    Quad sets             Supine heel slides             SAQ HEP            LAQ             SLR flexion 2x10  3x10 3x10  3x10 3x10  3x10  3x10 3x10 3x10    SL abd             L clamshells OTB 3x10  OTB  3x10 Pink   3x10   Pink 3x10 Pink 3x10  Pink 3x10  Pink 3x10  Pink  3x10 Pink  3x10    Side stepping   25ftx2 2 laps rail 25ftx4 25ftx4 25ftx2 NV 25 ft  x2    Leg press   st 6  55#  3x10   55#   3x10 55#  3x10  NV 55#  3x10 55#  3x10 65#  3x10 55#   3x10  65#   3x10    Supine L HS stretch 30"x3  30"x3 3x30"  3 x30" 3x30"  3x30"  3x30"   30"x3  30"x3    HS curls  OTB 3x10   OTB   3x10 Pink 2x10  Pink 2x10 Pink 3x10  Pink 3x10  Pink 3x10   Pink   3x10  Pink   3x10    Standing hip abd   OTB   2x10  OTB 2x10 OTB 2x10  OTB 3x10  OTB   2x10   OTB   2x10   bilat  OTB   2x10   bilat    Pt education AG  AG    AG                   Ther Activity             Mini squats 2x10 2x10 2x10 2x10   2x10  2x10 2x10    Lateral step downs             Step ups 6" 2x10   6"   2x10 6"   3x10 6"   3x10 6"   3x10 6"   3x10 6"   3x10  6"   3x10  8"   2x10                 Gait Training                                       Modalities             Cryo 10' post  10 min   post 10' post  10' post 10' post 10' post  10' post  10 min post and LB 10 min post

## 2022-06-23 LAB — BACTERIA UR CULT: ABNORMAL

## 2022-06-24 ENCOUNTER — OFFICE VISIT (OUTPATIENT)
Dept: PHYSICAL THERAPY | Facility: CLINIC | Age: 62
End: 2022-06-24
Payer: COMMERCIAL

## 2022-06-24 DIAGNOSIS — S83.242D ACUTE MEDIAL MENISCUS TEAR OF LEFT KNEE, SUBSEQUENT ENCOUNTER: Primary | ICD-10-CM

## 2022-06-24 DIAGNOSIS — N39.0 URINARY TRACT INFECTION WITHOUT HEMATURIA, SITE UNSPECIFIED: Primary | ICD-10-CM

## 2022-06-24 PROCEDURE — 97140 MANUAL THERAPY 1/> REGIONS: CPT

## 2022-06-24 PROCEDURE — 97110 THERAPEUTIC EXERCISES: CPT

## 2022-06-24 RX ORDER — AMPICILLIN 500 MG/1
CAPSULE ORAL
Qty: 15 CAPSULE | Refills: 0 | Status: SHIPPED | OUTPATIENT
Start: 2022-06-24 | End: 2022-06-29

## 2022-06-24 NOTE — PROGRESS NOTES
Daily Note     Today's date: 2022  Patient name: Hosea Das  : 1960  MRN: [id number]  Referring provider: Rolanda Ocampo PA-C  Dx:   Encounter Diagnosis     ICD-10-CM    1  Acute medial meniscus tear of left knee, subsequent encounter  [id number]                   Subjective: Patient reported wanting to get back into walking more  Objective: See treatment diary below  Assessment: Continued medial joint line restrictions and tenderness that responds well to IASTM  She continues to fatigue as expected with current strengthening program  Will continue to work on advancing functional strengthening needed for return to walking program and safely increase distance while avoiding symptoms  Plan: Continue per plan of care  Precautions: Arthritis, DM, GERD, S/P L knee arthroscopy with partial medial menisectomy on 3/24/22 - no restrictions    Manuals 5/20 5/24 5/26 6/2 6/7 6/9 6/14 6/15 6/22 6/24   L knee PROM AG EH AG AF AG        L patella PROM AG EH AG AF AG        IASTM L pes anserine and medial joint line     AG AG AG [facility] EH EH   Re-eval AG                         Neuro Re-Ed             Rockerboard   1' A/P   M/L 1' a/p m/l NV  1x30" ea    NV NV    Tandem stance   2x30" 2 x 30"  NV  3x30" LLE in back NV NV    SLS                                                                 Ther Ex             Bike 5' 5 min  L1 5'  5' 5' 5' 5' 5 min  L1 5 min  L1 5 min  L1   Quad sets             Supine heel slides             SAQ HEP            LAQ             SLR flexion 2x10  3x10 3x10  3x10 3x10  3x10  3x10 3x10 3x10 3x10   SL abd             L clamshells OTB 3x10  OTB  3x10 Pink   3x10   Pink 3x10 Pink 3x10  Pink 3x10  Pink 3x10  Pink  3x10 Pink  3x10 Pink  3x10   Side stepping   25ftx2 2 laps rail 25ftx4 25ftx4 25ftx2 NV 25 ft  x2 25 ft  x2   Leg press   st 6  55#  3x10   55#   3x10 55#  3x10  NV 55#  3x10 55#  3x10 65#  3x10   55#   3x10  65#   3x10  65#   3x10   Supine L HS stretch 30"x3  30"x3 3x30"  3 x30" 3x30"  3x30"  3x30"   30"x3  30"x3  30"x3   HS curls  OTB 3x10   OTB   3x10 Pink 2x10  Pink 2x10 Pink 3x10  Pink 3x10  Pink 3x10   Pink   3x10  Pink   3x10  Pink   3x10   Standing hip abd   OTB   2x10  OTB 2x10 OTB 2x10  OTB 3x10  OTB   2x10   OTB   2x10   bilat  OTB   2x10   bilat  OTB   2x10   bilat   Pt education AG  AG    AG                   Ther Activity             Mini squats 2x10 2x10 2x10 2x10   2x10  2x10 2x10 2x10   Lateral step downs             Step ups 6" 2x10   6"   2x10 6"   3x10 6"   3x10 6"   3x10 6"   3x10 6"   3x10  6"   3x10  8"   2x10  8"   2x10                Gait Training                                       Modalities             Cryo 10' post  10 min   post 10' post  10' post 10' post 10' post  10' post  10 min post and LB 10 min post 10 min post

## 2022-06-29 ENCOUNTER — APPOINTMENT (OUTPATIENT)
Dept: PHYSICAL THERAPY | Facility: CLINIC | Age: 62
End: 2022-06-29
Payer: COMMERCIAL

## 2022-07-01 ENCOUNTER — OFFICE VISIT (OUTPATIENT)
Dept: PHYSICAL THERAPY | Facility: CLINIC | Age: 62
End: 2022-07-01
Payer: COMMERCIAL

## 2022-07-01 DIAGNOSIS — S83.242D ACUTE MEDIAL MENISCUS TEAR OF LEFT KNEE, SUBSEQUENT ENCOUNTER: Primary | ICD-10-CM

## 2022-07-01 PROCEDURE — 97140 MANUAL THERAPY 1/> REGIONS: CPT | Performed by: PHYSICAL THERAPIST

## 2022-07-01 PROCEDURE — 97110 THERAPEUTIC EXERCISES: CPT | Performed by: PHYSICAL THERAPIST

## 2022-07-01 NOTE — PROGRESS NOTES
Daily Note     Today's date: 2022  Patient name: Ella Bowers  : 1960  MRN: [id number]  Referring provider: Almas Singh PA-C  Dx:   Encounter Diagnosis     ICD-10-CM    1  Acute medial meniscus tear of left knee, subsequent encounter  [id number]                   Subjective: Pt reports she felt like she pulled her back out the other day  States the inside of her knee is sore  Objective: See treatment diary below  Assessment: Continues to feel increased relief with medial IASTM  Good tolerance to progression of interventions with increased fatigue and soreness post interventions  Educated pt to continue pushing herself during her HEP  Decreased weight on leg press secondary to increased low back pain  Plan: Continue per plan of care  Precautions: Arthritis, DM, GERD, S/P L knee arthroscopy with partial medial menisectomy on 3/24/22 - no restrictions    Manuals 7/1     6/9 6/14 6/15 6/22 6/24   L knee PROM AG            L patella PROM AG            IASTM L pes anserine and medial joint line AG     AG AG [facility] EH EH   Re-eval                          Neuro Re-Ed             Rockerboard       1x30" ea  NV NV    Tandem stance       3x30" LLE in back NV NV    SLS                                                                 Ther Ex             Bike 5'     5' 5' 5 min  L1 5 min  L1 5 min  L1   Quad sets             Supine heel slides             SAQ             LAQ             SLR flexion 1#  3x10      3x10  3x10 3x10 3x10 3x10   SL abd             L clamshells GTB 2x10      Pink 3x10  Pink 3x10  Pink  3x10 Pink  3x10 Pink  3x10   Side stepping 25ftx6     25ftx4 25ftx2 NV 25 ft  x2 25 ft  x2   Leg press   st 6  55#  3x10      55#  3x10 65#  3x10   55#   3x10  65#   3x10  65#   3x10   Supine L HS stretch 3x30"      3x30"  3x30"   30"x3  30"x3  30"x3   HS curls  GTB 3x10      Pink 3x10  Pink 3x10   Pink   3x10  Pink   3x10  Pink   3x10   Standing hip abd OTB 3x10 ea        OTB 3x10 OTB   2x10   OTB   2x10   bilat  OTB   2x10   bilat  OTB   2x10   bilat   Pt education AG      AG                   Ther Activity             Mini squats 2x10      2x10  2x10 2x10 2x10   Lateral step downs             Step ups 8" 3x10      6"   3x10 6"   3x10  6"   3x10  8"   2x10  8"   2x10                Gait Training                                       Modalities             Cryo 10' post      10' post  10' post  10 min post and LB 10 min post 10 min post

## 2022-07-05 ENCOUNTER — OFFICE VISIT (OUTPATIENT)
Dept: PHYSICAL THERAPY | Facility: CLINIC | Age: 62
End: 2022-07-05
Payer: COMMERCIAL

## 2022-07-05 DIAGNOSIS — S83.242D ACUTE MEDIAL MENISCUS TEAR OF LEFT KNEE, SUBSEQUENT ENCOUNTER: Primary | ICD-10-CM

## 2022-07-05 PROCEDURE — 97530 THERAPEUTIC ACTIVITIES: CPT | Performed by: PHYSICAL THERAPIST

## 2022-07-05 PROCEDURE — 97110 THERAPEUTIC EXERCISES: CPT | Performed by: PHYSICAL THERAPIST

## 2022-07-05 PROCEDURE — 97140 MANUAL THERAPY 1/> REGIONS: CPT | Performed by: PHYSICAL THERAPIST

## 2022-07-05 NOTE — PROGRESS NOTES
PT Re-Evaluation     Today's date: 2022  Patient name: Fallon Mckinnon  : 1960  MRN: [id number]  Referring provider: Dariana Grullon PA-C  Dx:   Encounter Diagnosis     ICD-10-CM    1  Acute medial meniscus tear of left knee, subsequent encounter  [id number]                   Assessment  Assessment details: Fallon Mckinnon is a 63 y o  female referred to outpatient physical therapy S/P L knee arthroscopy with partial medial menisectomy on 3/24/22  Pt reports 90% improvement which correlates to improved FOTO outcomes  Improvements include decreased pain, improved ROM, improved LE strength, and improved tolerance to activity  Impairments that remain include decreased LLE strength, decreased flexibility, and impaired balance  These impairments are limiting patients functional ability to perform stair navigation  Pt is restricted in participating in ADLs  Pt will attended one more session of skilled PT to transition to a comprehensive independent HEP  Impairments: abnormal gait, abnormal muscle tone, abnormal or restricted ROM, abnormal movement, activity intolerance, impaired balance, impaired physical strength, lacks appropriate home exercise program and pain with function  Understanding of Dx/Px/POC: good  Goals  Short term goals 2-3 weeks    1) Patient will demonstrate ability to perform HEP  (MET)  2) Patient will improve pain at worst to 1/10 on NPRS  (MET)  3) Patient will increased L knee flexion to WNL  (MET)      Long term goals 4-8 weeks    1) Patient will demonstrate independence with comprehensive HEP  (PROGRESSING)  2) Patient improve FOTO score to equal or greater than expected values  (MET)  3) Patient will demonstrate ability to ambulate without AD  (MET)  4) Patient will improve TUG score to < 13 5 s to decrease risk for falls  (MET)  5) Patient will improve 5xSTS to <12 s to decrease risk for falls  (MET)  6) Patient will demonstrate ability to perform reciprocal stair navigation  (PROGRESSING)      Plan  Plan details: 1 more session  Patient would benefit from: skilled physical therapy  Planned modality interventions: cryotherapy, TENS, thermotherapy: hydrocollator packs and low level laser therapy  Other planned modality interventions: PRN, BFR  Planned therapy interventions: balance, flexibility, functional ROM exercises, home exercise program, joint mobilization, manual therapy, neuromuscular re-education, strengthening, therapeutic activities, stretching, therapeutic exercise, gait training, patient education and abdominal trunk stabilization  Duration in weeks: 1  Treatment plan discussed with: patient        Subjective Evaluation    History of Present Illness  Date of surgery: 3/24/2022  Mechanism of injury: surgery  Mechanism of injury: Patient presents to outpatient physical therapy S/P L knee arthroscopy with partial medial menisectomy on 3/24/22  Per MD referral, pt has no restrictions  Pt is currently ambulating with a SPC at time of initial evaluation  Patient Denies episodes of numbness or tingling in LLE  Pain described as throbbing in lateral and medial aspect of the knee and all around  Pain rating currently 0/10, at best 0/10 and at worst 3/10  Pt reports she is more uncomfortable in the knee  States she is sleeping on the couch  Patient states limitations with ambulation, stair navigation (pt has not attempted), standing, sleeping, and lifting  Pt can stand roughly 1-2 minutes without requiring a chair to sit  Pt is not currently working but is planning on working when she feels better  Aggravating factors include activity  Patient states use of ice, and naproxen for relief of symptoms  Pt goals for therapy include being able to feel strong, do steps, carry her groceries in the house, and clean the house  Pt would like to not need the cane       5/20/22: Pt reports she is very sore today with some swelling and discomfort due to washing all her planting pots  Pt reports she is still sleeping on the couch due to all the effort for the steps  Pt reports she can stand 15-20 minutes before she needs to sit due to fatigue  Pt has been performing stair navigation but it is still challenging  22: Pt reports she has just been having soreness but no pain in her knee  Pt no longer ambulates with a SPC  Pt reports her only limitation at this point is the steps due to the height  Does not increased discomfort with kneeling and pt unable to get up  Not a recurrent problem   Quality of life: good    Pain  Current pain ratin  At best pain ratin  At worst pain ratin  Quality: throbbing  Aggravating factors: stair climbing and walking    Social Support  Stairs in house: yes (20 steps)     Treatments  No previous or current treatments  Patient Goals  Patient goals for therapy: decreased edema, decreased pain, improved balance, increased motion, return to sport/leisure activities, return to work, independence with ADLs/IADLs and increased strength          Objective     Tenderness   Left Knee   Tenderness in the pes anserinus  No tenderness in the inferior patella, lateral joint line, medial joint line and patellar tendon       Neurological Testing     Sensation     Knee   Left Knee   Intact: light touch    Right Knee   Intact: light touch     Active Range of Motion   Left Knee   Flexion: 125 degrees   Extension: 0 degrees     Right Knee   Flexion: 130 degrees   Extension: 3 degrees     Passive Range of Motion   Left Knee   Flexion: 120 degrees   Extension: 0 degrees     Mobility   Patellar Mobility:   Left Knee   Hypomobile: left medial, left lateral, left superior and left inferior    Strength/Myotome Testing     Left Hip   Planes of Motion   Flexion: 4-    Right Hip   Planes of Motion   Flexion: 4    Left Knee   Flexion: 4  Extension: 4  Quadriceps contraction: good    Right Knee   Flexion: 4+  Extension: 4  Quadriceps contraction: good    Left Ankle/Foot   Dorsiflexion: 4-    Right Ankle/Foot   Dorsiflexion: 4    Swelling     Left Knee Girth Measurement (cm)   Joint line: 48 cm    Right Knee Girth Measurement (cm)   Joint line: 48 cm    Functional Assessment        Comments  5xSTS: 29 36 s with UE support 22: 20 19 s without UE support  22: 11 86 s with UE support   TU 99 s with use of SPC 22: 11 32 s without AD                 Precautions: Arthritis, DM, GERD, S/P L knee arthroscopy with partial medial menisectomy on 3/24/22 - no restrictions    Manuals 7/1 7/5    6/9 6/14 6/15 6/22 6/24   L knee PROM AG AG           L patella PROM AG AG           IASTM L pes anserine and medial joint line AG AG    AG AG [facility]   Re-eval  AG                        Neuro Re-Ed             Rockerboard       1x30" ea  NV NV    Tandem stance       3x30" LLE in back NV NV    SLS                                                                 Ther Ex             Bike 5' 5'    5' 5' 5 min  L1 5 min  L1 5 min  L1   Quad sets             Supine heel slides             SAQ             LAQ             SLR flexion 1#  3x10  1#  3x10     3x10  3x10 3x10 3x10 3x10   SL abd             L clamshells GTB 2x10  GTB 3x10     Pink 3x10  Pink 3x10  Pink  3x10 Pink  3x10 Pink  3x10   Side stepping 25ftx6 25ftx6     25ftx4 25ftx2 NV 25 ft  x2 25 ft  x2   Leg press   st 6  55#  3x10  55#  3x10    55#  3x10 65#  3x10   55#   3x10  65#   3x10  65#   3x10   Supine L HS stretch 3x30"  3x30"    3x30"  3x30"   30"x3  30"x3  30"x3   HS curls  GTB 3x10  GTB 3x10     Pink 3x10  Pink 3x10   Pink   3x10  Pink   3x10  Pink   3x10   Standing hip abd OTB 3x10 ea        OTB 3x10  OTB   2x10   OTB   2x10   bilat  OTB   2x10   bilat  OTB   2x10   bilat   Pt education AG AG     AG                   Ther Activity             Mini squats 2x10      2x10  2x10 2x10 2x10   Lateral step downs             Step ups 8" 3x10  8" 3x10     6"   3x10 6"   3x10  6"   3x10  8"   2x10  8"   2x10 Gait Training                                       Modalities             Cryo 10' post  10' post     10' post  10' post  10 min post and LB 10 min post 10 min post

## 2022-07-07 ENCOUNTER — OFFICE VISIT (OUTPATIENT)
Dept: PHYSICAL THERAPY | Facility: CLINIC | Age: 62
End: 2022-07-07
Payer: COMMERCIAL

## 2022-07-07 DIAGNOSIS — S83.242D ACUTE MEDIAL MENISCUS TEAR OF LEFT KNEE, SUBSEQUENT ENCOUNTER: Primary | ICD-10-CM

## 2022-07-07 PROCEDURE — 97140 MANUAL THERAPY 1/> REGIONS: CPT | Performed by: PHYSICAL THERAPIST

## 2022-07-07 PROCEDURE — 97110 THERAPEUTIC EXERCISES: CPT | Performed by: PHYSICAL THERAPIST

## 2022-07-07 PROCEDURE — 97530 THERAPEUTIC ACTIVITIES: CPT | Performed by: PHYSICAL THERAPIST

## 2022-07-07 NOTE — PROGRESS NOTES
PT Re-Evaluation  and PT Discharge    Today's date: 2022  Patient name: Ricardo Arrieta  : 1960  MRN: [id number]  Referring provider: Kaila Goldman PA-C  Dx:   Encounter Diagnosis     ICD-10-CM    1  Acute medial meniscus tear of left knee, subsequent encounter  [id number]                   Assessment  Assessment details: Ricardo Arrieta has been compliant with attending PT and home exercise program since initial eval  Debbie Hinsonilling has made improvements in objective data and achieved desired functional goals  Patient reports having returned to their prior level or function  It was mutually agreed to Discharge to home exercise program at this time  Patient has good understanding of provided home exercise program and was instructed to call with any questions or if issues should arise  Goals  Short term goals 2-3 weeks    1) Patient will demonstrate ability to perform HEP  (MET)  2) Patient will improve pain at worst to 1/10 on NPRS  (MET)  3) Patient will increased L knee flexion to WNL  (MET)      Long term goals 4-8 weeks    1) Patient will demonstrate independence with comprehensive HEP  (PROGRESSING)  2) Patient improve FOTO score to equal or greater than expected values  (MET)  3) Patient will demonstrate ability to ambulate without AD  (MET)  4) Patient will improve TUG score to < 13 5 s to decrease risk for falls  (MET)  5) Patient will improve 5xSTS to <12 s to decrease risk for falls  (MET)  6) Patient will demonstrate ability to perform reciprocal stair navigation  (PROGRESSING)      Plan  Plan details: Discharged at this time  Treatment plan discussed with: patient        Subjective Evaluation    History of Present Illness  Date of surgery: 3/24/2022  Mechanism of injury: surgery  Mechanism of injury: Patient presents to outpatient physical therapy S/P L knee arthroscopy with partial medial menisectomy on 3/24/22  Per MD referral, pt has no restrictions   Pt is currently ambulating with a SPC at time of initial evaluation  Patient Denies episodes of numbness or tingling in LLE  Pain described as throbbing in lateral and medial aspect of the knee and all around  Pain rating currently 0/10, at best 0/10 and at worst 3/10  Pt reports she is more uncomfortable in the knee  States she is sleeping on the couch  Patient states limitations with ambulation, stair navigation (pt has not attempted), standing, sleeping, and lifting  Pt can stand roughly 1-2 minutes without requiring a chair to sit  Pt is not currently working but is planning on working when she feels better  Aggravating factors include activity  Patient states use of ice, and naproxen for relief of symptoms  Pt goals for therapy include being able to feel strong, do steps, carry her groceries in the house, and clean the house  Pt would like to not need the cane  22: Pt reports she is very sore today with some swelling and discomfort due to washing all her planting pots  Pt reports she is still sleeping on the couch due to all the effort for the steps  Pt reports she can stand 15-20 minutes before she needs to sit due to fatigue  Pt has been performing stair navigation but it is still challenging  22: Pt reports she has just been having soreness but no pain in her knee  Pt no longer ambulates with a SPC  Pt reports her only limitation at this point is the steps due to the height  Does not increased discomfort with kneeling and pt unable to get up             Not a recurrent problem   Quality of life: good    Pain  Current pain ratin  At best pain ratin  At worst pain ratin  Quality: throbbing  Aggravating factors: stair climbing and walking    Social Support  Stairs in house: yes (20 steps)     Treatments  No previous or current treatments  Patient Goals  Patient goals for therapy: decreased edema, decreased pain, improved balance, increased motion, return to sport/leisure activities, return to work, independence with ADLs/IADLs and increased strength          Objective     Tenderness   Left Knee   Tenderness in the pes anserinus  No tenderness in the inferior patella, lateral joint line, medial joint line and patellar tendon  Neurological Testing     Sensation     Knee   Left Knee   Intact: light touch    Right Knee   Intact: light touch     Active Range of Motion   Left Knee   Flexion: 125 degrees   Extension: 0 degrees     Right Knee   Flexion: 130 degrees   Extension: 3 degrees     Passive Range of Motion   Left Knee   Flexion: 120 degrees   Extension: 0 degrees     Mobility   Patellar Mobility:   Left Knee   Hypomobile: left medial, left lateral, left superior and left inferior    Strength/Myotome Testing     Left Hip   Planes of Motion   Flexion: 4-    Right Hip   Planes of Motion   Flexion: 4    Left Knee   Flexion: 4  Extension: 4  Quadriceps contraction: good    Right Knee   Flexion: 4+  Extension: 4  Quadriceps contraction: good    Left Ankle/Foot   Dorsiflexion: 4-    Right Ankle/Foot   Dorsiflexion: 4    Swelling     Left Knee Girth Measurement (cm)   Joint line: 48 cm    Right Knee Girth Measurement (cm)   Joint line: 48 cm    Functional Assessment        Comments  5xSTS: 29 36 s with UE support 22: 20 19 s without UE support  22: 11 86 s with UE support   TU 99 s with use of SPC 22:  32 s without AD                 Precautions: Arthritis, DM, GERD, S/P L knee arthroscopy with partial medial menisectomy on 3/24/22 - no restrictions    Manuals 7/1 7/5 7/7   6/9 6/14 6/15 6/22 6/24   L knee PROM AG AG           L patella PROM AG AG           IASTM L pes anserine and medial joint line AG AG AG   AG AG EH EH EH   Re-eval  AG                        Neuro Re-Ed             Rockerboard       1x30" ea    NV NV    Tandem stance       3x30" LLE in back NV NV    SLS                                                                 Ther Ex             Bike 5' 5' 5'   5' 5' 5 min  L1 5 min  L1 5 min  L1   Quad sets             Supine heel slides             SAQ             LAQ             SLR flexion 1#  3x10  1#  3x10  1#  2x10   3x10  3x10 3x10 3x10 3x10   SL abd             L clamshells GTB 2x10  GTB 3x10  GTB 3x10    Pink 3x10  Pink 3x10  Pink  3x10 Pink  3x10 Pink  3x10   Side stepping 25ftx6 25ftx6  25ftx4   25ftx4 25ftx2 NV 25 ft  x2 25 ft  x2   Leg press   st 6  55#  3x10  55#  3x10 55#  3x10   55#  3x10 65#  3x10   55#   3x10  65#   3x10  65#   3x10   Supine L HS stretch 3x30"  3x30" 3x30"   3x30"  3x30"   30"x3  30"x3  30"x3   HS curls  GTB 3x10  GTB 3x10  GTB x25   Pink 3x10  Pink 3x10   Pink   3x10  Pink   3x10  Pink   3x10   Standing hip abd OTB 3x10 ea        OTB 3x10  OTB   2x10   OTB   2x10   bilat  OTB   2x10   bilat  OTB   2x10   bilat   Pt education AG AG AG    AG                   Ther Activity             Mini squats 2x10      2x10  2x10 2x10 2x10   Lateral step downs             Step ups 8" 3x10  8" 3x10  8" 2x10    6"   3x10 6"   3x10  6"   3x10  8"   2x10  8"   2x10                Gait Training                                       Modalities             Cryo 10' post  10' post  10' post    10' post  10' post  10 min post and LB 10 min post 10 min post

## 2022-07-18 ENCOUNTER — HOSPITAL ENCOUNTER (EMERGENCY)
Facility: HOSPITAL | Age: 62
Discharge: HOME | End: 2022-07-18
Attending: EMERGENCY MEDICINE
Payer: COMMERCIAL

## 2022-07-18 ENCOUNTER — APPOINTMENT (EMERGENCY)
Dept: CT IMAGING | Facility: HOSPITAL | Age: 62
End: 2022-07-18
Payer: COMMERCIAL

## 2022-07-18 ENCOUNTER — APPOINTMENT (OUTPATIENT)
Dept: RADIOLOGY | Facility: CLINIC | Age: 62
End: 2022-07-18
Payer: COMMERCIAL

## 2022-07-18 VITALS
OXYGEN SATURATION: 95 % | WEIGHT: 211 LBS | DIASTOLIC BLOOD PRESSURE: 87 MMHG | RESPIRATION RATE: 16 BRPM | SYSTOLIC BLOOD PRESSURE: 155 MMHG | BODY MASS INDEX: 35.11 KG/M2 | HEART RATE: 80 BPM | TEMPERATURE: 98.6 F

## 2022-07-18 DIAGNOSIS — M79.641 RIGHT HAND PAIN: ICD-10-CM

## 2022-07-18 DIAGNOSIS — K59.00 CONSTIPATION: Primary | ICD-10-CM

## 2022-07-18 DIAGNOSIS — R10.9 ABDOMINAL PAIN: ICD-10-CM

## 2022-07-18 LAB
ALBUMIN SERPL BCP-MCNC: 3.9 G/DL (ref 3.5–5)
ALP SERPL-CCNC: 118 U/L (ref 46–116)
ALT SERPL W P-5'-P-CCNC: 30 U/L (ref 12–78)
ANION GAP SERPL CALCULATED.3IONS-SCNC: 10 MMOL/L (ref 4–13)
AST SERPL W P-5'-P-CCNC: 22 U/L (ref 5–45)
BACTERIA UR QL AUTO: ABNORMAL /HPF
BASOPHILS # BLD AUTO: 0.04 THOUSANDS/ΜL (ref 0–0.1)
BASOPHILS NFR BLD AUTO: 1 % (ref 0–1)
BILIRUB SERPL-MCNC: 0.18 MG/DL (ref 0.2–1)
BILIRUB UR QL STRIP: NEGATIVE
BUN SERPL-MCNC: 21 MG/DL (ref 5–25)
CALCIUM SERPL-MCNC: 8.9 MG/DL (ref 8.3–10.1)
CHLORIDE SERPL-SCNC: 102 MMOL/L (ref 96–108)
CLARITY UR: CLEAR
CO2 SERPL-SCNC: 27 MMOL/L (ref 21–32)
COLOR UR: YELLOW
CREAT SERPL-MCNC: 1.15 MG/DL (ref 0.6–1.3)
EOSINOPHIL # BLD AUTO: 0.38 THOUSAND/ΜL (ref 0–0.61)
EOSINOPHIL NFR BLD AUTO: 5 % (ref 0–6)
ERYTHROCYTE [DISTWIDTH] IN BLOOD BY AUTOMATED COUNT: 13 % (ref 11.6–15.1)
GFR SERPL CREATININE-BSD FRML MDRD: 51 ML/MIN/1.73SQ M
GLUCOSE SERPL-MCNC: 141 MG/DL (ref 65–140)
GLUCOSE UR STRIP-MCNC: NEGATIVE MG/DL
HCT VFR BLD AUTO: 39.1 % (ref 34.8–46.1)
HGB BLD-MCNC: 12.7 G/DL (ref 11.5–15.4)
HGB UR QL STRIP.AUTO: NEGATIVE
IMM GRANULOCYTES # BLD AUTO: 0.02 THOUSAND/UL (ref 0–0.2)
IMM GRANULOCYTES NFR BLD AUTO: 0 % (ref 0–2)
KETONES UR STRIP-MCNC: NEGATIVE MG/DL
LEUKOCYTE ESTERASE UR QL STRIP: ABNORMAL
LIPASE SERPL-CCNC: 150 U/L (ref 73–393)
LYMPHOCYTES # BLD AUTO: 2.34 THOUSANDS/ΜL (ref 0.6–4.47)
LYMPHOCYTES NFR BLD AUTO: 31 % (ref 14–44)
MCH RBC QN AUTO: 29.3 PG (ref 26.8–34.3)
MCHC RBC AUTO-ENTMCNC: 32.5 G/DL (ref 31.4–37.4)
MCV RBC AUTO: 90 FL (ref 82–98)
MONOCYTES # BLD AUTO: 0.72 THOUSAND/ΜL (ref 0.17–1.22)
MONOCYTES NFR BLD AUTO: 10 % (ref 4–12)
MUCOUS THREADS UR QL AUTO: ABNORMAL
NEUTROPHILS # BLD AUTO: 3.95 THOUSANDS/ΜL (ref 1.85–7.62)
NEUTS SEG NFR BLD AUTO: 53 % (ref 43–75)
NITRITE UR QL STRIP: NEGATIVE
NON-SQ EPI CELLS URNS QL MICRO: ABNORMAL /HPF
NRBC BLD AUTO-RTO: 0 /100 WBCS
PH UR STRIP.AUTO: 5 [PH] (ref 4.5–8)
PLATELET # BLD AUTO: 271 THOUSANDS/UL (ref 149–390)
PMV BLD AUTO: 9.5 FL (ref 8.9–12.7)
POTASSIUM SERPL-SCNC: 4 MMOL/L (ref 3.5–5.3)
PROT SERPL-MCNC: 8 G/DL (ref 6.4–8.4)
PROT UR STRIP-MCNC: NEGATIVE MG/DL
RBC # BLD AUTO: 4.33 MILLION/UL (ref 3.81–5.12)
RBC #/AREA URNS AUTO: ABNORMAL /HPF
SODIUM SERPL-SCNC: 139 MMOL/L (ref 135–147)
SP GR UR STRIP.AUTO: >=1.03 (ref 1–1.03)
UROBILINOGEN UR QL STRIP.AUTO: 0.2 E.U./DL
WBC # BLD AUTO: 7.45 THOUSAND/UL (ref 4.31–10.16)
WBC #/AREA URNS AUTO: ABNORMAL /HPF

## 2022-07-18 PROCEDURE — G1004 CDSM NDSC: HCPCS

## 2022-07-18 PROCEDURE — 81001 URINALYSIS AUTO W/SCOPE: CPT

## 2022-07-18 PROCEDURE — 85025 COMPLETE CBC W/AUTO DIFF WBC: CPT | Performed by: PHYSICIAN ASSISTANT

## 2022-07-18 PROCEDURE — 73130 X-RAY EXAM OF HAND: CPT

## 2022-07-18 PROCEDURE — 99284 EMERGENCY DEPT VISIT MOD MDM: CPT | Performed by: PHYSICIAN ASSISTANT

## 2022-07-18 PROCEDURE — 80053 COMPREHEN METABOLIC PANEL: CPT | Performed by: PHYSICIAN ASSISTANT

## 2022-07-18 PROCEDURE — 36415 COLL VENOUS BLD VENIPUNCTURE: CPT | Performed by: PHYSICIAN ASSISTANT

## 2022-07-18 PROCEDURE — 99284 EMERGENCY DEPT VISIT MOD MDM: CPT

## 2022-07-18 PROCEDURE — 83690 ASSAY OF LIPASE: CPT | Performed by: PHYSICIAN ASSISTANT

## 2022-07-18 PROCEDURE — 96374 THER/PROPH/DIAG INJ IV PUSH: CPT

## 2022-07-18 PROCEDURE — 96361 HYDRATE IV INFUSION ADD-ON: CPT

## 2022-07-18 PROCEDURE — 74176 CT ABD & PELVIS W/O CONTRAST: CPT

## 2022-07-18 RX ORDER — MAGNESIUM CARB/ALUMINUM HYDROX 105-160MG
296 TABLET,CHEWABLE ORAL ONCE AS NEEDED
Qty: 296 ML | Refills: 0 | Status: SHIPPED | OUTPATIENT
Start: 2022-07-18

## 2022-07-18 RX ORDER — KETOROLAC TROMETHAMINE 30 MG/ML
15 INJECTION, SOLUTION INTRAMUSCULAR; INTRAVENOUS ONCE
Status: COMPLETED | OUTPATIENT
Start: 2022-07-18 | End: 2022-07-18

## 2022-07-18 RX ORDER — AMOXICILLIN 250 MG
1 CAPSULE ORAL 2 TIMES DAILY PRN
Qty: 20 TABLET | Refills: 0 | Status: SHIPPED | OUTPATIENT
Start: 2022-07-18

## 2022-07-18 RX ADMIN — KETOROLAC TROMETHAMINE 15 MG: 30 INJECTION, SOLUTION INTRAMUSCULAR; INTRAVENOUS at 20:57

## 2022-07-18 RX ADMIN — SODIUM CHLORIDE 1000 ML: 0.9 INJECTION, SOLUTION INTRAVENOUS at 18:01

## 2022-07-21 NOTE — ED PROVIDER NOTES
History  Chief Complaint   Patient presents with    Abdominal Pain     Patient reports abdominal pain, diarrhea and cramping, reports started July 8th  Mariana Ott is a 64 yo F presenting with lower abdominal pain/pressure and constipation over the past several days  She reports she began on 7/8 with diarrhea and mild lower abdominal cramping which turned into constipation within a few days  She reports she does not recall passing gas in two days  Denies nausea/vomiting  No fevers/chills  No known sick contacts or suspicious food intake  Prior abdominal surgery history of tubal ligation  No medications at home for constipation or pain  History provided by:  Patient   used: No        Prior to Admission Medications   Prescriptions Last Dose Informant Patient Reported? Taking?    Blood Glucose Monitoring Suppl (ONE TOUCH ULTRA 2) w/Device KIT  Self No No   Sig: by Does not apply route daily   Patient not taking: No sig reported   Dulaglutide 1 5 MG/0 5ML SOPN   No No   Sig: Inject 0 5 mL (1 5 mg total) under the skin once a week   Lancets (ONETOUCH ULTRASOFT) lancets  Self No No   Sig: Use as instructed daily   Patient not taking: No sig reported   QUEtiapine (SEROquel) 200 mg tablet  Self No No   Sig: Take 1 tablet (200 mg total) by mouth daily at bedtime   QUEtiapine (SEROquel) 25 mg tablet  Self Yes No   Sig: TAKE 1 TABLET BY MOUTH ONCE DAILY WITH 200MG AT BEDTIME AS NEEDED   Patient not taking: Reported on 6/21/2022   acetaminophen (TYLENOL) 325 mg tablet  Self Yes No   Sig: Take 650 mg by mouth every 6 (six) hours as needed for mild pain   albuterol (2 5 mg/3 mL) 0 083 % nebulizer solution  Self No No   Sig: Take 3 mL (2 5 mg total) by nebulization every 4 (four) hours as needed for wheezing   albuterol (Proventil HFA) 90 mcg/act inhaler  Self No No   Sig: Inhale 1-2 puffs every 4 (four) hours as needed for wheezing   benztropine (COGENTIN) 0 5 mg tablet  Self No No   Sig: Take 1 tablet (0 5 mg total) by mouth daily   busPIRone (BUSPAR) 10 mg tablet   No No   Sig: Take 1 tablet (10 mg total) by mouth in the morning   Patient taking differently: Take 10 mg by mouth as needed   fluticasone (FLONASE) 50 mcg/act nasal spray  Self No No   Si spray into each nostril daily   fluticasone (Flovent HFA) 110 MCG/ACT inhaler   No No   Sig: Inhale 2 puffs 2 (two) times a day Rinse mouth after use     Patient not taking: No sig reported   gabapentin (NEURONTIN) 100 mg capsule  Self No No   Sig: Take 1 capsule (100 mg total) by mouth daily at bedtime   levothyroxine 88 mcg tablet   No No   Sig: TAKE 1 TABLET (88 MCG TOTAL) BY MOUTH DAILY IN THE EARLY MORNING   loratadine (CLARITIN) 10 mg tablet   No No   Sig: Take 1 tablet (10 mg total) by mouth 2 (two) times a day   metFORMIN (GLUCOPHAGE) 500 mg tablet   No No   Sig: TAKE 2 TABLETS BY MOUTH TWICE A DAY WITH FOOD   omeprazole (PriLOSEC) 20 mg delayed release capsule   No No   Sig: Take 1 capsule (20 mg total) by mouth daily before breakfast   ondansetron (ZOFRAN-ODT) 4 mg disintegrating tablet  Self No No   Sig: Take 1 tablet (4 mg total) by mouth every 8 (eight) hours as needed for nausea or vomiting   Patient not taking: No sig reported   rosuvastatin (CRESTOR) 5 mg tablet   No No   Sig: Take 1 tablet (5 mg total) by mouth daily   sertraline (ZOLOFT) 100 mg tablet  Self No No   Sig: Take 2 tablets (200 mg total) by mouth daily at bedtime   traZODone (DESYREL) 50 mg tablet   No No   Sig: TAKE 1 TABLET BY MOUTH DAILY AT BEDTIME   venlafaxine (EFFEXOR-XR) 150 mg 24 hr capsule   No No   Sig: TAKE 1 CAPSULE BY MOUTH EVERY DAY      Facility-Administered Medications: None       Past Medical History:   Diagnosis Date    Anxiety     Arthritis     Asthma     Claustrophobia     CPAP (continuous positive airway pressure) dependence     Depression     Diabetes mellitus (HCC)     Disease of thyroid gland     hypo    GERD (gastroesophageal reflux disease)     Headache     History of COVID-19     Hyperlipemia     Kidney stone     Kidney stones     Major depressive disorder     Motion sickness     Risk for falls     Sleep apnea     Use of cane as ambulatory aid     Wears glasses     Wears partial dentures     upper partial       Past Surgical History:   Procedure Laterality Date    ADENOIDECTOMY      COLONOSCOPY      DILATION AND CURETTAGE OF UTERUS      ID COLONOSCOPY FLX DX W/COLLJ SPEC WHEN PFRMD N/A 3/15/2019    Procedure: COLONOSCOPY;  Surgeon: Dora Ramirez MD;  Location: [facility]; Service: Gastroenterology    ID KNEE SCOPE,MED/LAT MENISECTOMY Left 3/24/2022    Procedure: ARTHROSCOPY KNEE w/ partial medial menisectomy;  Surgeon: Adriana Bright MD;  Location: [facility] OR;  Service: Orthopedics    ROTATOR CUFF REPAIR Right     SHOULDER SURGERY Left     impingement    TONSILLECTOMY      TUBAL LIGATION         Family History   Problem Relation Age of Onset   Stone Polio ALS Mother     Venous thrombosis Father     Diabetes Father     Other Family         cerebral embolism    Diabetes Family     Hypertension Family     Kidney disease Family     Breast cancer Neg Hx     Colon cancer Neg Hx     Ovarian cancer Neg Hx     Uterine cancer Neg Hx      I have reviewed and agree with the history as documented  E-Cigarette/Vaping    E-Cigarette Use Never User      E-Cigarette/Vaping Substances    Nicotine No     THC No     CBD No     Flavoring No     Other No     Unknown No      Social History     Tobacco Use    Smoking status: Never Smoker    Smokeless tobacco: Never Used   Vaping Use    Vaping Use: Never used   Substance Use Topics    Alcohol use: No    Drug use: No       Review of Systems   Constitutional: Negative for chills and fever  HENT: Negative for congestion, rhinorrhea and sore throat  Eyes: Negative for pain and visual disturbance  Respiratory: Negative for cough, shortness of breath and wheezing      Cardiovascular: Negative for chest pain and palpitations  Gastrointestinal: Positive for abdominal pain and constipation  Negative for blood in stool, diarrhea (resolved), nausea and vomiting  Genitourinary: Negative for dysuria, frequency and urgency  Musculoskeletal: Negative for back pain, neck pain and neck stiffness  Skin: Negative for rash and wound  Neurological: Negative for dizziness, weakness, light-headedness and numbness  Physical Exam  Physical Exam  Constitutional:       General: She is not in acute distress  Appearance: She is well-developed  She is not diaphoretic  HENT:      Head: Normocephalic and atraumatic  Right Ear: External ear normal       Left Ear: External ear normal    Eyes:      Conjunctiva/sclera: Conjunctivae normal       Pupils: Pupils are equal, round, and reactive to light  Cardiovascular:      Rate and Rhythm: Normal rate and regular rhythm  Heart sounds: Normal heart sounds  No murmur heard  No friction rub  No gallop  Pulmonary:      Effort: Pulmonary effort is normal  No respiratory distress  Breath sounds: Normal breath sounds  No wheezing  Abdominal:      General: There is no distension  Palpations: Abdomen is soft  Tenderness: There is abdominal tenderness  There is no right CVA tenderness, left CVA tenderness or guarding  Negative signs include Clifford's sign, Rovsing's sign, psoas sign and obturator sign  Comments: Diffuse bilateral lower abdominal TTP without rigidity, rebound, or guarding  No CVAT  Musculoskeletal:      Cervical back: Normal range of motion and neck supple  Lymphadenopathy:      Cervical: No cervical adenopathy  Skin:     General: Skin is warm and dry  Capillary Refill: Capillary refill takes less than 2 seconds  Findings: No erythema or rash  Neurological:      Mental Status: She is alert and oriented to person, place, and time  Motor: No abnormal muscle tone        Coordination: Coordination normal    Psychiatric:         Behavior: Behavior normal          Thought Content:  Thought content normal          Judgment: Judgment normal          Vital Signs  ED Triage Vitals   Temperature Pulse Respirations Blood Pressure SpO2   07/18/22 1520 07/18/22 1513 07/18/22 1513 07/18/22 1513 07/18/22 1513   98 2 °F (36 8 °C) 91 16 138/79 98 %      Temp Source Heart Rate Source Patient Position - Orthostatic VS BP Location FiO2 (%)   07/18/22 1520 07/18/22 1513 07/18/22 1513 07/18/22 1513 --   Oral Monitor Sitting Right arm       Pain Score       07/18/22 1513       7           Vitals:    07/18/22 1513 07/18/22 1825 07/18/22 2103   BP: 138/79 158/85 155/87   Pulse: 91  80   Patient Position - Orthostatic VS: Sitting Lying Lying         Visual Acuity      ED Medications  Medications   sodium chloride 0 9 % bolus 1,000 mL (0 mL Intravenous Stopped 7/18/22 1952)   barium (READI-CAT 2) suspension 900 mL (900 mL Oral Given 7/18/22 1944)   ketorolac (TORADOL) injection 15 mg (15 mg Intravenous Given 7/18/22 2057)       Diagnostic Studies  Results Reviewed     Procedure Component Value Units Date/Time    Comprehensive metabolic panel [[id number]]  (Abnormal) Collected: 07/18/22 1802    Lab Status: Final result Specimen: Blood from Arm, Right Updated: 07/18/22 1839     Sodium 139 mmol/L      Potassium 4 0 mmol/L      Chloride 102 mmol/L      CO2 27 mmol/L      ANION GAP 10 mmol/L      BUN 21 mg/dL      Creatinine 1 15 mg/dL      Glucose 141 mg/dL      Calcium 8 9 mg/dL      AST 22 U/L      ALT 30 U/L      Alkaline Phosphatase 118 U/L      Total Protein 8 0 g/dL      Albumin 3 9 g/dL      Total Bilirubin 0 18 mg/dL      eGFR 51 ml/min/1 73sq m     Narrative:      Magnus guidelines for Chronic Kidney Disease (CKD):     Stage 1 with normal or high GFR (GFR > 90 mL/min/1 73 square meters)    Stage 2 Mild CKD (GFR = 60-89 mL/min/1 73 square meters)    Stage 3A Moderate CKD (GFR = 45-59 mL/min/1 73 square meters)    Stage 3B Moderate CKD (GFR = 30-44 mL/min/1 73 square meters)    Stage 4 Severe CKD (GFR = 15-29 mL/min/1 73 square meters)    Stage 5 End Stage CKD (GFR <15 mL/min/1 73 square meters)  Note: GFR calculation is accurate only with a steady state creatinine    Lipase [[id number]]  (Normal) Collected: 07/18/22 1802    Lab Status: Final result Specimen: Blood from Arm, Right Updated: 07/18/22 1839     Lipase 150 u/L     Urine Microscopic [[id number]]  (Abnormal) Collected: 07/18/22 1757    Lab Status: Final result Specimen: Urine, Clean Catch Updated: 07/18/22 1831     RBC, UA 0-1 /hpf      WBC, UA 2-4 /hpf      Epithelial Cells Occasional /hpf      Bacteria, UA Moderate /hpf      MUCUS THREADS Occasional    CBC and differential [[id number]] Collected: 07/18/22 1802    Lab Status: Final result Specimen: Blood from Arm, Right Updated: 07/18/22 1814     WBC 7 45 Thousand/uL      RBC 4 33 Million/uL      Hemoglobin 12 7 g/dL      Hematocrit 39 1 %      MCV 90 fL      MCH 29 3 pg      MCHC 32 5 g/dL      RDW 13 0 %      MPV 9 5 fL      Platelets 065 Thousands/uL      nRBC 0 /100 WBCs      Neutrophils Relative 53 %      Immat GRANS % 0 %      Lymphocytes Relative 31 %      Monocytes Relative 10 %      Eosinophils Relative 5 %      Basophils Relative 1 %      Neutrophils Absolute 3 95 Thousands/µL      Immature Grans Absolute 0 02 Thousand/uL      Lymphocytes Absolute 2 34 Thousands/µL      Monocytes Absolute 0 72 Thousand/µL      Eosinophils Absolute 0 38 Thousand/µL      Basophils Absolute 0 04 Thousands/µL     Urine Macroscopic, POC [[id number]]  (Abnormal) Collected: 07/18/22 1757    Lab Status: Final result Specimen: Urine Updated: 07/18/22 1758     Color, UA Yellow     Clarity, UA Clear     pH, UA 5 0     Leukocytes, UA Small     Nitrite, UA Negative     Protein, UA Negative mg/dl      Glucose, UA Negative mg/dl      Ketones, UA Negative mg/dl      Urobilinogen, UA 0 2 E U /dl Bilirubin, UA Negative     Occult Blood, UA Negative     Specific Gravity, UA >=1 030    Narrative:      CLINITEK RESULT                 CT abdomen pelvis wo contrast   Final Result by Ana Maria King MD (07/18 2033)      Moderate colonic fecal stasis in keeping with constipation  Gallstones without surrounding inflammation  Workstation performed: [id number]                    Procedures  Procedures         ED Course                                             MDM  Number of Diagnoses or Management Options  Abdominal pain  Constipation  Diagnosis management comments: Bilateral lower abdominal pain, initially with diarrhea but now with constipation over the past few days  No recalled flatus over the past two days  On exam she has poorly localized bilateral lower abdominal TTP without rigidity, rebound, or guarding  She is otherwise well appearing, afebrile, no acute distress  Will check abdominal labs, urine dip  Given concern for obstruction check CT abd/pelvis with oral contrast per radiology recommendations following discussion by phone  Pt initially declines pain medications  Will provide IV fluids  Disposition pending ED labs/imaging  Amount and/or Complexity of Data Reviewed  Clinical lab tests: reviewed and ordered  Tests in the radiology section of CPT®: ordered and reviewed    Patient Progress  Patient progress: stable      Disposition  Final diagnoses:   Constipation   Abdominal pain     Time reflects when diagnosis was documented in both MDM as applicable and the Disposition within this note     Time User Action Codes Description Comment    7/18/2022  9:22 PM Monroe Harding [K59 00] Constipation     7/18/2022  9:22 PM Janny Perdomo [R10 [id number]] Abdominal pain       ED Disposition     ED Disposition   Discharge    Condition   Stable    Date/Time   Mon Jul 18, 2022  9:21 PM    Alexandria Johnson discharge to home/self care                 Follow-up Information Follow up With Specialties Details Why Contact Info Additional Information    Scottie Quach DO Family Medicine Schedule an appointment as soon as possible for a visit   [address] Emergency Department Emergency Medicine  If symptoms worsen [place] [id number] [address] [facility] [place] Emergency Department, [address]  , [place], [place], [address]          Discharge Medication List as of 7/18/2022  9:32 PM      START taking these medications    Details   magnesium citrate (CITROMA) 1 745 g/30 mL oral solution Take 296 mL by mouth once as needed (Refractory constipation) for up to 1 dose, Starting Mon 7/18/2022, Normal      senna-docusate sodium (SENOKOT S) 8 6-50 mg per tablet Take 1 tablet by mouth 2 (two) times a day as needed for constipation, Starting Mon 7/18/2022, Normal         CONTINUE these medications which have NOT CHANGED    Details   acetaminophen (TYLENOL) 325 mg tablet Take 650 mg by mouth every 6 (six) hours as needed for mild pain, Historical Med      albuterol (2 5 mg/3 mL) 0 083 % nebulizer solution Take 3 mL (2 5 mg total) by nebulization every 4 (four) hours as needed for wheezing, Starting Fri 1/7/2022, Normal      albuterol (Proventil HFA) 90 mcg/act inhaler Inhale 1-2 puffs every 4 (four) hours as needed for wheezing, Starting Fri 1/7/2022, Normal      benztropine (COGENTIN) 0 5 mg tablet Take 1 tablet (0 5 mg total) by mouth daily, Starting Fri 1/7/2022, Normal      Blood Glucose Monitoring Suppl (ONE TOUCH ULTRA 2) w/Device KIT by Does not apply route daily, Starting Fri 2/7/2020, Normal      busPIRone (BUSPAR) 10 mg tablet Take 1 tablet (10 mg total) by mouth in the morning, Starting Fri 1/7/2022, Normal      Dulaglutide 1 5 MG/0 5ML SOPN Inject 0 5 mL (1 5 mg total) under the skin once a week, Starting Tue 6/21/2022, Normal      fluticasone (FLONASE) 50 mcg/act nasal spray 1 spray into each nostril daily, Starting Fri 1/7/2022, Normal      fluticasone (Flovent HFA) 110 MCG/ACT inhaler Inhale 2 puffs 2 (two) times a day Rinse mouth after use , Starting Fri 1/7/2022, Normal      gabapentin (NEURONTIN) 100 mg capsule Take 1 capsule (100 mg total) by mouth daily at bedtime, Starting Fri 10/25/2019, Normal      Lancets (ONETOUCH ULTRASOFT) lancets Use as instructed daily, Normal      levothyroxine 88 mcg tablet TAKE 1 TABLET (88 MCG TOTAL) BY MOUTH DAILY IN THE EARLY MORNING, Starting Mon 7/18/2022, Normal      loratadine (CLARITIN) 10 mg tablet Take 1 tablet (10 mg total) by mouth 2 (two) times a day, Starting Tue 6/21/2022, Normal      metFORMIN (GLUCOPHAGE) 500 mg tablet TAKE 2 TABLETS BY MOUTH TWICE A DAY WITH FOOD, Normal      omeprazole (PriLOSEC) 20 mg delayed release capsule Take 1 capsule (20 mg total) by mouth daily before breakfast, Starting Tue 6/21/2022, Normal      ondansetron (ZOFRAN-ODT) 4 mg disintegrating tablet Take 1 tablet (4 mg total) by mouth every 8 (eight) hours as needed for nausea or vomiting, Starting Thu 3/24/2022, Normal      !! QUEtiapine (SEROquel) 200 mg tablet Take 1 tablet (200 mg total) by mouth daily at bedtime, Starting Fri 1/7/2022, Normal      !! QUEtiapine (SEROquel) 25 mg tablet TAKE 1 TABLET BY MOUTH ONCE DAILY WITH 200MG AT BEDTIME AS NEEDED, Historical Med      rosuvastatin (CRESTOR) 5 mg tablet Take 1 tablet (5 mg total) by mouth daily, Starting Tue 6/21/2022, Normal      sertraline (ZOLOFT) 100 mg tablet Take 2 tablets (200 mg total) by mouth daily at bedtime, Starting Fri 1/7/2022, Normal      traZODone (DESYREL) 50 mg tablet TAKE 1 TABLET BY MOUTH DAILY AT BEDTIME, Normal      venlafaxine (EFFEXOR-XR) 150 mg 24 hr capsule TAKE 1 CAPSULE BY MOUTH EVERY DAY, Normal       !! - Potential duplicate medications found  Please discuss with provider  No discharge procedures on file      PDMP Review       Value Time User    PDMP Reviewed  Yes 3/24/2022  1:30 PM Anne Hines PA-C          ED Provider  Electronically Signed by           Barrera Arrington PA-C  07/21/22 4955

## 2022-08-02 ENCOUNTER — TELEPHONE (OUTPATIENT)
Dept: FAMILY MEDICINE CLINIC | Facility: CLINIC | Age: 62
End: 2022-08-02

## 2022-08-02 NOTE — TELEPHONE ENCOUNTER
Patient called in for her xray of right hand results  Patient was advised of the results per Dr Flip Gregory

## 2022-08-20 ENCOUNTER — APPOINTMENT (OUTPATIENT)
Dept: LAB | Facility: CLINIC | Age: 62
End: 2022-08-20
Payer: COMMERCIAL

## 2022-08-20 DIAGNOSIS — F31.9 BIPOLAR AFFECTIVE DISORDER, REMISSION STATUS UNSPECIFIED (HCC): ICD-10-CM

## 2022-08-20 DIAGNOSIS — Z79.899 ENCOUNTER FOR LONG-TERM (CURRENT) USE OF OTHER MEDICATIONS: ICD-10-CM

## 2022-08-20 LAB
ALBUMIN SERPL BCP-MCNC: 3.7 G/DL (ref 3.5–5)
ALP SERPL-CCNC: 113 U/L (ref 46–116)
ALT SERPL W P-5'-P-CCNC: 25 U/L (ref 12–78)
ANION GAP SERPL CALCULATED.3IONS-SCNC: 4 MMOL/L (ref 4–13)
AST SERPL W P-5'-P-CCNC: 19 U/L (ref 5–45)
BASOPHILS # BLD AUTO: 0.05 THOUSANDS/ΜL (ref 0–0.1)
BASOPHILS NFR BLD AUTO: 1 % (ref 0–1)
BILIRUB SERPL-MCNC: 0.13 MG/DL (ref 0.2–1)
BUN SERPL-MCNC: 16 MG/DL (ref 5–25)
CALCIUM SERPL-MCNC: 9 MG/DL (ref 8.3–10.1)
CHLORIDE SERPL-SCNC: 108 MMOL/L (ref 96–108)
CHOLEST SERPL-MCNC: 201 MG/DL
CO2 SERPL-SCNC: 26 MMOL/L (ref 21–32)
CREAT SERPL-MCNC: 1.06 MG/DL (ref 0.6–1.3)
EOSINOPHIL # BLD AUTO: 0.41 THOUSAND/ΜL (ref 0–0.61)
EOSINOPHIL NFR BLD AUTO: 6 % (ref 0–6)
ERYTHROCYTE [DISTWIDTH] IN BLOOD BY AUTOMATED COUNT: 13.3 % (ref 11.6–15.1)
GFR SERPL CREATININE-BSD FRML MDRD: 57 ML/MIN/1.73SQ M
GLUCOSE P FAST SERPL-MCNC: 155 MG/DL (ref 65–99)
HCT VFR BLD AUTO: 37.4 % (ref 34.8–46.1)
HDLC SERPL-MCNC: 48 MG/DL
HGB BLD-MCNC: 11.8 G/DL (ref 11.5–15.4)
IMM GRANULOCYTES # BLD AUTO: 0.01 THOUSAND/UL (ref 0–0.2)
IMM GRANULOCYTES NFR BLD AUTO: 0 % (ref 0–2)
LDLC SERPL CALC-MCNC: 117 MG/DL (ref 0–100)
LYMPHOCYTES # BLD AUTO: 2.14 THOUSANDS/ΜL (ref 0.6–4.47)
LYMPHOCYTES NFR BLD AUTO: 33 % (ref 14–44)
MCH RBC QN AUTO: 29 PG (ref 26.8–34.3)
MCHC RBC AUTO-ENTMCNC: 31.6 G/DL (ref 31.4–37.4)
MCV RBC AUTO: 92 FL (ref 82–98)
MONOCYTES # BLD AUTO: 0.54 THOUSAND/ΜL (ref 0.17–1.22)
MONOCYTES NFR BLD AUTO: 8 % (ref 4–12)
NEUTROPHILS # BLD AUTO: 3.43 THOUSANDS/ΜL (ref 1.85–7.62)
NEUTS SEG NFR BLD AUTO: 52 % (ref 43–75)
NONHDLC SERPL-MCNC: 153 MG/DL
NRBC BLD AUTO-RTO: 0 /100 WBCS
PLATELET # BLD AUTO: 275 THOUSANDS/UL (ref 149–390)
PMV BLD AUTO: 9.9 FL (ref 8.9–12.7)
POTASSIUM SERPL-SCNC: 4.5 MMOL/L (ref 3.5–5.3)
PROT SERPL-MCNC: 7.5 G/DL (ref 6.4–8.4)
RBC # BLD AUTO: 4.07 MILLION/UL (ref 3.81–5.12)
SODIUM SERPL-SCNC: 138 MMOL/L (ref 135–147)
TRIGL SERPL-MCNC: 180 MG/DL
WBC # BLD AUTO: 6.58 THOUSAND/UL (ref 4.31–10.16)

## 2022-08-20 PROCEDURE — 80061 LIPID PANEL: CPT

## 2022-08-20 PROCEDURE — 36415 COLL VENOUS BLD VENIPUNCTURE: CPT

## 2022-08-20 PROCEDURE — 85025 COMPLETE CBC W/AUTO DIFF WBC: CPT

## 2022-08-20 PROCEDURE — 80053 COMPREHEN METABOLIC PANEL: CPT

## 2022-11-30 DIAGNOSIS — E05.90 HYPERTHYROIDISM: ICD-10-CM

## 2022-11-30 RX ORDER — LEVOTHYROXINE SODIUM 88 UG/1
88 TABLET ORAL
Qty: 90 TABLET | Refills: 1 | Status: SHIPPED | OUTPATIENT
Start: 2022-11-30 | End: 2022-12-06 | Stop reason: SDUPTHER

## 2022-12-02 ENCOUNTER — APPOINTMENT (OUTPATIENT)
Dept: LAB | Facility: CLINIC | Age: 62
End: 2022-12-02

## 2022-12-02 DIAGNOSIS — F31.9 BIPOLAR AFFECTIVE DISORDER, REMISSION STATUS UNSPECIFIED (HCC): ICD-10-CM

## 2022-12-02 DIAGNOSIS — Z79.899 ENCOUNTER FOR LONG-TERM (CURRENT) USE OF OTHER MEDICATIONS: ICD-10-CM

## 2022-12-02 DIAGNOSIS — F41.9 ANXIETY: ICD-10-CM

## 2022-12-02 DIAGNOSIS — F33.1 MAJOR DEPRESSIVE DISORDER, RECURRENT EPISODE, MODERATE (HCC): ICD-10-CM

## 2022-12-02 LAB
ALBUMIN SERPL BCP-MCNC: 3.4 G/DL (ref 3.5–5)
ALP SERPL-CCNC: 112 U/L (ref 46–116)
ALT SERPL W P-5'-P-CCNC: 25 U/L (ref 12–78)
ANION GAP SERPL CALCULATED.3IONS-SCNC: 5 MMOL/L (ref 4–13)
AST SERPL W P-5'-P-CCNC: 15 U/L (ref 5–45)
BASOPHILS # BLD AUTO: 0.04 THOUSANDS/ÂΜL (ref 0–0.1)
BASOPHILS NFR BLD AUTO: 1 % (ref 0–1)
BILIRUB SERPL-MCNC: 0.45 MG/DL (ref 0.2–1)
BUN SERPL-MCNC: 19 MG/DL (ref 5–25)
CALCIUM ALBUM COR SERPL-MCNC: 9.6 MG/DL (ref 8.3–10.1)
CALCIUM SERPL-MCNC: 9.1 MG/DL (ref 8.3–10.1)
CHLORIDE SERPL-SCNC: 106 MMOL/L (ref 96–108)
CHOLEST SERPL-MCNC: 189 MG/DL
CO2 SERPL-SCNC: 27 MMOL/L (ref 21–32)
CREAT SERPL-MCNC: 1.07 MG/DL (ref 0.6–1.3)
EOSINOPHIL # BLD AUTO: 0.55 THOUSAND/ÂΜL (ref 0–0.61)
EOSINOPHIL NFR BLD AUTO: 8 % (ref 0–6)
ERYTHROCYTE [DISTWIDTH] IN BLOOD BY AUTOMATED COUNT: 13.5 % (ref 11.6–15.1)
GFR SERPL CREATININE-BSD FRML MDRD: 56 ML/MIN/1.73SQ M
GLUCOSE P FAST SERPL-MCNC: 166 MG/DL (ref 65–99)
HCT VFR BLD AUTO: 36.2 % (ref 34.8–46.1)
HDLC SERPL-MCNC: 51 MG/DL
HGB BLD-MCNC: 11.5 G/DL (ref 11.5–15.4)
IMM GRANULOCYTES # BLD AUTO: 0.02 THOUSAND/UL (ref 0–0.2)
IMM GRANULOCYTES NFR BLD AUTO: 0 % (ref 0–2)
LDLC SERPL CALC-MCNC: 100 MG/DL (ref 0–100)
LYMPHOCYTES # BLD AUTO: 2.22 THOUSANDS/ÂΜL (ref 0.6–4.47)
LYMPHOCYTES NFR BLD AUTO: 31 % (ref 14–44)
MCH RBC QN AUTO: 29.2 PG (ref 26.8–34.3)
MCHC RBC AUTO-ENTMCNC: 31.8 G/DL (ref 31.4–37.4)
MCV RBC AUTO: 92 FL (ref 82–98)
MONOCYTES # BLD AUTO: 0.69 THOUSAND/ÂΜL (ref 0.17–1.22)
MONOCYTES NFR BLD AUTO: 10 % (ref 4–12)
NEUTROPHILS # BLD AUTO: 3.72 THOUSANDS/ÂΜL (ref 1.85–7.62)
NEUTS SEG NFR BLD AUTO: 50 % (ref 43–75)
NONHDLC SERPL-MCNC: 138 MG/DL
NRBC BLD AUTO-RTO: 0 /100 WBCS
PLATELET # BLD AUTO: 295 THOUSANDS/UL (ref 149–390)
PMV BLD AUTO: 9.8 FL (ref 8.9–12.7)
POTASSIUM SERPL-SCNC: 4.4 MMOL/L (ref 3.5–5.3)
PROT SERPL-MCNC: 7.3 G/DL (ref 6.4–8.4)
RBC # BLD AUTO: 3.94 MILLION/UL (ref 3.81–5.12)
SODIUM SERPL-SCNC: 138 MMOL/L (ref 135–147)
TRIGL SERPL-MCNC: 189 MG/DL
WBC # BLD AUTO: 7.24 THOUSAND/UL (ref 4.31–10.16)

## 2022-12-06 ENCOUNTER — OFFICE VISIT (OUTPATIENT)
Dept: FAMILY MEDICINE CLINIC | Facility: CLINIC | Age: 62
End: 2022-12-06

## 2022-12-06 VITALS
OXYGEN SATURATION: 98 % | BODY MASS INDEX: 33.89 KG/M2 | HEART RATE: 95 BPM | TEMPERATURE: 97.4 F | HEIGHT: 65 IN | DIASTOLIC BLOOD PRESSURE: 68 MMHG | WEIGHT: 203.4 LBS | SYSTOLIC BLOOD PRESSURE: 138 MMHG

## 2022-12-06 DIAGNOSIS — E03.9 HYPOTHYROIDISM, UNSPECIFIED TYPE: ICD-10-CM

## 2022-12-06 DIAGNOSIS — E11.8 TYPE 2 DIABETES MELLITUS WITH COMPLICATION (HCC): Primary | ICD-10-CM

## 2022-12-06 DIAGNOSIS — B07.8 OTHER VIRAL WARTS: ICD-10-CM

## 2022-12-06 DIAGNOSIS — E11.9 TYPE 2 DIABETES MELLITUS WITHOUT COMPLICATION, WITHOUT LONG-TERM CURRENT USE OF INSULIN (HCC): ICD-10-CM

## 2022-12-06 DIAGNOSIS — E05.90 HYPERTHYROIDISM: ICD-10-CM

## 2022-12-06 DIAGNOSIS — K21.00 GASTROESOPHAGEAL REFLUX DISEASE WITH ESOPHAGITIS WITHOUT HEMORRHAGE: ICD-10-CM

## 2022-12-06 DIAGNOSIS — E78.2 MIXED HYPERLIPIDEMIA: ICD-10-CM

## 2022-12-06 DIAGNOSIS — I10 PRIMARY HYPERTENSION: ICD-10-CM

## 2022-12-06 DIAGNOSIS — F33.2 SEVERE RECURRENT MAJOR DEPRESSION WITHOUT PSYCHOTIC FEATURES (HCC): ICD-10-CM

## 2022-12-06 RX ORDER — DULAGLUTIDE 3 MG/.5ML
3 INJECTION, SOLUTION SUBCUTANEOUS WEEKLY
Qty: 3 ML | Refills: 3 | Status: SHIPPED | OUTPATIENT
Start: 2022-12-06

## 2022-12-06 RX ORDER — LEVOTHYROXINE SODIUM 88 UG/1
88 TABLET ORAL
Qty: 90 TABLET | Refills: 1 | Status: SHIPPED | OUTPATIENT
Start: 2022-12-06

## 2022-12-06 RX ORDER — LISINOPRIL 10 MG/1
10 TABLET ORAL DAILY
Qty: 90 TABLET | Refills: 3 | Status: SHIPPED | OUTPATIENT
Start: 2022-12-06

## 2022-12-06 RX ORDER — IMIQUIMOD 12.5 MG/.25G
1 CREAM TOPICAL 3 TIMES WEEKLY
Qty: 12 EACH | Refills: 0 | Status: SHIPPED | OUTPATIENT
Start: 2022-12-07

## 2022-12-06 NOTE — PROGRESS NOTES
Assessment/Plan: A1c 7 2   CBC normal CMP reviewed  TSH normal   Patient will start lisinopril for hypertension/elevated blood pressure and for renal protection  Patient on Trulicity increased to 3 mg q  weekly for diabetes  Flu shot has been received by the patient  Hypothyroidism stable continue with current regimen refills given  Patient will continue with current regimen of metformin for diabetes  Refills given  Patient will use Aldara cream for verruca vulgaris  Patient will follow up in 3 months or as needed  Diagnoses and all orders for this visit:    Type 2 diabetes mellitus with complication (HCC)  -     POCT hemoglobin A1c  -     Dulaglutide (Trulicity) 3 LN/0 1YT SOPN; Inject 0 5 mL (3 mg total) under the skin once a week  -     metFORMIN (GLUCOPHAGE) 500 mg tablet; Take 2 tablets (1,000 mg total) by mouth 2 (two) times a day with meals    Type 2 diabetes mellitus without complication, without long-term current use of insulin (HCC)    Hypothyroidism, unspecified type    Mixed hyperlipidemia    Gastroesophageal reflux disease with esophagitis without hemorrhage    Primary hypertension  -     lisinopril (ZESTRIL) 10 mg tablet; Take 1 tablet (10 mg total) by mouth daily    Hyperthyroidism  -     levothyroxine 88 mcg tablet; Take 1 tablet (88 mcg total) by mouth daily in the early morning    Severe recurrent major depression without psychotic features (HCC)    Other viral warts  -     imiquimod (ALDARA) 5 % cream; Apply 1 packet topically 3 (three) times a week            Subjective:        Patient ID: Vilma Farooq is a 63 y o  female  Patient is here to follow-up on diabetes for thyroidism hyperlipidemia GERD as well as depression  Patient still has some depression  No homicidal suicidal ideation  Patient laboratory studies done  Patient did receive flu shot  Patient otherwise feeling well overall  Patient with lesion on left neck          The following portions of the patient's history were reviewed and updated as appropriate: allergies, current medications, past family history, past medical history, past social history, past surgical history and problem list       Review of Systems   Constitutional: Negative  HENT: Negative  Eyes: Negative  Respiratory: Negative  Cardiovascular: Negative  Gastrointestinal: Negative  Endocrine: Negative  Genitourinary: Negative  Musculoskeletal: Negative  Skin: Positive for color change  Allergic/Immunologic: Negative  Neurological: Negative  Hematological: Negative  Psychiatric/Behavioral: Positive for dysphoric mood  Objective:               /68 (BP Location: Right arm, Patient Position: Sitting, Cuff Size: Standard)   Pulse 95   Temp (!) 97 4 °F (36 3 °C) (Temporal)   Ht 5' 5" (1 651 m)   Wt 92 3 kg (203 lb 6 4 oz)   LMP  (LMP Unknown)   SpO2 98%   BMI 33 85 kg/m²          Physical Exam  Vitals and nursing note reviewed  Constitutional:       General: She is not in acute distress  Appearance: Normal appearance  She is not ill-appearing, toxic-appearing or diaphoretic  HENT:      Head: Normocephalic and atraumatic  Right Ear: Tympanic membrane, ear canal and external ear normal  There is no impacted cerumen  Left Ear: Tympanic membrane, ear canal and external ear normal  There is no impacted cerumen  Nose: Nose normal  No congestion or rhinorrhea  Mouth/Throat:      Mouth: Mucous membranes are moist       Pharynx: No oropharyngeal exudate or posterior oropharyngeal erythema  Eyes:      General: No scleral icterus  Right eye: No discharge  Left eye: No discharge  Extraocular Movements: Extraocular movements intact  Conjunctiva/sclera: Conjunctivae normal       Pupils: Pupils are equal, round, and reactive to light  Neck:      Vascular: No carotid bruit  Cardiovascular:      Rate and Rhythm: Normal rate and regular rhythm  Pulses: Normal pulses  Heart sounds: Normal heart sounds  No murmur heard  No friction rub  No gallop  Pulmonary:      Effort: Pulmonary effort is normal  No respiratory distress  Breath sounds: Normal breath sounds  No stridor  No wheezing, rhonchi or rales  Chest:      Chest wall: No tenderness  Musculoskeletal:         General: No swelling, tenderness, deformity or signs of injury  Normal range of motion  Cervical back: Normal range of motion and neck supple  No rigidity  No muscular tenderness  Right lower leg: No edema  Left lower leg: No edema  Lymphadenopathy:      Cervical: No cervical adenopathy  Skin:     General: Skin is warm and dry  Capillary Refill: Capillary refill takes less than 2 seconds  Coloration: Skin is not jaundiced  Findings: Lesion present  No bruising, erythema or rash  Comments: Raised lesion left neck consistent with verruca vulgaris   Neurological:      Mental Status: She is alert and oriented to person, place, and time  Mental status is at baseline  Cranial Nerves: No cranial nerve deficit  Sensory: No sensory deficit  Motor: No weakness  Coordination: Coordination normal       Gait: Gait normal    Psychiatric:         Behavior: Behavior normal          Thought Content:  Thought content normal          Judgment: Judgment normal

## 2022-12-13 ENCOUNTER — OFFICE VISIT (OUTPATIENT)
Dept: URGENT CARE | Facility: CLINIC | Age: 62
End: 2022-12-13

## 2022-12-13 VITALS
OXYGEN SATURATION: 97 % | RESPIRATION RATE: 18 BRPM | TEMPERATURE: 96 F | DIASTOLIC BLOOD PRESSURE: 76 MMHG | HEART RATE: 90 BPM | SYSTOLIC BLOOD PRESSURE: 120 MMHG

## 2022-12-13 DIAGNOSIS — N30.00 ACUTE CYSTITIS WITHOUT HEMATURIA: Primary | ICD-10-CM

## 2022-12-13 LAB
SL AMB  POCT GLUCOSE, UA: NEGATIVE
SL AMB LEUKOCYTE ESTERASE,UA: ABNORMAL
SL AMB POCT BILIRUBIN,UA: NEGATIVE
SL AMB POCT BLOOD,UA: ABNORMAL
SL AMB POCT CLARITY,UA: ABNORMAL
SL AMB POCT COLOR,UA: YELLOW
SL AMB POCT KETONES,UA: NEGATIVE
SL AMB POCT NITRITE,UA: NEGATIVE
SL AMB POCT PH,UA: 5
SL AMB POCT SPECIFIC GRAVITY,UA: 1.02
SL AMB POCT URINE PROTEIN: NEGATIVE
SL AMB POCT UROBILINOGEN: 0.2

## 2022-12-13 RX ORDER — GABAPENTIN 300 MG/1
300 CAPSULE ORAL EVERY EVENING
COMMUNITY
Start: 2022-09-09

## 2022-12-13 RX ORDER — AMPICILLIN 500 MG/1
500 CAPSULE ORAL 3 TIMES DAILY
Qty: 15 CAPSULE | Refills: 0 | Status: SHIPPED | OUTPATIENT
Start: 2022-12-13 | End: 2022-12-18

## 2022-12-13 NOTE — PROGRESS NOTES
[facility]    NAME: Angel Rodriguez is a 63 y o  female  : 1960    MRN: [id number]  DATE: 2022  TIME: 5:09 PM    Assessment and Plan   Acute cystitis without hematuria [N30 00]  1  Acute cystitis without hematuria  POCT urine dip    Urine culture    ampicillin (PRINCIPEN) 500 mg capsule          Patient Instructions   Patient Instructions   Take antibiotic as directed  Push fluids  If burning on urination, you may try over the counter Azo Urinary Pain Relief or comparable product  Please note that this type of product may cause your urine to become bright orange and it may stain your undergarments if you leak  Please wear protection as needed  Follow up with your PCP if not improving over next 3-5 days  If you have recurrent urinary tract problems, please follow up with your PCP and / or urologist for further evaluation  If you develop worsening symptoms with associated fever, back pain, abdominal pain, nausea with vomiting, please proceed to emergency room for further evaluation  Chief Complaint     Chief Complaint   Patient presents with   • Possible UTI     Pt C/O burning sensation and voiding frequency that started 2 days ago  History of Present Illness   Angel Rodriguez presents to the clinic c/o  72-year-old female comes in with burning on urination and frequency that started within the last 2 days  No fever or chills  No abnormal back pain  Prior history of UTIs in 2022  Treated with ampicillin at that time  Culture grew out an enterococcal species  Review of Systems   Review of Systems   Constitutional: Positive for activity change, appetite change, chills and fatigue  Negative for diaphoresis and fever  Respiratory: Negative  Cardiovascular: Negative  Gastrointestinal: Positive for abdominal pain  Negative for nausea and vomiting  Genitourinary: Positive for difficulty urinating, dysuria, frequency and urgency  Negative for flank pain          Dribbling       Current Medications     Long-Term Medications   Medication Sig Dispense Refill   • benztropine (COGENTIN) 0 5 mg tablet Take 1 tablet (0 5 mg total) by mouth daily 90 tablet 1   • Dulaglutide (Trulicity) 3 EO/1 8MU SOPN Inject 0 5 mL (3 mg total) under the skin once a week 3 mL 3   • Flovent  MCG/ACT inhaler INHALE 2 PUFFS BY MOUTH 2 TIMES A DAY RINSE MOUTH AFTER USE  12 g 1   • fluticasone (FLONASE) 50 mcg/act nasal spray 1 spray into each nostril daily 16 g 1   • gabapentin (NEURONTIN) 100 mg capsule Take 1 capsule (100 mg total) by mouth daily at bedtime 90 capsule 1   • gabapentin (NEURONTIN) 300 mg capsule Take 300 mg by mouth every evening     • imiquimod (ALDARA) 5 % cream Apply 1 packet topically 3 (three) times a week 12 each 0   • levothyroxine 88 mcg tablet Take 1 tablet (88 mcg total) by mouth daily in the early morning 90 tablet 1   • lisinopril (ZESTRIL) 10 mg tablet Take 1 tablet (10 mg total) by mouth daily 90 tablet 3   • loratadine (CLARITIN) 10 mg tablet Take 1 tablet (10 mg total) by mouth 2 (two) times a day 180 tablet 2   • magnesium citrate (CITROMA) 1 745 g/30 mL oral solution Take 296 mL by mouth once as needed (Refractory constipation) for up to 1 dose 296 mL 0   • metFORMIN (GLUCOPHAGE) 500 mg tablet Take 2 tablets (1,000 mg total) by mouth 2 (two) times a day with meals 360 tablet 1   • omeprazole (PriLOSEC) 20 mg delayed release capsule Take 1 capsule (20 mg total) by mouth daily before breakfast 90 capsule 1   • QUEtiapine (SEROquel) 200 mg tablet Take 1 tablet (200 mg total) by mouth daily at bedtime 90 tablet 1   • rosuvastatin (CRESTOR) 5 mg tablet Take 1 tablet (5 mg total) by mouth daily 90 tablet 1   • senna-docusate sodium (SENOKOT S) 8 6-50 mg per tablet Take 1 tablet by mouth 2 (two) times a day as needed for constipation 20 tablet 0   • sertraline (ZOLOFT) 100 mg tablet Take 2 tablets (200 mg total) by mouth daily at bedtime 180 tablet 1   • traZODone (DESYREL) 50 mg tablet TAKE 1 TABLET BY MOUTH DAILY AT BEDTIME 90 tablet 1   • venlafaxine (EFFEXOR-XR) 150 mg 24 hr capsule TAKE 1 CAPSULE BY MOUTH EVERY DAY 90 capsule 1   • Blood Glucose Monitoring Suppl (ONE TOUCH ULTRA 2) w/Device KIT by Does not apply route daily (Patient not taking: Reported on 6/7/2022) 1 each 0   • busPIRone (BUSPAR) 10 mg tablet Take 1 tablet (10 mg total) by mouth in the morning (Patient not taking: Reported on 12/13/2022) 90 tablet 1   • Lancets (ONETOUCH ULTRASOFT) lancets Use as instructed daily (Patient not taking: Reported on 6/7/2022) 100 each 1       Current Allergies     Allergies as of 12/13/2022 - Reviewed 12/13/2022   Allergen Reaction Noted   • Darvon [propoxyphene] Dizziness 01/26/2019   • Fluoxetine Other (See Comments) 10/01/2012   • Meclizine Dizziness 10/01/2012   • Morphine and related Nausea Only 10/01/2012   • Oxycodone-acetaminophen Other (See Comments) and Tachycardia 10/01/2012   • Percolone [oxycodone] Other (See Comments) and Tachycardia 03/20/2019          The following portions of the patient's history were reviewed and updated as appropriate: allergies, current medications, past family history, past medical history, past social history, past surgical history and problem list   Past Medical History:   Diagnosis Date   • Anxiety    • Arthritis    • Asthma    • Claustrophobia    • CPAP (continuous positive airway pressure) dependence    • Depression    • Diabetes mellitus ([facility] [address] )    • Disease of thyroid gland     hypo   • GERD (gastroesophageal reflux disease)    • Headache    • History of COVID-19    • Hyperlipemia    • Kidney stone    • Kidney stones    • Major depressive disorder    • Motion sickness    • Risk for falls    • Sleep apnea    • Use of cane as ambulatory aid    • Wears glasses    • Wears partial dentures     upper partial     Past Surgical History:   Procedure Laterality Date   • ADENOIDECTOMY     • COLONOSCOPY • DILATION AND CURETTAGE OF UTERUS     • MS COLONOSCOPY FLX DX W/COLLJ SPEC WHEN PFRMD N/A 3/15/2019    Procedure: COLONOSCOPY;  Surgeon: Siena Gardner MD;  Location: [facility] GI LAB; Service: Gastroenterology   • MS KNEE SCOPE,MED/LAT MENISECTOMY Left 3/24/2022    Procedure: ARTHROSCOPY KNEE w/ partial medial menisectomy;  Surgeon: Krysta Jacobo MD;  Location: [facility] OR;  Service: Orthopedics   • ROTATOR CUFF REPAIR Right    • SHOULDER SURGERY Left     impingement   • TONSILLECTOMY     • TUBAL LIGATION       Family History   Problem Relation Age of Onset   • ALS Mother    • Venous thrombosis Father    • Diabetes Father    • Other Family         cerebral embolism   • Diabetes Family    • Hypertension Family    • Kidney disease Family    • Breast cancer Neg Hx    • Colon cancer Neg Hx    • Ovarian cancer Neg Hx    • Uterine cancer Neg Hx        Objective   /76 (BP Location: Right arm, Patient Position: Sitting, Cuff Size: Standard)   Pulse 90   Temp (!) 96 °F (35 6 °C) (Tympanic)   Resp 18   LMP  (LMP Unknown)   SpO2 97%   No LMP recorded (lmp unknown)  Patient is postmenopausal        Physical Exam     Physical Exam  Vitals and nursing note reviewed  Constitutional:       General: She is not in acute distress  Appearance: She is well-developed  She is not ill-appearing, toxic-appearing or diaphoretic  Cardiovascular:      Rate and Rhythm: Normal rate and regular rhythm  Heart sounds: Normal heart sounds  No murmur heard  No friction rub  No gallop  Pulmonary:      Effort: Pulmonary effort is normal  No respiratory distress  Breath sounds: Normal breath sounds  No stridor  No wheezing, rhonchi or rales  Abdominal:      Palpations: Abdomen is soft  Tenderness: There is abdominal tenderness  There is no right CVA tenderness, left CVA tenderness, guarding or rebound  Comments: Suprapubic TTP   Skin:     General: Skin is warm and dry     Neurological:      Mental Status: She is alert and oriented to person, place, and time     Psychiatric:         Mood and Affect: Mood normal          Behavior: Behavior normal

## 2022-12-13 NOTE — PATIENT INSTRUCTIONS
Take antibiotic as directed  Push fluids  If burning on urination, you may try over the counter Azo Urinary Pain Relief or comparable product  Please note that this type of product may cause your urine to become bright orange and it may stain your undergarments if you leak  Please wear protection as needed  Follow up with your PCP if not improving over next 3-5 days  If you have recurrent urinary tract problems, please follow up with your PCP and / or urologist for further evaluation  If you develop worsening symptoms with associated fever, back pain, abdominal pain, nausea with vomiting, please proceed to emergency room for further evaluation

## 2022-12-15 LAB — BACTERIA UR CULT: NORMAL

## 2022-12-19 ENCOUNTER — OFFICE VISIT (OUTPATIENT)
Dept: URGENT CARE | Facility: CLINIC | Age: 62
End: 2022-12-19

## 2022-12-19 VITALS
HEART RATE: 78 BPM | DIASTOLIC BLOOD PRESSURE: 76 MMHG | SYSTOLIC BLOOD PRESSURE: 124 MMHG | RESPIRATION RATE: 18 BRPM | TEMPERATURE: 96.6 F | OXYGEN SATURATION: 100 %

## 2022-12-19 DIAGNOSIS — J45.909 ASTHMA, UNSPECIFIED ASTHMA SEVERITY, UNSPECIFIED WHETHER COMPLICATED, UNSPECIFIED WHETHER PERSISTENT: ICD-10-CM

## 2022-12-19 DIAGNOSIS — N76.0 ACUTE VAGINITIS: ICD-10-CM

## 2022-12-19 DIAGNOSIS — R30.0 DYSURIA: Primary | ICD-10-CM

## 2022-12-19 LAB
SL AMB  POCT GLUCOSE, UA: NEGATIVE
SL AMB LEUKOCYTE ESTERASE,UA: NEGATIVE
SL AMB POCT BILIRUBIN,UA: NEGATIVE
SL AMB POCT BLOOD,UA: NEGATIVE
SL AMB POCT CLARITY,UA: CLEAR
SL AMB POCT COLOR,UA: YELLOW
SL AMB POCT KETONES,UA: NEGATIVE
SL AMB POCT NITRITE,UA: NEGATIVE
SL AMB POCT PH,UA: 5
SL AMB POCT SPECIFIC GRAVITY,UA: 1.02
SL AMB POCT URINE PROTEIN: NEGATIVE
SL AMB POCT UROBILINOGEN: 0.2

## 2022-12-19 RX ORDER — DEXAMETHASONE 4 MG/1
TABLET ORAL
Qty: 12 G | Refills: 1 | Status: SHIPPED | OUTPATIENT
Start: 2022-12-19

## 2022-12-20 LAB — BACTERIA UR CULT: NORMAL

## 2022-12-20 NOTE — PROGRESS NOTES
[facility]        NAME: Debra Mondragon is a 63 y o  female  : 1960    MRN: [id number]  DATE: 2022  TIME: 7:41 PM    Assessment and Plan   Dysuria [R30 0]  1  Dysuria  POCT urine dip    Urine culture      2  Acute vaginitis        ua completed in office - results negative   C&S from last week negative - will resend   Discussed recommend start monistat 7 to r/o vaginal yeast infection   F/u with gyn   Pt in agreement with plan of care      Patient Instructions     Follow up with PCP in 3-5 days  Proceed to  ER if symptoms worsen  Chief Complaint     Chief Complaint   Patient presents with   • Possible UTI     Patient just finished antibiotic and is still c/o burning with urination         History of Present Illness   Debra Mondragon presents to the clinic c/o    Pt states was seen here last week - diagnosed with UTI - started on amoxicillin -   Has 1 tab left and has no improvement in symptoms   Symptoms have worsened the past 24 hours   Denies any itching,   Burning is internal   Some burning with urination   Denies any abnormal vaginal discharge or odors  Review of Systems   Review of Systems   All other systems reviewed and are negative          Current Medications     Long-Term Medications   Medication Sig Dispense Refill   • benztropine (COGENTIN) 0 5 mg tablet Take 1 tablet (0 5 mg total) by mouth daily 90 tablet 1   • Dulaglutide (Trulicity) 3 WO/0 5ZG SOPN Inject 0 5 mL (3 mg total) under the skin once a week 3 mL 3   • Flovent  MCG/ACT inhaler INHALE 2 PUFFS BY MOUTH 2 TIMES A DAY RINSE MOUTH AFTER USE  12 g 1   • fluticasone (FLONASE) 50 mcg/act nasal spray 1 spray into each nostril daily 16 g 1   • gabapentin (NEURONTIN) 100 mg capsule Take 1 capsule (100 mg total) by mouth daily at bedtime 90 capsule 1   • gabapentin (NEURONTIN) 300 mg capsule Take 300 mg by mouth every evening     • imiquimod (ALDARA) 5 % cream Apply 1 packet topically 3 (three) times a week 12 each 0   • levothyroxine 88 mcg tablet Take 1 tablet (88 mcg total) by mouth daily in the early morning 90 tablet 1   • lisinopril (ZESTRIL) 10 mg tablet Take 1 tablet (10 mg total) by mouth daily 90 tablet 3   • loratadine (CLARITIN) 10 mg tablet Take 1 tablet (10 mg total) by mouth 2 (two) times a day 180 tablet 2   • magnesium citrate (CITROMA) 1 745 g/30 mL oral solution Take 296 mL by mouth once as needed (Refractory constipation) for up to 1 dose 296 mL 0   • metFORMIN (GLUCOPHAGE) 500 mg tablet Take 2 tablets (1,000 mg total) by mouth 2 (two) times a day with meals 360 tablet 1   • omeprazole (PriLOSEC) 20 mg delayed release capsule Take 1 capsule (20 mg total) by mouth daily before breakfast 90 capsule 1   • QUEtiapine (SEROquel) 200 mg tablet Take 1 tablet (200 mg total) by mouth daily at bedtime 90 tablet 1   • rosuvastatin (CRESTOR) 5 mg tablet Take 1 tablet (5 mg total) by mouth daily 90 tablet 1   • senna-docusate sodium (SENOKOT S) 8 6-50 mg per tablet Take 1 tablet by mouth 2 (two) times a day as needed for constipation 20 tablet 0   • sertraline (ZOLOFT) 100 mg tablet Take 2 tablets (200 mg total) by mouth daily at bedtime 180 tablet 1   • traZODone (DESYREL) 50 mg tablet TAKE 1 TABLET BY MOUTH DAILY AT BEDTIME 90 tablet 1   • venlafaxine (EFFEXOR-XR) 150 mg 24 hr capsule TAKE 1 CAPSULE BY MOUTH EVERY DAY 90 capsule 1   • Blood Glucose Monitoring Suppl (ONE TOUCH ULTRA 2) w/Device KIT by Does not apply route daily (Patient not taking: Reported on 6/7/2022) 1 each 0   • busPIRone (BUSPAR) 10 mg tablet Take 1 tablet (10 mg total) by mouth in the morning (Patient not taking: Reported on 12/13/2022) 90 tablet 1   • Lancets (ONETOUCH ULTRASOFT) lancets Use as instructed daily (Patient not taking: Reported on 6/7/2022) 100 each 1   • [DISCONTINUED] Flovent  MCG/ACT inhaler INHALE 2 PUFFS BY MOUTH 2 TIMES A DAY RINSE MOUTH AFTER USE  12 g 1       Current Allergies     Allergies as of 12/19/2022 - Reviewed 12/19/2022   Allergen Reaction Noted   • Darvon [propoxyphene] Dizziness 01/26/2019   • Fluoxetine Other (See Comments) 10/01/2012   • Meclizine Dizziness 10/01/2012   • Morphine and related Nausea Only 10/01/2012   • Oxycodone-acetaminophen Other (See Comments) and Tachycardia 10/01/2012   • Percolone [oxycodone] Other (See Comments) and Tachycardia 03/20/2019            The following portions of the patient's history were reviewed and updated as appropriate: allergies, current medications, past family history, past medical history, past social history, past surgical history and problem list     Objective   /76   Pulse 78   Temp (!) 96 6 °F (35 9 °C) (Tympanic)   Resp 18   LMP  (LMP Unknown)   SpO2 100%        Physical Exam     Physical Exam  Vitals and nursing note reviewed  Constitutional:       Appearance: Normal appearance  She is well-developed  HENT:      Head: Normocephalic and atraumatic  Eyes:      General: Lids are normal       Conjunctiva/sclera: Conjunctivae normal    Cardiovascular:      Rate and Rhythm: Normal rate and regular rhythm  Heart sounds: Normal heart sounds, S1 normal and S2 normal    Pulmonary:      Effort: Pulmonary effort is normal       Breath sounds: Normal breath sounds  Abdominal:      Tenderness: There is no abdominal tenderness  There is no right CVA tenderness or left CVA tenderness  Skin:     General: Skin is warm and dry  Neurological:      Mental Status: She is alert and oriented to person, place, and time  Psychiatric:         Speech: Speech normal          Behavior: Behavior normal  Behavior is cooperative  Thought Content:  Thought content normal          Judgment: Judgment normal

## 2022-12-20 NOTE — PATIENT INSTRUCTIONS
Yeast Infection   AMBULATORY CARE:   A yeast infection , or vaginal candidiasis, is a common vaginal infection  A yeast infection is caused by a fungus, or yeast-like germ  Fungi are normally found in your vagina  Too many fungi can cause an infection  Common signs and symptoms: Thick, white, cheese-like discharge from your vagina    Itching, swelling, and redness in your vagina    Pain or burning when you urinate    Pain during sexual intercourse    Call your doctor or gynecologist if:   You have a fever and chills  You develop abdominal or pelvic pain  Your discharge is bloody and it is not your monthly period  Your signs and symptoms get worse, even after treatment  You have questions or concerns about your condition or care  Treatment for a yeast infection  includes medicines to treat the fungal infection and decrease inflammation  The medicine may be a pill, cream, ointment, or vaginal tablet or suppository  Keep your vagina healthy:   Clean your genital area with mild soap and warm water each day  Do not get soap inside your vagina  Gently dry the area after washing  Do not use hot tubs  The heat and moisture from hot tubs can increase your risk for another yeast infection  Always wipe from front to back  after you use the toilet  This prevents spreading bacteria from your rectal area into your vagina  Do not wear tight-fitting clothes or undergarments  for long periods of time  Wear cotton underwear during the day  Cotton helps keep your genital area dry and does not hold in warmth or moisture  Do not wear underwear at night  Do not douche  or use feminine hygiene sprays or bubble bath  Do not use pads or tampons that are scented, or colored or perfumed toilet paper  Do not have sex until your symptoms go away  Have your partner wear a condom until you complete your course of medication  Ask your healthcare provider about birth control options if necessary    Condoms have latex and diaphragms have gel that kills sperm  Both of these may irritate your genital area  Follow up with your doctor or gynecologist as directed:  Write down your questions so you remember to ask them during your visits  © Copyright [employer] 2022 Information is for End User's use only and may not be sold, redistributed or otherwise used for commercial purposes  All illustrations and images included in CareNotes® are the copyrighted property of A D A M , Inc  or Ifeanyi Tariq   The above information is an  only  It is not intended as medical advice for individual conditions or treatments  Talk to your doctor, nurse or pharmacist before following any medical regimen to see if it is safe and effective for you

## 2022-12-28 DIAGNOSIS — E78.2 MIXED HYPERLIPIDEMIA: ICD-10-CM

## 2022-12-28 RX ORDER — ROSUVASTATIN CALCIUM 5 MG/1
5 TABLET, COATED ORAL DAILY
Qty: 90 TABLET | Refills: 1 | Status: SHIPPED | OUTPATIENT
Start: 2022-12-28

## 2023-01-13 ENCOUNTER — OFFICE VISIT (OUTPATIENT)
Dept: OBGYN CLINIC | Facility: CLINIC | Age: 63
End: 2023-01-13

## 2023-01-13 VITALS
WEIGHT: 204.6 LBS | BODY MASS INDEX: 34.09 KG/M2 | DIASTOLIC BLOOD PRESSURE: 62 MMHG | SYSTOLIC BLOOD PRESSURE: 110 MMHG | HEIGHT: 65 IN

## 2023-01-13 DIAGNOSIS — Z11.3 SCREENING EXAMINATION FOR STD (SEXUALLY TRANSMITTED DISEASE): ICD-10-CM

## 2023-01-13 DIAGNOSIS — B96.89 BACTERIAL VAGINITIS: Primary | ICD-10-CM

## 2023-01-13 DIAGNOSIS — N94.9 VAGINAL BURNING: ICD-10-CM

## 2023-01-13 DIAGNOSIS — N76.0 BACTERIAL VAGINITIS: Primary | ICD-10-CM

## 2023-01-13 LAB
BV WHIFF TEST VAG QL: NEGATIVE
CLUE CELLS SPEC QL WET PREP: POSITIVE
PH SMN: ABNORMAL [PH]
SL AMB POCT WET MOUNT: ABNORMAL
T VAGINALIS VAG QL WET PREP: NEGATIVE
YEAST VAG QL WET PREP: NEGATIVE

## 2023-01-13 RX ORDER — METRONIDAZOLE 500 MG/1
500 TABLET ORAL EVERY 12 HOURS SCHEDULED
Qty: 14 TABLET | Refills: 0 | Status: SHIPPED | OUTPATIENT
Start: 2023-01-13 | End: 2023-01-20

## 2023-01-13 NOTE — PROGRESS NOTES
Assessment/Plan:    1  Bacterial vaginitis  Positive on wet mount  Nature of BV discussed with patient in detail  rx sent for oral metronidazole  - metroNIDAZOLE (FLAGYL) 500 mg tablet; Take 1 tablet (500 mg total) by mouth every 12 (twelve) hours for 7 days  Dispense: 14 tablet; Refill: 0    2  Vaginal burning    - POCT wet mount    3  Screening examination for STD (sexually transmitted disease)  Since new relationship, G/C obtained  Subjective:      Patient ID: Vilma Farooq is a 63 y o  female  HPI  PROBLEM VISIT  CC: vaginal burning    64 yo G0 PMF presents for evaluation of vaginal burning  She states that she has become sexually active 3 months ago after a long period of abstinence  Has had vaginal burning which started in Dec 2022 at which time she was seen by Urgent Care on 12/13- dip showed large leukos and she was treated with ampicillin  She was still having symptoms and returned to Urgent care on 12/19 at which time her urine dip and culture were negative  They advised an antifungal and she used Monistat 3  Still symptomatic, comes and goes, soreness/irritated and burny internally  Not using condoms  States that her partner was recently  and was monogamous for 25 years  Counseled to consider condom use  12/19/22 UC negative   12/2/22 CMP ^ fasting glc of 166; lipids ^ triglycerides 189; CBC nl    The following portions of the patient's history were reviewed and updated as appropriate: allergies, current medications, past family history, past medical history, past social history, past surgical history and problem list     Review of Systems   Constitutional: Negative for chills and fever  Genitourinary: Negative for difficulty urinating, dysuria, frequency, hematuria, pelvic pain, urgency, vaginal bleeding and vaginal discharge  Musculoskeletal: Negative for arthralgias and myalgias           Objective:    /62 (BP Location: Left arm, Patient Position: Sitting, Cuff Size: Standard)   Ht 5' 5" (1 651 m)   Wt 92 8 kg (204 lb 9 6 oz)   LMP  (LMP Unknown)   BMI 34 05 kg/m²     Wet mount: + clue cells     Physical Exam  Constitutional:       General: She is not in acute distress  Appearance: Normal appearance  She is well-developed  She is not ill-appearing or diaphoretic  Comments: bmi 34   HENT:      Head: Normocephalic and atraumatic  Eyes:      Pupils: Pupils are equal, round, and reactive to light  Pulmonary:      Effort: Pulmonary effort is normal    Abdominal:      General: Abdomen is flat  Palpations: Abdomen is soft  Genitourinary:     General: Normal vulva  Exam position: Lithotomy position  Labia:         Right: No rash, tenderness, lesion or injury  Left: No rash, tenderness, lesion or injury  Vagina: No signs of injury and foreign body  Vaginal discharge (small  amount, creamy) present  No erythema, tenderness or bleeding  Cervix: No cervical motion tenderness, discharge or friability  Uterus: Not enlarged and not tender  Adnexa:         Right: No mass or tenderness  Left: No mass or tenderness  Skin:     General: Skin is warm and dry  Neurological:      General: No focal deficit present  Mental Status: She is alert and oriented to person, place, and time  Psychiatric:         Mood and Affect: Mood normal          Behavior: Behavior normal          Thought Content:  Thought content normal          Judgment: Judgment normal

## 2023-01-14 LAB
C TRACH DNA SPEC QL NAA+PROBE: NEGATIVE
N GONORRHOEA DNA SPEC QL NAA+PROBE: NEGATIVE

## 2023-01-23 DIAGNOSIS — K21.00 GASTROESOPHAGEAL REFLUX DISEASE WITH ESOPHAGITIS: ICD-10-CM

## 2023-01-23 RX ORDER — OMEPRAZOLE 20 MG/1
CAPSULE, DELAYED RELEASE ORAL
Qty: 90 CAPSULE | Refills: 1 | Status: SHIPPED | OUTPATIENT
Start: 2023-01-23

## 2023-02-09 DIAGNOSIS — F32.A DEPRESSION, UNSPECIFIED DEPRESSION TYPE: ICD-10-CM

## 2023-02-09 RX ORDER — SERTRALINE HYDROCHLORIDE 100 MG/1
200 TABLET, FILM COATED ORAL
Qty: 180 TABLET | Refills: 1 | Status: SHIPPED | OUTPATIENT
Start: 2023-02-09

## 2023-02-16 DIAGNOSIS — J45.909 ASTHMA, UNSPECIFIED ASTHMA SEVERITY, UNSPECIFIED WHETHER COMPLICATED, UNSPECIFIED WHETHER PERSISTENT: ICD-10-CM

## 2023-02-16 RX ORDER — DEXAMETHASONE 4 MG/1
TABLET ORAL
Qty: 12 G | Refills: 1 | Status: SHIPPED | OUTPATIENT
Start: 2023-02-16

## 2023-02-24 ENCOUNTER — OFFICE VISIT (OUTPATIENT)
Dept: URGENT CARE | Facility: CLINIC | Age: 63
End: 2023-02-24

## 2023-02-24 VITALS
RESPIRATION RATE: 20 BRPM | TEMPERATURE: 96 F | DIASTOLIC BLOOD PRESSURE: 70 MMHG | OXYGEN SATURATION: 97 % | HEART RATE: 95 BPM | SYSTOLIC BLOOD PRESSURE: 138 MMHG

## 2023-02-24 DIAGNOSIS — M54.2 CERVICALGIA: Primary | ICD-10-CM

## 2023-02-24 RX ORDER — QUETIAPINE FUMARATE 25 MG/1
25 TABLET, FILM COATED ORAL 2 TIMES DAILY PRN
COMMUNITY
Start: 2023-02-01

## 2023-02-24 RX ORDER — MELOXICAM 15 MG/1
15 TABLET ORAL DAILY
Qty: 20 TABLET | Refills: 0 | Status: SHIPPED | OUTPATIENT
Start: 2023-02-24

## 2023-02-24 RX ORDER — METHOCARBAMOL 750 MG/1
TABLET, FILM COATED ORAL
Qty: 24 TABLET | Refills: 0 | Status: SHIPPED | OUTPATIENT
Start: 2023-02-24

## 2023-02-24 NOTE — PROGRESS NOTES
[address] [employer] [facility]    NAME: Man Das is a 66 y o  female  : 1960    MRN: [id number]  DATE: 2023  TIME: 11:30 AM    Assessment and Plan   Cervicalgia [M54 2]  1  Cervicalgia  methocarbamol (ROBAXIN) 750 mg tablet    meloxicam (Mobic) 15 mg tablet          Patient Instructions   Patient Instructions   Stop Advil  Start meloxicam   May also take Tylenol every 4-6 hours as needed  Muscle relaxant for home use only  May cause drowsiness  Do not take it at the same time with any other potential sedating products  Cold or warm compresses to neck for comfort as needed  Or you may also try lidocaine pain patch that is over-the-counter  Follow-up with primary care and/or chiropractic this next week  Chief Complaint     Chief Complaint   Patient presents with   • Neck Pain     Pt C/O waking up with a stiff neck yesterday, tired ice, heat and taking Advil with no relief  History of Present Illness   Man Das presents to the clinic c/o  30-year-old female woke up with neck discomfort yesterday morning  Has been doing Advil, ice, heat and tried to get into see her chiropractor  Sees chiropractor periodically for neck and back treatment  Denies any known injury  No recent respiratory illnesses  Is supposed to celebrate Addie with a friend and then go to some performance  No prior history of problems with her neck like this  Pain is left-sided and hurts to turn her head  Review of Systems   Review of Systems   Constitutional: Positive for activity change and fatigue  Negative for appetite change, chills and fever  Musculoskeletal: Positive for neck pain and neck stiffness  Neurological: Negative for weakness and numbness         Current Medications     Long-Term Medications   Medication Sig Dispense Refill   • benztropine (COGENTIN) 0 5 mg tablet Take 1 tablet (0 5 mg total) by mouth daily 90 tablet 1   • busPIRone (BUSPAR) 10 mg tablet Take 1 tablet (10 mg total) by mouth in the morning 90 tablet 1   • Dulaglutide (Trulicity) 3 NX/6 9LI SOPN Inject 0 5 mL (3 mg total) under the skin once a week 3 mL 3   • Flovent  MCG/ACT inhaler INHALE 2 PUFFS BY MOUTH 2 TIMES A DAY RINSE MOUTH AFTER USE  12 g 1   • fluticasone (FLONASE) 50 mcg/act nasal spray 1 spray into each nostril daily 16 g 1   • gabapentin (NEURONTIN) 100 mg capsule Take 1 capsule (100 mg total) by mouth daily at bedtime 90 capsule 1   • imiquimod (ALDARA) 5 % cream Apply 1 packet topically 3 (three) times a week 12 each 0   • levothyroxine 88 mcg tablet Take 1 tablet (88 mcg total) by mouth daily in the early morning 90 tablet 1   • lisinopril (ZESTRIL) 10 mg tablet Take 1 tablet (10 mg total) by mouth daily 90 tablet 3   • loratadine (CLARITIN) 10 mg tablet Take 1 tablet (10 mg total) by mouth 2 (two) times a day 180 tablet 2   • magnesium citrate (CITROMA) 1 745 g/30 mL oral solution Take 296 mL by mouth once as needed (Refractory constipation) for up to 1 dose 296 mL 0   • meloxicam (Mobic) 15 mg tablet Take 1 tablet (15 mg total) by mouth daily 20 tablet 0   • metFORMIN (GLUCOPHAGE) 500 mg tablet Take 2 tablets (1,000 mg total) by mouth 2 (two) times a day with meals 360 tablet 1   • methocarbamol (ROBAXIN) 750 mg tablet 1/2 to 1 tablet every 6-8 hours or hs prn for muscle pain, spasms  Home use only  24 tablet 0   • omeprazole (PriLOSEC) 20 mg delayed release capsule TAKE 1 CAPSULE BY MOUTH DAILY BEFORE BREAKFAST  90 capsule 1   • QUEtiapine (SEROquel) 200 mg tablet Take 1 tablet (200 mg total) by mouth daily at bedtime 90 tablet 1   • QUEtiapine (SEROquel) 25 mg tablet Take 25 mg by mouth 2 (two) times a day as needed     • rosuvastatin (CRESTOR) 5 mg tablet TAKE 1 TABLET (5 MG TOTAL) BY MOUTH DAILY   90 tablet 1   • sertraline (ZOLOFT) 100 mg tablet TAKE 2 TABLETS (200 MG TOTAL) BY MOUTH DAILY AT BEDTIME 180 tablet 1   • traZODone (DESYREL) 50 mg tablet TAKE 1 TABLET BY MOUTH DAILY AT BEDTIME 90 tablet 1   • venlafaxine (EFFEXOR-XR) 150 mg 24 hr capsule TAKE 1 CAPSULE BY MOUTH EVERY DAY 90 capsule 1   • Blood Glucose Monitoring Suppl (ONE TOUCH ULTRA 2) w/Device KIT by Does not apply route daily (Patient not taking: Reported on 6/7/2022) 1 each 0   • gabapentin (NEURONTIN) 300 mg capsule Take 300 mg by mouth every evening (Patient not taking: Reported on 1/13/2023)     • Lancets (ONETOUCH ULTRASOFT) lancets Use as instructed daily (Patient not taking: Reported on 6/7/2022) 100 each 1   • senna-docusate sodium (SENOKOT S) 8 6-50 mg per tablet Take 1 tablet by mouth 2 (two) times a day as needed for constipation (Patient not taking: Reported on 1/13/2023) 20 tablet 0       Current Allergies     Allergies as of 02/24/2023 - Reviewed 02/24/2023   Allergen Reaction Noted   • Darvon [propoxyphene] Dizziness 01/26/2019   • Fluoxetine Other (See Comments) 10/01/2012   • Meclizine Dizziness 10/01/2012   • Morphine and related Nausea Only 10/01/2012   • Oxycodone-acetaminophen Other (See Comments) and Tachycardia 10/01/2012   • Percolone [oxycodone] Other (See Comments) and Tachycardia 03/20/2019          The following portions of the patient's history were reviewed and updated as appropriate: allergies, current medications, past family history, past medical history, past social history, past surgical history and problem list   Past Medical History:   Diagnosis Date   • Anxiety    • Arthritis    • Asthma    • Claustrophobia    • CPAP (continuous positive airway pressure) dependence    • Depression    • Diabetes mellitus ([facility] [address] )    • Disease of thyroid gland     hypo   • GERD (gastroesophageal reflux disease)    • Headache    • History of COVID-19    • Hyperlipemia    • Kidney stone    • Kidney stones    • Major depressive disorder    • Motion sickness    • Risk for falls    • Sleep apnea    • Use of cane as ambulatory aid    • Wears glasses    • Wears partial dentures     upper partial     Past Surgical History:   Procedure Laterality Date   • ADENOIDECTOMY     • COLONOSCOPY     • DILATION AND CURETTAGE OF UTERUS     • IL ARTHRS KNE SURG W/MENISCECTOMY MED/LAT W/SHVG Left 3/24/2022    Procedure: ARTHROSCOPY KNEE w/ partial medial menisectomy;  Surgeon: Symone Zamorano MD;  Location: [facility] OR;  Service: Orthopedics   • IL COLONOSCOPY FLX DX W/COLLJ [address] [place] [address] WHEN PFRMD N/A 3/15/2019    Procedure: COLONOSCOPY;  Surgeon: Nils Moritz, MD;  Location: [facility] GI LAB; Service: Gastroenterology   • ROTATOR CUFF REPAIR Right    • SHOULDER SURGERY Left     impingement   • TONSILLECTOMY     • TUBAL LIGATION       Family History   Problem Relation Age of Onset   • ALS Mother    • Venous thrombosis Father    • Diabetes Father    • Other Family         cerebral embolism   • Diabetes Family    • Hypertension Family    • Kidney disease Family    • Breast cancer Neg Hx    • Colon cancer Neg Hx    • Ovarian cancer Neg Hx    • Uterine cancer Neg Hx        Objective   /70 (BP Location: Right arm, Patient Position: Sitting, Cuff Size: Standard)   Pulse 95   Temp (!) 96 °F (35 6 °C) (Tympanic)   Resp 20   LMP  (LMP Unknown)   SpO2 97%   No LMP recorded (lmp unknown)  Patient is postmenopausal        Physical Exam     Physical Exam  Vitals and nursing note reviewed  Constitutional:       General: She is not in acute distress  Appearance: She is well-developed  She is not ill-appearing, toxic-appearing or diaphoretic  Comments: Sitting with stiff posturing of head  Neck:      Thyroid: No thyroid mass  Trachea: Trachea and phonation normal       Comments: TTP spinal processes of cervical spine as well as right paracervical muscles  Cardiovascular:      Rate and Rhythm: Normal rate  Pulmonary:      Effort: Pulmonary effort is normal  No respiratory distress  Musculoskeletal:      Cervical back: Rigidity and tenderness present  No edema, signs of trauma or crepitus   Pain with movement, spinous process tenderness and muscular tenderness present  Decreased range of motion  Lymphadenopathy:      Cervical: No cervical adenopathy  Neurological:      Mental Status: She is alert and oriented to person, place, and time     Psychiatric:         Mood and Affect: Mood normal          Behavior: Behavior normal

## 2023-02-24 NOTE — PATIENT INSTRUCTIONS
Stop Advil  Start meloxicam   May also take Tylenol every 4-6 hours as needed  Muscle relaxant for home use only  May cause drowsiness  Do not take it at the same time with any other potential sedating products  Cold or warm compresses to neck for comfort as needed  Or you may also try lidocaine pain patch that is over-the-counter  Follow-up with primary care and/or chiropractic this next week

## 2023-03-09 ENCOUNTER — VBI (OUTPATIENT)
Dept: ADMINISTRATIVE | Facility: OTHER | Age: 63
End: 2023-03-09

## 2023-03-21 ENCOUNTER — TELEPHONE (OUTPATIENT)
Dept: ADMINISTRATIVE | Facility: OTHER | Age: 63
End: 2023-03-21

## 2023-03-21 DIAGNOSIS — Z12.31 ENCOUNTER FOR SCREENING MAMMOGRAM FOR BREAST CANCER: ICD-10-CM

## 2023-03-21 NOTE — TELEPHONE ENCOUNTER
----- Message from Jakub Oconnell sent at 3/21/2023 11:26 AM EDT -----  Regarding: mammogram  03/21/23 11:26 AM    Hello, our patient Ree Credit has had Mammogram completed/performed  Please assist in updating the patient chart by pulling the Care Everywhere (CE) document  The date of service is 3/17/2023       Thank you,  Wali Baez MA  [facility]

## 2023-03-21 NOTE — TELEPHONE ENCOUNTER
Upon review of the In Basket request we were able to locate, review, and update the patient chart as requested for Mammogram     Any additional questions or concerns should be emailed to the Practice Liaisons via the appropriate education email address, please do not reply via In Basket      Thank you  Ashleigh Pastor

## 2023-03-22 DIAGNOSIS — J30.2 SEASONAL ALLERGIES: ICD-10-CM

## 2023-03-22 RX ORDER — LORATADINE 10 MG/1
TABLET ORAL
Qty: 60 TABLET | Refills: 8 | Status: SHIPPED | OUTPATIENT
Start: 2023-03-22

## 2023-03-23 ENCOUNTER — OFFICE VISIT (OUTPATIENT)
Dept: FAMILY MEDICINE CLINIC | Facility: CLINIC | Age: 63
End: 2023-03-23

## 2023-03-23 VITALS
HEIGHT: 65 IN | WEIGHT: 210.4 LBS | HEART RATE: 80 BPM | BODY MASS INDEX: 35.06 KG/M2 | SYSTOLIC BLOOD PRESSURE: 130 MMHG | DIASTOLIC BLOOD PRESSURE: 70 MMHG | TEMPERATURE: 97.5 F | OXYGEN SATURATION: 97 %

## 2023-03-23 DIAGNOSIS — T78.40XD ALLERGY, SUBSEQUENT ENCOUNTER: ICD-10-CM

## 2023-03-23 DIAGNOSIS — E78.2 MIXED HYPERLIPIDEMIA: ICD-10-CM

## 2023-03-23 DIAGNOSIS — K21.00 GASTROESOPHAGEAL REFLUX DISEASE WITH ESOPHAGITIS: ICD-10-CM

## 2023-03-23 DIAGNOSIS — M19.049 HAND ARTHRITIS: ICD-10-CM

## 2023-03-23 DIAGNOSIS — E11.8 TYPE 2 DIABETES MELLITUS WITH COMPLICATION (HCC): Primary | ICD-10-CM

## 2023-03-23 DIAGNOSIS — I10 PRIMARY HYPERTENSION: ICD-10-CM

## 2023-03-23 DIAGNOSIS — E03.9 HYPOTHYROIDISM, UNSPECIFIED TYPE: ICD-10-CM

## 2023-03-23 DIAGNOSIS — J45.909 ASTHMA, UNSPECIFIED ASTHMA SEVERITY, UNSPECIFIED WHETHER COMPLICATED, UNSPECIFIED WHETHER PERSISTENT: ICD-10-CM

## 2023-03-23 DIAGNOSIS — E05.90 HYPERTHYROIDISM: ICD-10-CM

## 2023-03-23 DIAGNOSIS — E11.9 TYPE 2 DIABETES MELLITUS WITHOUT COMPLICATION, WITHOUT LONG-TERM CURRENT USE OF INSULIN (HCC): ICD-10-CM

## 2023-03-23 LAB — SL AMB POCT HEMOGLOBIN AIC: 7.8 (ref ?–6.5)

## 2023-03-23 RX ORDER — DULAGLUTIDE 3 MG/.5ML
3 INJECTION, SOLUTION SUBCUTANEOUS WEEKLY
Qty: 3 ML | Refills: 3 | Status: SHIPPED | OUTPATIENT
Start: 2023-03-23

## 2023-03-23 RX ORDER — FLUTICASONE PROPIONATE 50 MCG
1 SPRAY, SUSPENSION (ML) NASAL DAILY
Qty: 16 G | Refills: 1 | Status: SHIPPED | OUTPATIENT
Start: 2023-03-23

## 2023-03-23 RX ORDER — LEVOTHYROXINE SODIUM 88 UG/1
88 TABLET ORAL
Qty: 90 TABLET | Refills: 1 | Status: SHIPPED | OUTPATIENT
Start: 2023-03-23

## 2023-03-23 RX ORDER — LISINOPRIL 10 MG/1
10 TABLET ORAL DAILY
Qty: 90 TABLET | Refills: 3 | Status: SHIPPED | OUTPATIENT
Start: 2023-03-23

## 2023-03-23 RX ORDER — OMEPRAZOLE 20 MG/1
20 CAPSULE, DELAYED RELEASE ORAL
Qty: 90 CAPSULE | Refills: 1 | Status: SHIPPED | OUTPATIENT
Start: 2023-03-23

## 2023-03-23 NOTE — PROGRESS NOTES
Assessment/Plan: A1c 7 8  CMP CBC lipid profile reviewed  Patient will restart Trulicity for diabetes  Continue with other meds for chronic conditions listed below  Patient will follow-up in 6 months or as needed  Diagnoses and all orders for this visit:    Type 2 diabetes mellitus with complication (HCC)  -     POCT hemoglobin A1c  -     dulaglutide (Trulicity) 3 OV/5 7SO injection; Inject 0 5 mL (3 mg total) under the skin once a week    Asthma, unspecified asthma severity, unspecified whether complicated, unspecified whether persistent  -     fluticasone (FLONASE) 50 mcg/act nasal spray; 1 spray into each nostril daily    Allergy, subsequent encounter  -     fluticasone (FLONASE) 50 mcg/act nasal spray; 1 spray into each nostril daily  -     Varicella Zoster Virus Ab (Immunity Scr),ACIF (S); Future    Primary hypertension  -     lisinopril (ZESTRIL) 10 mg tablet; Take 1 tablet (10 mg total) by mouth daily    Hyperthyroidism  -     levothyroxine 88 mcg tablet; Take 1 tablet (88 mcg total) by mouth daily in the early morning    Gastroesophageal reflux disease with esophagitis  -     omeprazole (PriLOSEC) 20 mg delayed release capsule; Take 1 capsule (20 mg total) by mouth daily before breakfast    Hypothyroidism, unspecified type    Type 2 diabetes mellitus without complication, without long-term current use of insulin (HCC)    Mixed hyperlipidemia    Hand arthritis  -     Ambulatory Referral to Orthopedic Surgery; Future            Subjective:        Patient ID: Vilma Farooq is a 66 y o  female  Patient is here to follow-up on diabetes hypertension hyperlipidemia with history of of hypothyroidism and anxiety and depression  Patient still some depressive symptoms but is working with psychiatrist   Patient did disability hearing recently  Patient did have laboratory studies done  Patient with significant anxiety over the past week  No homicidal suicidal ideation  Patient with lesion on neck  Patient with some pain right fifth finger DIP joint  Patient has been off Trulicity  The following portions of the patient's history were reviewed and updated as appropriate: allergies, current medications, past family history, past medical history, past social history, past surgical history and problem list       Review of Systems   Constitutional: Negative  HENT: Negative  Eyes: Negative  Respiratory: Negative  Cardiovascular: Negative  Gastrointestinal: Negative  Endocrine: Negative  Genitourinary: Negative  Musculoskeletal: Positive for arthralgias  Skin: Positive for color change  Allergic/Immunologic: Negative  Neurological: Negative  Hematological: Negative  Psychiatric/Behavioral: Positive for dysphoric mood  Negative for suicidal ideas  The patient is nervous/anxious  Objective:      BMI Counseling: Body mass index is 35 01 kg/m²  The BMI is above normal  Exercise recommendations include exercising 3-5 times per week  Rationale for BMI follow-up plan is due to patient being overweight or obese  /70 (BP Location: Left arm, Patient Position: Sitting, Cuff Size: Large)   Pulse 80   Temp 97 5 °F (36 4 °C) (Temporal)   Ht 5' 5" (1 651 m)   Wt 95 4 kg (210 lb 6 4 oz)   LMP  (LMP Unknown)   SpO2 97%   BMI 35 01 kg/m²          Physical Exam  Vitals and nursing note reviewed  Constitutional:       General: She is not in acute distress  Appearance: Normal appearance  She is not ill-appearing, toxic-appearing or diaphoretic  HENT:      Head: Normocephalic and atraumatic  Right Ear: Tympanic membrane, ear canal and external ear normal  There is no impacted cerumen  Left Ear: Tympanic membrane, ear canal and external ear normal  There is no impacted cerumen  Nose: Nose normal  No congestion or rhinorrhea        Mouth/Throat:      Mouth: Mucous membranes are moist       Pharynx: No oropharyngeal exudate or posterior oropharyngeal erythema  Eyes:      General: No scleral icterus  Right eye: No discharge  Left eye: No discharge  Extraocular Movements: Extraocular movements intact  Conjunctiva/sclera: Conjunctivae normal       Pupils: Pupils are equal, round, and reactive to light  Neck:      Vascular: No carotid bruit  Cardiovascular:      Rate and Rhythm: Normal rate and regular rhythm  Pulses: Normal pulses  Heart sounds: Normal heart sounds  No murmur heard  No friction rub  No gallop  Pulmonary:      Effort: Pulmonary effort is normal  No respiratory distress  Breath sounds: Normal breath sounds  No stridor  No wheezing, rhonchi or rales  Chest:      Chest wall: No tenderness  Musculoskeletal:         General: Tenderness present  No swelling, deformity or signs of injury  Cervical back: Normal range of motion and neck supple  No rigidity  No muscular tenderness  Right lower leg: No edema  Left lower leg: No edema  Comments: Swelling over the DIP joint of the fifth finger on the right   Lymphadenopathy:      Cervical: No cervical adenopathy  Skin:     General: Skin is warm and dry  Capillary Refill: Capillary refill takes less than 2 seconds  Coloration: Skin is not jaundiced  Findings: Lesion present  No bruising, erythema or rash  Comments: Left neck   Neurological:      Mental Status: She is alert and oriented to person, place, and time  Mental status is at baseline  Cranial Nerves: No cranial nerve deficit  Sensory: No sensory deficit  Motor: No weakness  Coordination: Coordination normal       Gait: Gait normal    Psychiatric:         Mood and Affect: Mood normal          Behavior: Behavior normal          Thought Content:  Thought content normal          Judgment: Judgment normal

## 2023-03-27 ENCOUNTER — TELEPHONE (OUTPATIENT)
Dept: OBGYN CLINIC | Facility: HOSPITAL | Age: 63
End: 2023-03-27

## 2023-03-27 NOTE — TELEPHONE ENCOUNTER
Patient is being referred to a orthopedics  Please schedule accordingly      [address]   [phone]

## 2023-04-03 ENCOUNTER — TELEPHONE (OUTPATIENT)
Dept: FAMILY MEDICINE CLINIC | Facility: CLINIC | Age: 63
End: 2023-04-03

## 2023-04-03 NOTE — TELEPHONE ENCOUNTER
Caller: patient    Doctor: n/a    Reason for call: patient will call back to schedule    Call back#: n/a

## 2023-04-03 NOTE — TELEPHONE ENCOUNTER
Pt stated she is in pain and needs a cortizone shot for her right pinky during dr visit. Pt tried to schedule it with the Orthopedic, but they do not have any appts unlit late May. Pt already went for an Xray. Pt appt at our office is scheduled for 04/05/2023. Please advise.

## 2023-04-06 ENCOUNTER — OFFICE VISIT (OUTPATIENT)
Dept: FAMILY MEDICINE CLINIC | Facility: CLINIC | Age: 63
End: 2023-04-06

## 2023-04-06 VITALS
DIASTOLIC BLOOD PRESSURE: 76 MMHG | TEMPERATURE: 97.8 F | HEART RATE: 94 BPM | OXYGEN SATURATION: 97 % | BODY MASS INDEX: 35.02 KG/M2 | SYSTOLIC BLOOD PRESSURE: 144 MMHG | HEIGHT: 65 IN | WEIGHT: 210.2 LBS

## 2023-04-06 DIAGNOSIS — I10 PRIMARY HYPERTENSION: ICD-10-CM

## 2023-04-06 DIAGNOSIS — M65.351 TRIGGER LITTLE FINGER OF RIGHT HAND: Primary | ICD-10-CM

## 2023-04-06 DIAGNOSIS — E78.2 MIXED HYPERLIPIDEMIA: ICD-10-CM

## 2023-04-06 RX ORDER — LISINOPRIL 10 MG/1
10 TABLET ORAL DAILY
Qty: 90 TABLET | Refills: 3 | Status: SHIPPED | OUTPATIENT
Start: 2023-04-06

## 2023-04-06 RX ORDER — GABAPENTIN 300 MG/1
300 CAPSULE ORAL EVERY EVENING
COMMUNITY
Start: 2023-03-29

## 2023-04-06 RX ORDER — ROSUVASTATIN CALCIUM 5 MG/1
5 TABLET, COATED ORAL DAILY
Qty: 90 TABLET | Refills: 1 | Status: SHIPPED | OUTPATIENT
Start: 2023-04-06

## 2023-04-06 NOTE — PROGRESS NOTES
Assessment/Plan: Patient will continue with current regimen for hyperlipidemia and hypertension and refills given at present time  Patient had form completed for dentist at this time  Informed consent obtained  Betadine and arthrocentesis with half a cc of lidocaine 1% without epinephrine and quarter cc of Depo-Medrol 40 given for trigger finger right fifth digit  Patient tolerated well  Patient may use ice and follow-up per routine       Diagnoses and all orders for this visit:    Trigger little finger of right hand    Mixed hyperlipidemia  -     rosuvastatin (CRESTOR) 5 mg tablet; Take 1 tablet (5 mg total) by mouth daily    Primary hypertension  -     lisinopril (ZESTRIL) 10 mg tablet; Take 1 tablet (10 mg total) by mouth daily    Other orders  -     gabapentin (NEURONTIN) 300 mg capsule; Take 300 mg by mouth every evening (Patient not taking: Reported on 4/6/2023)            Subjective:        Patient ID: Fátima Torrez is a 66 y o  female  Patient is here for multiple reasons  Patient will need refills on chronic conditions  Patient also needs form completed for dentist   Patient also with ongoing significant pain in the right fifth digit  Patient notices triggering  Patient wishes injection  The following portions of the patient's history were reviewed and updated as appropriate: allergies, current medications, past family history, past medical history, past social history, past surgical history and problem list       Review of Systems   Constitutional: Negative  HENT: Negative  Eyes: Negative  Respiratory: Negative  Cardiovascular: Negative  Gastrointestinal: Negative  Endocrine: Negative  Genitourinary: Negative  Musculoskeletal: Positive for arthralgias  Skin: Negative  Allergic/Immunologic: Negative  Neurological: Negative  Hematological: Negative  Psychiatric/Behavioral: Negative              Objective:               /76 (BP Location: Right "arm, Patient Position: Sitting, Cuff Size: Standard)   Pulse 94   Temp 97 8 °F (36 6 °C) (Temporal)   Ht 5' 5\" (1 651 m)   Wt 95 3 kg (210 lb 3 2 oz)   LMP  (LMP Unknown)   SpO2 97%   BMI 34 98 kg/m²          Physical Exam  Vitals and nursing note reviewed  Cardiovascular:      Rate and Rhythm: Normal rate and regular rhythm  Pulses: Normal pulses  Heart sounds: Normal heart sounds  Pulmonary:      Effort: Pulmonary effort is normal       Breath sounds: Normal breath sounds  Musculoskeletal:         General: Tenderness present        Comments: Triggering right fifth digit         "

## 2023-04-17 ENCOUNTER — APPOINTMENT (EMERGENCY)
Dept: RADIOLOGY | Facility: HOSPITAL | Age: 63
End: 2023-04-17

## 2023-04-17 ENCOUNTER — HOSPITAL ENCOUNTER (EMERGENCY)
Facility: HOSPITAL | Age: 63
Discharge: HOME | End: 2023-04-17
Attending: EMERGENCY MEDICINE | Admitting: EMERGENCY MEDICINE

## 2023-04-17 ENCOUNTER — APPOINTMENT (EMERGENCY)
Dept: CT IMAGING | Facility: HOSPITAL | Age: 63
End: 2023-04-17

## 2023-04-17 VITALS
DIASTOLIC BLOOD PRESSURE: 75 MMHG | RESPIRATION RATE: 18 BRPM | WEIGHT: 215 LBS | SYSTOLIC BLOOD PRESSURE: 138 MMHG | HEART RATE: 81 BPM | TEMPERATURE: 98 F | HEIGHT: 65 IN | BODY MASS INDEX: 35.82 KG/M2 | OXYGEN SATURATION: 99 %

## 2023-04-17 DIAGNOSIS — K59.00 CONSTIPATION: Primary | ICD-10-CM

## 2023-04-17 LAB
ALBUMIN SERPL BCP-MCNC: 4.3 G/DL (ref 3.5–5)
ALP SERPL-CCNC: 86 U/L (ref 34–104)
ALT SERPL W P-5'-P-CCNC: 16 U/L (ref 7–52)
ANION GAP SERPL CALCULATED.3IONS-SCNC: 8 MMOL/L (ref 4–13)
AST SERPL W P-5'-P-CCNC: 17 U/L (ref 13–39)
BASOPHILS # BLD AUTO: 0.03 THOUSANDS/ΜL (ref 0–0.1)
BASOPHILS NFR BLD AUTO: 0 % (ref 0–1)
BILIRUB SERPL-MCNC: 0.37 MG/DL (ref 0.2–1)
BILIRUB UR QL STRIP: NEGATIVE
BUN SERPL-MCNC: 21 MG/DL (ref 5–25)
CALCIUM SERPL-MCNC: 9.2 MG/DL (ref 8.4–10.2)
CARDIAC TROPONIN I PNL SERPL HS: <2 NG/L
CHLORIDE SERPL-SCNC: 104 MMOL/L (ref 96–108)
CLARITY UR: CLEAR
CO2 SERPL-SCNC: 25 MMOL/L (ref 21–32)
COLOR UR: YELLOW
CREAT SERPL-MCNC: 1.17 MG/DL (ref 0.6–1.3)
EOSINOPHIL # BLD AUTO: 0.41 THOUSAND/ΜL (ref 0–0.61)
EOSINOPHIL NFR BLD AUTO: 6 % (ref 0–6)
ERYTHROCYTE [DISTWIDTH] IN BLOOD BY AUTOMATED COUNT: 12.9 % (ref 11.6–15.1)
GFR SERPL CREATININE-BSD FRML MDRD: 50 ML/MIN/1.73SQ M
GLUCOSE SERPL-MCNC: 155 MG/DL (ref 65–140)
GLUCOSE UR STRIP-MCNC: NEGATIVE MG/DL
HCT VFR BLD AUTO: 37.7 % (ref 34.8–46.1)
HGB BLD-MCNC: 12.3 G/DL (ref 11.5–15.4)
HGB UR QL STRIP.AUTO: NEGATIVE
IMM GRANULOCYTES # BLD AUTO: 0.02 THOUSAND/UL (ref 0–0.2)
IMM GRANULOCYTES NFR BLD AUTO: 0 % (ref 0–2)
KETONES UR STRIP-MCNC: ABNORMAL MG/DL
LEUKOCYTE ESTERASE UR QL STRIP: NEGATIVE
LIPASE SERPL-CCNC: 34 U/L (ref 11–82)
LYMPHOCYTES # BLD AUTO: 1.45 THOUSANDS/ΜL (ref 0.6–4.47)
LYMPHOCYTES NFR BLD AUTO: 22 % (ref 14–44)
MCH RBC QN AUTO: 29.6 PG (ref 26.8–34.3)
MCHC RBC AUTO-ENTMCNC: 32.6 G/DL (ref 31.4–37.4)
MCV RBC AUTO: 91 FL (ref 82–98)
MONOCYTES # BLD AUTO: 0.67 THOUSAND/ΜL (ref 0.17–1.22)
MONOCYTES NFR BLD AUTO: 10 % (ref 4–12)
NEUTROPHILS # BLD AUTO: 4.17 THOUSANDS/ΜL (ref 1.85–7.62)
NEUTS SEG NFR BLD AUTO: 62 % (ref 43–75)
NITRITE UR QL STRIP: NEGATIVE
NRBC BLD AUTO-RTO: 0 /100 WBCS
PH UR STRIP.AUTO: 5 [PH]
PLATELET # BLD AUTO: 243 THOUSANDS/UL (ref 149–390)
PMV BLD AUTO: 9.6 FL (ref 8.9–12.7)
POTASSIUM SERPL-SCNC: 4.6 MMOL/L (ref 3.5–5.3)
PROT SERPL-MCNC: 7.2 G/DL (ref 6.4–8.4)
PROT UR STRIP-MCNC: NEGATIVE MG/DL
RBC # BLD AUTO: 4.16 MILLION/UL (ref 3.81–5.12)
SODIUM SERPL-SCNC: 137 MMOL/L (ref 135–147)
SP GR UR STRIP.AUTO: 1.01 (ref 1–1.03)
UROBILINOGEN UR QL STRIP.AUTO: 0.2 E.U./DL
WBC # BLD AUTO: 6.75 THOUSAND/UL (ref 4.31–10.16)

## 2023-04-17 RX ORDER — SODIUM PHOSPHATE, DIBASIC AND SODIUM PHOSPHATE, MONOBASIC 7; 19 G/133ML; G/133ML
1 ENEMA RECTAL ONCE
Status: COMPLETED | OUTPATIENT
Start: 2023-04-17 | End: 2023-04-17

## 2023-04-17 RX ORDER — ONDANSETRON 2 MG/ML
1 INJECTION INTRAMUSCULAR; INTRAVENOUS ONCE
Status: COMPLETED | OUTPATIENT
Start: 2023-04-17 | End: 2023-04-17

## 2023-04-17 RX ADMIN — SODIUM PHOSPHATE 1 ENEMA: 7; 19 ENEMA RECTAL at 22:08

## 2023-04-17 RX ADMIN — SODIUM CHLORIDE 1000 ML: 0.9 INJECTION, SOLUTION INTRAVENOUS at 19:32

## 2023-04-17 RX ADMIN — IOHEXOL 100 ML: 350 INJECTION, SOLUTION INTRAVENOUS at 19:42

## 2023-04-17 RX ADMIN — POLYETHYLENE GLYCOL 3350, SODIUM SULFATE ANHYDROUS, SODIUM BICARBONATE, SODIUM CHLORIDE, POTASSIUM CHLORIDE 1000 ML: 236; 22.74; 6.74; 5.86; 2.97 POWDER, FOR SOLUTION ORAL at 23:18

## 2023-04-17 NOTE — ED NOTES
Patient transported to [facility]  Ruth Leonard, [address] [place] [address]  04/17/23 1935 Private car

## 2023-04-18 LAB
ATRIAL RATE: 97 BPM
P AXIS: 47 DEGREES
PR INTERVAL: 148 MS
QRS AXIS: 7 DEGREES
QRSD INTERVAL: 86 MS
QT INTERVAL: 338 MS
QTC INTERVAL: 429 MS
T WAVE AXIS: 57 DEGREES
VENTRICULAR RATE: 97 BPM

## 2023-04-18 NOTE — DISCHARGE INSTRUCTIONS
The following findings require follow up:  Radiographic finding   Finding: ill-defined lesion in gallbladder   Follow up required: MRI abd/MRCP   Follow up should be done within 3 month(s)    Please notify the following clinician to assist with the follow up:   Dr Vikas Alvarado

## 2023-04-21 NOTE — ED PROVIDER NOTES
"History  Chief Complaint   Patient presents with   • Medical Problem     Pt reports multiple problems: constipation, abd pain, \"crushing\" chest pain, shortness of breath, and subjective fever  79-year-old female with history of diabetes, constipation who presents to the emergency department for several complaints  She reports that she has been suffering from constipation for the past 10 days now has crushing abdominal pain and chest pain  Did have an episode last night where she felt short of breath with a subjective fever  She states that she has not had a bowel movement in 10 days  She has tried 2 enemas at home and mag citrate with no relief  She states that she has suffered from chronic constipation but has never gone 10 days without a bowel movement  She does not follow with GI for her symptoms  Denies chest pain at this time  Denies headache, dizziness, nausea, vomiting, urinary symptoms, vaginal bleeding/discharge  She reports that her diabetes is well controlled  History provided by:  Patient   used: No    Medical Problem  Associated symptoms: abdominal pain and chest pain    Associated symptoms: no cough, no ear pain, no fever, no headaches, no rash, no shortness of breath, no sore throat and no vomiting        Prior to Admission Medications   Prescriptions Last Dose Informant Patient Reported? Taking?    Flovent  MCG/ACT inhaler   No No   Sig: INHALE 2 PUFFS BY MOUTH 2 TIMES A DAY RINSE MOUTH AFTER USE    QUEtiapine (SEROquel) 200 mg tablet   No No   Sig: Take 1 tablet (200 mg total) by mouth daily at bedtime   QUEtiapine (SEROquel) 25 mg tablet   Yes No   Sig: Take 25 mg by mouth 2 (two) times a day as needed   acetaminophen (TYLENOL) 325 mg tablet   Yes No   Sig: Take 650 mg by mouth every 6 (six) hours as needed for mild pain   albuterol (2 5 mg/3 mL) 0 083 % nebulizer solution   No No   Sig: Take 3 mL (2 5 mg total) by nebulization every 4 (four) hours as " needed for wheezing   albuterol (Proventil HFA) 90 mcg/act inhaler   No No   Sig: Inhale 1-2 puffs every 4 (four) hours as needed for wheezing   benztropine (COGENTIN) 0 5 mg tablet   No No   Sig: Take 1 tablet (0 5 mg total) by mouth daily   busPIRone (BUSPAR) 10 mg tablet   No No   Sig: Take 1 tablet (10 mg total) by mouth in the morning   dulaglutide (Trulicity) 3 MA/5 6NV injection   No No   Sig: Inject 0 5 mL (3 mg total) under the skin once a week   fluticasone (FLONASE) 50 mcg/act nasal spray   No No   Si spray into each nostril daily   gabapentin (NEURONTIN) 100 mg capsule   No No   Sig: Take 1 capsule (100 mg total) by mouth daily at bedtime   gabapentin (NEURONTIN) 300 mg capsule   Yes No   Sig: Take 300 mg by mouth every evening   Patient not taking: Reported on 2023   levothyroxine 88 mcg tablet   No No   Sig: Take 1 tablet (88 mcg total) by mouth daily in the early morning   lisinopril (ZESTRIL) 10 mg tablet   No No   Sig: Take 1 tablet (10 mg total) by mouth daily   loratadine (CLARITIN) 10 mg tablet   No No   Sig: TAKE 1 TABLET BY MOUTH TWICE A DAY   metFORMIN (GLUCOPHAGE) 500 mg tablet   No No   Sig: Take 2 tablets (1,000 mg total) by mouth 2 (two) times a day with meals   methocarbamol (ROBAXIN) 750 mg tablet   No No   Si/2 to 1 tablet every 6-8 hours or hs prn for muscle pain, spasms  Home use only     omeprazole (PriLOSEC) 20 mg delayed release capsule   No No   Sig: Take 1 capsule (20 mg total) by mouth daily before breakfast   rosuvastatin (CRESTOR) 5 mg tablet   No No   Sig: Take 1 tablet (5 mg total) by mouth daily   sertraline (ZOLOFT) 100 mg tablet   No No   Sig: TAKE 2 TABLETS (200 MG TOTAL) BY MOUTH DAILY AT BEDTIME   traZODone (DESYREL) 50 mg tablet   No No   Sig: TAKE 1 TABLET BY MOUTH DAILY AT BEDTIME   venlafaxine (EFFEXOR-XR) 150 mg 24 hr capsule   No No   Sig: TAKE 1 CAPSULE BY MOUTH EVERY DAY      Facility-Administered Medications: None       Past Medical History: Diagnosis Date   • Anxiety    • Arthritis    • Asthma    • Claustrophobia    • CPAP (continuous positive airway pressure) dependence    • Depression    • Diabetes mellitus ([address] )    • Disease of thyroid gland     hypo   • GERD (gastroesophageal reflux disease)    • Headache    • History of COVID-19    • Hyperlipemia    • Kidney stone    • Kidney stones    • Major depressive disorder    • Motion sickness    • Risk for falls    • Sleep apnea    • Use of cane as ambulatory aid    • Wears glasses    • Wears partial dentures     upper partial       Past Surgical History:   Procedure Laterality Date   • ADENOIDECTOMY     • COLONOSCOPY     • DILATION AND CURETTAGE OF UTERUS     • IN ARTHRS KNE SURG W/MENISCECTOMY MED/LAT W/SHVG Left 3/24/2022    Procedure: ARTHROSCOPY KNEE w/ partial medial menisectomy;  Surgeon: Franklyn Jarvis MD;  Location: [facility] OR;  Service: Orthopedics   • [facility] WHEN PFRMD N/A 3/15/2019    Procedure: COLONOSCOPY;  Surgeon: Caio Ha MD;  Location: [facility] GI LAB; Service: Gastroenterology   • ROTATOR CUFF REPAIR Right    • SHOULDER SURGERY Left     impingement   • TONSILLECTOMY     • TUBAL LIGATION         Family History   Problem Relation Age of Onset   • ALS Mother    • Venous thrombosis Father    • Diabetes Father    • Other Family         cerebral embolism   • Diabetes Family    • Hypertension Family    • Kidney disease Family    • Breast cancer Neg Hx    • Colon cancer Neg Hx    • Ovarian cancer Neg Hx    • Uterine cancer Neg Hx      I have reviewed and agree with the history as documented  E-Cigarette/Vaping   • E-Cigarette Use Never User      E-Cigarette/Vaping Substances   • Nicotine No    • THC No    • CBD No    • Flavoring No    • Other No    • Unknown No      Social History     Tobacco Use   • Smoking status: Never   • Smokeless tobacco: Never   Vaping Use   • Vaping Use: Never used   Substance Use Topics   • Alcohol use: No   • Drug use:  No Review of Systems   Constitutional: Negative for chills and fever  HENT: Negative for ear pain and sore throat  Eyes: Negative for pain and visual disturbance  Respiratory: Negative for cough and shortness of breath  Cardiovascular: Positive for chest pain  Negative for palpitations  Gastrointestinal: Positive for abdominal pain and constipation  Negative for vomiting  Genitourinary: Negative for dysuria and hematuria  Musculoskeletal: Negative for arthralgias and back pain  Skin: Negative for color change and rash  Neurological: Negative for dizziness, seizures, syncope and headaches  All other systems reviewed and are negative  Physical Exam  Physical Exam  Vitals and nursing note reviewed  Constitutional:       General: She is not in acute distress  Appearance: Normal appearance  She is well-developed and normal weight  She is not ill-appearing  HENT:      Head: Normocephalic and atraumatic  Right Ear: Tympanic membrane and external ear normal       Left Ear: Tympanic membrane and external ear normal       Nose: Nose normal       Mouth/Throat:      Mouth: Mucous membranes are moist       Pharynx: Oropharynx is clear  No oropharyngeal exudate or posterior oropharyngeal erythema  Eyes:      General: No scleral icterus  Right eye: No discharge  Left eye: No discharge  Extraocular Movements: Extraocular movements intact  Conjunctiva/sclera: Conjunctivae normal       Pupils: Pupils are equal, round, and reactive to light  Cardiovascular:      Rate and Rhythm: Normal rate and regular rhythm  Pulses: Normal pulses  Heart sounds: Normal heart sounds  No murmur heard  Pulmonary:      Effort: Pulmonary effort is normal  No respiratory distress  Breath sounds: Normal breath sounds  No wheezing or rales  Chest:      Chest wall: No tenderness  Abdominal:      General: Abdomen is flat   Bowel sounds are normal       Palpations: Abdomen is soft  Tenderness: There is no abdominal tenderness  There is no right CVA tenderness or left CVA tenderness  Musculoskeletal:         General: No signs of injury  Normal range of motion  Cervical back: Normal range of motion and neck supple  No tenderness  Right lower leg: No edema  Left lower leg: No edema  Skin:     General: Skin is warm and dry  Capillary Refill: Capillary refill takes less than 2 seconds  Findings: No rash  Neurological:      General: No focal deficit present  Mental Status: She is alert and oriented to person, place, and time  Mental status is at baseline  Motor: No weakness  Gait: Gait normal    Psychiatric:         Mood and Affect: Mood normal          Behavior: Behavior normal          Thought Content:  Thought content normal          Vital Signs  ED Triage Vitals [04/17/23 1845]   Temperature Pulse Respirations Blood Pressure SpO2   98 °F (36 7 °C) 91 20 (!) 171/84 96 %      Temp Source Heart Rate Source Patient Position - Orthostatic VS BP Location FiO2 (%)   Oral Monitor Sitting Right arm --      Pain Score       5           Vitals:    04/17/23 1845 04/17/23 2019 04/17/23 2216 04/17/23 2319   BP: (!) 171/84 146/74 144/66 138/75   Pulse: 91 91 91 81   Patient Position - Orthostatic VS: Sitting Lying Lying          Visual Acuity      ED Medications  Medications   ondansetron (FOR EMS ONLY) (ZOFRAN) 4 mg/2 mL injection 4 mg (0 mg Does not apply Given to EMS 4/17/23 1850)   sodium chloride 0 9 % bolus 1,000 mL (0 mL Intravenous Stopped 4/17/23 2208)   iohexol (OMNIPAQUE) 350 MG/ML injection (SINGLE-DOSE) 100 mL (100 mL Intravenous Given 4/17/23 1942)   sodium phosphate-biphosphate (FLEET) enema 1 enema (1 enema Rectal Given 4/17/23 2208)   polyethylene glycol (GOLYTELY) bowel prep 1,000 mL (1,000 mL Oral Given 4/17/23 2318)       Diagnostic Studies  Results Reviewed     Procedure Component Value Units Date/Time    UA w Reflex to Microscopic w Reflex to Culture [[id number]]  (Abnormal) Collected: 04/17/23 2132    Lab Status: Final result Specimen: Urine, Clean Catch Updated: 04/17/23 2221     Color, UA Yellow     Clarity, UA Clear     Specific Gravity, UA 1 010     pH, UA 5 0     Leukocytes, UA Negative     Nitrite, UA Negative     Protein, UA Negative mg/dl      Glucose, UA Negative mg/dl      Ketones, UA 15 (1+) mg/dl      Urobilinogen, UA 0 2 E U /dl      Bilirubin, UA Negative     Occult Blood, UA Negative    HS Troponin 0hr (reflex protocol) [[id number]]  (Normal) Collected: 04/17/23 1932    Lab Status: Final result Specimen: Blood from Arm, Right Updated: 04/17/23 2003     hs TnI 0hr <2 ng/L     Comprehensive metabolic panel [[id number]]  (Abnormal) Collected: 04/17/23 1858    Lab Status: Final result Specimen: Blood from Arm, Right Updated: 04/17/23 1925     Sodium 137 mmol/L      Potassium 4 6 mmol/L      Chloride 104 mmol/L      CO2 25 mmol/L      ANION GAP 8 mmol/L      BUN 21 mg/dL      Creatinine 1 17 mg/dL      Glucose 155 mg/dL      Calcium 9 2 mg/dL      AST 17 U/L      ALT 16 U/L      Alkaline Phosphatase 86 U/L      Total Protein 7 2 g/dL      Albumin 4 3 g/dL      Total Bilirubin 0 37 mg/dL      eGFR 50 ml/min/1 73sq m     Narrative:      [place] guidelines for Chronic Kidney Disease (CKD):   •  Stage 1 with normal or high GFR (GFR > 90 mL/min/1 73 square meters)  •  Stage 2 Mild CKD (GFR = 60-89 mL/min/1 73 square meters)  •  Stage 3A Moderate CKD (GFR = 45-59 mL/min/1 73 square meters)  •  Stage 3B Moderate CKD (GFR = 30-44 mL/min/1 73 square meters)  •  Stage 4 Severe CKD (GFR = 15-29 mL/min/1 73 square meters)  •  Stage 5 End Stage CKD (GFR <15 mL/min/1 73 square meters)  Note: GFR calculation is accurate only with a steady state creatinine    Lipase [[id number]]  (Normal) Collected: 04/17/23 1858    Lab Status: Final result Specimen: Blood from Arm, Right Updated: 04/17/23 1925     Lipase 34 u/L CBC and differential [[id number]] Collected: 04/17/23 1858    Lab Status: Final result Specimen: Blood from Arm, Right Updated: 04/17/23 1904     WBC 6 75 Thousand/uL      RBC 4 16 Million/uL      Hemoglobin 12 3 g/dL      Hematocrit 37 7 %      MCV 91 fL      MCH 29 6 pg      MCHC 32 6 g/dL      RDW 12 9 %      MPV 9 6 fL      Platelets 277 Thousands/uL      nRBC 0 /100 WBCs      Neutrophils Relative 62 %      Immat GRANS % 0 %      Lymphocytes Relative 22 %      Monocytes Relative 10 %      Eosinophils Relative 6 %      Basophils Relative 0 %      Neutrophils Absolute 4 17 Thousands/µL      Immature Grans Absolute 0 02 Thousand/uL      Lymphocytes Absolute 1 45 Thousands/µL      Monocytes Absolute 0 67 Thousand/µL      Eosinophils Absolute 0 41 Thousand/µL      Basophils Absolute 0 03 Thousands/µL                  XR chest 1 view portable   Final Result by Speedy Romero MD (04/18 7534)      No acute cardiopulmonary disease  Findings are stable            Workstation performed: [id number]         CT abdomen pelvis with contrast   Final Result by Marisa Steward MD (04/17 2103)      Mild bibasilar bronchiectatic changes of unknown source/significance  Recommend clinical correlation  Colon is filled with stool  Visualized GI tract is otherwise unremarkable  Heterogeneous ill-defined lesion within the gallbladder lumen possibly with calcifications versus vessels on the periphery; this is best appreciated on the coronal exam  While this appears similar to the prior exam dated 7/18/2022, it appears worse than    on the prior exam dated 3/6/2020; is this a mass? No pericholecystic inflammatory change  Recommend MRI abdomen without and with contrast/MRCP for further evaluation in the appropriate clinical setting  The study was marked in [facility] for immediate notification        Workstation performed: [id number]                    Procedures  Procedures         ED Course  ED Course as of 04/21/23 0854   Mon Apr 17, 2023   1926 No STEMI on ecg   2107 CT: Mild bibasilar bronchiectatic changes of unknown source/significance  Recommend clinical correlation  Colon is filled with stool  Visualized GI tract is otherwise unremarkable  Heterogeneous ill-defined lesion within the gallbladder lumen possibly with calcifications versus vessels on the periphery; this is best appreciated on the coronal exam  While this appears similar to the prior exam dated 7/18/2022, it appears worse than   on the prior exam dated 3/6/2020; is this a mass? No pericholecystic inflammatory change  Recommend MRI abdomen without and with contrast/MRCP for further evaluation in the appropriate clinical setting  2111 TT sent to GI for recommendations regarding severe constipation  2133 [id number]  [place]  Lot of stool on the ct  would do additional tap water enemas, additional mag citrate, can start miralax as well  If nothing by tomorrow will escalate regimen  9:17 PM  20 days left   2144 Patient was informed of the recommendations by GI      GI okay for one extra dose of mag citrate today   2148 Mag citrate recalled, unable to order  Will proceed with a liter of golytely  If unable to have BM, will discuss with [facility] for admission  2304 Patient had 4 Bms while in ED  Will discharge with strict bowel regimen and GI f/u              HEART Risk Score    Flowsheet Row Most Recent Value   Heart Score Risk Calculator    History 0 Filed at: 04/21/2023 0852   ECG 0 Filed at: 04/21/2023 6171   Age 1 Filed at: 04/21/2023 6572   Risk Factors 1 Filed at: 04/21/2023 0852   Troponin 0 Filed at: 04/21/2023 3376   HEART Score 2 Filed at: 04/21/2023 7818                        SBIRT 20yo+    Flowsheet Row Most Recent Value   Initial Alcohol Screen: US AUDIT-C     1  How often do you have a drink containing alcohol? 0 Filed at: 04/17/2023 1850   2   How many drinks containing alcohol do you have on a typical day you are drinking? 0 Filed at: 04/17/2023 1850   3a  Male UNDER 65: How often do you have five or more drinks on one occasion? 0 Filed at: 04/17/2023 1850   3b  FEMALE Any Age, or MALE 65+: How often do you have 4 or more drinks on one occassion? 0 Filed at: 04/17/2023 1850   Audit-C Score 0 Filed at: 04/17/2023 1850   CARMELINA: How many times in the past year have you    Used an illegal drug or used a prescription medication for non-medical reasons? Never Filed at: 04/17/2023 [address] [facility] [address] Making  30-year-old female presenting to the emergency department for evaluation and management of constipation  Differential: Considered small bowel obstruction, perforated bowel, IBS, IBD  Considered renal stone  Considered UTI/pyelonephritis  Plan: IV, blood work, ECG, CT abdomen  Started IV fluids  Work-up showing no acute process  CT has some incidental findings and noted stool-filled colon  Discussed with GI who recommended getting on a strict GI regimen for constipation  I informed patient of the results and the plan and she verbalizes understanding  Gave Fleet enema in the emergency department which prompted patient to have 4 bowel movements in the emergency department and she is requesting to go home  Recommended to follow-up with GI for further evaluation and to have her chronic constipation and for incidental findings noted on CT  Patient verbalizes understanding and is amenable to discharge at this time  Strict return precautions discussed  Patient stable at time of discharge  Constipation: acute illness or injury  Amount and/or Complexity of Data Reviewed  Labs: ordered  Details: Lab work unremarkable  No leukocytosis  Radiology: ordered  Details: No acute cardiopulmonary process on my independent interpretation of chest x-ray  Abdominal CT: Mild bibasilar bronchiectatic changes of unknown source/significance  Recommend clinical correlation    Colon is filled with stool  Visualized GI tract is otherwise unremarkable  Heterogeneous ill-defined lesion within the gallbladder lumen possibly with calcifications versus vessels on the periphery; this is best appreciated on the coronal exam  While this appears similar to the prior exam dated 7/18/2022, it appears worse than   on the prior exam dated 3/6/2020; is this a mass? No pericholecystic inflammatory change  Recommend MRI abdomen without and with contrast/MRCP for further evaluation in the appropriate clinical setting          ECG/medicine tests: ordered and independent interpretation performed  Details: Normal ECG on my independent interpretation  Discussion of management or test interpretation with external provider(s): Juan (GI) -details documented in ED course    Risk  OTC drugs  Prescription drug management  Disposition  Final diagnoses:   Constipation     Time reflects when diagnosis was documented in both MDM as applicable and the Disposition within this note     Time User Action Codes Description Comment    4/17/2023 11:05 PM Mal Dipti Add [K59 00] Constipation       ED Disposition     ED Disposition   Discharge    Condition   Stable    Date/Time   Mon Apr 17, 2023 11:05 PM    Comment   Lucita Thurman discharge to home/self care                 Follow-up Information     Follow up With Specialties Details Why Contact Info Additional [address] [facility] Emergency Department Emergency Medicine Go to  If symptoms worsen [facility] [id number]  112 [facility] Emergency Department, [address] [place] [address], [facility] [address] [facility] Schedule an appointment as soon as possible for a visit  follow up for further evaluation of symptoms [address] [id number] [address] [facility] [address], [address] [facility], [address], [place], [address], [phone]          Discharge Medication List as of 4/17/2023 11:07 PM      START taking these medications    Details   polyethylene glycol (GOLYTELY) 4000 mL solution Take 1,000 mL by mouth once for 1 dose, Starting Mon 4/17/2023, Normal         CONTINUE these medications which have NOT CHANGED    Details   acetaminophen (TYLENOL) 325 mg tablet Take 650 mg by mouth every 6 (six) hours as needed for mild pain, Historical Med      albuterol (2 5 mg/3 mL) 0 083 % nebulizer solution Take 3 mL (2 5 mg total) by nebulization every 4 (four) hours as needed for wheezing, Starting Fri 1/7/2022, Normal      albuterol (Proventil HFA) 90 mcg/act inhaler Inhale 1-2 puffs every 4 (four) hours as needed for wheezing, Starting Fri 1/7/2022, Normal      benztropine (COGENTIN) 0 5 mg tablet Take 1 tablet (0 5 mg total) by mouth daily, Starting Fri 1/7/2022, Normal      busPIRone (BUSPAR) 10 mg tablet Take 1 tablet (10 mg total) by mouth in the morning, Starting Fri 1/7/2022, Normal      dulaglutide (Trulicity) 3 AI/9 9YG injection Inject 0 5 mL (3 mg total) under the skin once a week, Starting Thu 3/23/2023, Normal      Flovent  MCG/ACT inhaler INHALE 2 PUFFS BY MOUTH 2 TIMES A DAY RINSE MOUTH AFTER USE , Normal      fluticasone (FLONASE) 50 mcg/act nasal spray 1 spray into each nostril daily, Starting Thu 3/23/2023, Normal      !! gabapentin (NEURONTIN) 100 mg capsule Take 1 capsule (100 mg total) by mouth daily at bedtime, Starting Fri 10/25/2019, Normal      !! gabapentin (NEURONTIN) 300 mg capsule Take 300 mg by mouth every evening, Starting Wed 3/29/2023, Historical Med      levothyroxine 88 mcg tablet Take 1 tablet (88 mcg total) by mouth daily in the early morning, Starting Thu 3/23/2023, Normal      lisinopril (ZESTRIL) 10 mg tablet Take 1 tablet (10 mg total) by mouth daily, Starting Thu 4/6/2023, Normal      loratadine (CLARITIN) 10 mg tablet TAKE 1 TABLET BY MOUTH TWICE A DAY, Normal      metFORMIN (GLUCOPHAGE) 500 mg tablet Take 2 tablets (1,000 mg total) by mouth 2 (two) times a day with meals, Starting Tue 12/6/2022, Normal      methocarbamol (ROBAXIN) 750 mg tablet 1/2 to 1 tablet every 6-8 hours or hs prn for muscle pain, spasms  Home use only , Normal      omeprazole (PriLOSEC) 20 mg delayed release capsule Take 1 capsule (20 mg total) by mouth daily before breakfast, Starting Thu 3/23/2023, Normal      !! QUEtiapine (SEROquel) 200 mg tablet Take 1 tablet (200 mg total) by mouth daily at bedtime, Starting Fri 1/7/2022, Normal      !! QUEtiapine (SEROquel) 25 mg tablet Take 25 mg by mouth 2 (two) times a day as needed, Starting Wed 2/1/2023, Historical Med      rosuvastatin (CRESTOR) 5 mg tablet Take 1 tablet (5 mg total) by mouth daily, Starting Thu 4/6/2023, Normal      sertraline (ZOLOFT) 100 mg tablet TAKE 2 TABLETS (200 MG TOTAL) BY MOUTH DAILY AT BEDTIME, Starting Thu 2/9/2023, Normal      traZODone (DESYREL) 50 mg tablet TAKE 1 TABLET BY MOUTH DAILY AT BEDTIME, Normal      venlafaxine (EFFEXOR-XR) 150 mg 24 hr capsule TAKE 1 CAPSULE BY MOUTH EVERY DAY, Normal       !! - Potential duplicate medications found  Please discuss with provider                PDMP Review       Value Time User    PDMP Reviewed  Yes 3/24/2022  1:30 PM Sarwat Mello PA-C          ED Provider  Electronically Signed by           Galilea Montenegro PA-C  04/21/23 8729

## 2023-05-30 ENCOUNTER — OFFICE VISIT (OUTPATIENT)
Dept: URGENT CARE | Age: 63
End: 2023-05-30

## 2023-05-30 VITALS
HEART RATE: 98 BPM | SYSTOLIC BLOOD PRESSURE: 124 MMHG | DIASTOLIC BLOOD PRESSURE: 78 MMHG | TEMPERATURE: 98.2 F | OXYGEN SATURATION: 99 % | RESPIRATION RATE: 18 BRPM

## 2023-05-30 DIAGNOSIS — L23.7 POISON IVY: Primary | ICD-10-CM

## 2023-05-30 RX ORDER — PREDNISONE 10 MG/1
TABLET ORAL
Qty: 24 TABLET | Refills: 0 | Status: SHIPPED | OUTPATIENT
Start: 2023-05-30 | End: 2023-06-06

## 2023-05-30 RX ORDER — TRIAMCINOLONE ACETONIDE 1 MG/G
CREAM TOPICAL 2 TIMES DAILY
Qty: 30 G | Refills: 0 | Status: SHIPPED | OUTPATIENT
Start: 2023-05-30

## 2023-05-30 NOTE — PROGRESS NOTES
[facility]        NAME: Janeen Fletcher is a 66 y o  female  : 1960    MRN: [id number]  DATE: May 30, 2023  TIME: 10:31 AM    Assessment and Plan   Poison ivy [L23 7]  1  Poison ivy  predniSONE 10 mg tablet    triamcinolone (KENALOG) 0 1 % cream            Patient Instructions     Follow up with PCP in 2-3 days  Proceed to ER if symptoms worsen  Chief Complaint     Chief Complaint   Patient presents with   • Rash     Poison ivy on arms/stomach  Started one week ago         History of Present Illness     66 y o  F  Rash x 1 week  States she was exposed to poison doing yard work  Topical Benadryl OTC  Denies CP or SOB  No fevers or drainage    Review of Systems   Review of Systems   Constitutional: Negative for chills, fatigue and fever  HENT: Negative for congestion, ear pain, facial swelling, hearing loss, rhinorrhea, sinus pressure, sneezing, sore throat and trouble swallowing  Eyes: Negative for pain, redness and visual disturbance  Respiratory: Negative for cough, chest tightness, shortness of breath and wheezing  Cardiovascular: Negative for chest pain and palpitations  Gastrointestinal: Negative for abdominal pain, diarrhea, nausea and vomiting  Genitourinary: Negative for dysuria, flank pain, hematuria and pelvic pain  Musculoskeletal: Negative for arthralgias, back pain and myalgias  Skin: Positive for rash  Negative for color change  Neurological: Negative for dizziness, seizures, syncope, weakness, light-headedness and headaches  Psychiatric/Behavioral: Negative for confusion, hallucinations and sleep disturbance  The patient is not nervous/anxious  All other systems reviewed and are negative          Current Medications       Current Outpatient Medications:   •  albuterol (2 5 mg/3 mL) 0 083 % nebulizer solution, Take 3 mL (2 5 mg total) by nebulization every 4 (four) hours as needed for wheezing, Disp: 75 mL, Rfl: 2  •  albuterol (Proventil HFA) 90 mcg/act inhaler, Inhale 1-2 puffs every 4 (four) hours as needed for wheezing, Disp: 36 g, Rfl: 1  •  benztropine (COGENTIN) 0 5 mg tablet, Take 1 tablet (0 5 mg total) by mouth daily, Disp: 90 tablet, Rfl: 1  •  busPIRone (BUSPAR) 10 mg tablet, Take 1 tablet (10 mg total) by mouth in the morning, Disp: 90 tablet, Rfl: 1  •  dulaglutide (Trulicity) 3 EA/2 9TU injection, Inject 0 5 mL (3 mg total) under the skin once a week, Disp: 3 mL, Rfl: 3  •  Flovent  MCG/ACT inhaler, INHALE 2 PUFFS BY MOUTH 2 TIMES A DAY RINSE MOUTH AFTER USE , Disp: 12 g, Rfl: 1  •  fluticasone (FLONASE) 50 mcg/act nasal spray, 1 spray into each nostril daily, Disp: 16 g, Rfl: 1  •  gabapentin (NEURONTIN) 100 mg capsule, Take 1 capsule (100 mg total) by mouth daily at bedtime, Disp: 90 capsule, Rfl: 1  •  levothyroxine 88 mcg tablet, Take 1 tablet (88 mcg total) by mouth daily in the early morning, Disp: 90 tablet, Rfl: 1  •  lisinopril (ZESTRIL) 10 mg tablet, Take 1 tablet (10 mg total) by mouth daily, Disp: 90 tablet, Rfl: 3  •  loratadine (CLARITIN) 10 mg tablet, TAKE 1 TABLET BY MOUTH TWICE A DAY, Disp: 60 tablet, Rfl: 8  •  metFORMIN (GLUCOPHAGE) 500 mg tablet, Take 2 tablets (1,000 mg total) by mouth 2 (two) times a day with meals, Disp: 360 tablet, Rfl: 1  •  methocarbamol (ROBAXIN) 750 mg tablet, 1/2 to 1 tablet every 6-8 hours or hs prn for muscle pain, spasms  Home use only  , Disp: 24 tablet, Rfl: 0  •  omeprazole (PriLOSEC) 20 mg delayed release capsule, Take 1 capsule (20 mg total) by mouth daily before breakfast, Disp: 90 capsule, Rfl: 1  •  predniSONE 10 mg tablet, Take 5 tablets (50 mg total) by mouth daily for 2 days, THEN 4 tablets (40 mg total) daily for 2 days, THEN 3 tablets (30 mg total) daily for 1 day, THEN 2 tablets (20 mg total) daily for 1 day, THEN 1 tablet (10 mg total) daily for 1 day , Disp: 24 tablet, Rfl: 0  •  QUEtiapine (SEROquel) 200 mg tablet, Take 1 tablet (200 mg total) by mouth daily at bedtime, Disp: 90 tablet, Rfl: 1  •  QUEtiapine (SEROquel) 25 mg tablet, Take 25 mg by mouth 2 (two) times a day as needed, Disp: , Rfl:   •  rosuvastatin (CRESTOR) 5 mg tablet, Take 1 tablet (5 mg total) by mouth daily, Disp: 90 tablet, Rfl: 1  •  sertraline (ZOLOFT) 100 mg tablet, TAKE 2 TABLETS (200 MG TOTAL) BY MOUTH DAILY AT BEDTIME, Disp: 180 tablet, Rfl: 1  •  traZODone (DESYREL) 50 mg tablet, TAKE 1 TABLET BY MOUTH DAILY AT BEDTIME, Disp: 90 tablet, Rfl: 1  •  triamcinolone (KENALOG) 0 1 % cream, Apply topically 2 (two) times a day, Disp: 30 g, Rfl: 0  •  venlafaxine (EFFEXOR-XR) 150 mg 24 hr capsule, TAKE 1 CAPSULE BY MOUTH EVERY DAY, Disp: 90 capsule, Rfl: 1  •  acetaminophen (TYLENOL) 325 mg tablet, Take 650 mg by mouth every 6 (six) hours as needed for mild pain, Disp: , Rfl:   •  gabapentin (NEURONTIN) 300 mg capsule, Take 300 mg by mouth every evening (Patient not taking: Reported on 4/6/2023), Disp: , Rfl:   •  polyethylene glycol (GOLYTELY) 4000 mL solution, Take 1,000 mL by mouth once for 1 dose, Disp: 1000 mL, Rfl: 0    Current Allergies     Allergies as of 05/30/2023 - Reviewed 05/30/2023   Allergen Reaction Noted   • Darvon [propoxyphene] Dizziness 01/26/2019   • Fluoxetine Other (See Comments) 10/01/2012   • Meclizine Dizziness 10/01/2012   • Morphine and related Nausea Only 10/01/2012   • Oxycodone-acetaminophen Other (See Comments) and Tachycardia 10/01/2012   • Percolone [oxycodone] Other (See Comments) and Tachycardia 03/20/2019            The following portions of the patient's history were reviewed and updated as appropriate: allergies, current medications, past family history, past medical history, past social history, past surgical history and problem list      Past Medical History:   Diagnosis Date   • Anxiety    • Arthritis    • Asthma    • Claustrophobia    • CPAP (continuous positive airway pressure) dependence    • Depression    • Diabetes mellitus (HCC)    • Disease of thyroid gland     hypo   • GERD (gastroesophageal reflux disease)    • Headache    • History of COVID-19    • Hyperlipemia    • Kidney stone    • Kidney stones    • Major depressive disorder    • Motion sickness    • Risk for falls    • Sleep apnea    • Use of cane as ambulatory aid    • Wears glasses    • Wears partial dentures     upper partial       Past Surgical History:   Procedure Laterality Date   • ADENOIDECTOMY     • COLONOSCOPY     • DILATION AND CURETTAGE OF UTERUS     • MN ARTHRS KNE SURG W/MENISCECTOMY MED/LAT W/SHVG Left 3/24/2022    Procedure: ARTHROSCOPY KNEE w/ partial medial menisectomy;  Surgeon: Lian Webber MD;  Location: [facility] OR;  Service: Orthopedics   • MN COLONOSCOPY FLX DX W/COLLJ [facility] WHEN PFRMD N/A 3/15/2019    Procedure: COLONOSCOPY;  Surgeon: Acacia Bowser MD;  Location: [facility] GI LAB; Service: Gastroenterology   • ROTATOR CUFF REPAIR Right    • SHOULDER SURGERY Left     impingement   • TONSILLECTOMY     • TUBAL LIGATION         Family History   Problem Relation Age of Onset   • ALS Mother    • Venous thrombosis Father    • Diabetes Father    • Other Family         cerebral embolism   • Diabetes Family    • Hypertension Family    • Kidney disease Family    • Breast cancer Neg Hx    • Colon cancer Neg Hx    • Ovarian cancer Neg Hx    • Uterine cancer Neg Hx          Medications have been verified  Objective   /78   Pulse 98   Temp 98 2 °F (36 8 °C)   Resp 18   LMP  (LMP Unknown)   SpO2 99%   No LMP recorded (lmp unknown)  Patient is postmenopausal        Physical Exam     Physical Exam  Vitals reviewed  Constitutional:       General: She is not in acute distress  Appearance: Normal appearance  She is not toxic-appearing  HENT:      Head: Normocephalic  Right Ear: Tympanic membrane normal  Tympanic membrane is not erythematous or bulging  Left Ear: Tympanic membrane normal  Tympanic membrane is not erythematous or bulging  Nose: No congestion or rhinorrhea  Right Sinus: No maxillary sinus tenderness or frontal sinus tenderness  Left Sinus: No maxillary sinus tenderness or frontal sinus tenderness  Mouth/Throat:      Pharynx: Uvula midline  No oropharyngeal exudate, posterior oropharyngeal erythema or uvula swelling  Tonsils: No tonsillar exudate or tonsillar abscesses  Eyes:      Extraocular Movements: Extraocular movements intact  Conjunctiva/sclera: Conjunctivae normal       Pupils: Pupils are equal, round, and reactive to light  Cardiovascular:      Rate and Rhythm: Normal rate and regular rhythm  Pulses: Normal pulses  Heart sounds: Normal heart sounds  Pulmonary:      Effort: Pulmonary effort is normal  No tachypnea or respiratory distress  Breath sounds: Normal breath sounds and air entry  No decreased breath sounds, wheezing, rhonchi or rales  Abdominal:      General: Bowel sounds are normal       Palpations: Abdomen is soft  Tenderness: There is no abdominal tenderness  There is no right CVA tenderness or guarding  Musculoskeletal:         General: Normal range of motion  Cervical back: Normal range of motion  Lymphadenopathy:      Cervical: No cervical adenopathy  Skin:     General: Skin is warm and dry  Findings: Rash present  Comments: +vesicular rash to BL forearms and abdomen  No drainage, erythema or warmth   Neurological:      General: No focal deficit present  Mental Status: She is alert  Sensory: Sensation is intact  Motor: Motor function is intact  Coordination: Coordination is intact  Gait: Gait is intact  Deep Tendon Reflexes: Reflexes are normal and symmetric

## 2023-06-02 DIAGNOSIS — E11.8 TYPE 2 DIABETES MELLITUS WITH COMPLICATION (HCC): ICD-10-CM

## 2023-07-17 ENCOUNTER — OFFICE VISIT (OUTPATIENT)
Dept: URGENT CARE | Facility: CLINIC | Age: 63
End: 2023-07-17
Payer: COMMERCIAL

## 2023-07-17 VITALS
HEART RATE: 91 BPM | TEMPERATURE: 96.7 F | RESPIRATION RATE: 18 BRPM | HEIGHT: 65 IN | BODY MASS INDEX: 35.82 KG/M2 | DIASTOLIC BLOOD PRESSURE: 70 MMHG | SYSTOLIC BLOOD PRESSURE: 138 MMHG | OXYGEN SATURATION: 97 % | WEIGHT: 215 LBS

## 2023-07-17 DIAGNOSIS — L24.7 IRRITANT CONTACT DERMATITIS DUE TO PLANTS, EXCEPT FOOD: Primary | ICD-10-CM

## 2023-07-17 PROCEDURE — 99213 OFFICE O/P EST LOW 20 MIN: CPT | Performed by: NURSE PRACTITIONER

## 2023-07-17 RX ORDER — METHYLPREDNISOLONE 4 MG/1
TABLET ORAL
Qty: 1 EACH | Refills: 0 | Status: SHIPPED | OUTPATIENT
Start: 2023-07-17

## 2023-07-17 NOTE — PATIENT INSTRUCTIONS
Contact Dermatitis   AMBULATORY CARE:   Contact dermatitis  is a rash. It develops when you touch something that irritates your skin or causes an allergic reaction. Common signs and symptoms include the following:   Red, swollen, painful rash    Skin that itches, stings, or burns    Dry, scaly, or crusty skin patches    Bumps or blisters    Fluid draining from blisters    Call your local emergency number (911 in the [address]) if:   You have sudden trouble breathing. Your throat swells and you have trouble eating. Your face is swollen. Call your doctor or dermatologist if:   You have a fever. Your blisters are draining pus. Your rash spreads or does not get better, even after treatment. You have questions or concerns about your condition or care. Treatment for contact dermatitis  involves removing any irritants or allergens that cause your rash. You may also need medicines to decrease itching and swelling. They will be given as a topical medicine to apply to your rash or as a pill. Manage contact dermatitis:   Take short baths or showers in cool water. Use mild soap or soap-free cleansers. Add oatmeal, baking soda, or cornstarch to the bath water to help decrease skin irritation. Avoid skin irritants. Examples include makeup, hair products, soaps, and cleansers. Use products that do not contain a scent or dye. Apply a cool compress to your rash. This will help soothe your skin. Apply lotions or creams to the area. These help keep your skin moist and decrease itching. Apply the lotion or cream right after a lukewarm bath or shower when your skin is still damp. Use products that do not contain a scent. Follow up with your doctor or dermatologist in 2 to 3 days:  Write down your questions so you remember to ask them during your visits. © Copyright [employer] [employer] 2022 Information is for End User's use only and may not be sold, redistributed or otherwise used for commercial purposes.   The above information is an  only. It is not intended as medical advice for individual conditions or treatments. Talk to your doctor, nurse or pharmacist before following any medical regimen to see if it is safe and effective for you.

## 2023-07-17 NOTE — PROGRESS NOTES
[facility]        NAME: Lelo Madrid is a 66 y.o. female  : 1960    MRN: [id number]  DATE: 2023  TIME: 2:54 PM    Assessment and Plan   Irritant contact dermatitis due to plants, except food [L24.7]  1. Irritant contact dermatitis due to plants, except food  methylPREDNISolone 4 MG tablet therapy pack        Start Medrol Dosepak. Instructed to keep an eye on blood sugars as she has type II diabetic. Continue topical and oral antihistamine. Discussed prevention methods patient agreement plan. Patient Instructions     Follow up with PCP in 3-5 days. Proceed to  ER if symptoms worsen. Chief Complaint     Chief Complaint   Patient presents with   • Rash     Poison for a few days         History of Present Illness   Lelo Madrid presents to the clinic c/o    Patient states was outside doing yard work and now has a rash to her bilateral forearms, abdomen and bilateral upper thighs. States that rash is itchy. She has been using topical hydrocortisone cream with no relief has also been taking an oral antihistamine with no relief. Had something similar about 2 months ago and was prescribed a prednisone taper which helped. Review of Systems   Review of Systems   All other systems reviewed and are negative.         Current Medications     Long-Term Medications   Medication Sig Dispense Refill   • benztropine (COGENTIN) 0.5 mg tablet Take 1 tablet (0.5 mg total) by mouth daily 90 tablet 1   • busPIRone (BUSPAR) 10 mg tablet Take 1 tablet (10 mg total) by mouth in the morning 90 tablet 1   • dulaglutide (Trulicity) 3 COVARRUBIAS/6.9DA injection Inject 0.5 mL (3 mg total) under the skin once a week 3 mL 3   • Flovent  MCG/ACT inhaler INHALE 2 PUFFS BY MOUTH 2 TIMES A DAY RINSE MOUTH AFTER USE. 12 g 1   • fluticasone (FLONASE) 50 mcg/act nasal spray 1 spray into each nostril daily 16 g 1   • gabapentin (NEURONTIN) 100 mg capsule Take 1 capsule (100 mg total) by mouth daily at bedtime 90 capsule 1   • gabapentin (NEURONTIN) 300 mg capsule Take 300 mg by mouth every evening (Patient not taking: Reported on 4/6/2023)     • levothyroxine 88 mcg tablet Take 1 tablet (88 mcg total) by mouth daily in the early morning 90 tablet 1   • lisinopril (ZESTRIL) 10 mg tablet Take 1 tablet (10 mg total) by mouth daily 90 tablet 3   • loratadine (CLARITIN) 10 mg tablet TAKE 1 TABLET BY MOUTH TWICE A DAY 60 tablet 8   • metFORMIN (GLUCOPHAGE) 500 mg tablet TAKE 2 TABLETS BY MOUTH TWICE A DAY WITH FOOD 360 tablet 1   • methocarbamol (ROBAXIN) 750 mg tablet 1/2 to 1 tablet every 6-8 hours or hs prn for muscle pain, spasms. Home use only.  24 tablet 0   • omeprazole (PriLOSEC) 20 mg delayed release capsule Take 1 capsule (20 mg total) by mouth daily before breakfast 90 capsule 1   • polyethylene glycol (GOLYTELY) 4000 mL solution Take 1,000 mL by mouth once for 1 dose 1000 mL 0   • QUEtiapine (SEROquel) 200 mg tablet Take 1 tablet (200 mg total) by mouth daily at bedtime 90 tablet 1   • QUEtiapine (SEROquel) 25 mg tablet Take 25 mg by mouth 2 (two) times a day as needed     • rosuvastatin (CRESTOR) 5 mg tablet Take 1 tablet (5 mg total) by mouth daily 90 tablet 1   • sertraline (ZOLOFT) 100 mg tablet TAKE 2 TABLETS (200 MG TOTAL) BY MOUTH DAILY AT BEDTIME 180 tablet 1   • traZODone (DESYREL) 50 mg tablet TAKE 1 TABLET BY MOUTH DAILY AT BEDTIME 90 tablet 1   • triamcinolone (KENALOG) 0.1 % cream Apply topically 2 (two) times a day 30 g 0   • venlafaxine (EFFEXOR-XR) 150 mg 24 hr capsule TAKE 1 CAPSULE BY MOUTH EVERY DAY 90 capsule 1       Current Allergies     Allergies as of 07/17/2023 - Reviewed 07/17/2023   Allergen Reaction Noted   • Darvon [propoxyphene] Dizziness 01/26/2019   • Fluoxetine Other (See Comments) 10/01/2012   • Meclizine Dizziness 10/01/2012   • Morphine and related Nausea Only 10/01/2012   • Oxycodone-acetaminophen Other (See Comments) and Tachycardia 10/01/2012   • Percolone [oxycodone] Other (See Comments) and Tachycardia 03/20/2019            The following portions of the patient's history were reviewed and updated as appropriate: allergies, current medications, past family history, past medical history, past social history, past surgical history and problem list.    Objective   /70   Pulse 91   Temp (!) 96.7 °F (35.9 °C) (Tympanic)   Resp 18   Ht 5' 5" (1.651 m)   Wt 97.5 kg (215 lb)   LMP  (LMP Unknown)   SpO2 97%   BMI 35.78 kg/m²        Physical Exam     Physical Exam  Vitals and nursing note reviewed. Constitutional:       Appearance: She is well-developed. HENT:      Head: Normocephalic and atraumatic. Eyes:      General: Lids are normal.      Conjunctiva/sclera: Conjunctivae normal.   Cardiovascular:      Rate and Rhythm: Normal rate and regular rhythm. Heart sounds: Normal heart sounds, S1 normal and S2 normal.   Pulmonary:      Effort: Pulmonary effort is normal.      Breath sounds: Normal breath sounds. Skin:     General: Skin is warm and dry. Findings: Rash present. Rash is papular. Neurological:      Mental Status: She is alert and oriented to person, place, and time. Psychiatric:         Speech: Speech normal.         Behavior: Behavior normal. Behavior is cooperative. Thought Content:  Thought content normal.         Judgment: Judgment normal.

## 2023-07-27 ENCOUNTER — OFFICE VISIT (OUTPATIENT)
Dept: URGENT CARE | Facility: CLINIC | Age: 63
End: 2023-07-27
Payer: COMMERCIAL

## 2023-07-27 VITALS — HEIGHT: 65 IN | BODY MASS INDEX: 35.82 KG/M2 | WEIGHT: 215 LBS | HEART RATE: 91 BPM | TEMPERATURE: 97.1 F

## 2023-07-27 DIAGNOSIS — R30.0 DYSURIA: Primary | ICD-10-CM

## 2023-07-27 LAB
SL AMB  POCT GLUCOSE, UA: NEGATIVE
SL AMB LEUKOCYTE ESTERASE,UA: NEGATIVE
SL AMB POCT BILIRUBIN,UA: NEGATIVE
SL AMB POCT BLOOD,UA: ABNORMAL
SL AMB POCT CLARITY,UA: CLEAR
SL AMB POCT COLOR,UA: YELLOW
SL AMB POCT KETONES,UA: NEGATIVE
SL AMB POCT NITRITE,UA: NEGATIVE
SL AMB POCT PH,UA: 5
SL AMB POCT SPECIFIC GRAVITY,UA: 1.03
SL AMB POCT URINE PROTEIN: ABNORMAL
SL AMB POCT UROBILINOGEN: 0.2

## 2023-07-27 PROCEDURE — 81002 URINALYSIS NONAUTO W/O SCOPE: CPT | Performed by: NURSE PRACTITIONER

## 2023-07-27 PROCEDURE — 99213 OFFICE O/P EST LOW 20 MIN: CPT | Performed by: NURSE PRACTITIONER

## 2023-07-27 PROCEDURE — 87086 URINE CULTURE/COLONY COUNT: CPT | Performed by: NURSE PRACTITIONER

## 2023-07-27 RX ORDER — SULFAMETHOXAZOLE AND TRIMETHOPRIM 800; 160 MG/1; MG/1
1 TABLET ORAL EVERY 12 HOURS SCHEDULED
Qty: 6 TABLET | Refills: 0 | Status: SHIPPED | OUTPATIENT
Start: 2023-07-27 | End: 2023-08-01

## 2023-07-27 NOTE — PROGRESS NOTES
[facility]        NAME: Hossein Galan is a 66 y.o. female  : 1960    MRN: [id number]  DATE: 2023  TIME: 6:48 PM    Assessment and Plan   Dysuria [R30.0]  1. Dysuria  POCT urine dip    Urine culture    sulfamethoxazole-trimethoprim (BACTRIM DS) 800-160 mg per tablet        UA positive for blood and protein. Will start course of Bactrim and will send urine for culture and sensitivity. Follow-up with PCP. Patient agreement plan. Patient Instructions     Follow up with PCP in 3-5 days. Proceed to  ER if symptoms worsen. Chief Complaint     Chief Complaint   Patient presents with   • Possible UTI     Patient with dysuria for 2 days         History of Present Illness   Hossein Galan presents to the clinic c/o    Patient presents to the office with complaints of urinary urgency, frequency, dysuria for the past 2 days. States symptoms have worsening. Today felt very rundown and fatigued and did not do much. Has not been pushing fluids. Had a UTI last one was about a year ago. Review of Systems   Review of Systems   All other systems reviewed and are negative.         Current Medications     Long-Term Medications   Medication Sig Dispense Refill   • benztropine (COGENTIN) 0.5 mg tablet Take 1 tablet (0.5 mg total) by mouth daily 90 tablet 1   • busPIRone (BUSPAR) 10 mg tablet Take 1 tablet (10 mg total) by mouth in the morning 90 tablet 1   • dulaglutide (Trulicity) 3 EQ/0.1TX injection Inject 0.5 mL (3 mg total) under the skin once a week 3 mL 3   • Flovent  MCG/ACT inhaler INHALE 2 PUFFS BY MOUTH 2 TIMES A DAY RINSE MOUTH AFTER USE. 12 g 1   • fluticasone (FLONASE) 50 mcg/act nasal spray 1 spray into each nostril daily 16 g 1   • gabapentin (NEURONTIN) 100 mg capsule Take 1 capsule (100 mg total) by mouth daily at bedtime 90 capsule 1   • gabapentin (NEURONTIN) 300 mg capsule Take 300 mg by mouth every evening (Patient not taking: Reported on 2023)     • levothyroxine 88 mcg tablet Take 1 tablet (88 mcg total) by mouth daily in the early morning 90 tablet 1   • lisinopril (ZESTRIL) 10 mg tablet Take 1 tablet (10 mg total) by mouth daily 90 tablet 3   • loratadine (CLARITIN) 10 mg tablet TAKE 1 TABLET BY MOUTH TWICE A DAY 60 tablet 8   • metFORMIN (GLUCOPHAGE) 500 mg tablet TAKE 2 TABLETS BY MOUTH TWICE A DAY WITH FOOD 360 tablet 1   • methocarbamol (ROBAXIN) 750 mg tablet 1/2 to 1 tablet every 6-8 hours or hs prn for muscle pain, spasms. Home use only.  24 tablet 0   • omeprazole (PriLOSEC) 20 mg delayed release capsule Take 1 capsule (20 mg total) by mouth daily before breakfast 90 capsule 1   • polyethylene glycol (GOLYTELY) 4000 mL solution Take 1,000 mL by mouth once for 1 dose 1000 mL 0   • QUEtiapine (SEROquel) 200 mg tablet Take 1 tablet (200 mg total) by mouth daily at bedtime 90 tablet 1   • QUEtiapine (SEROquel) 25 mg tablet Take 25 mg by mouth 2 (two) times a day as needed     • rosuvastatin (CRESTOR) 5 mg tablet Take 1 tablet (5 mg total) by mouth daily 90 tablet 1   • sertraline (ZOLOFT) 100 mg tablet TAKE 2 TABLETS (200 MG TOTAL) BY MOUTH DAILY AT BEDTIME 180 tablet 1   • traZODone (DESYREL) 50 mg tablet TAKE 1 TABLET BY MOUTH DAILY AT BEDTIME 90 tablet 1   • triamcinolone (KENALOG) 0.1 % cream Apply topically 2 (two) times a day 30 g 0   • venlafaxine (EFFEXOR-XR) 150 mg 24 hr capsule TAKE 1 CAPSULE BY MOUTH EVERY DAY 90 capsule 1       Current Allergies     Allergies as of 07/27/2023 - Reviewed 07/27/2023   Allergen Reaction Noted   • Darvon [propoxyphene] Dizziness 01/26/2019   • Fluoxetine Other (See Comments) 10/01/2012   • Meclizine Dizziness 10/01/2012   • Morphine and related Nausea Only 10/01/2012   • Oxycodone-acetaminophen Other (See Comments) and Tachycardia 10/01/2012   • Percolone [oxycodone] Other (See Comments) and Tachycardia 03/20/2019            The following portions of the patient's history were reviewed and updated as appropriate: allergies, current medications, past family history, past medical history, past social history, past surgical history and problem list.    Objective   Pulse 91   Temp (!) 97.1 °F (36.2 °C) (Tympanic)   Ht 5' 5" (1.651 m)   Wt 97.5 kg (215 lb)   LMP  (LMP Unknown)   BMI 35.78 kg/m²        Physical Exam     Physical Exam  Vitals and nursing note reviewed. Constitutional:       Appearance: Normal appearance. She is well-developed. HENT:      Head: Normocephalic and atraumatic. Eyes:      General: Lids are normal.      Conjunctiva/sclera: Conjunctivae normal.   Cardiovascular:      Rate and Rhythm: Normal rate and regular rhythm. Heart sounds: Normal heart sounds, S1 normal and S2 normal.   Pulmonary:      Effort: Pulmonary effort is normal.      Breath sounds: Normal breath sounds. Abdominal:      General: Abdomen is flat. Bowel sounds are normal.      Palpations: Abdomen is soft. Tenderness: There is no abdominal tenderness. There is no right CVA tenderness or left CVA tenderness. Skin:     General: Skin is warm and dry. Neurological:      Mental Status: She is alert and oriented to person, place, and time. Psychiatric:         Speech: Speech normal.         Behavior: Behavior normal. Behavior is cooperative. Thought Content:  Thought content normal.         Judgment: Judgment normal.

## 2023-07-28 LAB — BACTERIA UR CULT: NORMAL

## 2023-07-31 ENCOUNTER — APPOINTMENT (EMERGENCY)
Dept: CT IMAGING | Facility: HOSPITAL | Age: 63
End: 2023-07-31
Payer: COMMERCIAL

## 2023-07-31 ENCOUNTER — HOSPITAL ENCOUNTER (INPATIENT)
Facility: HOSPITAL | Age: 63
LOS: 1 days | Discharge: HOME | End: 2023-08-01
Attending: EMERGENCY MEDICINE | Admitting: INTERNAL MEDICINE
Payer: COMMERCIAL

## 2023-07-31 DIAGNOSIS — K59.00 CONSTIPATION, UNSPECIFIED CONSTIPATION TYPE: ICD-10-CM

## 2023-07-31 DIAGNOSIS — K59.00 INTRACTABLE CONSTIPATION: ICD-10-CM

## 2023-07-31 DIAGNOSIS — K80.20 GALLSTONES: ICD-10-CM

## 2023-07-31 DIAGNOSIS — R10.9 ABDOMINAL PAIN: Primary | ICD-10-CM

## 2023-07-31 LAB
ALBUMIN SERPL BCP-MCNC: 4.3 G/DL (ref 3.5–5)
ALP SERPL-CCNC: 93 U/L (ref 34–104)
ALT SERPL W P-5'-P-CCNC: 17 U/L (ref 7–52)
ANION GAP SERPL CALCULATED.3IONS-SCNC: 8 MMOL/L
AST SERPL W P-5'-P-CCNC: 17 U/L (ref 13–39)
BASOPHILS # BLD AUTO: 0.05 THOUSANDS/ÂΜL (ref 0–0.1)
BASOPHILS NFR BLD AUTO: 1 % (ref 0–1)
BILIRUB SERPL-MCNC: 0.28 MG/DL (ref 0.2–1)
BUN SERPL-MCNC: 20 MG/DL (ref 5–25)
CALCIUM SERPL-MCNC: 9.5 MG/DL (ref 8.4–10.2)
CHLORIDE SERPL-SCNC: 104 MMOL/L (ref 96–108)
CO2 SERPL-SCNC: 23 MMOL/L (ref 21–32)
CREAT SERPL-MCNC: 1.46 MG/DL (ref 0.6–1.3)
EOSINOPHIL # BLD AUTO: 0.16 THOUSAND/ÂΜL (ref 0–0.61)
EOSINOPHIL NFR BLD AUTO: 2 % (ref 0–6)
ERYTHROCYTE [DISTWIDTH] IN BLOOD BY AUTOMATED COUNT: 13 % (ref 11.6–15.1)
GFR SERPL CREATININE-BSD FRML MDRD: 38 ML/MIN/1.73SQ M
GLUCOSE SERPL-MCNC: 156 MG/DL (ref 65–140)
HCT VFR BLD AUTO: 39.3 % (ref 34.8–46.1)
HGB BLD-MCNC: 12.5 G/DL (ref 11.5–15.4)
IMM GRANULOCYTES # BLD AUTO: 0.03 THOUSAND/UL (ref 0–0.2)
IMM GRANULOCYTES NFR BLD AUTO: 0 % (ref 0–2)
LYMPHOCYTES # BLD AUTO: 1.67 THOUSANDS/ÂΜL (ref 0.6–4.47)
LYMPHOCYTES NFR BLD AUTO: 20 % (ref 14–44)
MCH RBC QN AUTO: 29.1 PG (ref 26.8–34.3)
MCHC RBC AUTO-ENTMCNC: 31.8 G/DL (ref 31.4–37.4)
MCV RBC AUTO: 92 FL (ref 82–98)
MONOCYTES # BLD AUTO: 0.61 THOUSAND/ÂΜL (ref 0.17–1.22)
MONOCYTES NFR BLD AUTO: 7 % (ref 4–12)
NEUTROPHILS # BLD AUTO: 5.72 THOUSANDS/ÂΜL (ref 1.85–7.62)
NEUTS SEG NFR BLD AUTO: 70 % (ref 43–75)
NRBC BLD AUTO-RTO: 0 /100 WBCS
PLATELET # BLD AUTO: 230 THOUSANDS/UL (ref 149–390)
PMV BLD AUTO: 9.2 FL (ref 8.9–12.7)
POTASSIUM SERPL-SCNC: 4.5 MMOL/L (ref 3.5–5.3)
PROT SERPL-MCNC: 7.1 G/DL (ref 6.4–8.4)
RBC # BLD AUTO: 4.29 MILLION/UL (ref 3.81–5.12)
SODIUM SERPL-SCNC: 135 MMOL/L (ref 135–147)
WBC # BLD AUTO: 8.24 THOUSAND/UL (ref 4.31–10.16)

## 2023-07-31 PROCEDURE — 99284 EMERGENCY DEPT VISIT MOD MDM: CPT

## 2023-07-31 PROCEDURE — 80053 COMPREHEN METABOLIC PANEL: CPT | Performed by: EMERGENCY MEDICINE

## 2023-07-31 PROCEDURE — 85025 COMPLETE CBC W/AUTO DIFF WBC: CPT | Performed by: EMERGENCY MEDICINE

## 2023-07-31 PROCEDURE — 96360 HYDRATION IV INFUSION INIT: CPT

## 2023-07-31 PROCEDURE — 36415 COLL VENOUS BLD VENIPUNCTURE: CPT | Performed by: EMERGENCY MEDICINE

## 2023-07-31 PROCEDURE — 99285 EMERGENCY DEPT VISIT HI MDM: CPT | Performed by: EMERGENCY MEDICINE

## 2023-07-31 PROCEDURE — G1004 CDSM NDSC: HCPCS

## 2023-07-31 PROCEDURE — 74177 CT ABD & PELVIS W/CONTRAST: CPT

## 2023-07-31 RX ORDER — POLYETHYLENE GLYCOL 3350 17 G/17G
238 POWDER, FOR SOLUTION ORAL ONCE
Status: COMPLETED | OUTPATIENT
Start: 2023-07-31 | End: 2023-07-31

## 2023-07-31 RX ORDER — ENEMA 19; 7 G/133ML; G/133ML
1 ENEMA RECTAL ONCE
Status: COMPLETED | OUTPATIENT
Start: 2023-07-31 | End: 2023-07-31

## 2023-07-31 RX ORDER — ONDANSETRON 4 MG/1
4 TABLET, ORALLY DISINTEGRATING ORAL ONCE
Status: COMPLETED | OUTPATIENT
Start: 2023-07-31 | End: 2023-07-31

## 2023-07-31 RX ADMIN — SODIUM PHOSPHATE 1 ENEMA: 7; 19 ENEMA RECTAL at 15:10

## 2023-07-31 RX ADMIN — SODIUM CHLORIDE 1000 ML: 0.9 INJECTION, SOLUTION INTRAVENOUS at 18:30

## 2023-07-31 RX ADMIN — POLYETHYLENE GLYCOL 3350 238 G: 17 POWDER, FOR SOLUTION ORAL at 15:55

## 2023-07-31 RX ADMIN — ONDANSETRON 4 MG: 4 TABLET, ORALLY DISINTEGRATING ORAL at 17:45

## 2023-07-31 RX ADMIN — IOHEXOL 100 ML: 350 INJECTION, SOLUTION INTRAVENOUS at 19:21

## 2023-07-31 NOTE — ED CARE HANDOFF
Emergency Department Sign Out Note        Sign out and transfer of care from Dr. Seamus Hardy. See Separate Emergency Department note. The patient, Hossein Galan, was evaluated by the previous provider for abdominal pain. Workup Completed:  Lab, CT.    ED Course / Workup Pending (followup):                                  ED Course as of 07/31/23 2327 Mon Jul 31, 2023 2038 Received in signout for follow-up of CT scan, results reviewed at this time. 2038 CT abdomen pelvis with contrast  IMPRESSION:     Large amount of stool throughout the colon similar to prior study in keeping with constipation.     Gallstones without surrounding inflammation. 2230 Patient informed about CT scan findings, stable constipation, gallstones. Patient has no right upper quadrant pain, normal LFTs. Patient not able to have bowel movements despite enema, meds, last bowel movement 9 days back, patient concerned about going home like this. We will admit for intractable constipation, would benefit from GI consult as inpatient, bowel regimen. Procedures  MDM        Disposition  Final diagnoses:   Abdominal pain   Intractable constipation   Gallstones     Time reflects when diagnosis was documented in both MDM as applicable and the Disposition within this note     Time User Action Codes Description Comment    7/31/2023  7:23 PM Dylan Locker Add [R10.9] Abdominal pain     7/31/2023 10:11 PM Dylan Locker Add [K59.00] Intractable constipation     7/31/2023 11:27 PM Dylan Locker Add [K80.20] Gallstones       ED Disposition     ED Disposition   Admit    Condition   Stable    Date/Time   Mon Jul 31, 2023 10:25 PM    Comment   Case was discussed with Gen Tafoya and the patient's admission status was agreed to be Admission Status: observation status to the service of Dr. Nettie Holstein.            Follow-up Information    None       Current Discharge Medication List      CONTINUE these medications which have NOT CHANGED    Details dulaglutide (Trulicity) 3 CU/0.6PZ injection Inject 0.5 mL (3 mg total) under the skin once a week  Qty: 3 mL, Refills: 3    Associated Diagnoses: Type 2 diabetes mellitus with complication ([address])      ! ! gabapentin (NEURONTIN) 100 mg capsule Take 1 capsule (100 mg total) by mouth daily at bedtime  Qty: 90 capsule, Refills: 1    Associated Diagnoses: Depression, unspecified depression type      levothyroxine 88 mcg tablet Take 1 tablet (88 mcg total) by mouth daily in the early morning  Qty: 90 tablet, Refills: 1    Associated Diagnoses: Hyperthyroidism      loratadine (CLARITIN) 10 mg tablet TAKE 1 TABLET BY MOUTH TWICE A DAY  Qty: 60 tablet, Refills: 8    Associated Diagnoses: Seasonal allergies      metFORMIN (GLUCOPHAGE) 500 mg tablet TAKE 2 TABLETS BY MOUTH TWICE A DAY WITH FOOD  Qty: 360 tablet, Refills: 1    Associated Diagnoses: Type 2 diabetes mellitus with complication (HCC)      omeprazole (PriLOSEC) 20 mg delayed release capsule Take 1 capsule (20 mg total) by mouth daily before breakfast  Qty: 90 capsule, Refills: 1    Associated Diagnoses: Gastroesophageal reflux disease with esophagitis      !! QUEtiapine (SEROquel) 200 mg tablet Take 1 tablet (200 mg total) by mouth daily at bedtime  Qty: 90 tablet, Refills: 1    Associated Diagnoses: Depression, unspecified depression type      !!  QUEtiapine (SEROquel) 25 mg tablet Take 25 mg by mouth 2 (two) times a day as needed      rosuvastatin (CRESTOR) 5 mg tablet Take 1 tablet (5 mg total) by mouth daily  Qty: 90 tablet, Refills: 1    Associated Diagnoses: Mixed hyperlipidemia      sertraline (ZOLOFT) 100 mg tablet TAKE 2 TABLETS (200 MG TOTAL) BY MOUTH DAILY AT BEDTIME  Qty: 180 tablet, Refills: 1    Associated Diagnoses: Depression, unspecified depression type      traZODone (DESYREL) 50 mg tablet TAKE 1 TABLET BY MOUTH DAILY AT BEDTIME  Qty: 90 tablet, Refills: 1    Associated Diagnoses: Depression, unspecified depression type      venlafaxine (EFFEXOR-XR) 150 mg 24 hr capsule TAKE 1 CAPSULE BY MOUTH EVERY DAY  Qty: 90 capsule, Refills: 1    Associated Diagnoses: Depression, unspecified depression type      acetaminophen (TYLENOL) 325 mg tablet Take 650 mg by mouth every 6 (six) hours as needed for mild pain      albuterol (2.5 mg/3 mL) 0.083 % nebulizer solution Take 3 mL (2.5 mg total) by nebulization every 4 (four) hours as needed for wheezing  Qty: 75 mL, Refills: 2    Associated Diagnoses: Depression, unspecified depression type      albuterol (Proventil HFA) 90 mcg/act inhaler Inhale 1-2 puffs every 4 (four) hours as needed for wheezing  Qty: 36 g, Refills: 1    Comments: Substitution to a formulary equivalent within the same pharmaceutical class is authorized. Associated Diagnoses: Asthma, unspecified asthma severity, unspecified whether complicated, unspecified whether persistent      benztropine (COGENTIN) 0.5 mg tablet Take 1 tablet (0.5 mg total) by mouth daily  Qty: 90 tablet, Refills: 1    Associated Diagnoses: Depression, unspecified depression type      busPIRone (BUSPAR) 10 mg tablet Take 1 tablet (10 mg total) by mouth in the morning  Qty: 90 tablet, Refills: 1    Associated Diagnoses: Depression, unspecified depression type      Flovent  MCG/ACT inhaler INHALE 2 PUFFS BY MOUTH 2 TIMES A DAY RINSE MOUTH AFTER USE. Qty: 12 g, Refills: 1    Associated Diagnoses: Asthma, unspecified asthma severity, unspecified whether complicated, unspecified whether persistent      fluticasone (FLONASE) 50 mcg/act nasal spray 1 spray into each nostril daily  Qty: 16 g, Refills: 1    Associated Diagnoses: Asthma, unspecified asthma severity, unspecified whether complicated, unspecified whether persistent;  Allergy, subsequent encounter      !! gabapentin (NEURONTIN) 300 mg capsule Take 300 mg by mouth every evening      lisinopril (ZESTRIL) 10 mg tablet Take 1 tablet (10 mg total) by mouth daily  Qty: 90 tablet, Refills: 3    Associated Diagnoses: Primary hypertension      methocarbamol (ROBAXIN) 750 mg tablet 1/2 to 1 tablet every 6-8 hours or hs prn for muscle pain, spasms. Home use only. Qty: 24 tablet, Refills: 0    Associated Diagnoses: Cervicalgia      methylPREDNISolone 4 MG tablet therapy pack Use as directed on package  Qty: 1 each, Refills: 0    Associated Diagnoses: Irritant contact dermatitis due to plants, except food      polyethylene glycol (GOLYTELY) 4000 mL solution Take 1,000 mL by mouth once for 1 dose  Qty: 1000 mL, Refills: 0    Associated Diagnoses: Constipation      triamcinolone (KENALOG) 0.1 % cream Apply topically 2 (two) times a day  Qty: 30 g, Refills: 0    Associated Diagnoses: Poison ivy       !! - Potential duplicate medications found. Please discuss with provider. STOP taking these medications       sulfamethoxazole-trimethoprim (BACTRIM DS) 800-160 mg per tablet Comments:   Reason for Stopping:             No discharge procedures on file.        ED Provider  Electronically Signed by     Theo Goodrich MD  07/31/23 2758

## 2023-07-31 NOTE — ED PROVIDER NOTES
History  Chief Complaint   Patient presents with   • Abdominal Pain     Lower abdominal pain x 3 days. Last bowel movement 9 days ago. Has taken enemas and stool softeners with no relief. Denies nausea and vomiting      72-year-old female with history of asthma, anxiety, diabetes, hypertension presents to the ED with constipation. Patient states she has had no bowel movement in the past 9 days. She has used multiple over-the-counter medications without relief. She had decreased appetite but no nausea vomiting. No fever. She had occasional crampy lower abdominal pain. No past surgical history. She states that she has had constipation in the past but this is the longest its ever been. She was seen here in April for constipation and had a full work-up including a CT which showed severe constipation. She has not yet followed up with GI. She was given a fleets enema and GoLytely to drink here in the ED which she states helped      History provided by:  Patient   used: No    Constipation  Severity:  Severe  Time since last bowel movement:  9 days  Timing:  Constant  Progression:  Unchanged  Chronicity:  Recurrent  Context: not dehydration, not dietary changes, not medication, not narcotics and not stress    Stool description:  None produced  Relieved by:  Nothing  Worsened by:  Nothing  Ineffective treatments:  Laxatives and Miralax  Associated symptoms: abdominal pain, anorexia and back pain    Associated symptoms: no diarrhea, no dysuria, no fever, no flatus, no hematochezia, no nausea, no urinary retention and no vomiting    Risk factors: recent antibiotic use    Risk factors: no change in medication, no hx of abdominal surgery, no obesity, no recent illness, no recent surgery and no recent travel        Prior to Admission Medications   Prescriptions Last Dose Informant Patient Reported? Taking?    Flovent  MCG/ACT inhaler   No No   Sig: INHALE 2 PUFFS BY MOUTH 2 TIMES A DAY RINSE MOUTH AFTER USE.   QUEtiapine (SEROquel) 200 mg tablet  Self No No   Sig: Take 1 tablet (200 mg total) by mouth daily at bedtime   QUEtiapine (SEROquel) 25 mg tablet   Yes No   Sig: Take 25 mg by mouth 2 (two) times a day as needed   acetaminophen (TYLENOL) 325 mg tablet  Self Yes No   Sig: Take 650 mg by mouth every 6 (six) hours as needed for mild pain   albuterol (2.5 mg/3 mL) 0.083 % nebulizer solution  Self No No   Sig: Take 3 mL (2.5 mg total) by nebulization every 4 (four) hours as needed for wheezing   albuterol (Proventil HFA) 90 mcg/act inhaler  Self No No   Sig: Inhale 1-2 puffs every 4 (four) hours as needed for wheezing   benztropine (COGENTIN) 0.5 mg tablet  Self No No   Sig: Take 1 tablet (0.5 mg total) by mouth daily   busPIRone (BUSPAR) 10 mg tablet   No No   Sig: Take 1 tablet (10 mg total) by mouth in the morning   dulaglutide (Trulicity) 3 EV/0.5WI injection   No No   Sig: Inject 0.5 mL (3 mg total) under the skin once a week   fluticasone (FLONASE) 50 mcg/act nasal spray   No No   Si spray into each nostril daily   gabapentin (NEURONTIN) 100 mg capsule  Self No No   Sig: Take 1 capsule (100 mg total) by mouth daily at bedtime   gabapentin (NEURONTIN) 300 mg capsule   Yes No   Sig: Take 300 mg by mouth every evening   Patient not taking: Reported on 2023   levothyroxine 88 mcg tablet   No No   Sig: Take 1 tablet (88 mcg total) by mouth daily in the early morning   lisinopril (ZESTRIL) 10 mg tablet   No No   Sig: Take 1 tablet (10 mg total) by mouth daily   loratadine (CLARITIN) 10 mg tablet   No No   Sig: TAKE 1 TABLET BY MOUTH TWICE A DAY   metFORMIN (GLUCOPHAGE) 500 mg tablet   No No   Sig: TAKE 2 TABLETS BY MOUTH TWICE A DAY WITH FOOD   methocarbamol (ROBAXIN) 750 mg tablet   No No   Si/2 to 1 tablet every 6-8 hours or hs prn for muscle pain, spasms. Home use only.    methylPREDNISolone 4 MG tablet therapy pack   No No   Sig: Use as directed on package   omeprazole (PriLOSEC) 20 mg delayed release capsule   No No   Sig: Take 1 capsule (20 mg total) by mouth daily before breakfast   polyethylene glycol (GOLYTELY) 4000 mL solution   No No   Sig: Take 1,000 mL by mouth once for 1 dose   rosuvastatin (CRESTOR) 5 mg tablet   No No   Sig: Take 1 tablet (5 mg total) by mouth daily   sertraline (ZOLOFT) 100 mg tablet   No No   Sig: TAKE 2 TABLETS (200 MG TOTAL) BY MOUTH DAILY AT BEDTIME   sulfamethoxazole-trimethoprim (BACTRIM DS) 800-160 mg per tablet   No No   Sig: Take 1 tablet by mouth every 12 (twelve) hours for 3 days   traZODone (DESYREL) 50 mg tablet   No No   Sig: TAKE 1 TABLET BY MOUTH DAILY AT BEDTIME   triamcinolone (KENALOG) 0.1 % cream   No No   Sig: Apply topically 2 (two) times a day   venlafaxine (EFFEXOR-XR) 150 mg 24 hr capsule   No No   Sig: TAKE 1 CAPSULE BY MOUTH EVERY DAY      Facility-Administered Medications: None       Past Medical History:   Diagnosis Date   • Anxiety    • Arthritis    • Asthma    • Claustrophobia    • CPAP (continuous positive airway pressure) dependence    • Depression    • Diabetes mellitus (HCC)    • Disease of thyroid gland     hypo   • GERD (gastroesophageal reflux disease)    • Headache    • History of COVID-19    • Hyperlipemia    • Kidney stone    • Kidney stones    • Major depressive disorder    • Motion sickness    • Risk for falls    • Sleep apnea    • Use of cane as ambulatory aid    • Wears glasses    • Wears partial dentures     upper partial       Past Surgical History:   Procedure Laterality Date   • ADENOIDECTOMY     • COLONOSCOPY     • DILATION AND CURETTAGE OF UTERUS     • ND ARTHRS KNE SURG W/MENISCECTOMY MED/LAT W/SHVG Left 3/24/2022    Procedure: ARTHROSCOPY KNEE w/ partial medial menisectomy;  Surgeon: Teresa Barnard MD;  Location: [facility] OR;  Service: Orthopedics   • ND COLONOSCOPY FLX DX W/[facility] WHEN PFRMD N/A 3/15/2019    Procedure: COLONOSCOPY;  Surgeon: Ramy Benavides MD;  Location: [facility] GI LAB;   Service: Gastroenterology   • ROTATOR CUFF REPAIR Right    • SHOULDER SURGERY Left     impingement   • TONSILLECTOMY     • TUBAL LIGATION         Family History   Problem Relation Age of Onset   • ALS Mother    • Venous thrombosis Father    • Diabetes Father    • Other Family         cerebral embolism   • Diabetes Family    • Hypertension Family    • Kidney disease Family    • Breast cancer Neg Hx    • Colon cancer Neg Hx    • Ovarian cancer Neg Hx    • Uterine cancer Neg Hx      I have reviewed and agree with the history as documented. E-Cigarette/Vaping   • E-Cigarette Use Never User      E-Cigarette/Vaping Substances   • Nicotine No    • THC No    • CBD No    • Flavoring No    • Other No    • Unknown No      Social History     Tobacco Use   • Smoking status: Never   • Smokeless tobacco: Never   Vaping Use   • Vaping Use: Never used   Substance Use Topics   • Alcohol use: No   • Drug use: No       Review of Systems   Constitutional: Negative. Negative for chills, diaphoresis, fatigue and fever. HENT: Negative. Negative for congestion, rhinorrhea and sore throat. Eyes: Negative. Negative for discharge, redness and itching. Respiratory: Negative. Negative for apnea, cough, chest tightness, shortness of breath and wheezing. Cardiovascular: Negative for chest pain, palpitations and leg swelling. Gastrointestinal: Positive for abdominal pain, anorexia and constipation. Negative for abdominal distention, diarrhea, flatus, hematochezia, nausea and vomiting. Endocrine: Negative. Genitourinary: Negative. Negative for dysuria, flank pain, frequency and urgency. Musculoskeletal: Positive for back pain. Skin: Negative. Allergic/Immunologic: Negative. Neurological: Negative. Negative for dizziness, syncope, weakness, light-headedness, numbness and headaches. Hematological: Negative. All other systems reviewed and are negative. Physical Exam  Physical Exam  Vitals and nursing note reviewed. Constitutional:       General: She is awake. She is not in acute distress. Appearance: Normal appearance. She is well-developed and normal weight. She is not ill-appearing, toxic-appearing or diaphoretic. HENT:      Head: Normocephalic and atraumatic. Right Ear: External ear normal.      Left Ear: External ear normal.      Nose: Nose normal.      Mouth/Throat:      Mouth: Mucous membranes are moist.      Pharynx: No oropharyngeal exudate. Eyes:      General: No scleral icterus. Right eye: No discharge. Left eye: No discharge. Conjunctiva/sclera: Conjunctivae normal.   Neck:      Thyroid: No thyromegaly. Vascular: No JVD. Trachea: No tracheal deviation. Cardiovascular:      Rate and Rhythm: Normal rate and regular rhythm. Heart sounds: Normal heart sounds. No murmur heard. No friction rub. No gallop. Pulmonary:      Effort: Pulmonary effort is normal. No respiratory distress. Breath sounds: Normal breath sounds. No stridor. No wheezing, rhonchi or rales. Chest:      Chest wall: No tenderness. Abdominal:      General: Abdomen is flat. Bowel sounds are normal. There is no distension. Palpations: Abdomen is soft. There is no mass. Tenderness: There is abdominal tenderness in the right lower quadrant, suprapubic area and left lower quadrant. There is no guarding or rebound. Hernia: No hernia is present. Skin:     General: Skin is warm and dry. Coloration: Skin is not jaundiced or pale. Findings: No bruising, erythema, lesion or rash. Neurological:      General: No focal deficit present. Mental Status: She is alert and oriented to person, place, and time. Motor: No weakness or abnormal muscle tone. Deep Tendon Reflexes: Reflexes are normal and symmetric. Psychiatric:         Mood and Affect: Mood normal.         Behavior: Behavior is cooperative.          Vital Signs  ED Triage Vitals [07/31/23 1414] Temperature Pulse Respirations Blood Pressure SpO2   98.3 °F (36.8 °C) 71 19 148/70 98 %      Temp Source Heart Rate Source Patient Position - Orthostatic VS BP Location FiO2 (%)   Oral Monitor -- -- --      Pain Score       --           Vitals:    07/31/23 1414   BP: 148/70   Pulse: 71         Visual Acuity      ED Medications  Medications   sodium phosphate-biphosphate (FLEET) enema 1 enema (has no administration in time range)   polyethylene glycol (GLYCOLAX) bowel prep 238 g (has no administration in time range)       Diagnostic Studies  Results Reviewed     None                 No orders to display              Procedures  Procedures         ED Course  ED Course as of 08/03/23 1603   Mon Jul 31, 2023   1647 Very little stool production after GoLytely and enema. will try soapsuds enema   1740 Patient only produced small amount of stool with treatment so far including soapsuds enema. Rectal exam without stool in the rectal vault   1825 No stool production. Still complaining of abdominal pain with tenderness in the lower abdomen. We will obtain blood work and CT scan to rule out possibility of bowel obstruction, appendicitis or diverticulitis, severe constipation   1846 CBC normal                                             Medical Decision Making  59-year-old female presents with constipation for 9 days. She has tried over-the-counter remedies without relief. She has had constipation in the past but never this long. She was seen here in April for this complaint and had a full work-up including CT of the abdomen which showed severe constipation and also a finding of calcifications in the gallbladder for which she was recommended an MRI of her abdomen and possible MRCP, however patient states she was unaware of this finding. She has not followed up with GI. She states when she was here she received an enema which worked. On review of the records she did get 1 fleets enema and GoLytely.   She does have some mild tenderness across the lower abdomen. She states she does not want another CT and is willing to try the enema and GoLytely again. Will reexamine after patient has bowel movement to see if there is any improvement. Risk  OTC drugs. Disposition  Final diagnoses:   None     ED Disposition     None      Follow-up Information    None         Patient's Medications   Discharge Prescriptions    No medications on file       No discharge procedures on file.     PDMP Review       Value Time User    PDMP Reviewed  Yes 3/24/2022  1:30 PM Fadi Paige PA-C          ED Provider  Electronically Signed by           Mt Underwood DO  08/03/23 5252

## 2023-07-31 NOTE — ED NOTES
Pt had medium bowel movement in commode - pt reports she feels more stool in her abdomen and still has abdominal pain.      Lisa Blankenship, KIM  07/31/23 0286

## 2023-08-01 VITALS
SYSTOLIC BLOOD PRESSURE: 125 MMHG | TEMPERATURE: 97.5 F | WEIGHT: 195.77 LBS | RESPIRATION RATE: 18 BRPM | OXYGEN SATURATION: 99 % | DIASTOLIC BLOOD PRESSURE: 66 MMHG | HEART RATE: 80 BPM | BODY MASS INDEX: 32.58 KG/M2

## 2023-08-01 PROBLEM — K59.00 CONSTIPATION: Status: ACTIVE | Noted: 2023-08-01

## 2023-08-01 PROBLEM — K80.20 CHOLELITHIASIS: Status: ACTIVE | Noted: 2023-08-01

## 2023-08-01 LAB
ANION GAP SERPL CALCULATED.3IONS-SCNC: 5 MMOL/L
BASOPHILS # BLD AUTO: 0.04 THOUSANDS/ÂΜL (ref 0–0.1)
BASOPHILS NFR BLD AUTO: 1 % (ref 0–1)
BUN SERPL-MCNC: 14 MG/DL (ref 5–25)
CALCIUM SERPL-MCNC: 8.4 MG/DL (ref 8.4–10.2)
CHLORIDE SERPL-SCNC: 103 MMOL/L (ref 96–108)
CO2 SERPL-SCNC: 26 MMOL/L (ref 21–32)
CREAT SERPL-MCNC: 1.2 MG/DL (ref 0.6–1.3)
EOSINOPHIL # BLD AUTO: 0.19 THOUSAND/ÂΜL (ref 0–0.61)
EOSINOPHIL NFR BLD AUTO: 3 % (ref 0–6)
ERYTHROCYTE [DISTWIDTH] IN BLOOD BY AUTOMATED COUNT: 13 % (ref 11.6–15.1)
FERRITIN SERPL-MCNC: 79 NG/ML (ref 11–307)
GFR SERPL CREATININE-BSD FRML MDRD: 48 ML/MIN/1.73SQ M
GLUCOSE SERPL-MCNC: 181 MG/DL (ref 65–140)
HCT VFR BLD AUTO: 35.8 % (ref 34.8–46.1)
HGB BLD-MCNC: 11.4 G/DL (ref 11.5–15.4)
IMM GRANULOCYTES # BLD AUTO: 0.02 THOUSAND/UL (ref 0–0.2)
IMM GRANULOCYTES NFR BLD AUTO: 0 % (ref 0–2)
IRON SATN MFR SERPL: 23 % (ref 15–50)
IRON SERPL-MCNC: 63 UG/DL (ref 50–170)
LYMPHOCYTES # BLD AUTO: 1.79 THOUSANDS/ÂΜL (ref 0.6–4.47)
LYMPHOCYTES NFR BLD AUTO: 26 % (ref 14–44)
MCH RBC QN AUTO: 29.5 PG (ref 26.8–34.3)
MCHC RBC AUTO-ENTMCNC: 31.8 G/DL (ref 31.4–37.4)
MCV RBC AUTO: 93 FL (ref 82–98)
MONOCYTES # BLD AUTO: 0.48 THOUSAND/ÂΜL (ref 0.17–1.22)
MONOCYTES NFR BLD AUTO: 7 % (ref 4–12)
NEUTROPHILS # BLD AUTO: 4.28 THOUSANDS/ÂΜL (ref 1.85–7.62)
NEUTS SEG NFR BLD AUTO: 63 % (ref 43–75)
NRBC BLD AUTO-RTO: 0 /100 WBCS
PLATELET # BLD AUTO: 215 THOUSANDS/UL (ref 149–390)
PMV BLD AUTO: 9.5 FL (ref 8.9–12.7)
POTASSIUM SERPL-SCNC: 4.4 MMOL/L (ref 3.5–5.3)
RBC # BLD AUTO: 3.87 MILLION/UL (ref 3.81–5.12)
SODIUM SERPL-SCNC: 134 MMOL/L (ref 135–147)
TIBC SERPL-MCNC: 274 UG/DL (ref 250–450)
TSH SERPL DL<=0.05 MIU/L-ACNC: 0.82 UIU/ML (ref 0.45–4.5)
WBC # BLD AUTO: 6.8 THOUSAND/UL (ref 4.31–10.16)

## 2023-08-01 PROCEDURE — 84443 ASSAY THYROID STIM HORMONE: CPT | Performed by: PHYSICIAN ASSISTANT

## 2023-08-01 PROCEDURE — 82728 ASSAY OF FERRITIN: CPT | Performed by: STUDENT IN AN ORGANIZED HEALTH CARE EDUCATION/TRAINING PROGRAM

## 2023-08-01 PROCEDURE — 85025 COMPLETE CBC W/AUTO DIFF WBC: CPT | Performed by: INTERNAL MEDICINE

## 2023-08-01 PROCEDURE — 99222 1ST HOSP IP/OBS MODERATE 55: CPT | Performed by: INTERNAL MEDICINE

## 2023-08-01 PROCEDURE — 83540 ASSAY OF IRON: CPT | Performed by: STUDENT IN AN ORGANIZED HEALTH CARE EDUCATION/TRAINING PROGRAM

## 2023-08-01 PROCEDURE — 80048 BASIC METABOLIC PNL TOTAL CA: CPT | Performed by: INTERNAL MEDICINE

## 2023-08-01 PROCEDURE — 99236 HOSP IP/OBS SAME DATE HI 85: CPT | Performed by: INTERNAL MEDICINE

## 2023-08-01 PROCEDURE — 83550 IRON BINDING TEST: CPT | Performed by: STUDENT IN AN ORGANIZED HEALTH CARE EDUCATION/TRAINING PROGRAM

## 2023-08-01 RX ORDER — SERTRALINE HYDROCHLORIDE 100 MG/1
200 TABLET, FILM COATED ORAL
Status: DISCONTINUED | OUTPATIENT
Start: 2023-08-01 | End: 2023-08-01 | Stop reason: HOSPADM

## 2023-08-01 RX ORDER — POLYETHYLENE GLYCOL 3350 17 G/17G
17 POWDER, FOR SOLUTION ORAL DAILY
Status: DISCONTINUED | OUTPATIENT
Start: 2023-08-01 | End: 2023-08-01

## 2023-08-01 RX ORDER — LORATADINE 10 MG/1
10 TABLET ORAL 2 TIMES DAILY
Status: DISCONTINUED | OUTPATIENT
Start: 2023-08-01 | End: 2023-08-01 | Stop reason: HOSPADM

## 2023-08-01 RX ORDER — ENOXAPARIN SODIUM 100 MG/ML
40 INJECTION SUBCUTANEOUS DAILY
Status: DISCONTINUED | OUTPATIENT
Start: 2023-08-01 | End: 2023-08-01 | Stop reason: HOSPADM

## 2023-08-01 RX ORDER — POLYETHYLENE GLYCOL 3350 17 G/17G
17 POWDER, FOR SOLUTION ORAL 2 TIMES DAILY
Status: DISCONTINUED | OUTPATIENT
Start: 2023-08-01 | End: 2023-08-01

## 2023-08-01 RX ORDER — LEVOTHYROXINE SODIUM 88 UG/1
88 TABLET ORAL
Status: DISCONTINUED | OUTPATIENT
Start: 2023-08-01 | End: 2023-08-01 | Stop reason: HOSPADM

## 2023-08-01 RX ORDER — SODIUM CHLORIDE, SODIUM GLUCONATE, SODIUM ACETATE, POTASSIUM CHLORIDE, MAGNESIUM CHLORIDE, SODIUM PHOSPHATE, DIBASIC, AND POTASSIUM PHOSPHATE .53; .5; .37; .037; .03; .012; .00082 G/100ML; G/100ML; G/100ML; G/100ML; G/100ML; G/100ML; G/100ML
125 INJECTION, SOLUTION INTRAVENOUS CONTINUOUS
Status: DISCONTINUED | OUTPATIENT
Start: 2023-08-01 | End: 2023-08-01

## 2023-08-01 RX ORDER — QUETIAPINE FUMARATE 200 MG/1
200 TABLET, FILM COATED ORAL
Status: DISCONTINUED | OUTPATIENT
Start: 2023-08-01 | End: 2023-08-01 | Stop reason: HOSPADM

## 2023-08-01 RX ORDER — DOCUSATE SODIUM 100 MG/1
100 CAPSULE, LIQUID FILLED ORAL 2 TIMES DAILY
Qty: 60 CAPSULE | Refills: 0 | Status: SHIPPED | OUTPATIENT
Start: 2023-08-01

## 2023-08-01 RX ORDER — ONDANSETRON 2 MG/ML
4 INJECTION INTRAMUSCULAR; INTRAVENOUS EVERY 6 HOURS PRN
Status: DISCONTINUED | OUTPATIENT
Start: 2023-08-01 | End: 2023-08-01 | Stop reason: HOSPADM

## 2023-08-01 RX ORDER — ATORVASTATIN CALCIUM 10 MG/1
10 TABLET, FILM COATED ORAL
Status: DISCONTINUED | OUTPATIENT
Start: 2023-08-01 | End: 2023-08-01 | Stop reason: HOSPADM

## 2023-08-01 RX ORDER — SENNOSIDES 8.6 MG
8.6 TABLET ORAL
Qty: 30 TABLET | Refills: 0 | Status: SHIPPED | OUTPATIENT
Start: 2023-08-01

## 2023-08-01 RX ORDER — SENNOSIDES 8.6 MG
1 TABLET ORAL
Status: DISCONTINUED | OUTPATIENT
Start: 2023-08-01 | End: 2023-08-01 | Stop reason: HOSPADM

## 2023-08-01 RX ORDER — DOCUSATE SODIUM 100 MG/1
100 CAPSULE, LIQUID FILLED ORAL 2 TIMES DAILY
Status: DISCONTINUED | OUTPATIENT
Start: 2023-08-01 | End: 2023-08-01 | Stop reason: HOSPADM

## 2023-08-01 RX ORDER — POLYETHYLENE GLYCOL 3350 17 G/17G
17 POWDER, FOR SOLUTION ORAL DAILY
Qty: 30 EACH | Refills: 0 | Status: SHIPPED | OUTPATIENT
Start: 2023-08-01

## 2023-08-01 RX ORDER — GABAPENTIN 100 MG/1
100 CAPSULE ORAL
Status: DISCONTINUED | OUTPATIENT
Start: 2023-08-01 | End: 2023-08-01 | Stop reason: HOSPADM

## 2023-08-01 RX ORDER — VENLAFAXINE HYDROCHLORIDE 150 MG/1
150 CAPSULE, EXTENDED RELEASE ORAL DAILY
Status: DISCONTINUED | OUTPATIENT
Start: 2023-08-01 | End: 2023-08-01 | Stop reason: HOSPADM

## 2023-08-01 RX ADMIN — DOCUSATE SODIUM 100 MG: 100 CAPSULE, LIQUID FILLED ORAL at 08:04

## 2023-08-01 RX ADMIN — SENNOSIDES 8.6 MG: 8.6 TABLET, FILM COATED ORAL at 02:10

## 2023-08-01 RX ADMIN — VENLAFAXINE HYDROCHLORIDE 150 MG: 150 CAPSULE, EXTENDED RELEASE ORAL at 08:04

## 2023-08-01 RX ADMIN — SODIUM CHLORIDE, SODIUM GLUCONATE, SODIUM ACETATE, POTASSIUM CHLORIDE, MAGNESIUM CHLORIDE, SODIUM PHOSPHATE, DIBASIC, AND POTASSIUM PHOSPHATE 125 ML/HR: .53; .5; .37; .037; .03; .012; .00082 INJECTION, SOLUTION INTRAVENOUS at 02:09

## 2023-08-01 RX ADMIN — ENOXAPARIN SODIUM 40 MG: 40 INJECTION SUBCUTANEOUS at 08:04

## 2023-08-01 RX ADMIN — LORATADINE 10 MG: 10 TABLET ORAL at 08:04

## 2023-08-01 RX ADMIN — POLYETHYLENE GLYCOL 3350 17 G: 17 POWDER, FOR SOLUTION ORAL at 08:04

## 2023-08-01 RX ADMIN — LEVOTHYROXINE SODIUM 88 MCG: 88 TABLET ORAL at 06:30

## 2023-08-01 RX ADMIN — POLYETHYLENE GLYCOL 3350, SODIUM SULFATE ANHYDROUS, SODIUM BICARBONATE, SODIUM CHLORIDE, POTASSIUM CHLORIDE 4000 ML: 236; 22.74; 6.74; 5.86; 2.97 POWDER, FOR SOLUTION ORAL at 14:34

## 2023-08-01 NOTE — ASSESSMENT & PLAN NOTE
· Continue home regimen of Seroquel 200 mg nightly, Zoloft 200 mg nightly and Effexor 150 mg daily  · Continue outpatient follow-up

## 2023-08-01 NOTE — PLAN OF CARE
Problem: Potential for Falls  Goal: Patient will remain free of falls  Description: INTERVENTIONS:  - Educate patient/family on patient safety including physical limitations  - Instruct patient to call for assistance with activity   - Consult OT/PT to assist with strengthening/mobility   - Keep Call bell within reach  - Keep bed low and locked with side rails adjusted as appropriate  - Keep care items and personal belongings within reach  - Initiate and maintain comfort rounds  - Make Fall Risk Sign visible to staff  - Apply yellow socks and bracelet for high fall risk patients  - Consider moving patient to room near nurses station  Outcome: Progressing     Problem: PAIN - ADULT  Goal: Verbalizes/displays adequate comfort level or baseline comfort level  Description: Interventions:  - Encourage patient to monitor pain and request assistance  - Assess pain using appropriate pain scale  - Administer analgesics based on type and severity of pain and evaluate response  - Implement non-pharmacological measures as appropriate and evaluate response  - Consider cultural and social influences on pain and pain management  - Notify physician/advanced practitioner if interventions unsuccessful or patient reports new pain  Outcome: Progressing     Problem: INFECTION - ADULT  Goal: Absence or prevention of progression during hospitalization  Description: INTERVENTIONS:  - Assess and monitor for signs and symptoms of infection  - Monitor lab/diagnostic results  - Monitor all insertion sites, i.e. indwelling lines, tubes, and drains  - Monitor endotracheal if appropriate and nasal secretions for changes in amount and color  - [place] appropriate cooling/warming therapies per order  - Administer medications as ordered  - Instruct and encourage patient and family to use good hand hygiene technique  - Identify and instruct in appropriate isolation precautions for identified infection/condition  Outcome: Progressing     Problem: SAFETY ADULT  Goal: Patient will remain free of falls  Description: INTERVENTIONS:  - Educate patient/family on patient safety including physical limitations  - Instruct patient to call for assistance with activity   - Consult OT/PT to assist with strengthening/mobility   - Keep Call bell within reach  - Keep bed low and locked with side rails adjusted as appropriate  - Keep care items and personal belongings within reach  - Initiate and maintain comfort rounds  - Make Fall Risk Sign visible to staff  - Apply yellow socks and bracelet for high fall risk patients  - Consider moving patient to room near nurses station  Outcome: Progressing     Problem: DISCHARGE PLANNING  Goal: Discharge to home or other facility with appropriate resources  Description: INTERVENTIONS:  - Identify barriers to discharge w/patient and caregiver  - Arrange for needed discharge resources and transportation as appropriate  - Identify discharge learning needs (meds, wound care, etc.)  - Arrange for interpretive services to assist at discharge as needed  - Refer to Case Management Department for coordinating discharge planning if the patient needs post-hospital services based on physician/advanced practitioner order or complex needs related to functional status, cognitive ability, or social support system  Outcome: Progressing     Problem: Knowledge Deficit  Goal: Patient/family/caregiver demonstrates understanding of disease process, treatment plan, medications, and discharge instructions  Description: Complete learning assessment and assess knowledge base.   Interventions:  - Provide teaching at level of understanding  - Provide teaching via preferred learning methods  Outcome: Progressing     Problem: GASTROINTESTINAL - ADULT  Goal: Minimal or absence of nausea and/or vomiting  Description: INTERVENTIONS:  - Administer IV fluids if ordered to ensure adequate hydration  - Maintain NPO status until nausea and vomiting are resolved  - Nasogastric tube if ordered  - Administer ordered antiemetic medications as needed  - Provide nonpharmacologic comfort measures as appropriate  - Advance diet as tolerated, if ordered  - Consider nutrition services referral to assist patient with adequate nutrition and appropriate food choices  Outcome: Progressing  Goal: Maintains or returns to baseline bowel function  Description: INTERVENTIONS:  - Assess bowel function  - Encourage oral fluids to ensure adequate hydration  - Administer IV fluids if ordered to ensure adequate hydration  - Administer ordered medications as needed  - Encourage mobilization and activity  - Consider nutritional services referral to assist patient with adequate nutrition and appropriate food choices  Outcome: Progressing  Goal: Maintains adequate nutritional intake  Description: INTERVENTIONS:  - Monitor percentage of each meal consumed  - Identify factors contributing to decreased intake, treat as appropriate  - Assist with meals as needed  - Monitor I&O, weight, and lab values if indicated  - Obtain nutrition services referral as needed  Outcome: Progressing

## 2023-08-01 NOTE — ASSESSMENT & PLAN NOTE
· CAT scan on admission revealed gallstones without surrounding inflammation  · Patient does not have any signs or symptoms concerning for acute cholecystitis  · LFTs without significant abnormality

## 2023-08-01 NOTE — H&P
[facility]  H&P  Name: Landry Ruby 66 y.o. female I MRN: [id number]  Unit/Bed#: E5 -01 I Date of Admission: 7/31/2023   Date of Service: 8/1/2023 I Hospital Day: 1      Assessment/Plan   * Constipation  Assessment & Plan  W/failure of multiple enemas/miralax in ed as well as miralax/enemas at home w/no bm in 9 days. No obstipation. Ct a/p negative for obstruction but demonstrates large stool burden and incidental cholelithiasis  Start senokot qhs, miralax in am as well as milk of molasses enema, ivf  Consult gi    Cholelithiasis  Assessment & Plan  Incidental finding no pain or pericholecystic fluid to suggest symptomatic cholelithiasis/cholecystitis  Op surveillance w/pcp    Type 2 diabetes mellitus without complication, without long-term current use of insulin (HCC)  Assessment & Plan  Lab Results   Component Value Date    HGBA1C 7.8 (A) 03/23/2023       No results for input(s): "POCGLU" in the last 72 hours. Blood Sugar Average: Last 72 hrs:  niddm  Hold metformin    Hypothyroidism  Assessment & Plan  Check tsh  Continue levothyroxine    Anxiety  Assessment & Plan  Continue effexor/zoloft/seroquel. D/w pt seroquel in particular as well as ssri/snris have anticholinergic properties which may contribute to constipation issues         VTE Pharmacologic Prophylaxis:   Moderate Risk (Score 3-4) - Pharmacological DVT Prophylaxis Ordered: enoxaparin (Lovenox). Code Status: Level 1 - Full Code   Discussion with family:     Anticipated Length of Stay: Patient will be admitted on an observation basis with an anticipated length of stay of less than 2 midnights secondary to constipation.     Total Time Spent on Date of Encounter in care of patient: 40 minutes This time was spent on one or more of the following: performing physical exam; counseling and coordination of care; obtaining or reviewing history; documenting in the medical record; reviewing/ordering tests, medications or procedures; communicating with other healthcare professionals and discussing with patient's family/caregivers. Chief Complaint: constipation x 9d    History of Present Illness:  Vicky Chambers is a 66 y.o. female with a PMH of constipation, hypothyroidism, anxiety/depression dm who presents with constipation x 9d and bloating w/o obstipation. Pt had similar episode in 04/23. Notes she has trialed coffee prunes prune juice at home, stool softeners and enema. In ed she was given miralax 2 enemas w/only minimal stool output. Ct a/p was negative for obstruction or acute pathology save large stool burden. Admission was requested. Review of Systems:  Review of Systems   Gastrointestinal: Positive for abdominal pain (bloating), constipation and nausea. All other systems reviewed and are negative. Past Medical and Surgical History:   Past Medical History:   Diagnosis Date   • Anxiety    • Arthritis    • Asthma    • Claustrophobia    • CPAP (continuous positive airway pressure) dependence    • Depression    • Diabetes mellitus ([address])    • Disease of thyroid gland     hypo   • GERD (gastroesophageal reflux disease)    • Headache    • History of COVID-19    • Hyperlipemia    • Kidney stone    • Kidney stones    • Major depressive disorder    • Motion sickness    • Risk for falls    • Sleep apnea    • Use of cane as ambulatory aid    • Wears glasses    • Wears partial dentures     upper partial       Past Surgical History:   Procedure Laterality Date   • ADENOIDECTOMY     • COLONOSCOPY     • DILATION AND CURETTAGE OF UTERUS     • IN ARTHRS KNE SURG W/MENISCECTOMY MED/LAT W/SHVG Left 3/24/2022    Procedure: ARTHROSCOPY KNEE w/ partial medial menisectomy;  Surgeon: Rio Sanders MD;  Location: [facility] OR;  Service: Orthopedics   • IN COLONOSCOPY FLX DX W/COLLJ [facility] WHEN PFRMD N/A 3/15/2019    Procedure: COLONOSCOPY;  Surgeon: Sunitha Morton MD;  Location: [facility] GI LAB;   Service: Gastroenterology   • ROTATOR CUFF REPAIR Right    • SHOULDER SURGERY Left     impingement   • TONSILLECTOMY     • TUBAL LIGATION         Meds/Allergies:  Prior to Admission medications    Medication Sig Start Date End Date Taking?  Authorizing Provider   dulaglutide (Trulicity) 3 ZH/2.9LL injection Inject 0.5 mL (3 mg total) under the skin once a week 3/23/23  Yes Tommy Loser, DO   gabapentin (NEURONTIN) 100 mg capsule Take 1 capsule (100 mg total) by mouth daily at bedtime 10/25/19  Yes Tommy Loser, DO   levothyroxine 88 mcg tablet Take 1 tablet (88 mcg total) by mouth daily in the early morning 3/23/23  Yes Tommy Loser, DO   loratadine (CLARITIN) 10 mg tablet TAKE 1 TABLET BY MOUTH TWICE A DAY 3/22/23  Yes Tommy Loser, DO   metFORMIN (GLUCOPHAGE) 500 mg tablet TAKE 2 TABLETS BY MOUTH TWICE A DAY WITH FOOD 6/2/23  Yes Tommy Loser, DO   omeprazole (PriLOSEC) 20 mg delayed release capsule Take 1 capsule (20 mg total) by mouth daily before breakfast 3/23/23  Yes Tommy Loser, DO   QUEtiapine (SEROquel) 200 mg tablet Take 1 tablet (200 mg total) by mouth daily at bedtime 1/7/22  Yes Tommy Loser, DO   QUEtiapine (SEROquel) 25 mg tablet Take 25 mg by mouth 2 (two) times a day as needed 2/1/23  Yes Historical Provider, MD   rosuvastatin (CRESTOR) 5 mg tablet Take 1 tablet (5 mg total) by mouth daily 4/6/23  Yes Tommy Loser, DO   sertraline (ZOLOFT) 100 mg tablet TAKE 2 TABLETS (200 MG TOTAL) BY MOUTH DAILY AT BEDTIME 2/9/23  Yes Tommy Loser, DO   traZODone (DESYREL) 50 mg tablet TAKE 1 TABLET BY MOUTH DAILY AT BEDTIME 7/18/22  Yes Tommy Loser, DO   venlafaxine (EFFEXOR-XR) 150 mg 24 hr capsule TAKE 1 CAPSULE BY MOUTH EVERY DAY 7/18/22  Yes Tommy Loser, DO   acetaminophen (TYLENOL) 325 mg tablet Take 650 mg by mouth every 6 (six) hours as needed for mild pain  Patient not taking: Reported on 7/31/2023    Historical Provider, MD   albuterol (2.5 mg/3 mL) 0.083 % nebulizer solution Take 3 mL (2.5 mg total) by nebulization every 4 (four) hours as needed for wheezing  Patient not taking: Reported on 7/31/2023 1/7/22   Leidy Srinivasan DO   albuterol (Proventil HFA) 90 mcg/act inhaler Inhale 1-2 puffs every 4 (four) hours as needed for wheezing  Patient not taking: Reported on 7/31/2023 1/7/22   Leidy Srinivasan DO   benztropine (COGENTIN) 0.5 mg tablet Take 1 tablet (0.5 mg total) by mouth daily  Patient not taking: Reported on 7/31/2023 1/7/22   Leidy Srinivasan DO   busPIRone (BUSPAR) 10 mg tablet Take 1 tablet (10 mg total) by mouth in the morning  Patient not taking: Reported on 7/31/2023 1/7/22   Leidy Srinivasan DO   Flovent  MCG/ACT inhaler INHALE 2 PUFFS BY MOUTH 2 TIMES A DAY RINSE MOUTH AFTER USE. Patient not taking: Reported on 7/31/2023 2/16/23   Leidy Srinivasan DO   fluticasone [facility]) 50 mcg/act nasal spray 1 spray into each nostril daily  Patient not taking: Reported on 7/31/2023 3/23/23   Leidy Srinivasan DO   gabapentin (NEURONTIN) 300 mg capsule Take 300 mg by mouth every evening  Patient not taking: Reported on 4/6/2023 3/29/23   Historical Provider, MD   lisinopril (ZESTRIL) 10 mg tablet Take 1 tablet (10 mg total) by mouth daily  Patient not taking: Reported on 7/31/2023 4/6/23   Leidy Srinivasan DO   methocarbamol (ROBAXIN) 750 mg tablet 1/2 to 1 tablet every 6-8 hours or hs prn for muscle pain, spasms. Home use only. Patient not taking: Reported on 7/31/2023 2/24/23   Dalton Luna PA-C   methylPREDNISolone 4 MG tablet therapy pack Use as directed on package 7/17/23   TERELL Larry   polyethylene glycol (GOLYTELY) 4000 mL solution Take 1,000 mL by mouth once for 1 dose 4/17/23 4/17/23  Janeece Robby Lenka, PA-C   triamcinolone (KENALOG) 0.1 % cream Apply topically 2 (two) times a day 5/30/23   TERELL Mccormack     I have reviewed home medications with patient personally. Allergies:    Allergies   Allergen Reactions   • Darvon [Propoxyphene] Dizziness   • Fluoxetine Other (See Comments)     suicidal   • Meclizine Dizziness   • Morphine And Related Nausea Only   • Oxycodone-Acetaminophen Other (See Comments) and Tachycardia     The whole house was moving   • [address] [Oxycodone] Other (See Comments) and Tachycardia     The whole house was moving       Social History:  Marital Status: Single   Occupation:   Patient Pre-hospital Living Situation:   Patient Pre-hospital Level of Mobility:   Patient Pre-hospital Diet Restrictions:   Substance Use History:   Social History     Substance and Sexual Activity   Alcohol Use No     Social History     Tobacco Use   Smoking Status Never   Smokeless Tobacco Never     Social History     Substance and Sexual Activity   Drug Use No       Family History:  Family History   Problem Relation Age of Onset   • ALS Mother    • Venous thrombosis Father    • Diabetes Father    • Other Family         cerebral embolism   • Diabetes Family    • Hypertension Family    • Kidney disease Family    • Breast cancer Neg Hx    • Colon cancer Neg Hx    • Ovarian cancer Neg Hx    • Uterine cancer Neg Hx        Physical Exam:     Vitals:   Blood Pressure: 119/87 (07/31/23 2259)  Pulse: 83 (07/31/23 2259)  Temperature: 98.1 °F (36.7 °C) (07/31/23 2259)  Temp Source: Oral (07/31/23 1414)  Respirations: 18 (07/31/23 2259)  Weight - Scale: 88.8 kg (195 lb 12.3 oz) (07/31/23 1414)  SpO2: 96 % (07/31/23 2259)    Physical Exam  Vitals reviewed. Constitutional:       General: She is not in acute distress. Appearance: She is obese. She is not ill-appearing, toxic-appearing or diaphoretic. HENT:      Head: Normocephalic and atraumatic. Right Ear: External ear normal.      Left Ear: External ear normal.      Nose: Nose normal.   Eyes:      Extraocular Movements: Extraocular movements intact. Cardiovascular:      Rate and Rhythm: Normal rate and regular rhythm. Heart sounds: No murmur heard. No friction rub. No gallop. Pulmonary:      Effort: No respiratory distress. Breath sounds:  No wheezing, rhonchi or rales. Abdominal:      General: Bowel sounds are normal. There is no distension. Palpations: Abdomen is soft. There is no mass. Tenderness: There is abdominal tenderness (mild ttp). There is no guarding or rebound. Hernia: No hernia is present. Musculoskeletal:      Right lower leg: No edema. Left lower leg: No edema. Skin:     General: Skin is warm and dry. Neurological:      Mental Status: She is alert. Mental status is at baseline. Psychiatric:         Mood and Affect: Mood normal.          Additional Data:     Lab Results:  Results from last 7 days   Lab Units 07/31/23  1829   WBC Thousand/uL 8.24   HEMOGLOBIN g/dL 12.5   HEMATOCRIT % 39.3   PLATELETS Thousands/uL 230   NEUTROS PCT % 70   LYMPHS PCT % 20   MONOS PCT % 7   EOS PCT % 2     Results from last 7 days   Lab Units 07/31/23  1829   SODIUM mmol/L 135   POTASSIUM mmol/L 4.5   CHLORIDE mmol/L 104   CO2 mmol/L 23   BUN mg/dL 20   CREATININE mg/dL 1.46*   ANION GAP mmol/L 8   CALCIUM mg/dL 9.5   ALBUMIN g/dL 4.3   TOTAL BILIRUBIN mg/dL 0.28   ALK PHOS U/L 93   ALT U/L 17   AST U/L 17   GLUCOSE RANDOM mg/dL 156*                       Lines/Drains:  Invasive Devices     Peripheral Intravenous Line  Duration           Peripheral IV 07/31/23 Left Antecubital <1 day                    Imaging: Reviewed radiology reports from this admission including: abdominal/pelvic CT and CT abdomen film personally reviewed there is significant stool burden and right worse than left large intestine as well as throughout small intestine. There is still notable amount of stool and the left hemicolon which likely be unable to repeat enema  CT abdomen pelvis with contrast   Final Result by Elizabeth Vaughn MD (07/31 2034)      Large amount of stool throughout the colon similar to prior study in keeping with constipation. Gallstones without surrounding inflammation.             Workstation performed: [id number]             EKG and Other Studies Reviewed on Admission:   · EKG:     ** Please Note: This note has been constructed using a voice recognition system.  **

## 2023-08-01 NOTE — ASSESSMENT & PLAN NOTE
W/failure of multiple enemas/miralax in ed as well as miralax/enemas at home w/no bm in 9 days. No obstipation.   Ct a/p negative for obstruction but demonstrates large stool burden and incidental cholelithiasis  Start senokot qhs, miralax in am as well as milk of molasses enema, ivf  Consult gi

## 2023-08-01 NOTE — ASSESSMENT & PLAN NOTE
Continue effexor/zoloft/seroquel.   D/w pt seroquel in particular as well as ssri/snris have anticholinergic properties which may contribute to constipation issues

## 2023-08-01 NOTE — PLAN OF CARE
Problem: Potential for Falls  Goal: Patient will remain free of falls  Description: INTERVENTIONS:  - Educate patient/family on patient safety including physical limitations  - Instruct patient to call for assistance with activity   - Consult OT/PT to assist with strengthening/mobility   - Keep Call bell within reach  - Keep bed low and locked with side rails adjusted as appropriate  - Keep care items and personal belongings within reach  - Initiate and maintain comfort rounds  - Make Fall Risk Sign visible to staff  - Offer Toileting every  Hours, in advance of need  - Initiate/Maintain alarm  - Obtain necessary fall risk management equipment: - Apply yellow socks and bracelet for high fall risk patients  - Consider moving patient to room near nurses station  Outcome: Progressing     Problem: PAIN - ADULT  Goal: Verbalizes/displays adequate comfort level or baseline comfort level  Description: Interventions:  - Encourage patient to monitor pain and request assistance  - Assess pain using appropriate pain scale  - Administer analgesics based on type and severity of pain and evaluate response  - Implement non-pharmacological measures as appropriate and evaluate response  - Consider cultural and social influences on pain and pain management  - Notify physician/advanced practitioner if interventions unsuccessful or patient reports new pain  Outcome: Progressing     Problem: INFECTION - ADULT  Goal: Absence or prevention of progression during hospitalization  Description: INTERVENTIONS:  - Assess and monitor for signs and symptoms of infection  - Monitor lab/diagnostic results  - Monitor all insertion sites, i.e. indwelling lines, tubes, and drains  - Monitor endotracheal if appropriate and nasal secretions for changes in amount and color  - [place] appropriate cooling/warming therapies per order  - Administer medications as ordered  - Instruct and encourage patient and family to use good hand hygiene technique  - Identify and instruct in appropriate isolation precautions for identified infection/condition  Outcome: Progressing  Goal: Absence of fever/infection during neutropenic period  Description: INTERVENTIONS:  - Monitor WBC    Outcome: Progressing     Problem: SAFETY ADULT  Goal: Patient will remain free of falls  Description: INTERVENTIONS:  - Educate patient/family on patient safety including physical limitations  - Instruct patient to call for assistance with activity   - Consult OT/PT to assist with strengthening/mobility   - Keep Call bell within reach  - Keep bed low and locked with side rails adjusted as appropriate  - Keep care items and personal belongings within reach  - Initiate and maintain comfort rounds  - Make Fall Risk Sign visible to staff  - Offer Toileting every  Hours, in advance of need  - Initiate/Maintain alarm  - Obtain necessary fall risk management equipment:   - Apply yellow socks and bracelet for high fall risk patients  - Consider moving patient to room near nurses station  Outcome: Progressing  Goal: Maintain or return to baseline ADL function  Description: INTERVENTIONS:  -  Assess patient's ability to carry out ADLs; assess patient's baseline for ADL function and identify physical deficits which impact ability to perform ADLs (bathing, care of mouth/teeth, toileting, grooming, dressing, etc.)  - Assess/evaluate cause of self-care deficits   - Assess range of motion  - Assess patient's mobility; develop plan if impaired  - Assess patient's need for assistive devices and provide as appropriate  - Encourage maximum independence but intervene and supervise when necessary  - Involve family in performance of ADLs  - Assess for home care needs following discharge   - Consider OT consult to assist with ADL evaluation and planning for discharge  - Provide patient education as appropriate  Outcome: Progressing  Goal: Maintains/Returns to pre admission functional level  Description: INTERVENTIONS:  - Perform BMAT or MOVE assessment daily.   - Set and communicate daily mobility goal to care team and patient/family/caregiver. - Collaborate with rehabilitation services on mobility goals if consulted  - Perform Range of Motion  times a day. - Reposition patient every hours. - Dangle patient  times a day  - Stand patient times a day  - Ambulate patient  times a day  - Out of bed to chair  times a day   - Out of bed for meals times a day  - Out of bed for toileting  - Record patient progress and toleration of activity level   Outcome: Progressing     Problem: DISCHARGE PLANNING  Goal: Discharge to home or other facility with appropriate resources  Description: INTERVENTIONS:  - Identify barriers to discharge w/patient and caregiver  - Arrange for needed discharge resources and transportation as appropriate  - Identify discharge learning needs (meds, wound care, etc.)  - Arrange for interpretive services to assist at discharge as needed  - Refer to Case Management Department for coordinating discharge planning if the patient needs post-hospital services based on physician/advanced practitioner order or complex needs related to functional status, cognitive ability, or social support system  Outcome: Progressing     Problem: Knowledge Deficit  Goal: Patient/family/caregiver demonstrates understanding of disease process, treatment plan, medications, and discharge instructions  Description: Complete learning assessment and assess knowledge base.   Interventions:  - Provide teaching at level of understanding  - Provide teaching via preferred learning methods  Outcome: Progressing     Problem: GASTROINTESTINAL - ADULT  Goal: Minimal or absence of nausea and/or vomiting  Description: INTERVENTIONS:  - Administer IV fluids if ordered to ensure adequate hydration  - Maintain NPO status until nausea and vomiting are resolved  - Nasogastric tube if ordered  - Administer ordered antiemetic medications as needed  - Provide nonpharmacologic comfort measures as appropriate  - Advance diet as tolerated, if ordered  - Consider nutrition services referral to assist patient with adequate nutrition and appropriate food choices  Outcome: Progressing  Goal: Maintains or returns to baseline bowel function  Description: INTERVENTIONS:  - Assess bowel function  - Encourage oral fluids to ensure adequate hydration  - Administer IV fluids if ordered to ensure adequate hydration  - Administer ordered medications as needed  - Encourage mobilization and activity  - Consider nutritional services referral to assist patient with adequate nutrition and appropriate food choices  Outcome: Progressing  Goal: Maintains adequate nutritional intake  Description: INTERVENTIONS:  - Monitor percentage of each meal consumed  - Identify factors contributing to decreased intake, treat as appropriate  - Assist with meals as needed  - Monitor I&O, weight, and lab values if indicated  - Obtain nutrition services referral as needed  Outcome: Progressing  Goal: Oral mucous membranes remain intact  Description: INTERVENTIONS  - Assess oral mucosa and hygiene practices  - Implement preventative oral hygiene regimen  - Implement oral medicated treatments as ordered  - Initiate Nutrition services referral as needed  Outcome: Progressing

## 2023-08-01 NOTE — ASSESSMENT & PLAN NOTE
Patient is a 21-year-old female with past medical history significant for hypothyroidism, hypertension, diabetes, and GERD who presented to the ER with constipation, that had failed outpatient treatment    · Patient related her last bowel movement was 9 days prior to admission  · In the ER she was given Fleet enema without relief, and referred for admission  · CT scan revealed large amount of stool throughout the colon, consistent with constipation. No obstruction or impaction  · On admission she was placed on Colace, senna, MiraLAX, and Colace  · She was seen by GI and given GoLytely  · Has had subsequent profuse bowel movements  · Currently tolerating p.o. Discussed with GI team: Patient may be discharged home.   Recommend she continue Colace, MiraLAX, and senna regimen as an outpatient  · Outpatient GI follow-up, and screening colonoscopy

## 2023-08-01 NOTE — UTILIZATION REVIEW
Initial Clinical Review    Admission: Date/Time/Statement:   Admission Orders (From admission, onward)     Ordered        07/31/23 2225  INPATIENT ADMISSION  Once                      Orders Placed This Encounter   Procedures   • INPATIENT ADMISSION     Standing Status:   Standing     Number of Occurrences:   1     Order Specific Question:   Level of Care     Answer:   Med Surg [16]     Order Specific Question:   Estimated length of stay     Answer:   More than 2 Midnights     Order Specific Question:   Certification     Answer:   I certify that inpatient services are medically necessary for this patient for a duration of greater than two midnights. See H&P and MD Progress Notes for additional information about the patient's course of treatment. ED Arrival Information     Expected   -    Arrival   7/31/2023 14:08    Acuity   Urgent            Means of arrival   Walk-In    Escorted by   Self    Service   Hospitalist    Admission type   Emergency            Arrival complaint   Abdominal Pain           Chief Complaint   Patient presents with   • Abdominal Pain     Lower abdominal pain x 3 days. Last bowel movement 9 days ago. Has taken enemas and stool softeners with no relief. Denies nausea and vomiting        Initial Presentation: 66 y.o. female to ED via walk-in from home  Present to ED with  constipation x 9d and bloating w/o obstipation. Notes she has trialed coffee prunes prune juice at home, stool softeners and enema. In ed she was given miralax 2 enemas w/only minimal stool output. PMHX: constipation, hypothyroidism, anxiety/depression , DM  Admitted to [facility] with DX: Constipation  on exam: abdominal tenderness (mild ttp).   Pain 5/10; Cr 1.46  CT a/p negative for obstruction but demonstrates large stool burden and incidental cholelithiasis  PLAN: Start senokot qhs, miralax in am as well as milk of molasses enema, ivf; GI consult; pain / nausea control      Date: 8/1/23    Day 2  GI and given GoLytely - Has had subsequent profuse bowel movements  Plan: Advancing diet     GI CONSULT:  She reports a longstanding history of infrequent bowel movements, usually every 3 to 5 days. She does not use any regular bowel regimen for constipation management. Prior to admission she had had 9 days in between bowel movements and reported lower abdominal bloating and discomfort. She denied rectal bleeding. She reports a 20 pound unintentional weight loss. On presentation she underwent CT abdomen pelvis showing cholelithiasis large amount of stool throughout the colon. Labs showed mild anemia with hemoglobin 11.4, MCV 93. On exam she is an older female lying in bed no acute distress; Abdomen soft nontender nondistended   Constipation: After drinking MiraLAX bowel prep she had multiple bowel movements.   Continue MiraLAX 1-2 times a day and follow-up in the office to discuss long-term constipation management      ED Triage Vitals   Temperature Pulse Respirations Blood Pressure SpO2   07/31/23 1414 07/31/23 1414 07/31/23 1414 07/31/23 1414 07/31/23 1414   98.3 °F (36.8 °C) 71 19 148/70 98 %      Temp Source Heart Rate Source Patient Position - Orthostatic VS BP Location FiO2 (%)   07/31/23 1414 07/31/23 1414 07/31/23 1918 07/31/23 1918 --   Oral Monitor Lying Right arm       Pain Score       07/31/23 2322       5          Wt Readings from Last 1 Encounters:   07/31/23 88.8 kg (195 lb 12.3 oz)     Additional Vital Signs:   Date/Time Temp Pulse Resp BP MAP (mmHg) SpO2 O2 Device Patient Position - Orthostatic VS   08/01/23 06:58:33 97.5 °F (36.4 °C) 80 18 125/66 86 99 % None (Room air) Lying   07/31/23 22:59:57 98.1 °F (36.7 °C) 83 18 119/87 98 96 % -- --   07/31/23 2245 -- 88 18 134/65 93 96 % None (Room air) Lying   07/31/23 2135 -- 98 18 131/62 89 97 % None (Room air) Lying   07/31/23 1918 -- 92 18 155/66 95 94 % None (Room air) Lying   07/31/23 1715 -- 92 17 113/59 81 97 % None (Room air) --   07/31/23 1414 98.3 °F (36.8 °C) 71 19 148/70 -- 98 % -- --           EKG: none obtained    Pertinent Labs/Diagnostic Test Results:   CT abdomen pelvis with contrast   Final Result by Rehana Cabrera MD (07/31 2034)      Large amount of stool throughout the colon similar to prior study in keeping with constipation. Gallstones without surrounding inflammation.             Workstation performed: [id number]               Results from last 7 days   Lab Units 08/01/23  0823 07/31/23  1829   WBC Thousand/uL 6.80 8.24   HEMOGLOBIN g/dL 11.4* 12.5   HEMATOCRIT % 35.8 39.3   PLATELETS Thousands/uL 215 230   NEUTROS ABS Thousands/µL 4.28 5.72         Results from last 7 days   Lab Units 08/01/23  0823 07/31/23  1829   SODIUM mmol/L 134* 135   POTASSIUM mmol/L 4.4 4.5   CHLORIDE mmol/L 103 104   CO2 mmol/L 26 23   ANION GAP mmol/L 5 8   BUN mg/dL 14 20   CREATININE mg/dL 1.20 1.46*   EGFR ml/min/1.73sq m 48 38   CALCIUM mg/dL 8.4 9.5     Results from last 7 days   Lab Units 07/31/23  1829   AST U/L 17   ALT U/L 17   ALK PHOS U/L 93   TOTAL PROTEIN g/dL 7.1   ALBUMIN g/dL 4.3   TOTAL BILIRUBIN mg/dL 0.28         Results from last 7 days   Lab Units 08/01/23  0823 07/31/23  1829   GLUCOSE RANDOM mg/dL 181* 156*       Results from last 7 days   Lab Units 08/01/23  0627   TSH 3RD GENERATON uIU/mL 0.819       Results from last 7 days   Lab Units 07/27/23  1837   CLARITY UA  clear   COLOR UA  yellow   GLUCOSE UA  negative   KETONES UA  negative   BLOOD UA  trace   PROTEIN UA  trace   NITRITE UA  negative   BILIRUBIN UA POC  negative   UROBILINOGEN UA  0.2   LEUKOCYTES UA  negative       Results from last 7 days   Lab Units 07/27/23  1903   URINE CULTURE  No Growth <1000 cfu/mL       ED Treatment:   Medication Administration from 07/31/2023 1408 to 07/31/2023 2253       Date/Time Order Dose Route Action     07/31/2023 1510 EDT sodium phosphate-biphosphate (FLEET) enema 1 enema 1 enema Rectal Given     07/31/2023 1555 EDT polyethylene glycol (GLYCOLAX) bowel prep 238 g 238 g Oral Given     07/31/2023 1745 EDT ondansetron (ZOFRAN-ODT) dispersible tablet 4 mg 4 mg Oral Given     07/31/2023 1830 EDT sodium chloride 0.9 % bolus 1,000 mL 1,000 mL Intravenous New Bag     07/31/2023 1921 EDT iohexol (OMNIPAQUE) 350 MG/ML injection (SINGLE-DOSE) 100 mL 100 mL Intravenous Given          Present on Admission:  • Hypothyroidism  • Type 2 diabetes mellitus without complication, without long-term current use of insulin (HCC)  • Anxiety      Admitting Diagnosis: Abdominal pain [R10.9]  Intractable constipation [K59.00]     Age/Sex: 66 y.o. female     Admission Orders: SCDs; regular diet    Scheduled Medications:  atorvastatin, 10 mg, Oral, Daily With Dinner  docusate sodium, 100 mg, Oral, BID  enoxaparin, 40 mg, Subcutaneous, Daily  gabapentin, 100 mg, Oral, HS  levothyroxine, 88 mcg, Oral, Early Morning  loratadine, 10 mg, Oral, BID  polyethylene glycol, 4,000 mL, Oral, Once  QUEtiapine, 200 mg, Oral, HS  senna, 1 tablet, Oral, HS  sertraline, 200 mg, Oral, HS  venlafaxine, 150 mg, Oral, Daily      Continuous IV Infusions: multi-electrolyte (PLASMALYTE-A/ISOLYTE-S PH 7.4) IV solution  Rate: 125 mL/hr Dose: 125 mL/hr     PRN Meds:  ondansetron, 4 mg, Intravenous, Q6H PRN        IP CONSULT TO GASTROENTEROLOGY    Network Utilization Review Department  ATTENTION: Please call with any questions or concerns to [phone] and carefully listen to the prompts so that you are directed to the right person. All voicemails are confidential.  Manav Loco all requests for admission clinical reviews, approved or denied determinations and any other requests to dedicated fax number below belonging to the campus where the patient is receiving treatment.  List of dedicated fax numbers for the Facilities:  [place] DENIALS (Administrative/Medical Necessity) [phone]   [facility] (Maternity/NICU/Pediatrics) [phone]   [address] [place] [address] Drive [phone] [facility] 1000 [facility] [phone]   [address] [place] [address] Street [address] [facility] [address] [place] [address] [address] [phone]

## 2023-08-01 NOTE — ASSESSMENT & PLAN NOTE
Incidental finding no pain or pericholecystic fluid to suggest symptomatic cholelithiasis/cholecystitis  Op surveillance w/pcp

## 2023-08-01 NOTE — ED NOTES
Pt rang call bell to inquire about CT results, RN informed pt they were not resulted yet. Pt asked to speak to provider, provider made aware.      Aj Carrasco RN  07/31/23 2043

## 2023-08-01 NOTE — ED NOTES
Pt updated with CT results as provider has not seen pt yet to answer questions.   Pt also provided with water, crackers, and pretzels at this time per pt request.      Mercer Duane, RN  07/31/23 2150       Mercer Duane, RN  07/31/23 8367

## 2023-08-01 NOTE — ASSESSMENT & PLAN NOTE
Lab Results   Component Value Date    HGBA1C 7.8 (A) 03/23/2023       No results for input(s): "POCGLU" in the last 72 hours.     Blood Sugar Average: Last 72 hrs:  niddm  Hold metformin

## 2023-08-01 NOTE — ASSESSMENT & PLAN NOTE
· Continue home lisinopril 10 mg daily  · Continue outpatient follow-up with PCP  · Blood pressure adequately controlled

## 2023-08-01 NOTE — DISCHARGE SUMMARY
[facility]  Discharge- Madyson Garcia 1960, 66 y.o. female MRN: [id number]  Unit/Bed#: E5 -01 Encounter: [id number]  Primary Care Provider: Hussain Recio DO   Date and time admitted to hospital: 7/31/2023  2:34 PM          Admission Date: 7/31/2023       Discharge Date: 8/1/2023        Primary Diagnoses  Principal Problem:    Constipation  Active Problems:    Cholelithiasis    Anxiety    Asthma    Hypothyroidism    Type 2 diabetes mellitus without complication, without long-term current use of insulin ([address])    Primary hypertension  Resolved Problems:    * No resolved hospital problems. [facility] course by problem:   * Constipation  Assessment & Plan  Patient is a 37-year-old female with past medical history significant for hypothyroidism, hypertension, diabetes, and GERD who presented to the ER with constipation, that had failed outpatient treatment    · Patient related her last bowel movement was 9 days prior to admission  · In the ER she was given Fleet enema without relief, and referred for admission  · CT scan revealed large amount of stool throughout the colon, consistent with constipation. No obstruction or impaction  · On admission she was placed on Colace, senna, MiraLAX, and Colace  · She was seen by GI and given GoLytely  · Has had subsequent profuse bowel movements: Patient relates she is passing a bowel movement approximately every 10 minutes, and passing several "cups" of liquid brown stool each time  · Currently tolerating p.o. Discussed with GI team: Patient may be discharged home. Recommend she continue Colace, MiraLAX, and senna regimen as an outpatient  · Directed patient to be sure to drink greater than 64 ounces of water to prevent dehydration[de-identified] Due to profuse bowel movements we will hold additional laxative today.   Patient may titrate her regimen as an outpatient over the next week  · Outpatient GI follow-up, and screening colonoscopy    Cholelithiasis  Assessment & Plan  · CAT scan on admission revealed gallstones without surrounding inflammation  · Patient does not have any signs or symptoms concerning for acute cholecystitis  · LFTs without significant abnormality    Primary hypertension  Assessment & Plan  · Was prescribed lisinopril 10 mg daily: States she has not yet started it, and her blood pressure is being monitored as an outpatient  · Continue outpatient follow-up with PCP  · Blood pressure adequately controlled    Type 2 diabetes mellitus without complication, without long-term current use of insulin ([address])  Assessment & Plan  Lab Results   Component Value Date    HGBA1C 7.8 (A) 03/23/2023       No results for input(s): "POCGLU" in the last 72 hours. Blood Sugar Average: Last 72 hrs:  Patient will resume her home regimen of Trulicity and metformin as an outpatient  Continue diabetic diet, and Accu-Cheks  Continue follow-up with PCP    Hypothyroidism  Assessment & Plan  · TSH therapeutic  · Continue home dose of levothyroxine 88 mcg daily  · Continue follow-up with PCP    Asthma  Assessment & Plan  · Without acute exacerbation  · Continue home metered-dose inhalers and nebulizers as needed    Anxiety  Assessment & Plan  · Continue home regimen of Seroquel 200 mg nightly, Zoloft 200 mg nightly and Effexor 150 mg daily  · Continue outpatient follow-up        Service:  [facility], [facility]. Consulting Providers   GI:     Procedures Performed   none    Hospital Studies:  7/31: CT abdomen/pelvis:  Large amount of stool throughout the colon similar to prior study in keeping with constipation.   Gallstones without surrounding inflammation    Results from last 7 days   Lab Units 08/01/23  0823 07/31/23  1829   WBC Thousand/uL 6.80 8.24   HEMOGLOBIN g/dL 11.4* 12.5   HEMATOCRIT % 35.8 39.3   PLATELETS Thousands/uL 215 230     Results from last 7 days   Lab Units 08/01/23  5844 07/31/23  1829   POTASSIUM mmol/L 4.4 4.5   CHLORIDE mmol/L 103 104   CO2 mmol/L 26 23   BUN mg/dL 14 20   CREATININE mg/dL 1.20 1.46*   CALCIUM mg/dL 8.4 9.5       History and Physical Exam:  Please refer to the Admission H&P note    Discharge Condition: Improved  Discharge Disposition: Home/Self Care    Discharge Note and Physical Exam:   Patient lates she has been passing copious amounts of stool throughout today. She states she is passing her bowels approximately every 10 minutes, and each time she passes her bowels she passes several cups worth at a time. She notes it is liquid brown stool. She notes her abdominal discomfort has completely resolved. She denies any nausea or vomiting. She is tolerating her diet without any difficulties    Denies any pain or discomfort anywhere else. She notes she is ambulating without any dizziness or lightheadedness. She denies any chest pain. Denies any shortness of breath or cough. Denies any other complaints. Vitals:    08/01/23 0658   BP: 125/66   Pulse: 80   Resp: 18   Temp: 97.5 °F (36.4 °C)   SpO2: 99%     General:  Pleasant, non-tachypnic, non-dyspnic. Conversant  Heart: Regular rate and rhythm, S1S2 present. No murmur, rub or gallop. Lungs: Clear to auscultation bilaterally, no wheezing, rhonchi, or crackles. Good air movement. No accessory muscle use or respiratory distress. Abdomen: soft, non-tender, non-distended, NABS. No rebound or guarding. No mass or peritoneal signs. Extremities: no clubbing, cyanosis or edema. 2+ pedal pulses bilaterally. Neurologic: Alert. Fluent speech. Interactive. Smiling and joking. Strength globally intact  Skin: warm and dry. No petechiae, purpura or rash.       Discharge Medications   Please see Medical Reconciliation Discharge Form    Discharge Follow Up Appointments:   Maria L Silva DO: 1 week  Gi: Dr. Elicia Anderson: 2-4 week for outpt follow up and colonoscopy    Discharge  Statement   Total Time Spent today including physical exam, discussion with patient, and discharge arrangements/care = 38 minutes.     yesica pts nurse and PATRICK robert GI on call: Dr. Jennifer Lyn:  [facility]    This note has been constructed using a voice recognition system

## 2023-08-01 NOTE — ASSESSMENT & PLAN NOTE
Lab Results   Component Value Date    HGBA1C 7.8 (A) 03/23/2023       No results for input(s): "POCGLU" in the last 72 hours.     Blood Sugar Average: Last 72 hrs:  Patient will resume her home regimen of Trulicity and metformin as an outpatient  Continue diabetic diet, and Accu-Cheks  Continue follow-up with PCP

## 2023-08-01 NOTE — ASSESSMENT & PLAN NOTE
· TSH therapeutic  · Continue home dose of levothyroxine 88 mcg daily  · Continue follow-up with PCP

## 2023-08-02 ENCOUNTER — TELEPHONE (OUTPATIENT)
Dept: GASTROENTEROLOGY | Facility: MEDICAL CENTER | Age: 63
End: 2023-08-02

## 2023-08-02 ENCOUNTER — TRANSITIONAL CARE MANAGEMENT (OUTPATIENT)
Dept: FAMILY MEDICINE CLINIC | Facility: CLINIC | Age: 63
End: 2023-08-02

## 2023-08-02 DIAGNOSIS — Z71.89 COMPLEX CARE COORDINATION: Primary | ICD-10-CM

## 2023-08-02 NOTE — UTILIZATION REVIEW
NOTIFICATION OF ADMISSION DISCHARGE   This is a Notification of Discharge from [facility]. Please be advised that this patient has been discharge from our facility. Below you will find the admission and discharge date and time including the patient’s disposition. UTILIZATION REVIEW CONTACT:  Tom García MA  Utilization   Network Utilization Review Department  Phone: [phone] x carefully listen to the prompts. All voicemails are confidential.  Email: [email or web address]     ADMISSION INFORMATION  PRESENTATION DATE: 7/31/2023  2:34 PM  OBERVATION ADMISSION DATE:   INPATIENT ADMISSION DATE: 7/31/23 10:25 PM   DISCHARGE DATE: 8/1/2023  4:34 PM   DISPOSITION:Home/Self Care    IMPORTANT INFORMATION:  Send all requests for admission clinical reviews, approved or denied determinations and any other requests to dedicated fax number below belonging to the campus where the patient is receiving treatment.  List of dedicated fax numbers:  [place] DENIALS (Administrative/Medical Necessity) [phone] [address] [facility] (Maternity/NICU/Pediatrics) [phone]   [facility] [phone]   [facility] [phone] [address] [facility] [phone]   [address] [place] [address] [phone]   [facility] [phone]   [address] [place] [address] [address] [place] [address] [phone]   [address] [place] [address] [phone] [address] [facility] [phone]   [address] [facility] [address] [facility] [phone]

## 2023-08-02 NOTE — TELEPHONE ENCOUNTER
----- Message from Rachael Coreas sent at 8/2/2023  8:02 AM EDT -----  Regarding: FW: Follow up    ----- Message -----  From: Shiela Brewer DO  Sent: 8/1/2023   3:01 PM EDT  To: Abby Hercules MD; #  Subject: Follow up                                        Please schedule outpatient follow-up in the next 1 to 2 months to discuss management of constipation, repeat EGD and colonoscopy for weight loss.

## 2023-08-03 ENCOUNTER — PATIENT OUTREACH (OUTPATIENT)
Dept: FAMILY MEDICINE CLINIC | Facility: CLINIC | Age: 63
End: 2023-08-03

## 2023-08-03 ENCOUNTER — VBI (OUTPATIENT)
Dept: ADMINISTRATIVE | Facility: OTHER | Age: 63
End: 2023-08-03

## 2023-08-04 ENCOUNTER — VBI (OUTPATIENT)
Dept: ADMINISTRATIVE | Facility: OTHER | Age: 63
End: 2023-08-04

## 2023-08-10 ENCOUNTER — PATIENT OUTREACH (OUTPATIENT)
Dept: FAMILY MEDICINE CLINIC | Facility: CLINIC | Age: 63
End: 2023-08-10

## 2023-08-10 NOTE — LETTER
Date: 08/10/23    Dear Gabrielle Huffman,   My name is Brittanie Louis  I am a registered nurse care manager working with [employer]    I have not been able to reach you and would like to set a time that I can talk with you over the phone. My work is to help patients that have complex medical conditions get the care they need. This includes patients who may have been in the hospital or emergency room. I have enclosed my contact information below  for you. Please call me with any questions you may have. I look forward to  hearing from  you.   Sincerely,  Melisa Jeter RN  [phone]  Outpatient Care Manager

## 2023-08-11 ENCOUNTER — VBI (OUTPATIENT)
Dept: ADMINISTRATIVE | Facility: OTHER | Age: 63
End: 2023-08-11

## 2023-08-24 ENCOUNTER — PATIENT OUTREACH (OUTPATIENT)
Dept: FAMILY MEDICINE CLINIC | Facility: CLINIC | Age: 63
End: 2023-08-24

## 2023-08-24 ENCOUNTER — ANNUAL EXAM (OUTPATIENT)
Dept: OBGYN CLINIC | Facility: CLINIC | Age: 63
End: 2023-08-24
Payer: COMMERCIAL

## 2023-08-24 VITALS
WEIGHT: 195.4 LBS | SYSTOLIC BLOOD PRESSURE: 114 MMHG | DIASTOLIC BLOOD PRESSURE: 60 MMHG | BODY MASS INDEX: 32.55 KG/M2 | HEIGHT: 65 IN

## 2023-08-24 DIAGNOSIS — Z12.4 ENCOUNTER FOR PAPANICOLAOU SMEAR FOR CERVICAL CANCER SCREENING: ICD-10-CM

## 2023-08-24 DIAGNOSIS — Z12.31 ENCOUNTER FOR SCREENING MAMMOGRAM FOR BREAST CANCER: ICD-10-CM

## 2023-08-24 DIAGNOSIS — Z11.51 SCREENING FOR HPV (HUMAN PAPILLOMAVIRUS): ICD-10-CM

## 2023-08-24 DIAGNOSIS — Z01.419 ENCOUNTER FOR GYNECOLOGICAL EXAMINATION WITHOUT ABNORMAL FINDING: Primary | ICD-10-CM

## 2023-08-24 PROCEDURE — G0145 SCR C/V CYTO,THINLAYER,RESCR: HCPCS | Performed by: NURSE PRACTITIONER

## 2023-08-24 PROCEDURE — G0476 HPV COMBO ASSAY CA SCREEN: HCPCS | Performed by: NURSE PRACTITIONER

## 2023-08-24 PROCEDURE — 99396 PREV VISIT EST AGE 40-64: CPT | Performed by: NURSE PRACTITIONER

## 2023-08-24 NOTE — PROGRESS NOTES
Assessment / Plan    1. Encounter for gynecological examination without abnormal finding  Normal well woman exam  Pap with hpv updated  Up to date on colonoscopy    2. Encounter for Papanicolaou smear for cervical cancer screening    - Liquid-based pap, screening    3. Screening for HPV (human papillomavirus)    - Liquid-based pap, screening    4. Encounter for screening mammogram for breast cancer  Order provided    - Mammo screening bilateral w 3d & cad; Future        Subjective      Debbi Kerr is a 66 y.o. female who presents for her annual gynecologic exam.    65 yo G0 PMF    She's now engaged! Last pap: 2020 pap/hpv negative  Pap Hx: NL  STD hx: none  Sexually active: yes, partner of 10 yrs  Last mammogram: 3/2023 negative  Colonoscopy, 3/2019, Q 5 yrs    Periods are absent  Current contraception: post menopausal status  History of abnormal Pap smear: no  Family history of breast,uterine, ovarian or colon cancer: no    Menstrual History:  OB History        0    Para   0    Term   0       0    AB   0    Living   0       SAB   0    IAB   0    Ectopic   0    Multiple   0    Live Births   0           Obstetric Comments   Menarche 17  Menopause early 46s           No LMP recorded (lmp unknown). Patient is postmenopausal.       The following portions of the patient's history were reviewed and updated as appropriate: allergies, current medications, past family history, past medical history, past social history, past surgical history and problem list.    Review of Systems      Review of Systems   Constitutional: Negative for chills and fever. Respiratory: Negative for cough and shortness of breath. Gastrointestinal: Negative for abdominal distention, abdominal pain, blood in stool, constipation, diarrhea, nausea and vomiting.    Genitourinary: Negative for difficulty urinating, dysuria, frequency, genital sores, hematuria, menstrual problem, pelvic pain, urgency, vaginal bleeding and vaginal discharge. Urge UI   Musculoskeletal: Negative for arthralgias and myalgias. Breasts:  Negative for skin changes, dimpling, asymmetry, nipple discharge, redness, tenderness or palpable masses      Objective      /60 (BP Location: Right arm, Patient Position: Sitting, Cuff Size: Standard)   Ht 5' 5" (1.651 m)   Wt 88.6 kg (195 lb 6.4 oz)   LMP  (LMP Unknown)   BMI 32.52 kg/m²   Physical Exam  Constitutional:       General: She is not in acute distress. Appearance: Normal appearance. She is well-developed. She is not ill-appearing or diaphoretic. Comments: bmi 32.5   HENT:      Head: Normocephalic and atraumatic. Eyes:      Pupils: Pupils are equal, round, and reactive to light. Neck:      Thyroid: No thyromegaly. Pulmonary:      Effort: Pulmonary effort is normal.   Chest:   Breasts:     Breasts are symmetrical.      Right: No inverted nipple, mass, nipple discharge, skin change or tenderness. Left: No inverted nipple, mass, nipple discharge, skin change or tenderness. Abdominal:      General: There is no distension. Palpations: Abdomen is soft. There is no mass. Tenderness: There is no abdominal tenderness. There is no guarding or rebound. Genitourinary:     General: Normal vulva. Exam position: Lithotomy position. Labia:         Right: No rash, tenderness, lesion or injury. Left: No rash, tenderness, lesion or injury. Vagina: No signs of injury and foreign body. No vaginal discharge, erythema, tenderness or bleeding. Cervix: No cervical motion tenderness, discharge or friability. Uterus: Not enlarged and not tender. Adnexa:         Right: No mass or tenderness. Left: No mass or tenderness. Musculoskeletal:      Cervical back: Neck supple. Lymphadenopathy:      Cervical: No cervical adenopathy. Upper Body:      Right upper body: No supraclavicular adenopathy.       Left upper body: No supraclavicular adenopathy. Skin:     General: Skin is warm and dry. Neurological:      General: No focal deficit present. Mental Status: She is alert and oriented to person, place, and time. Psychiatric:         Mood and Affect: Mood normal.         Behavior: Behavior normal.         Thought Content:  Thought content normal.         Judgment: Judgment normal.

## 2023-08-24 NOTE — PATIENT INSTRUCTIONS
Eliminate bladder irritants:  caffeine, carbonated, sugar substitutes (except splenda), citrus fruits, tomatoes, alcohol & spicy foods. Kegel exercises:  Squeeze the pelvic floor muscle and hold for 3 seconds, then relax the muscle for 3 seconds. Repeat this ten times (this is one set). Perform 3 to 4 sets per day.

## 2023-08-28 ENCOUNTER — OFFICE VISIT (OUTPATIENT)
Dept: GASTROENTEROLOGY | Facility: MEDICAL CENTER | Age: 63
End: 2023-08-28
Payer: COMMERCIAL

## 2023-08-28 ENCOUNTER — TELEPHONE (OUTPATIENT)
Dept: GASTROENTEROLOGY | Facility: MEDICAL CENTER | Age: 63
End: 2023-08-28

## 2023-08-28 ENCOUNTER — OFFICE VISIT (OUTPATIENT)
Dept: FAMILY MEDICINE CLINIC | Facility: CLINIC | Age: 63
End: 2023-08-28
Payer: COMMERCIAL

## 2023-08-28 ENCOUNTER — APPOINTMENT (OUTPATIENT)
Dept: LAB | Facility: MEDICAL CENTER | Age: 63
End: 2023-08-28
Payer: COMMERCIAL

## 2023-08-28 VITALS
SYSTOLIC BLOOD PRESSURE: 142 MMHG | DIASTOLIC BLOOD PRESSURE: 68 MMHG | TEMPERATURE: 97 F | OXYGEN SATURATION: 95 % | WEIGHT: 198 LBS | HEIGHT: 65 IN | HEART RATE: 94 BPM | BODY MASS INDEX: 32.99 KG/M2

## 2023-08-28 VITALS
DIASTOLIC BLOOD PRESSURE: 81 MMHG | TEMPERATURE: 97 F | WEIGHT: 198.4 LBS | SYSTOLIC BLOOD PRESSURE: 143 MMHG | BODY MASS INDEX: 33.02 KG/M2 | HEART RATE: 88 BPM

## 2023-08-28 DIAGNOSIS — M65.351 TRIGGER LITTLE FINGER OF RIGHT HAND: ICD-10-CM

## 2023-08-28 DIAGNOSIS — E11.8 TYPE 2 DIABETES MELLITUS WITH COMPLICATION (HCC): Primary | ICD-10-CM

## 2023-08-28 DIAGNOSIS — D64.9 ANEMIA, UNSPECIFIED TYPE: ICD-10-CM

## 2023-08-28 DIAGNOSIS — K59.09 CHRONIC CONSTIPATION: Primary | ICD-10-CM

## 2023-08-28 DIAGNOSIS — I10 PRIMARY HYPERTENSION: ICD-10-CM

## 2023-08-28 DIAGNOSIS — K21.00 GASTROESOPHAGEAL REFLUX DISEASE WITH ESOPHAGITIS: ICD-10-CM

## 2023-08-28 DIAGNOSIS — E11.9 TYPE 2 DIABETES MELLITUS WITHOUT COMPLICATION, WITHOUT LONG-TERM CURRENT USE OF INSULIN (HCC): ICD-10-CM

## 2023-08-28 DIAGNOSIS — K21.00 GASTROESOPHAGEAL REFLUX DISEASE WITH ESOPHAGITIS WITHOUT HEMORRHAGE: ICD-10-CM

## 2023-08-28 DIAGNOSIS — J30.2 SEASONAL ALLERGIES: ICD-10-CM

## 2023-08-28 DIAGNOSIS — E05.90 HYPERTHYROIDISM: ICD-10-CM

## 2023-08-28 LAB
HPV HR 12 DNA CVX QL NAA+PROBE: NEGATIVE
HPV16 DNA CVX QL NAA+PROBE: NEGATIVE
HPV18 DNA CVX QL NAA+PROBE: NEGATIVE

## 2023-08-28 PROCEDURE — 99214 OFFICE O/P EST MOD 30 MIN: CPT | Performed by: FAMILY MEDICINE

## 2023-08-28 PROCEDURE — 36415 COLL VENOUS BLD VENIPUNCTURE: CPT

## 2023-08-28 PROCEDURE — 99213 OFFICE O/P EST LOW 20 MIN: CPT | Performed by: NURSE PRACTITIONER

## 2023-08-28 PROCEDURE — 82784 ASSAY IGA/IGD/IGG/IGM EACH: CPT

## 2023-08-28 PROCEDURE — 86364 TISS TRNSGLTMNASE EA IG CLAS: CPT

## 2023-08-28 RX ORDER — DULAGLUTIDE 3 MG/.5ML
3 INJECTION, SOLUTION SUBCUTANEOUS WEEKLY
Qty: 3 ML | Refills: 3 | Status: SHIPPED | OUTPATIENT
Start: 2023-08-28

## 2023-08-28 RX ORDER — LORATADINE 10 MG/1
10 TABLET ORAL 2 TIMES DAILY
Qty: 60 TABLET | Refills: 8 | Status: SHIPPED | OUTPATIENT
Start: 2023-08-28

## 2023-08-28 RX ORDER — OMEPRAZOLE 20 MG/1
20 CAPSULE, DELAYED RELEASE ORAL
Qty: 90 CAPSULE | Refills: 1 | Status: SHIPPED | OUTPATIENT
Start: 2023-08-28

## 2023-08-28 RX ORDER — LEVOTHYROXINE SODIUM 88 UG/1
88 TABLET ORAL
Qty: 90 TABLET | Refills: 1 | Status: SHIPPED | OUTPATIENT
Start: 2023-08-28

## 2023-08-28 RX ORDER — POLYETHYLENE GLYCOL 3350, SODIUM SULFATE ANHYDROUS, SODIUM BICARBONATE, SODIUM CHLORIDE, POTASSIUM CHLORIDE 236; 22.74; 6.74; 5.86; 2.97 G/4L; G/4L; G/4L; G/4L; G/4L
4000 POWDER, FOR SOLUTION ORAL ONCE
Qty: 4000 ML | Refills: 0 | Status: SHIPPED | OUTPATIENT
Start: 2023-08-28 | End: 2023-08-28

## 2023-08-28 NOTE — PROGRESS NOTES
Assessment/Plan: Patient will follow with GI today for constipation. Continue with current regimen of medications. Patient will have other medications refilled for chronic conditions listed below. Labs reviewed. A1c 6.6.  TSH normal.  Iron saturation normal.  Patient will be starting lisinopril for hypertension. Patient will follow-up in 3 months. Informed consent obtained. Injection for her trigger finger right fifth finger with quarter cc Depo-Medrol 40 and half cc lidocaine without epinephrine. Diagnoses and all orders for this visit:    Type 2 diabetes mellitus with complication (HCC)  -     POCT hemoglobin A1c  -     dulaglutide (Trulicity) 3 GB/8.8VW injection; Inject 0.5 mL (3 mg total) under the skin once a week  -     Ambulatory Referral to Nutrition Services; Future    Gastroesophageal reflux disease with esophagitis  -     omeprazole (PriLOSEC) 20 mg delayed release capsule; Take 1 capsule (20 mg total) by mouth daily before breakfast    Seasonal allergies  -     loratadine (CLARITIN) 10 mg tablet; Take 1 tablet (10 mg total) by mouth 2 (two) times a day    Hyperthyroidism  -     levothyroxine 88 mcg tablet; Take 1 tablet (88 mcg total) by mouth daily in the early morning    Type 2 diabetes mellitus without complication, without long-term current use of insulin (HCC)    Primary hypertension    Gastroesophageal reflux disease with esophagitis without hemorrhage    Trigger little finger of right hand            Subjective:        Patient ID: José Miguel Burnham is a 66 y.o. female. Patient here to follow-up on chronic issues. Patient with some right fifth finger pain. Patient status post constipation being in the ER on the 31st of the first.  Patient did have bowel movement ultimately on the first.  Patient is doing well now.         The following portions of the patient's history were reviewed and updated as appropriate: allergies, current medications, past family history, past medical history, past social history, past surgical history and problem list.      Review of Systems   Constitutional: Negative. HENT: Negative. Eyes: Negative. Respiratory: Negative. Cardiovascular: Negative. Gastrointestinal: Negative. Endocrine: Negative. Genitourinary: Negative. Musculoskeletal: Negative. Skin: Negative. Allergic/Immunologic: Negative. Neurological: Negative. Hematological: Negative. Psychiatric/Behavioral: Negative. Objective:               /68 (BP Location: Right arm, Patient Position: Sitting, Cuff Size: Standard)   Pulse 94   Temp (!) 97 °F (36.1 °C) (Temporal)   Ht 5' 5" (1.651 m)   Wt 89.8 kg (198 lb)   LMP  (LMP Unknown)   SpO2 95%   BMI 32.95 kg/m²          Physical Exam  Vitals and nursing note reviewed. Constitutional:       General: She is not in acute distress. Appearance: Normal appearance. She is not ill-appearing, toxic-appearing or diaphoretic. HENT:      Head: Normocephalic and atraumatic. Right Ear: Tympanic membrane, ear canal and external ear normal. There is no impacted cerumen. Left Ear: Tympanic membrane, ear canal and external ear normal. There is no impacted cerumen. Nose: Nose normal. No congestion or rhinorrhea. Mouth/Throat:      Mouth: Mucous membranes are moist.      Pharynx: No oropharyngeal exudate or posterior oropharyngeal erythema. Eyes:      General: No scleral icterus. Right eye: No discharge. Left eye: No discharge. Neck:      Vascular: No carotid bruit. Cardiovascular:      Rate and Rhythm: Normal rate and regular rhythm. Pulses: Normal pulses. Heart sounds: Normal heart sounds. No murmur heard. No friction rub. No gallop. Pulmonary:      Effort: Pulmonary effort is normal. No respiratory distress. Breath sounds: Normal breath sounds. No stridor. No wheezing, rhonchi or rales. Chest:      Chest wall: No tenderness. Musculoskeletal:         General: No swelling, tenderness, deformity or signs of injury. Normal range of motion. Cervical back: Normal range of motion and neck supple. No rigidity. No muscular tenderness. Right lower leg: No edema. Left lower leg: No edema. Lymphadenopathy:      Cervical: No cervical adenopathy. Skin:     General: Skin is warm and dry. Capillary Refill: Capillary refill takes less than 2 seconds. Coloration: Skin is not jaundiced. Findings: No bruising, erythema, lesion or rash. Neurological:      Mental Status: She is alert and oriented to person, place, and time. Mental status is at baseline. Cranial Nerves: No cranial nerve deficit. Sensory: No sensory deficit. Motor: No weakness. Coordination: Coordination normal.      Gait: Gait normal.   Psychiatric:         Mood and Affect: Mood normal.         Behavior: Behavior normal.         Thought Content:  Thought content normal.         Judgment: Judgment normal.

## 2023-08-28 NOTE — PROGRESS NOTES
[facility] - Outpatient Follow-up Note  Carolan Goodpasture 66 y.o. female MRN: [id number]  Encounter: [id number]          ASSESSMENT AND PLAN:      1. Chronic constipation  2. Anemia      Reports a longstanding history of infrequent bowel movements, usually every 3 to 4 days. She does not use any regular bowel regime for constipation management. Prior to admission she had had 9 days in between bowel movements and reported lower abdominal bloating and discomfort. Denies any melena or hematochezia. She is reporting a 20 pound unintentional weight loss over the past few months. CT abdomen pelvis showed cholelithiasis and a large amount of stool throughout the colon. Labs showed mild anemia with hemoglobin 11.4, MCV 93. Iron studies normal.  TSH normal.  Last colonoscopy was 2019-noted 1 subcentimeter colon polyp with pathology showing focal adenomatous change and she was recommended to repeat in 5 years. She was giving a MiraLAX bowel prep in the hospital and she had multiple bowel movements. he held her MiraLAX for 2 weeks when she was on vacation. She will restart her MiraLAX 1 capful daily. BMs are brown and formed every 3 days. She reports she feels comfortable but feels like not completely evacuating at times. Denies any melena or hematochezia. Last colonoscopy was 20 19-1 polyp-tubular adenoma. Recall colonoscopy 5 years.      -Continue MiraLAX 1 capful daily  -Colonoscopy with GoLytely/Dulcolax prep  -Increase fiber in diet  -Increase water intake to 64 ounces per day  -Celiac panel and stool for H. pylori  -Follow-up in office after test    I reviewed with patient/family potential risks of endoscopic evaluation including possible infection, bleeding or perforation. Patient/family verbalized understanding of potential risks and agreed to procedure(s).       ______________________________________________________________________    SUBJECTIVE: 71-year-old female here for follow-up after consultation in the hospital 8/1/2023. She has a past medical history of type 2 diabetes, hypothyroidism, asthma, hypertension admitted with constipation abdominal pain. Reports a longstanding history of infrequent bowel movements, usually every 3 to 4 days. She does not use any regular bowel regime for constipation management. Prior to admission she had had 9 days in between bowel movements and reported lower abdominal bloating and discomfort. Denies any melena or hematochezia. She is reporting a 20 pound unintentional weight loss over the past few months. CT abdomen pelvis showed cholelithiasis and a large amount of stool throughout the colon. Labs showed mild anemia with hemoglobin 11.4, MCV 93. Iron studies normal.  TSH normal.  Last colonoscopy was 2019-noted 1 subcentimeter colon polyp with pathology showing focal adenomatous change and she was recommended to repeat in 5 years. She was giving a MiraLAX bowel prep in the hospital and she had multiple bowel movements. Recommended that she continue MiraLAX 1-2 times per day. She held her MiraLAX for 2 weeks when she was on vacation. She will restart her MiraLAX 1 capful daily. BMs are brown and formed every 3 days. She reports she feels comfortable but feels like not completely evacuating at times. Denies any melena or hematochezia. Last colonoscopy was 20 19-1 polyp-tubular adenoma. Recall colonoscopy 5 years. Denies any heartburn, nausea, vomiting or dysphagia. Prior EGD/colonoscopy     2019- 1 subcentimeter colon polyp with pathology showing focal adenomatous change and recommended recall colonoscopy 5 years. REVIEW OF SYSTEMS IS OTHERWISE NEGATIVE.       Historical Information   Past Medical History:   Diagnosis Date   • Anxiety    • Arthritis    • Asthma    • Claustrophobia    • CPAP (continuous positive airway pressure) dependence    • Depression    • Diabetes mellitus ([address])    • Disease of thyroid gland hypo   • GERD (gastroesophageal reflux disease)    • Headache    • History of COVID-19    • Hyperlipemia    • Kidney stone    • Kidney stones    • Major depressive disorder    • Motion sickness    • Risk for falls    • Sleep apnea    • Use of cane as ambulatory aid    • Wears glasses    • Wears partial dentures     upper partial     Past Surgical History:   Procedure Laterality Date   • ADENOIDECTOMY     • COLONOSCOPY     • DILATION AND CURETTAGE OF UTERUS     • CO ARTHRS KNE SURG W/MENISCECTOMY MED/LAT W/SHVG Left 3/24/2022    Procedure: ARTHROSCOPY KNEE w/ partial medial menisectomy;  Surgeon: Ayse Howell MD;  Location: [facility] OR;  Service: Orthopedics   • CO COLONOSCOPY FLX DX W/COLLJ [facility] WHEN PFRMD N/A 3/15/2019    Procedure: COLONOSCOPY;  Surgeon: Tashia Shi MD;  Location: [facility] GI LAB;   Service: Gastroenterology   • ROTATOR CUFF REPAIR Right    • SHOULDER SURGERY Left     impingement   • TONSILLECTOMY     • TUBAL LIGATION       Social History   Social History     Substance and Sexual Activity   Alcohol Use No     Social History     Substance and Sexual Activity   Drug Use No     Social History     Tobacco Use   Smoking Status Never   Smokeless Tobacco Never     Family History   Problem Relation Age of Onset   • ALS Mother    • Venous thrombosis Father    • Diabetes Father    • Other Family         cerebral embolism   • Diabetes Family    • Hypertension Family    • Kidney disease Family    • Breast cancer Neg Hx    • Colon cancer Neg Hx    • Ovarian cancer Neg Hx    • Uterine cancer Neg Hx        Meds/Allergies       Current Outpatient Medications:   •  docusate sodium (COLACE) 100 mg capsule  •  dulaglutide (Trulicity) 3 MA/3.0JR injection  •  gabapentin (NEURONTIN) 100 mg capsule  •  levothyroxine 88 mcg tablet  •  loratadine (CLARITIN) 10 mg tablet  •  metFORMIN (GLUCOPHAGE) 500 mg tablet  •  omeprazole (PriLOSEC) 20 mg delayed release capsule  •  polyethylene glycol (Golytely) 4000 mL solution  •  polyethylene glycol (MIRALAX) 17 g packet  •  QUEtiapine (SEROquel) 200 mg tablet  •  QUEtiapine (SEROquel) 25 mg tablet  •  rosuvastatin (CRESTOR) 5 mg tablet  •  senna (SENOKOT) 8.6 mg  •  sertraline (ZOLOFT) 100 mg tablet  •  traZODone (DESYREL) 50 mg tablet  •  venlafaxine (EFFEXOR-XR) 150 mg 24 hr capsule  •  albuterol (2.5 mg/3 mL) 0.083 % nebulizer solution  •  albuterol (Proventil HFA) 90 mcg/act inhaler  •  gabapentin (NEURONTIN) 300 mg capsule  •  lisinopril (ZESTRIL) 10 mg tablet    Allergies   Allergen Reactions   • Darvon [Propoxyphene] Dizziness   • Fluoxetine Other (See Comments)     suicidal   • Meclizine Dizziness   • Morphine And Related Nausea Only   • Oxycodone-Acetaminophen Other (See Comments) and Tachycardia     The whole house was moving   • Percolone [Oxycodone] Other (See Comments) and Tachycardia     The whole house was moving           Objective     Blood pressure 143/81, pulse 88, temperature (!) 97 °F (36.1 °C), temperature source Tympanic, weight 90 kg (198 lb 6.4 oz), not currently breastfeeding. Body mass index is 33.02 kg/m². PHYSICAL EXAM:      General Appearance:   Alert, cooperative, no distress   HEENT:   Normocephalic, atraumatic, anicteric. Neck:  Supple, symmetrical, trachea midline   Lungs:   Clear to auscultation bilaterally; no rales, rhonchi or wheezing; respirations unlabored    Heart[de-identified]   Regular rate and rhythm; no murmur, rub, or gallop. Abdomen:   Soft, non-tender, non-distended; normal bowel sounds; no masses, no organomegaly    Genitalia:   Deferred    Rectal:   Deferred    Extremities:  No cyanosis, clubbing or edema    Pulses:  2+ and symmetric    Skin:  No jaundice, rashes, or lesions    Lymph nodes:  No palpable cervical lymphadenopathy        Lab Results:   No visits with results within 1 Day(s) from this visit.    Latest known visit with results is:   Annual Exam on 08/24/2023   Component Date Value   • HPV Other HR 08/24/2023 Negative • HPV16 08/24/2023 Negative    • HPV18 08/24/2023 Negative          Radiology Results:   CT abdomen pelvis with contrast    Result Date: 7/31/2023  Narrative: CT ABDOMEN AND PELVIS WITH IV CONTRAST INDICATION:   no bm for 9 days. COMPARISON: CT abdomen/pelvis from 4/17/2023. TECHNIQUE:  CT examination of the abdomen and pelvis was performed. Multiplanar 2D reformatted images were created from the source data. This examination, like all CT scans performed in the [facility], was performed utilizing techniques to minimize radiation dose exposure, including the use of iterative reconstruction and automated exposure control. Radiation dose length product (DLP) for this visit:  534 mGy-cm IV Contrast:  100 mL of iohexol (OMNIPAQUE) Enteric Contrast:  Enteric contrast was not administered. FINDINGS: ABDOMEN LOWER CHEST:  No clinically significant abnormality identified in the visualized lower chest. LIVER/BILIARY TREE:  Unremarkable. GALLBLADDER: There are gallstone(s) within the gallbladder, without pericholecystic inflammatory changes. SPLEEN:  Unremarkable. PANCREAS:  Unremarkable. ADRENAL GLANDS:  Unremarkable. KIDNEYS/URETERS:  Unremarkable. No hydronephrosis. STOMACH AND BOWEL: Large volume of stool throughout the colon similar to prior study in keeping with constipation. Fluid contents again noted in the small bowel and ascending colon. No bowel obstruction. APPENDIX:  No findings to suggest appendicitis. ABDOMINOPELVIC CAVITY:  No ascites. No pneumoperitoneum. No lymphadenopathy. VESSELS:  Unremarkable for patient's age. PELVIS REPRODUCTIVE ORGANS:  Unremarkable for patient's age. URINARY BLADDER:  Unremarkable. ABDOMINAL WALL/INGUINAL REGIONS:  Unremarkable. OSSEOUS STRUCTURES:  No acute fracture or destructive osseous lesion. Impression: Large amount of stool throughout the colon similar to prior study in keeping with constipation. Gallstones without surrounding inflammation.  Workstation performed: [id number]

## 2023-08-28 NOTE — PATIENT INSTRUCTIONS
Moderate Sedation   AMBULATORY CARE:   What you need to know about moderate sedation:  Moderate sedation, or conscious sedation, is medicine used during procedures to help you feel relaxed and calm. You will be awake and able to follow directions without anxiety or pain. You will remember little to none of the procedure. Moderate sedation can be used for procedures such as a colonoscopy, wound repair, cataract removal, or dental work. The medicine is given as a pill, shot, inhaled solution, or injection through an IV. How to prepare for moderate sedation:  Your healthcare provider will talk to you about how to prepare for moderate sedation. You may be told not to eat or drink anything for 8 hours before moderate sedation. You may be able to drink clear liquids up until 2 hours before moderate sedation. Tell healthcare providers if you have any allergies, heart problems, or breathing problems. Arrange for someone to drive you home and stay with you for 24 hours. You may feel sleepy and need help doing things at home. Another person may need to call 911 if you cannot be woken. What will happen during moderate sedation:  Your healthcare provider will give you enough medicine to keep you relaxed and calm. Your healthcare provider will monitor your blood pressure, heart rate, and breathing. You will be on a heart monitor and a pulse oximeter. A heart monitor is a safety device that stays on continuously to record your heart's electrical activity. A pulse oximeter is a device that measures the amount of oxygen in your blood. You may get oxygen through a mask placed over your nose and mouth or through small tubes placed in your nostrils. What will happen after moderate sedation:  Healthcare providers will monitor you until you are awake. You may need extra oxygen if your blood oxygen level is lower than it should be. Ask your healthcare provider before you take off the mask or oxygen tubing.  You may be able to go home when you are alert and can stand up. This may take 1 to 2 hours after you have received moderate sedation. You may feel tired, weak, or unsteady on your feet after you get sedation. You may also have trouble concentrating or short-term memory loss. These symptoms should go away in 24 hours or less. Risks of moderate sedation:   You may get a headache or nausea from the medicine. You may have problems with your short-term memory. Your skin may itch or your eyes may water. You may not get enough sedation, or it may wear off quickly. You may feel restless during the procedure or as you wake up. Too much medicine can cause deep sedation. Your healthcare provider may have trouble waking you, and you may need medicine to help you wake up. Your breathing may not be regular, or it may stop. You may need a ventilator to help you breathe. Your risk for problems with sedation is higher if you have heart or lung disease, a head injury, or drink alcohol. Call 911 or have someone else call for any of the following: You have sudden trouble breathing. You cannot be woken. Seek care immediately if:   You have a severe headache or dizziness. Your heart is beating faster than usual.    Contact your healthcare provider if:   You have a fever. You have nausea or are vomiting for more than 8 hours after the procedure. Your skin is itchy, swollen, or you have a rash. You have questions or concerns about your condition or care. Self-care:   Have someone stay with you for 24 hours. This person can drive you to errands and help you do things around the house. This person can also watch for problems. Rest and do quiet activities for 24 hours. Do not exercise, ride a bike, or play sports. Stand up slowly to prevent dizziness and falls. Take short walks around the house with another person. Slowly return to your usual activities the next day. Do not drive or use dangerous machines or tools for 24 hours. You may injure yourself or others. Examples include a lawnmower, saw, or drill. Do not return to work for 24 hours if you use dangerous machines or tools for work. Do not make important decisions for 24 hours. For example, do not sign important papers or invest money. Drink liquids as directed. Liquids help flush the sedation medicine out of your body. Ask how much liquid to drink each day and which liquids are best for you. Eat small, frequent meals to prevent nausea and vomiting. Start with clear liquids such as juice or broth. If you do not vomit after clear liquids, you can eat your usual foods. Do not drink alcohol or take medicines that make you drowsy. This includes medicines that help you sleep and anxiety medicines. Ask your healthcare provider if it is safe for you to take pain medicine. Follow up with your healthcare provider as directed:  Write down your questions so you remember to ask them during your visits. © Copyright Maria R Sousa 2022 Information is for End User's use only and may not be sold, redistributed or otherwise used for commercial purposes. The above information is an  only. It is not intended as medical advice for individual conditions or treatments. Talk to your doctor, nurse or pharmacist before following any medical regimen to see if it is safe and effective for you. Colonoscopy   WHAT YOU NEED TO KNOW:   What do I need to know about a colonoscopy? A colonoscopy is a procedure to examine the inside of your colon (intestine) with a scope. A scope is a flexible tube with a small light and camera on the end. Polyps or tissue growths may be removed during your colonoscopy. What do I need to do the week before my colonoscopy? Give your healthcare provider a list of all the medicines, supplements, and herbs you take. You will need to stop taking medicines that contain aspirin or iron for 7 days before your colonoscopy.  If you take a blood thinner, such as warfarin, ask when you should stop taking it. Make plans for someone to drive you home after your procedure. How do I prepare for my colonoscopy? Your healthcare provider will have you prepare your bowels before your procedure. It is important for your bowels to be empty before your procedure to allow him or her to see your colon clearly. You will need to do the following:  Have only clear liquids for the entire day before your colonoscopy. Clear liquids include plain gelatin, unsweetened fruit juices, clear soup, and broth. Do not drink any liquid that is blue, red, or purple. Follow your bowel prep as directed. There are many different preparations that can be given before a colonoscopy. With any bowel prep, stay close to the bathroom. This prep will cause your bowels to move often. Use an enema if directed. Your healthcare provider may tell you to use an enema to help clean out your bowels. Do not eat or drink anything after midnight. This will help prevent problems that can happen if you vomit while under anesthesia. What will happen during my colonoscopy? You will be given medicine to help you relax. You will lie on your left side and raise one or both knees toward your chest. Your healthcare provider will examine your anus and use a gloved finger to check your rectum. You may need another enema if your bowel is not empty. The scope will be lubricated and gently placed into your anus. It will then be passed through your rectum and into your colon. Water or air will be put into your colon to help clean or expand it. This is done so your healthcare provider can see your colon clearly. Tissue samples may be taken from the walls of your bowel and sent to a lab for tests. If you have a polyp, your healthcare provider will pass a wire loop through the scope and use it to hold the polyp. The polyp is then removed from the wall of your colon. You should not feel this.  The polyps are sent to a lab for tests. Pictures of your colon may be taken during the procedure. What will happen after my colonoscopy? You may feel bloated or have some gas and abdominal discomfort. You may need to lie on your left side with a heating pad on your abdomen. Eat small meals until your bloating has improved. What are the risks of a colonoscopy? You may have pain or bleeding. You may also have a slow heartbeat, decreased blood pressure, or increased sweating. Your colon may tear due to the increased pressure from the scope and other instruments. This may cause bowel contents to leak out of your colon and into your abdomen. If this happens, you will need to stay in the hospital and have surgery on your colon. CARE AGREEMENT:   You have the right to help plan your care. Learn about your health condition and how it may be treated. Discuss treatment options with your healthcare providers to decide what care you want to receive. You always have the right to refuse treatment. The above information is an  only. It is not intended as medical advice for individual conditions or treatments. Talk to your doctor, nurse or pharmacist before following any medical regimen to see if it is safe and effective for you. © Copyright Sallyann Spore 2022 Information is for End User's use only and may not be sold, redistributed or otherwise used for commercial purposes. Upper Endoscopy   AMBULATORY CARE:   What you need to know about an upper endoscopy: An upper endoscopy is also called an upper gastrointestinal (GI) endoscopy, or an esophagogastroduodenoscopy (EGD). A scope (thin, flexible tube with a light and camera) is used to examine the walls of your upper digestive tract. The upper digestive tract includes the esophagus, stomach, and duodenum (first part of the small intestine). An upper endoscopy is used to look for problems, such as bleeding, polyps, ulcers, or infection.         How to prepare for an upper endoscopy: Your healthcare provider will talk to you about how to prepare for your procedure. You may be told not eat or drink anything except water for 6 to 12 hours before the procedure. Your provider will tell you which medicines to take or not take on the day of your procedure. Arrange to have someone drive you home. What will happen during an upper endoscopy: You will be given medicine through your IV to help you relax and make you drowsy. You will also be given medicine to numb your throat. You may need to wear a plastic mouthpiece to help hold your mouth open and protect your teeth and tongue. Your provider will gently insert the endoscope through your mouth and down into your throat. You may be asked to swallow to help move the scope. You may feel pressure in your throat, but you should not feel pain. The endoscope does not restrict your breathing. Your provider will watch the scope on a monitor and take pictures with the scope. Your provider may gently inject air to make it easier to see your digestive tract clearly. Your provider may take tissue samples and send them to a lab for tests. Foreign objects, tumors, or polyps that may be blocking your digestive tract may be removed. Your provider may also insert tools with the scope to treat bleeding or place a stent (tube). What to expect after an upper endoscopy: You may feel bloated, gassy, or have some abdominal discomfort. Your throat may be sore for 24 to 36 hours after the procedure. You may burp or pass gas from air that is still inside your body after your procedure. You may need to take short walks to help move the gas out. Eat small meals, if you feel bloated. Do not drive or make important decisions until the day after your procedure. Risks of an upper endoscopy: Your esophagus, stomach, or duodenum may be punctured or torn during the procedure. This is because of increased pressure as the scope and air are passing through.  You may bleed more than expected or get an infection. You may have a slow or irregular heartbeat, or low blood pressure. This can cause sweating and fainting. Fluid may enter your lungs and you may have trouble breathing. These problems can be life-threatening. Call 911 if:   You have sudden chest pain or trouble breathing. Seek care immediately if:   You feel dizzy or faint. You have trouble swallowing. You have severe throat pain. Your bowel movements are very dark or black. Your abdomen is hard and firm and you have severe pain. You vomit blood. Contact your healthcare provider if:   You feel full or bloated and cannot burp or pass gas. You have not had a bowel movement for 3 days after your procedure. You have neck pain. You have a fever or chills. You have nausea or are vomiting. You have a rash or hives. You have questions or concerns about your endoscopy. Relieve a sore throat:  Suck on throat lozenges or crushed ice. Gargle with a small amount of warm salt water. Mix 1 teaspoon of salt and 1 cup of warm water to make salt water. Relieve gas and discomfort from bloating:  Lie on your right side with a heating pad on your abdomen. Take short walks to help pass gas. Eat small meals until bloating is relieved. Rest after your procedure: You have been given medicine to relax you. Do not  drive or make important decisions until the day after your procedure. Return to your normal activity as directed. You can usually return to work the day after your procedure. Follow up with your healthcare provider as directed:  Write down your questions so you remember to ask them during your visits. © Copyright Pevely Ranks 2022 Information is for End User's use only and may not be sold, redistributed or otherwise used for commercial purposes. The above information is an  only. It is not intended as medical advice for individual conditions or treatments.  Talk to your doctor, nurse or pharmacist before following any medical regimen to see if it is safe and effective for you.

## 2023-08-28 NOTE — TELEPHONE ENCOUNTER
[facility] ENDOSCOPY CENTERS ELIGIBILITY FORM  (Complete new form if procedure date is more than 90 days from submission of the first form)    Patient Name:  Flori Carlin        : 1960   BMI: Estimated body mass index is 33.02 kg/m² as calculated from the following:    Height as of an earlier encounter on 23: 5' 5" (1.651 m). Weight as of an earlier encounter on 23: 90 kg (198 lb 6.4 oz).  (>45, schedule in hospital)  Procedure: Colon/EGD Diagnosis: chronic constipation, anemia  Date of Procedure: 10/11/2023 Prep: Golytely  Responsible : tbd  Phone #: tbd  Name Completing Form: Jairo Sylvesterms Date Form Complete: 23    IF THE PATIENT ANSWERS YES TO ANY OF THESE QUESTIONS, SCHEDULE IN A HOSPITAL:  1. Are you pregnant? no           2. Do you rely on a wheelchair for mobility? no        3. Do you need oxygen during the day? no         4. Have you ever been informed by anesthesia that you have a difficult airway? no    5. Have you been diagnosed with End Stage Renal Disease (ESRD)? no      6. Are you actively on dialysis? no          7. Have you been diagnosed with Pulmonary Hypertension? no      IF THE PATIENT ANSWERS YES TO ANY OF THESE QUESTIONS, SEND AN IN-BASKET MESSAGE TO THE SOC MD/NP POOL WITH A SUBJECT OF “REVIEW FOR ASC”:  8. Do you have a pacemaker or an Automatic Implantable Cardioverter Defibrillator (AICD)? no  9. Have you ever had an organ transplant? no         10. Have you had a stroke, heart attack, or myocardial infarction (MI) within the last six months? no  11. Have you ever been diagnosed with Aortic Stenosis?no        12. Have you ever been diagnosed with Congestive Heart Failure? no     13.  Have you ever been diagnoses with a heart valve disease? no       DIABETIC QUESTIONNAIRE - FOLLOW THE SERIES OF QUESTIONS TO DETERMINE ELIGIBILITY:  14. Are you diabetic?yes            a. If NO, schedule at [address] [place] [address],[address]  15. If YES, ask “Have you had an A1C test within the last six months?      b. If NO, send an in-basket message to the [facility] MD/NP pool with a subject of “Review for ASC”  c. If YES, ask “Was your A1C greater than 12?” No      i. If NO, schedule at [address] [facility] [place],[address]  ii. If YES, schedule at hospital  iii.  If NOT SURE, send an in-basket message to the [facility] MD/NP pool with a subject of “Review for [facility]

## 2023-08-29 LAB — IGA SERPL-MCNC: 156 MG/DL (ref 66–433)

## 2023-08-30 LAB
LAB AP GYN PRIMARY INTERPRETATION: NORMAL
Lab: NORMAL
TTG IGA SER-ACNC: <2 U/ML (ref 0–3)

## 2023-09-25 ENCOUNTER — TELEPHONE (OUTPATIENT)
Dept: FAMILY MEDICINE CLINIC | Facility: CLINIC | Age: 63
End: 2023-09-25

## 2023-09-25 ENCOUNTER — TELEPHONE (OUTPATIENT)
Dept: GASTROENTEROLOGY | Facility: MEDICAL CENTER | Age: 63
End: 2023-09-25

## 2023-09-25 NOTE — TELEPHONE ENCOUNTER
LVM attempting to confirm 10/11 procedure, informing to hold Trulicity 7 days, that prep instructions sent via [employer], and that bowel prep medication sent to email.

## 2023-09-25 NOTE — TELEPHONE ENCOUNTER
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 Patient has never had chicken pox or a vaccine for varicella. Is she ok to take care of this person with just a mask on or does she need to stay away from them and if so, for how long.

## 2023-09-27 NOTE — TELEPHONE ENCOUNTER
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 Told patient to mask, wear gloves, wash hands often and wash clothing after visits. Is there anything else she can do as a precaution to not get chicken pox from the patient she cares for. Please advise.

## 2023-10-09 RX ORDER — SODIUM CHLORIDE 9 MG/ML
125 INJECTION, SOLUTION INTRAVENOUS CONTINUOUS
Status: CANCELLED | OUTPATIENT
Start: 2023-10-09

## 2023-10-11 ENCOUNTER — HOSPITAL ENCOUNTER (OUTPATIENT)
Dept: GASTROENTEROLOGY | Facility: MEDICAL CENTER | Age: 63
Setting detail: OUTPATIENT SURGERY
Discharge: HOME | End: 2023-10-11
Payer: COMMERCIAL

## 2023-10-11 ENCOUNTER — ANESTHESIA (OUTPATIENT)
Dept: GASTROENTEROLOGY | Facility: MEDICAL CENTER | Age: 63
End: 2023-10-11

## 2023-10-11 ENCOUNTER — ANESTHESIA EVENT (OUTPATIENT)
Dept: GASTROENTEROLOGY | Facility: MEDICAL CENTER | Age: 63
End: 2023-10-11

## 2023-10-11 VITALS
RESPIRATION RATE: 16 BRPM | OXYGEN SATURATION: 97 % | SYSTOLIC BLOOD PRESSURE: 118 MMHG | TEMPERATURE: 97 F | DIASTOLIC BLOOD PRESSURE: 67 MMHG | HEART RATE: 74 BPM

## 2023-10-11 DIAGNOSIS — D64.9 ANEMIA, UNSPECIFIED TYPE: ICD-10-CM

## 2023-10-11 DIAGNOSIS — K59.09 CHRONIC CONSTIPATION: ICD-10-CM

## 2023-10-11 PROCEDURE — 88305 TISSUE EXAM BY PATHOLOGIST: CPT | Performed by: STUDENT IN AN ORGANIZED HEALTH CARE EDUCATION/TRAINING PROGRAM

## 2023-10-11 RX ORDER — SODIUM CHLORIDE 9 MG/ML
125 INJECTION, SOLUTION INTRAVENOUS CONTINUOUS
Status: DISCONTINUED | OUTPATIENT
Start: 2023-10-11 | End: 2023-10-15 | Stop reason: HOSPADM

## 2023-10-11 RX ORDER — PROPOFOL 10 MG/ML
INJECTION, EMULSION INTRAVENOUS AS NEEDED
Status: DISCONTINUED | OUTPATIENT
Start: 2023-10-11 | End: 2023-10-11

## 2023-10-11 RX ADMIN — PROPOFOL 100 MG: 10 INJECTION, EMULSION INTRAVENOUS at 09:10

## 2023-10-11 RX ADMIN — PROPOFOL 100 MG: 10 INJECTION, EMULSION INTRAVENOUS at 09:00

## 2023-10-11 RX ADMIN — PROPOFOL 100 MG: 10 INJECTION, EMULSION INTRAVENOUS at 08:55

## 2023-10-11 RX ADMIN — PROPOFOL 200 MG: 10 INJECTION, EMULSION INTRAVENOUS at 08:49

## 2023-10-11 RX ADMIN — SODIUM CHLORIDE 125 ML/HR: 0.9 INJECTION, SOLUTION INTRAVENOUS at 08:27

## 2023-10-11 RX ADMIN — PROPOFOL 100 MG: 10 INJECTION, EMULSION INTRAVENOUS at 08:52

## 2023-10-11 NOTE — ANESTHESIA PREPROCEDURE EVALUATION
Likely the cause of her chest pains  On Effexor in the past and was hard to get off so does not want to initiate meds at this time  Procedure:  COLONOSCOPY  EGD    Relevant Problems   CARDIO   (+) Hyperlipidemia   (+) Migraine headache   (+) Primary hypertension      ENDO   (+) Hypothyroidism   (+) Type 2 diabetes mellitus without complication, without long-term current use of insulin (HCC)      GI/HEPATIC   (+) Esophageal reflux disease      HEMATOLOGY   (+) Anemia      MUSCULOSKELETAL   (+) Hand arthritis      NEURO/PSYCH   (+) Anxiety   (+) Migraine headache   (+) Severe recurrent major depression without psychotic features (HCC)      PULMONARY   (+) Asthma   (+) FELIX (obstructive sleep apnea)        Physical Exam    Airway    Mallampati score: II         Dental       Cardiovascular  Rhythm: regular    Pulmonary   Breath sounds clear to auscultation    Other Findings        Anesthesia Plan  ASA Score- 3     Anesthesia Type- IV sedation with anesthesia with ASA Monitors. Additional Monitors:     Airway Plan:            Plan Factors-Exercise tolerance (METS): >4 METS. Chart reviewed. Existing labs reviewed. Patient summary reviewed. Patient is not a current smoker. Patient not instructed to abstain from smoking on day of procedure. Patient did not smoke on day of surgery. Obstructive sleep apnea risk education given perioperatively. Induction- intravenous. Postoperative Plan-     Informed Consent- Anesthetic plan and risks discussed with patient.

## 2023-10-11 NOTE — DISCHARGE INSTRUCTIONS
Colonoscopy   WHAT YOU NEED TO KNOW:   A colonoscopy is a procedure to examine the inside of your colon (intestine) with a scope. Polyps or tissue growths may have been removed during your colonoscopy. It is normal to feel bloated and to have some abdominal discomfort. You should be passing gas. If you have hemorrhoids or you had polyps removed, you may have a small amount of bleeding. DISCHARGE INSTRUCTIONS:   Seek care immediately if:   You have sudden, severe abdominal pain. You have problems swallowing. You have a large amount of black, sticky bowel movements or blood in your bowel movements. You have sudden trouble breathing. You feel weak, lightheaded, or faint or your heart beats faster than normal for you. Contact your healthcare provider if:   You have a fever and chills. You have nausea or are vomiting. Your abdomen is bloated or feels full and hard. You have abdominal pain. You have black, sticky bowel movements or blood in your bowel movements. You have not had a bowel movement for 3 days after your procedure. You have rash or hives. You have questions or concerns about your procedure. Activity:   Do not lift, strain, or run for 24 hours after your procedure. Rest after your procedure. You have been given medicine to relax you. Do not drive or make important decisions until the day after your procedure. Return to your normal activity as directed. Relieve gas and discomfort from bloating by lying on your right side with a heating pad on your abdomen. You may need to take short walks to help the gas move out. Eat small meals until bloating is relieved. Follow up with your healthcare provider as directed: Write down your questions so you remember to ask them during your visits. If you take a “blood thinner”, please review the specific instructions from your endoscopist about when you should resume it.  These can be found in the “Recommendation” and “Your Medication list” sections of this After Visit Summary. Upper Endoscopy   WHAT YOU NEED TO KNOW:   An upper endoscopy is also called an upper gastrointestinal (GI) endoscopy, or an esophagogastroduodenoscopy (EGD). It is a procedure to examine the inside of your esophagus, stomach, and duodenum (first part of the small intestine) with a scope. You may feel bloated, gassy, or have some abdominal discomfort after your procedure. Your throat may be sore for 24 to 36 hours. You may burp or pass gas from air that is still inside your body. DISCHARGE INSTRUCTIONS:   Seek care immediately if:   You have sudden, severe abdominal pain. You have problems swallowing. You have a large amount of black, sticky bowel movements or blood in your bowel movements. You have sudden trouble breathing. You feel weak, lightheaded, or faint or your heart beats faster than normal for you. Contact your healthcare provider if:   You have a fever and chills. You have nausea or are vomiting. Your abdomen is bloated or feels full and hard. You have abdominal pain. You have black, sticky bowel movements or blood in your bowel movements. You have not had a bowel movement for 3 days after your procedure. You have rash or hives. You have questions or concerns about your procedure. Activity:   Do not lift, strain, or run for 24 hours after your procedure. Rest after your procedure. You have been given medicine to relax you. Do not drive or make important decisions until the day after your procedure. Return to your normal activity as directed. Relieve gas and discomfort from bloating by lying on your right side with a heating pad on your abdomen. You may need to take short walks to help the gas move out. Eat small meals until bloating is relieved. Follow up with your healthcare provider as directed: Write down your questions so you remember to ask them during your visits. If you take a “blood thinner”, please review the specific instructions from your endoscopist about when you should resume it. These can be found in the “Recommendation” and “Your Medication list” sections of this After Visit Summary.

## 2023-10-11 NOTE — ANESTHESIA POSTPROCEDURE EVALUATION
Post-Op Assessment Note    CV Status:  Stable  Pain Score: 1    Pain management: adequate     Mental Status:  Alert and awake   Hydration Status:  Euvolemic   PONV Controlled:  Controlled   Airway Patency:  Patent      Post Op Vitals Reviewed: Yes      Staff: Anesthesiologist         No notable events documented.     /60 (10/11/23 0921)    Temp     Pulse 75 (10/11/23 0921)   Resp 16 (10/11/23 0921)    SpO2 95 % (10/11/23 0921)

## 2023-10-11 NOTE — H&P
History and Physical - [facility]  Romi Soto 66 y.o. female MRN: [id number]                  HPI: Romi Soto is a 66y.o. year old female who presents for constipation and anemia. REVIEW OF SYSTEMS: Per the HPI, and otherwise unremarkable. Historical Information   Past Medical History:   Diagnosis Date    Anxiety     Arthritis     Asthma     Claustrophobia     CPAP (continuous positive airway pressure) dependence     Depression     Diabetes mellitus (HCC)     Disease of thyroid gland     hypo    GERD (gastroesophageal reflux disease)     Headache     History of COVID-19     Hyperlipemia     Kidney stone     Kidney stones     Major depressive disorder     Motion sickness     Risk for falls     Sleep apnea     Use of cane as ambulatory aid     Wears glasses     Wears partial dentures     upper partial     Past Surgical History:   Procedure Laterality Date    ADENOIDECTOMY      COLONOSCOPY      DILATION AND CURETTAGE OF UTERUS      UT ARTHRS KNE SURG W/MENISCECTOMY MED/LAT W/SHVG Left 3/24/2022    Procedure: ARTHROSCOPY KNEE w/ partial medial menisectomy;  Surgeon: Letty Nunez MD;  Location: [facility] OR;  Service: Orthopedics    UT COLONOSCOPY FLX DX W/[facility] REHABILITATION WHEN PFRMD N/A 3/15/2019    Procedure: COLONOSCOPY;  Surgeon: Wilhemena Kayser, MD;  Location: [facility] GI LAB;   Service: Gastroenterology    ROTATOR CUFF REPAIR Right     SHOULDER SURGERY Left     impingement    TONSILLECTOMY      TUBAL LIGATION       Social History   Social History     Substance and Sexual Activity   Alcohol Use No     Social History     Substance and Sexual Activity   Drug Use No     Social History     Tobacco Use   Smoking Status Never   Smokeless Tobacco Never     Family History   Problem Relation Age of Onset    ALS Mother     Venous thrombosis Father     Diabetes Father     Other Family         cerebral embolism    Diabetes Family     Hypertension Family     Kidney disease Family     Breast cancer Neg Hx     Colon cancer Neg Hx     Ovarian cancer Neg Hx     Uterine cancer Neg Hx        Meds/Allergies       Current Outpatient Medications:     levothyroxine 88 mcg tablet    albuterol (2.5 mg/3 mL) 0.083 % nebulizer solution    albuterol (Proventil HFA) 90 mcg/act inhaler    docusate sodium (COLACE) 100 mg capsule    dulaglutide (Trulicity) 3 SA/6.4BC injection    gabapentin (NEURONTIN) 100 mg capsule    gabapentin (NEURONTIN) 300 mg capsule    lisinopril (ZESTRIL) 10 mg tablet    loratadine (CLARITIN) 10 mg tablet    metFORMIN (GLUCOPHAGE) 500 mg tablet    omeprazole (PriLOSEC) 20 mg delayed release capsule    polyethylene glycol (Golytely) 4000 mL solution    polyethylene glycol (MIRALAX) 17 g packet    QUEtiapine (SEROquel) 200 mg tablet    QUEtiapine (SEROquel) 25 mg tablet    rosuvastatin (CRESTOR) 5 mg tablet    senna (SENOKOT) 8.6 mg    sertraline (ZOLOFT) 100 mg tablet    traZODone (DESYREL) 50 mg tablet    venlafaxine (EFFEXOR-XR) 150 mg 24 hr capsule    Current Facility-Administered Medications:     sodium chloride 0.9 % infusion, 125 mL/hr, Intravenous, Continuous, 125 mL/hr at 10/11/23 0827    Allergies   Allergen Reactions    Darvon [Propoxyphene] Dizziness    Fluoxetine Other (See Comments)     suicidal    Meclizine Dizziness    Morphine And Related Nausea Only    Oxycodone-Acetaminophen Other (See Comments) and Tachycardia     The whole house was moving    [address] [place] [address] [Oxycodone] Other (See Comments) and Tachycardia     The whole house was moving       Objective     /77   Pulse 85   Temp (!) 97 °F (36.1 °C) (Temporal)   Resp 18   LMP  (LMP Unknown)   SpO2 97%       PHYSICAL EXAM    Gen: NAD  Head: NCAT  CV: RRR  CHEST: Clear  ABD: soft, NT/ND  EXT: no edema      ASSESSMENT/PLAN:  This is a 66y.o. year old female here for EGD and colonoscopy, and she is stable and optimized for her procedure.

## 2023-10-16 PROCEDURE — 88305 TISSUE EXAM BY PATHOLOGIST: CPT | Performed by: STUDENT IN AN ORGANIZED HEALTH CARE EDUCATION/TRAINING PROGRAM

## 2023-11-01 ENCOUNTER — TELEPHONE (OUTPATIENT)
Dept: GASTROENTEROLOGY | Facility: MEDICAL CENTER | Age: 63
End: 2023-11-01

## 2023-11-01 ENCOUNTER — OFFICE VISIT (OUTPATIENT)
Dept: GASTROENTEROLOGY | Facility: MEDICAL CENTER | Age: 63
End: 2023-11-01
Payer: COMMERCIAL

## 2023-11-01 VITALS
TEMPERATURE: 98.2 F | SYSTOLIC BLOOD PRESSURE: 108 MMHG | BODY MASS INDEX: 32.82 KG/M2 | WEIGHT: 197.2 LBS | DIASTOLIC BLOOD PRESSURE: 69 MMHG | HEART RATE: 89 BPM

## 2023-11-01 DIAGNOSIS — D50.9 IRON DEFICIENCY ANEMIA, UNSPECIFIED IRON DEFICIENCY ANEMIA TYPE: Primary | ICD-10-CM

## 2023-11-01 PROCEDURE — 99213 OFFICE O/P EST LOW 20 MIN: CPT | Performed by: NURSE PRACTITIONER

## 2023-11-01 RX ORDER — POLYETHYLENE GLYCOL 3350, SODIUM SULFATE ANHYDROUS, SODIUM BICARBONATE, SODIUM CHLORIDE, POTASSIUM CHLORIDE 236; 22.74; 6.74; 5.86; 2.97 G/4L; G/4L; G/4L; G/4L; G/4L
4000 POWDER, FOR SOLUTION ORAL ONCE
Qty: 4000 ML | Refills: 0 | Status: SHIPPED | OUTPATIENT
Start: 2023-11-01 | End: 2023-11-01

## 2023-11-01 NOTE — PATIENT INSTRUCTIONS
High Fiber Diet   AMBULATORY CARE:   A high-fiber diet  includes foods that have a high amount of fiber. Fiber is the part of fruits, vegetables, and grains that is not broken down by your body. Fiber keeps your bowel movements regular. Fiber can also help lower your cholesterol level, control blood sugar in people with diabetes, and relieve constipation. Fiber can also help you control your weight because it helps you feel full faster. Most adults should eat 25 to 35 grams of fiber each day. Talk to your dietitian or healthcare provider about the amount of fiber you need. Good sources of fiber:       Foods with at least 4 grams of fiber per serving:      ? to ½ cup of high-fiber cereal (check the nutrition label on the box)    ½ cup of blackberries or raspberries    4 dried prunes    1 cooked artichoke    ½ cup of cooked legumes, such as lentils, or red, kidney, and kaufman beans    Foods with 1 to 3 grams of fiber per servin slice of whole-wheat, pumpernickel, or rye bread    ½ cup of cooked brown rice    4 whole-wheat crackers    1 cup of oatmeal    ½ cup of cereal with 1 to 3 grams of fiber per serving (check the nutrition label on the box)    1 small piece of fruit, such as an apple, banana, pear, kiwi, or orange    3 dates    ½ cup of canned apricots, fruit cocktail, peaches, or pears    ½ cup of raw or cooked vegetables, such as carrots, cauliflower, cabbage, spinach, squash, or corn  Ways that you can increase fiber in your diet:   Choose brown or wild rice instead of white rice. Use whole wheat flour in recipes instead of white or all-purpose flour. Add beans and peas to casseroles or soups. Choose fresh fruit and vegetables with peels or skins on instead of juices. Other diet guidelines to follow: Add fiber to your diet slowly. You may have abdominal discomfort, bloating, and gas if you add fiber to your diet too quickly.      Drink plenty of liquids as you add fiber to your diet.  You may have nausea or develop constipation if you do not drink enough water. Ask how much liquid to drink each day and which liquids are best for you. © Copyright [facility] 2023 Information is for End User's use only and may not be sold, redistributed or otherwise used for commercial purposes. The above information is an  only. It is not intended as medical advice for individual conditions or treatments. Talk to your doctor, nurse or pharmacist before following any medical regimen to see if it is safe and effective for you.

## 2023-11-01 NOTE — PROGRESS NOTES
[facility] - Outpatient Follow-up Note  Molina Melendrez 66 y.o. female MRN: [id number]  Encounter: [id number]          ASSESSMENT AND PLAN:      1. Chronic constipation  2. Anemia    BM's are usually every 3 to 4 days. Labs showed mild anemia with hemoglobin 11.4, MCV 93. Iron studies were normal.  TSH normal.  Using MiraLAX as needed with some help. Denies any melena or hematochezia. Last colonoscopy 10/23 noted inadequate prep and recommended repeat colonoscopy in 6 months with 2-day prep. Patient is getting  in several months and would like to have her colonoscopy before the 6-month period.    -Colonoscopy with 2-day prep  -MiraLAX 1 capful daily  -Follow-up in office after test    I reviewed with patient/family potential risks of endoscopic evaluation including possible infection, bleeding or perforation. Patient/family verbalized understanding of potential risks and agreed to procedure(s). _____________________________________________________________________    SUBJECTIVE: 59-year-old female here for follow-up. She was last seen by myself 8/28/2023 for chronic constipation and anemia. BM's are usually every 3 to 4 days. Labs showed mild anemia with hemoglobin 11.4, MCV 93. Iron studies were normal.  TSH normal.  Using MiraLAX as needed with some help. Denies any melena or hematochezia. Last colonoscopy 10/23 noted inadequate prep and recommended repeat colonoscopy in 6 months with 2-day prep. Patient is getting  in several months and would like to have her colonoscopy before the 6-month period. Prior EGD/colonoscopy     EGD 10/23- The duodenal bulb and 2nd part of the duodenum appeared normal. 2 cm hiatal hernia - GE junction 40 cm from the incisors, diaphragmatic impression 42 cm from the incisors: Hill classification: Grade II  Otherwise normal stomach.  The esophagus appeared normal.  Biopsies negative for celiac disease and H. pylori. Colonoscopy 10/23-Inadequate prep with opaque liquid stool throughout the colon that could not be fully cleared due to scope clogging. No large lesions were seen but mucosal inspection was limited. Colonoscopy 2019 noted 1 subcentimeter colon polyp with pathology showing focal adenomatous change and she was recommended for repeat colonoscopy in 5 years. REVIEW OF SYSTEMS IS OTHERWISE NEGATIVE. 10 point review of systems negative other than per HPI      Historical Information   Past Medical History:   Diagnosis Date   • Anxiety    • Arthritis    • Asthma    • Claustrophobia    • CPAP (continuous positive airway pressure) dependence    • Depression    • Diabetes mellitus ([address])    • Disease of thyroid gland     hypo   • GERD (gastroesophageal reflux disease)    • Headache    • History of COVID-19    • Hyperlipemia    • Kidney stone    • Kidney stones    • Major depressive disorder    • Motion sickness    • Risk for falls    • Sleep apnea    • Use of cane as ambulatory aid    • Wears glasses    • Wears partial dentures     upper partial     Past Surgical History:   Procedure Laterality Date   • ADENOIDECTOMY     • COLONOSCOPY     • DILATION AND CURETTAGE OF UTERUS     • TX ARTHRS KNE SURG W/MENISCECTOMY MED/LAT W/SHVG Left 3/24/2022    Procedure: ARTHROSCOPY KNEE w/ partial medial menisectomy;  Surgeon: Darcy Gomez MD;  Location: [facility] OR;  Service: Orthopedics   • TX COLONOSCOPY FLX DX W/[facility] INPATIENT REHABILITATION WHEN PFRMD N/A 3/15/2019    Procedure: COLONOSCOPY;  Surgeon: Kaya Cabrera MD;  Location: [facility] GI LAB;   Service: Gastroenterology   • ROTATOR CUFF REPAIR Right    • SHOULDER SURGERY Left     impingement   • TONSILLECTOMY     • TUBAL LIGATION       Social History   Social History     Substance and Sexual Activity   Alcohol Use No     Social History     Substance and Sexual Activity   Drug Use No     Social History     Tobacco Use   Smoking Status Never   Smokeless Tobacco Never     Family History   Problem Relation Age of Onset   • ALS Mother    • Venous thrombosis Father    • Diabetes Father    • Other Family         cerebral embolism   • Diabetes Family    • Hypertension Family    • Kidney disease Family    • Breast cancer Neg Hx    • Colon cancer Neg Hx    • Ovarian cancer Neg Hx    • Uterine cancer Neg Hx        Meds/Allergies       Current Outpatient Medications:   •  dulaglutide (Trulicity) 3 KM/1.6OU injection  •  gabapentin (NEURONTIN) 100 mg capsule  •  levothyroxine 88 mcg tablet  •  loratadine (CLARITIN) 10 mg tablet  •  metFORMIN (GLUCOPHAGE) 500 mg tablet  •  omeprazole (PriLOSEC) 20 mg delayed release capsule  •  polyethylene glycol (Golytely) 4000 mL solution  •  QUEtiapine (SEROquel) 200 mg tablet  •  QUEtiapine (SEROquel) 25 mg tablet  •  rosuvastatin (CRESTOR) 5 mg tablet  •  sertraline (ZOLOFT) 100 mg tablet  •  traZODone (DESYREL) 50 mg tablet  •  venlafaxine (EFFEXOR-XR) 150 mg 24 hr capsule  •  albuterol (2.5 mg/3 mL) 0.083 % nebulizer solution  •  albuterol (Proventil HFA) 90 mcg/act inhaler  •  docusate sodium (COLACE) 100 mg capsule  •  gabapentin (NEURONTIN) 300 mg capsule  •  lisinopril (ZESTRIL) 10 mg tablet  •  polyethylene glycol (MIRALAX) 17 g packet  •  senna (SENOKOT) 8.6 mg    Allergies   Allergen Reactions   • Darvon [Propoxyphene] Dizziness   • Fluoxetine Other (See Comments)     suicidal   • Meclizine Dizziness   • Morphine And Related Nausea Only   • Oxycodone-Acetaminophen Other (See Comments) and Tachycardia     The whole house was moving   • Percolone [Oxycodone] Other (See Comments) and Tachycardia     The whole house was moving           Objective     Blood pressure 108/69, pulse 89, temperature 98.2 °F (36.8 °C), temperature source Tympanic, weight 89.4 kg (197 lb 3.2 oz), not currently breastfeeding. Body mass index is 32.82 kg/m².       PHYSICAL EXAM:      General Appearance:   Alert, cooperative, no distress   HEENT:   Normocephalic, atraumatic, anicteric. Neck:  Supple, symmetrical, trachea midline   Lungs:   Clear to auscultation bilaterally; no rales, rhonchi or wheezing; respirations unlabored    Heart[de-identified]   Regular rate and rhythm; no murmur, rub, or gallop. Abdomen:   Soft, non-tender, non-distended; normal bowel sounds; no masses, no organomegaly    Genitalia:   Deferred    Rectal:   Deferred    Extremities:  No cyanosis, clubbing or edema    Pulses:  2+ and symmetric    Skin:  No jaundice, rashes, or lesions    Lymph nodes:  No palpable cervical lymphadenopathy        Lab Results:   No visits with results within 1 Day(s) from this visit. Latest known visit with results is:   Hospital Outpatient Visit on 10/11/2023   Component Date Value   • Case Report 10/11/2023                      Value:Surgical Pathology Report                         Case: [id number]                                   Authorizing Provider:  Dorothy Jones MD          Collected:           10/11/2023 0850              Ordering Location:     [facility] End        Received:            10/12/2023 [address] [place] [address] Endoscopy                                                     Pathologist:           Jeffrey Vang MD                                                            Specimens:   A) - Duodenum, duodenal bx r/o celiac                                                               B) - Stomach, gastric bx r/o h pylori                                                     • Final Diagnosis 10/11/2023                      Value: This result contains rich text formatting which cannot be displayed here. • Additional Information 10/11/2023                      Value: This result contains rich text formatting which cannot be displayed here. • Gross Description 10/11/2023                      Value: This result contains rich text formatting which cannot be displayed here.    • Clinical Information 10/11/2023 Value: INDICATION:  Anemia, unspecified type   FINDINGS:  · The duodenal bulb and 2nd part of the duodenum appeared normal. Performed random biopsy using biopsy forceps to rule out celiac disease. · 2 cm hiatal hernia - GE junction 40 cm from the incisors, diaphragmatic impression 42 cm from the incisors:  Hill classification: Grade II  · Otherwise normal stomach. Biopsies taken for h pylori  · The esophagus appeared normal.           Radiology Results:   Colonoscopy    Result Date: 10/11/2023  Narrative: Table formatting from the original result was not included. [address] [facility] Endoscopy [facility],2 And 3 S Floors [phone] DATE OF SERVICE: 10/11/23 PHYSICIAN(S): Attending: Anabel Rivas MD Fellow: No Staff Documented INDICATION: Chronic constipation, Anemia, unspecified type POST-OP DIAGNOSIS: See the impression below. HISTORY: Prior colonoscopy: 5 years ago. BOWEL PREPARATION: Golytely/Colyte/Trilyte PREPROCEDURE: Informed consent was obtained for the procedure, including sedation. Risks including but not limited to bleeding, infection, perforation, adverse drug reaction and aspiration were explained in detail. Also explained about less than 100% sensitivity with the exam and other alternatives. The patient was placed in the left lateral decubitus position. Procedure: Colonoscopy DETAILS OF PROCEDURE: Patient was taken to the procedure room where a time out was performed to confirm correct patient and correct procedure. The patient underwent monitored anesthesia care, which was administered by an anesthesia professional. The patient's blood pressure, heart rate, level of consciousness, oxygen, respirations and ECG were monitored throughout the procedure. A digital rectal exam was performed. The scope was introduced through the anus and advanced to the cecum. Retroflexion was performed in the rectum.  The quality of bowel preparation was evaluated using the [facility] Bowel Preparation Scale with scores of: right colon = 1, transverse colon = 1, left colon = 1. The total BBPS score was 3. Bowel prep was not adequate. The patient experienced no blood loss. The procedure was not difficult. The patient tolerated the procedure well. There were no apparent adverse events. ANESTHESIA INFORMATION: ASA: III Anesthesia Type: IV Sedation with Anesthesia MEDICATIONS: sodium chloride 0.9 % infusion 800 mL*  *From user-documented volume (Totals for administrations occurring from 0844 to 0919 on 10/11/23) FINDINGS: Inadequate prep with opaque liquid stool throughout the colon that could not be fully cleared due to scope clogging. No large lesions were seen but mucosal inspection was limited. EVENTS: Procedure Events Event Event Time ENDO CECUM REACHED 10/11/2023  9:05 AM ENDO SCOPE OUT TIME 10/11/2023  9:17 AM SPECIMENS: ID Type Source Tests Collected by Time Destination 1 : duodenal bx r/o celiac Tissue Duodenum TISSUE EXAM Brittany Davis MD 10/11/2023  8:50 AM  2 : gastric bx r/o h pylori Tissue Stomach TISSUE EXAM Brittany Davis MD 10/11/2023  8:51 AM  EQUIPMENT: Colonoscope - #[id number]     Impression: Inadequate prep with opaque liquid stool throughout the colon that could not be fully cleared due to scope clogging. No large lesions were seen but mucosal inspection was limited. RECOMMENDATION:  Repeat colonoscopy in 6 months  Inadequate bowel preparation   2 day prep for next colonoscopy    Brittany Davis MD     EGD    Result Date: 10/11/2023  Narrative: Table formatting from the original result was not included. [address] [facility] Endoscopy [address] [place] [address],2 And 3 S Floors [phone] DATE OF SERVICE: 10/11/23 PHYSICIAN(S): Attending: Brittany Davis MD Fellow: No Staff Documented INDICATION: Anemia, unspecified type POST-OP DIAGNOSIS: See the impression below. PREPROCEDURE: Informed consent was obtained for the procedure, including sedation.   Risks of perforation, hemorrhage, adverse drug reaction and aspiration were discussed. The patient was placed in the left lateral decubitus position. Patient was explained about the risks and benefits of the procedure. Risks including but not limited to bleeding, infection, and perforation were explained in detail. Also explained about less than 100% sensitivity with the exam and other alternatives. PROCEDURE: EGD DETAILS OF PROCEDURE: Patient was taken to the procedure room where a time out was performed to confirm correct patient and correct procedure. The patient underwent monitored anesthesia care, which was administered by an anesthesia professional. The patient's blood pressure, heart rate, level of consciousness, respirations, oxygen and ETCO2 were monitored throughout the procedure. The scope was advanced to the second part of the duodenum. Retroflexion was performed in the fundus. The patient experienced no blood loss. The procedure was not difficult. The patient tolerated the procedure well. There were no apparent adverse events. ANESTHESIA INFORMATION: ASA: III Anesthesia Type: IV Sedation with Anesthesia MEDICATIONS: No administrations occurring from 0844 to 0855 on 10/11/23 FINDINGS: The duodenal bulb and 2nd part of the duodenum appeared normal. Performed random biopsy using biopsy forceps to rule out celiac disease. 2 cm hiatal hernia - GE junction 40 cm from the incisors, diaphragmatic impression 42 cm from the incisors:  Hill classification: Grade II Otherwise normal stomach. Biopsies taken for h pylori The esophagus appeared normal. SPECIMENS: ID Type Source Tests Collected by Time Destination 1 : duodenal bx r/o celiac Tissue Duodenum TISSUE EXAM David Dangelo MD 10/11/2023  8:50 AM  2 : gastric bx r/o h pylori Tissue Stomach TISSUE EXAM David Dangelo MD 10/11/2023  8:51 AM      Impression: The duodenal bulb and 2nd part of the duodenum appeared normal. Performed random biopsy using biopsy forceps to rule out celiac disease.  2 cm hiatal hernia - GE junction 40 cm from the incisors, diaphragmatic impression 42 cm from the incisors:  Hill classification: Grade II Otherwise normal stomach.  Biopsies taken for h pylori The esophagus appeared normal. RECOMMENDATION:  Await pathology results   Tristan Berrios MD

## 2023-12-06 DIAGNOSIS — E11.8 TYPE 2 DIABETES MELLITUS WITH COMPLICATION (HCC): ICD-10-CM

## 2023-12-12 ENCOUNTER — APPOINTMENT (OUTPATIENT)
Dept: LAB | Facility: CLINIC | Age: 63
End: 2023-12-12
Payer: COMMERCIAL

## 2023-12-12 DIAGNOSIS — F41.9 ANXIETY: ICD-10-CM

## 2023-12-12 DIAGNOSIS — F33.1 MAJOR DEPRESSIVE DISORDER, RECURRENT EPISODE, MODERATE (HCC): ICD-10-CM

## 2023-12-12 DIAGNOSIS — Z79.899 ENCOUNTER FOR LONG-TERM (CURRENT) USE OF OTHER MEDICATIONS: ICD-10-CM

## 2023-12-12 DIAGNOSIS — T78.40XD ALLERGY, SUBSEQUENT ENCOUNTER: ICD-10-CM

## 2023-12-12 DIAGNOSIS — F31.9 BIPOLAR AFFECTIVE DISORDER, REMISSION STATUS UNSPECIFIED (HCC): ICD-10-CM

## 2023-12-12 LAB
ALBUMIN SERPL BCP-MCNC: 4 G/DL (ref 3.5–5)
ALP SERPL-CCNC: 80 U/L (ref 34–104)
ALT SERPL W P-5'-P-CCNC: 12 U/L (ref 7–52)
ANION GAP SERPL CALCULATED.3IONS-SCNC: 4 MMOL/L
AST SERPL W P-5'-P-CCNC: 16 U/L (ref 13–39)
BASOPHILS # BLD AUTO: 0.04 THOUSANDS/ÂΜL (ref 0–0.1)
BASOPHILS NFR BLD AUTO: 1 % (ref 0–1)
BILIRUB SERPL-MCNC: 0.24 MG/DL (ref 0.2–1)
BUN SERPL-MCNC: 17 MG/DL (ref 5–25)
CALCIUM SERPL-MCNC: 8.8 MG/DL (ref 8.4–10.2)
CHLORIDE SERPL-SCNC: 109 MMOL/L (ref 96–108)
CHOLEST SERPL-MCNC: 186 MG/DL
CO2 SERPL-SCNC: 28 MMOL/L (ref 21–32)
CREAT SERPL-MCNC: 0.97 MG/DL (ref 0.6–1.3)
EOSINOPHIL # BLD AUTO: 0.36 THOUSAND/ÂΜL (ref 0–0.61)
EOSINOPHIL NFR BLD AUTO: 5 % (ref 0–6)
ERYTHROCYTE [DISTWIDTH] IN BLOOD BY AUTOMATED COUNT: 13.2 % (ref 11.6–15.1)
GFR SERPL CREATININE-BSD FRML MDRD: 63 ML/MIN/1.73SQ M
GLUCOSE P FAST SERPL-MCNC: 108 MG/DL (ref 65–99)
HCT VFR BLD AUTO: 33.5 % (ref 34.8–46.1)
HDLC SERPL-MCNC: 51 MG/DL
HGB BLD-MCNC: 11.1 G/DL (ref 11.5–15.4)
IMM GRANULOCYTES # BLD AUTO: 0.03 THOUSAND/UL (ref 0–0.2)
IMM GRANULOCYTES NFR BLD AUTO: 0 % (ref 0–2)
LDLC SERPL CALC-MCNC: 100 MG/DL (ref 0–100)
LYMPHOCYTES # BLD AUTO: 1.67 THOUSANDS/ÂΜL (ref 0.6–4.47)
LYMPHOCYTES NFR BLD AUTO: 24 % (ref 14–44)
MCH RBC QN AUTO: 30.5 PG (ref 26.8–34.3)
MCHC RBC AUTO-ENTMCNC: 33.1 G/DL (ref 31.4–37.4)
MCV RBC AUTO: 92 FL (ref 82–98)
MONOCYTES # BLD AUTO: 0.53 THOUSAND/ÂΜL (ref 0.17–1.22)
MONOCYTES NFR BLD AUTO: 8 % (ref 4–12)
NEUTROPHILS # BLD AUTO: 4.24 THOUSANDS/ÂΜL (ref 1.85–7.62)
NEUTS SEG NFR BLD AUTO: 62 % (ref 43–75)
NONHDLC SERPL-MCNC: 135 MG/DL
NRBC BLD AUTO-RTO: 0 /100 WBCS
PLATELET # BLD AUTO: 268 THOUSANDS/UL (ref 149–390)
PMV BLD AUTO: 9.9 FL (ref 8.9–12.7)
POTASSIUM SERPL-SCNC: 4.6 MMOL/L (ref 3.5–5.3)
PROT SERPL-MCNC: 6.5 G/DL (ref 6.4–8.4)
RBC # BLD AUTO: 3.64 MILLION/UL (ref 3.81–5.12)
SODIUM SERPL-SCNC: 141 MMOL/L (ref 135–147)
TRIGL SERPL-MCNC: 176 MG/DL
VZV IGG SER QL IA: NORMAL
WBC # BLD AUTO: 6.87 THOUSAND/UL (ref 4.31–10.16)

## 2023-12-12 PROCEDURE — 86787 VARICELLA-ZOSTER ANTIBODY: CPT

## 2023-12-12 PROCEDURE — 36415 COLL VENOUS BLD VENIPUNCTURE: CPT

## 2023-12-12 PROCEDURE — 80053 COMPREHEN METABOLIC PANEL: CPT

## 2023-12-12 PROCEDURE — 85025 COMPLETE CBC W/AUTO DIFF WBC: CPT

## 2023-12-12 PROCEDURE — 80061 LIPID PANEL: CPT

## 2023-12-14 ENCOUNTER — VBI (OUTPATIENT)
Dept: ADMINISTRATIVE | Facility: OTHER | Age: 63
End: 2023-12-14

## 2024-01-04 ENCOUNTER — TELEPHONE (OUTPATIENT)
Dept: GASTROENTEROLOGY | Facility: MEDICAL CENTER | Age: 64
End: 2024-01-04

## 2024-01-08 ENCOUNTER — ANESTHESIA (OUTPATIENT)
Dept: ANESTHESIOLOGY | Facility: HOSPITAL | Age: 64
End: 2024-01-08

## 2024-01-08 ENCOUNTER — ANESTHESIA EVENT (OUTPATIENT)
Dept: ANESTHESIOLOGY | Facility: HOSPITAL | Age: 64
End: 2024-01-08

## 2024-01-10 NOTE — TELEPHONE ENCOUNTER
LVM to confirm procedure, told patient that she needs to hold her trulicity 7 days before the procedure.

## 2024-01-25 ENCOUNTER — TELEPHONE (OUTPATIENT)
Dept: GASTROENTEROLOGY | Facility: CLINIC | Age: 64
End: 2024-01-25

## 2024-01-29 ENCOUNTER — ANESTHESIA (OUTPATIENT)
Dept: ANESTHESIOLOGY | Facility: HOSPITAL | Age: 64
End: 2024-01-29

## 2024-01-29 ENCOUNTER — ANESTHESIA EVENT (OUTPATIENT)
Dept: ANESTHESIOLOGY | Facility: HOSPITAL | Age: 64
End: 2024-01-29

## 2024-01-30 NOTE — TELEPHONE ENCOUNTER
LVM to confirm procedure, remind patient that she needs to stop trulicity 7 days prior procedure, and left call back number

## 2024-02-01 ENCOUNTER — TELEPHONE (OUTPATIENT)
Age: 64
End: 2024-02-01

## 2024-02-01 NOTE — TELEPHONE ENCOUNTER
Scheduled date of colonoscopy (as of today): 03/07/2024    Physician performing colonoscopy: Natalia     Location of colonoscopy: [facility]    Bowel prep reviewed with patient: 2 Day Golytely    Instructions reviewed with patient by: [address]     Clearances: 7 day trulicity hold    Pt rescheduled from 2/8 due to not feeling well and being exposed to covid.

## 2024-02-02 ENCOUNTER — TELEPHONE (OUTPATIENT)
Age: 64
End: 2024-02-02

## 2024-02-02 NOTE — TELEPHONE ENCOUNTER
Patients GI provider:  Nisha     Number to return call: [phone]    Reason for call: Pt calling to schedule her f/u from colonoscopy on 3/7/24. I put pt on the next avail of 5/29/24 and added to the waitlist. Pt feels this is not an acceptable date as her colonoscopy is 3/7/24. Please reach out to the pt if a sooner appt is needed    Scheduled procedure/appointment date if applicable: procedure 3/7/24

## 2024-02-21 PROBLEM — Z12.11 SCREENING FOR COLON CANCER: Status: RESOLVED | Noted: 2019-03-07 | Resolved: 2024-02-21

## 2024-02-23 ENCOUNTER — TELEPHONE (OUTPATIENT)
Dept: GASTROENTEROLOGY | Facility: MEDICAL CENTER | Age: 64
End: 2024-02-23

## 2024-02-23 NOTE — TELEPHONE ENCOUNTER
Confirming Upcoming Procedure: colonoscopy on 03/07/2024  Physician performing: Dr. Fisher  Location of procedure:  [facility]  Prep: 2 day Golytely

## 2024-02-26 DIAGNOSIS — E05.90 HYPERTHYROIDISM: ICD-10-CM

## 2024-02-26 RX ORDER — LEVOTHYROXINE SODIUM 88 UG/1
88 TABLET ORAL
Qty: 90 TABLET | Refills: 1 | Status: SHIPPED | OUTPATIENT
Start: 2024-02-26

## 2024-03-01 ENCOUNTER — ANESTHESIA (OUTPATIENT)
Dept: ANESTHESIOLOGY | Facility: HOSPITAL | Age: 64
End: 2024-03-01

## 2024-03-01 ENCOUNTER — ANESTHESIA EVENT (OUTPATIENT)
Dept: ANESTHESIOLOGY | Facility: HOSPITAL | Age: 64
End: 2024-03-01

## 2024-03-05 ENCOUNTER — NURSE TRIAGE (OUTPATIENT)
Age: 64
End: 2024-03-05

## 2024-03-05 NOTE — TELEPHONE ENCOUNTER
Patient is called she is scheduled to have colonoscopy on Thursday and woke this morning not feeling well and is all stuffy.  Patient is supposed to do a 2 day prep.  I advised her to take a covid test to confirm that it is not covid.  She is going to see how she is feeling and if she is not feeling well later on this evening she will call to cancel her procedure.

## 2024-03-06 ENCOUNTER — TELEPHONE (OUTPATIENT)
Age: 64
End: 2024-03-06

## 2024-03-06 RX ORDER — SODIUM CHLORIDE 9 MG/ML
125 INJECTION, SOLUTION INTRAVENOUS CONTINUOUS
OUTPATIENT
Start: 2024-03-06

## 2024-03-06 RX ORDER — ONDANSETRON 2 MG/ML
4 INJECTION INTRAMUSCULAR; INTRAVENOUS ONCE AS NEEDED
OUTPATIENT
Start: 2024-03-06

## 2024-03-06 RX ORDER — ALBUTEROL SULFATE 2.5 MG/3ML
2.5 SOLUTION RESPIRATORY (INHALATION) ONCE AS NEEDED
OUTPATIENT
Start: 2024-03-06

## 2024-03-06 NOTE — TELEPHONE ENCOUNTER
Pt rescheduled colon from 03/07/24 to 05/21/24 due to illness. Ispoke with Liliana at [facility] and informed her of the reschedule

## 2024-03-07 ENCOUNTER — OFFICE VISIT (OUTPATIENT)
Dept: URGENT CARE | Facility: CLINIC | Age: 64
End: 2024-03-07
Payer: COMMERCIAL

## 2024-03-07 VITALS
DIASTOLIC BLOOD PRESSURE: 62 MMHG | TEMPERATURE: 98 F | RESPIRATION RATE: 18 BRPM | SYSTOLIC BLOOD PRESSURE: 128 MMHG | OXYGEN SATURATION: 98 % | HEART RATE: 92 BPM

## 2024-03-07 DIAGNOSIS — H66.001 NON-RECURRENT ACUTE SUPPURATIVE OTITIS MEDIA OF RIGHT EAR WITHOUT SPONTANEOUS RUPTURE OF TYMPANIC MEMBRANE: ICD-10-CM

## 2024-03-07 DIAGNOSIS — R68.89 FLU-LIKE SYMPTOMS: ICD-10-CM

## 2024-03-07 DIAGNOSIS — R68.89 FLU-LIKE SYMPTOMS: Primary | ICD-10-CM

## 2024-03-07 PROCEDURE — 99213 OFFICE O/P EST LOW 20 MIN: CPT

## 2024-03-07 PROCEDURE — 87636 SARSCOV2 & INF A&B AMP PRB: CPT

## 2024-03-07 RX ORDER — OSELTAMIVIR PHOSPHATE 75 MG/1
75 CAPSULE ORAL EVERY 12 HOURS SCHEDULED
Qty: 10 CAPSULE | Refills: 0 | Status: SHIPPED | OUTPATIENT
Start: 2024-03-07 | End: 2024-03-12

## 2024-03-07 RX ORDER — ESZOPICLONE 1 MG/1
1 TABLET, FILM COATED ORAL
COMMUNITY
Start: 2024-02-14

## 2024-03-07 RX ORDER — AMOXICILLIN 875 MG/1
875 TABLET, COATED ORAL 2 TIMES DAILY
Qty: 10 TABLET | Refills: 0 | Status: SHIPPED | OUTPATIENT
Start: 2024-03-07 | End: 2024-03-12

## 2024-03-07 RX ORDER — BUSPIRONE HYDROCHLORIDE 10 MG/1
TABLET ORAL
COMMUNITY
Start: 2024-02-13

## 2024-03-07 NOTE — PATIENT INSTRUCTIONS
Take antibiotics for ear infection as prescribed.    Start Tamiflu as prescribed, await COVID/Flu test.  If Flu positive, finish course of Tamiflu.  If Flu negative, stop Tamiflu as you have another viral illness.  If COVID returns positive, stop Tamiflu and I will call in Paxlovid for you depending on how you are feeling.    Follow up with Primary Care Provider in 3-5 days if not improving.  Proceed to Emergency Department if symptoms worsen.    If tests have been performed at [facility], our office will contact you with results if changes need to be made to the care plan discussed with you at the visit.  You can review your full results on [facility] MyChart.

## 2024-03-07 NOTE — PROGRESS NOTES
[facility]        NAME: Ana Rosa Parker is a 64 y.o. female  : 1960    MRN: [id number]  DATE: 2024  TIME: 10:59 AM    Assessment and Plan   Flu-like symptoms [R68.89]  1. Flu-like symptoms  oseltamivir (TAMIFLU) 75 mg capsule    Covid/Flu- Office Collect Normal      2. Non-recurrent acute suppurative otitis media of right ear without spontaneous rupture of tympanic membrane  amoxicillin (AMOXIL) 875 mg tablet        Discussed window for Tamiflu and shared decision to start Tamiflu and await COVID/Flu test.    Patient Instructions   Take antibiotics for ear infection as prescribed.    Start Tamiflu as prescribed, await COVID/Flu test.  If Flu positive, finish course of Tamiflu.  If Flu negative, stop Tamiflu as you have another viral illness.  If COVID returns positive, stop Tamiflu and I will call in Paxlovid for you depending on how you are feeling.    Follow up with Primary Care Provider in 3-5 days if not improving.  Proceed to Emergency Department if symptoms worsen.    If tests have been performed at [facility], our office will contact you with results if changes need to be made to the care plan discussed with you at the visit.  [facility]    Chief Complaint     Chief Complaint   Patient presents with   • Cold Like Symptoms     Patient states that she woke up Tuesday with body aches, head congestion and thinks she had the flu.  Patient states that she head congestion, cough,and HA and teeth pain with ear congestion         History of Present Illness       Body aches, head congestion, right ear ache, and teeth pain starting 2 days ago.         Review of Systems   Review of Systems   Constitutional:  Positive for chills and fatigue. Negative for fever.   HENT:  Positive for congestion, ear pain, sinus pressure and sore throat. Negative for sinus pain and trouble swallowing (Due to pain).    Eyes:  Negative for pain and visual disturbance.    Respiratory:  Positive for cough. Negative for shortness of breath and wheezing.    Cardiovascular:  Negative for chest pain and palpitations.   Gastrointestinal:  Negative for abdominal pain, diarrhea, nausea and vomiting.   Genitourinary:  Negative for dysuria and hematuria.   Musculoskeletal:  Positive for myalgias. Negative for arthralgias and back pain.   Skin:  Negative for color change and rash.   Neurological:  Negative for dizziness, seizures, syncope and light-headedness.   All other systems reviewed and are negative.        Current Medications       Current Outpatient Medications:   •  amoxicillin (AMOXIL) 875 mg tablet, Take 1 tablet (875 mg total) by mouth 2 (two) times a day for 5 days, Disp: 10 tablet, Rfl: 0  •  busPIRone (BUSPAR) 10 mg tablet, TAKE 1 TABLET BY MOUTH TWICE A DAY EVERY MORNING AND EVERY AFTERNOON, Disp: , Rfl:   •  dulaglutide (Trulicity) 3 MG/0.5ML injection, Inject 0.5 mL (3 mg total) under the skin once a week, Disp: 3 mL, Rfl: 3  •  eszopiclone (LUNESTA) 1 mg tablet, Take 1 mg by mouth daily at bedtime, Disp: , Rfl:   •  levothyroxine 88 mcg tablet, TAKE 1 TABLET (88 MCG TOTAL) BY MOUTH DAILY IN THE EARLY MORNING, Disp: 90 tablet, Rfl: 1  •  loratadine (CLARITIN) 10 mg tablet, Take 1 tablet (10 mg total) by mouth 2 (two) times a day, Disp: 60 tablet, Rfl: 8  •  metFORMIN (GLUCOPHAGE) 500 mg tablet, TAKE 2 TABLETS BY MOUTH TWICE A DAY WITH FOOD, Disp: 360 tablet, Rfl: 0  •  omeprazole (PriLOSEC) 20 mg delayed release capsule, Take 1 capsule (20 mg total) by mouth daily before breakfast, Disp: 90 capsule, Rfl: 1  •  oseltamivir (TAMIFLU) 75 mg capsule, Take 1 capsule (75 mg total) by mouth every 12 (twelve) hours for 5 days, Disp: 10 capsule, Rfl: 0  •  QUEtiapine (SEROquel) 200 mg tablet, Take 1 tablet (200 mg total) by mouth daily at bedtime, Disp: 90 tablet, Rfl: 1  •  QUEtiapine (SEROquel) 25 mg tablet, Take 25 mg by mouth 2 (two) times a day as needed, Disp: , Rfl:   •   rosuvastatin (CRESTOR) 5 mg tablet, TAKE 1 TABLET (5 MG TOTAL) BY MOUTH DAILY., Disp: 90 tablet, Rfl: 1  •  sertraline (ZOLOFT) 100 mg tablet, TAKE 2 TABLETS (200 MG TOTAL) BY MOUTH DAILY AT BEDTIME, Disp: 180 tablet, Rfl: 1  •  traZODone (DESYREL) 50 mg tablet, TAKE 1 TABLET BY MOUTH DAILY AT BEDTIME, Disp: 90 tablet, Rfl: 1  •  venlafaxine (EFFEXOR-XR) 150 mg 24 hr capsule, TAKE 1 CAPSULE BY MOUTH EVERY DAY, Disp: 90 capsule, Rfl: 1  •  albuterol (2.5 mg/3 mL) 0.083 % nebulizer solution, Take 3 mL (2.5 mg total) by nebulization every 4 (four) hours as needed for wheezing (Patient not taking: Reported on 8/28/2023), Disp: 75 mL, Rfl: 2  •  albuterol (Proventil HFA) 90 mcg/act inhaler, Inhale 1-2 puffs every 4 (four) hours as needed for wheezing (Patient not taking: Reported on 8/28/2023), Disp: 36 g, Rfl: 1  •  docusate sodium (COLACE) 100 mg capsule, Take 1 capsule (100 mg total) by mouth 2 (two) times a day (Patient not taking: Reported on 10/11/2023), Disp: 60 capsule, Rfl: 0  •  gabapentin (NEURONTIN) 100 mg capsule, Take 1 capsule (100 mg total) by mouth daily at bedtime (Patient taking differently: Take 100 mg by mouth daily at bedtime 2x), Disp: 90 capsule, Rfl: 1  •  gabapentin (NEURONTIN) 300 mg capsule, Take 300 mg by mouth every evening (Patient not taking: Reported on 4/6/2023), Disp: , Rfl:   •  lisinopril (ZESTRIL) 10 mg tablet, Take 1 tablet (10 mg total) by mouth daily (Patient not taking: Reported on 8/24/2023), Disp: 90 tablet, Rfl: 3  •  polyethylene glycol (Golytely) 4000 mL solution, Take 4,000 mL by mouth once for 1 dose Take 4000 mL by mouth once for 1 dose. Use as directed, Disp: 4000 mL, Rfl: 0  •  polyethylene glycol (MIRALAX) 17 g packet, Take 17 g by mouth daily (Patient not taking: Reported on 11/1/2023), Disp: 30 each, Rfl: 0  •  senna (SENOKOT) 8.6 mg, Take 1 tablet (8.6 mg total) by mouth daily at bedtime (Patient not taking: Reported on 10/11/2023), Disp: 30 tablet, Rfl:  0    Current Allergies     Allergies as of 03/07/2024 - Reviewed 03/07/2024   Allergen Reaction Noted   • Darvon [propoxyphene] Dizziness 01/26/2019   • Fluoxetine Other (See Comments) 10/01/2012   • Meclizine Dizziness 10/01/2012   • Morphine and related Nausea Only 10/01/2012   • Oxycodone-acetaminophen Other (See Comments) and Tachycardia 10/01/2012   • Percolone [oxycodone] Other (See Comments) and Tachycardia 03/20/2019            The following portions of the patient's history were reviewed and updated as appropriate: allergies, current medications, past family history, past medical history, past social history, past surgical history and problem list.     Past Medical History:   Diagnosis Date   • Anxiety    • Arthritis    • Asthma    • Claustrophobia    • CPAP (continuous positive airway pressure) dependence    • Depression    • Diabetes mellitus (HCC)    • Disease of thyroid gland     hypo   • GERD (gastroesophageal reflux disease)    • Headache    • History of COVID-19    • Hyperlipemia    • Kidney stone    • Kidney stones    • Major depressive disorder    • Motion sickness    • Risk for falls    • Sleep apnea    • Use of cane as ambulatory aid    • Wears glasses    • Wears partial dentures     upper partial       Past Surgical History:   Procedure Laterality Date   • ADENOIDECTOMY     • COLONOSCOPY     • DILATION AND CURETTAGE OF UTERUS     • ND ARTHRS KNE SURG W/MENISCECTOMY MED/LAT W/SHVG Left 3/24/2022    Procedure: ARTHROSCOPY KNEE w/ partial medial menisectomy;  Surgeon: Terry Bhatt MD;  Location: [facility] OR;  Service: Orthopedics   • ND COLONOSCOPY FLX DX W/COLLJ SPEC WHEN PFRMD N/A 3/15/2019    Procedure: COLONOSCOPY;  Surgeon: Angel Saavedra MD;  Location: [facility] GI LAB;  Service: Gastroenterology   • ROTATOR CUFF REPAIR Right    • SHOULDER SURGERY Left     impingement   • TONSILLECTOMY     • TUBAL LIGATION         Family History   Problem Relation Age of Onset   • ALS Mother    • Venous  thrombosis Father    • Diabetes Father    • Other Family         cerebral embolism   • Diabetes Family    • Hypertension Family    • Kidney disease Family    • Breast cancer Neg Hx    • Colon cancer Neg Hx    • Ovarian cancer Neg Hx    • Uterine cancer Neg Hx          Medications have been verified.        Objective   /62   Pulse 92   Temp 98 °F (36.7 °C) (Tympanic)   Resp 18   LMP  (LMP Unknown)   SpO2 98%   No LMP recorded (lmp unknown). Patient is postmenopausal.       Physical Exam     Physical Exam  Vitals and nursing note reviewed.   Constitutional:       Appearance: Normal appearance.   HENT:      Head: Normocephalic and atraumatic.      Right Ear: Tympanic membrane is erythematous.      Left Ear: Tympanic membrane normal.      Nose: Congestion present.      Mouth/Throat:      Mouth: Mucous membranes are moist.   Eyes:      Pupils: Pupils are equal, round, and reactive to light.   Cardiovascular:      Rate and Rhythm: Normal rate.      Pulses: Normal pulses.      Heart sounds: Normal heart sounds.   Pulmonary:      Effort: Pulmonary effort is normal.      Breath sounds: Normal breath sounds.   Abdominal:      Tenderness: There is no abdominal tenderness.   Musculoskeletal:      Cervical back: Normal range of motion and neck supple.   Skin:     General: Skin is warm and dry.      Capillary Refill: Capillary refill takes less than 2 seconds.   Neurological:      General: No focal deficit present.      Mental Status: She is alert and oriented to person, place, and time. Mental status is at baseline.      Sensory: No sensory deficit.      Motor: No weakness.   Psychiatric:         Mood and Affect: Mood normal.         Behavior: Behavior normal.         Thought Content: Thought content normal.

## 2024-03-08 ENCOUNTER — TELEPHONE (OUTPATIENT)
Dept: URGENT CARE | Age: 64
End: 2024-03-08

## 2024-03-08 LAB
FLUAV RNA RESP QL NAA+PROBE: NEGATIVE
FLUBV RNA RESP QL NAA+PROBE: NEGATIVE
SARS-COV-2 RNA RESP QL NAA+PROBE: NEGATIVE

## 2024-03-10 DIAGNOSIS — E11.8 TYPE 2 DIABETES MELLITUS WITH COMPLICATION (HCC): ICD-10-CM

## 2024-04-13 DIAGNOSIS — E78.2 MIXED HYPERLIPIDEMIA: ICD-10-CM

## 2024-04-13 RX ORDER — ROSUVASTATIN CALCIUM 5 MG/1
5 TABLET, COATED ORAL DAILY
Qty: 90 TABLET | Refills: 1 | Status: SHIPPED | OUTPATIENT
Start: 2024-04-13

## 2024-05-05 LAB — HBA1C MFR BLD HPLC: 7.3 %

## 2024-05-06 ENCOUNTER — ANESTHESIA (OUTPATIENT)
Dept: ANESTHESIOLOGY | Facility: HOSPITAL | Age: 64
End: 2024-05-06

## 2024-05-06 ENCOUNTER — ANESTHESIA EVENT (OUTPATIENT)
Dept: ANESTHESIOLOGY | Facility: HOSPITAL | Age: 64
End: 2024-05-06

## 2024-05-08 ENCOUNTER — TRANSITIONAL CARE MANAGEMENT (OUTPATIENT)
Dept: FAMILY MEDICINE CLINIC | Facility: CLINIC | Age: 64
End: 2024-05-08

## 2024-05-10 ENCOUNTER — TELEPHONE (OUTPATIENT)
Dept: GASTROENTEROLOGY | Facility: MEDICAL CENTER | Age: 64
End: 2024-05-10

## 2024-05-10 ENCOUNTER — OFFICE VISIT (OUTPATIENT)
Dept: FAMILY MEDICINE CLINIC | Facility: CLINIC | Age: 64
End: 2024-05-10
Payer: COMMERCIAL

## 2024-05-10 VITALS
BODY MASS INDEX: 35.16 KG/M2 | WEIGHT: 211 LBS | HEIGHT: 65 IN | OXYGEN SATURATION: 98 % | SYSTOLIC BLOOD PRESSURE: 140 MMHG | TEMPERATURE: 98.7 F | DIASTOLIC BLOOD PRESSURE: 80 MMHG | HEART RATE: 78 BPM

## 2024-05-10 DIAGNOSIS — G45.9 TIA (TRANSIENT ISCHEMIC ATTACK): ICD-10-CM

## 2024-05-10 DIAGNOSIS — R26.89 BALANCE PROBLEM: ICD-10-CM

## 2024-05-10 DIAGNOSIS — E11.8 TYPE 2 DIABETES MELLITUS WITH COMPLICATION (HCC): ICD-10-CM

## 2024-05-10 DIAGNOSIS — Z76.89 ENCOUNTER FOR SUPPORT AND COORDINATION OF TRANSITION OF CARE: Primary | ICD-10-CM

## 2024-05-10 LAB — SL AMB POCT HEMOGLOBIN AIC: 6.6 (ref ?–6.5)

## 2024-05-10 PROCEDURE — 99496 TRANSJ CARE MGMT HIGH F2F 7D: CPT | Performed by: STUDENT IN AN ORGANIZED HEALTH CARE EDUCATION/TRAINING PROGRAM

## 2024-05-10 PROCEDURE — 83036 HEMOGLOBIN GLYCOSYLATED A1C: CPT | Performed by: STUDENT IN AN ORGANIZED HEALTH CARE EDUCATION/TRAINING PROGRAM

## 2024-05-10 RX ORDER — ROSUVASTATIN CALCIUM 20 MG/1
20 TABLET, COATED ORAL DAILY
COMMUNITY

## 2024-05-10 RX ORDER — ASPIRIN 81 MG/1
81 TABLET, CHEWABLE ORAL DAILY
COMMUNITY

## 2024-05-10 NOTE — PROGRESS NOTES
Assessment & Plan     1. Encounter for support and coordination of transition of care    2. Type 2 diabetes mellitus with complication (HCC)  -     POCT hemoglobin A1c  -     Ambulatory Referral to Diabetic Education; Future    3. TIA (transient ischemic attack)    4. Balance problem  -     Ambulatory Referral to Physical Therapy; Future      Counseled patient extensively on importance of following diabetic diet.  Referral to diabetic education placed at today's visit as well as handout regarding dietetic substitutions for food provided to patient.  Patient expressed understanding.  Given patient's balance issues, referral to PT placed to further work with patient.  Explained to patient that high sugar levels may be contributing to poor balance given diabetes.  Patient otherwise compliant with current diabetic regiment tolerating with no issues.    Given issues requiring follow-up, patient to return in 2 weeks for follow-up with PCP.       Subjective     Transitional Care Management Review:   Ana Rosa Parker is a 64 y.o. female here for TCM follow up.     During the TCM phone call patient stated:  TCM Call     Date and time call was made  5/8/2024  2:00 PM    Patient was hospitialized at  Other (comment)    [facility]    Date of Admission  05/04/24    Date of discharge  05/06/24    Diagnosis  STROKE LIKE SYMPTOMS    Disposition  Home    Were the patients medications reviewed and updated  Yes    Current Symptoms  None      TCM Call     Post hospital issues  None    Should patient be enrolled in anticoag monitoring?  No    Scheduled for follow up?  Yes    Did you obtain your prescribed medications  Yes    Do you need help managing your prescriptions or medications  No    Is transportation to your appointment needed  No    I have advised the patient to call PCP with any new or worsening symptoms  ELO NIEVES CMA    Living Arrangements  Family members    Support System  Family    The type of support  "provided  Emotional; Financial; Physical    Do you have social support  Yes, as much as I need    Are you recieving any outpatient services  No    Are you recieving home care services  No    Are you using any community resources  No    Current waiver services  No    Have you fallen in the last 12 months  No    Interperter language line needed  No    Counseling  Patient    Comments  SCHEDULED FOR 8/28/23  TOO LATE FOR TCM        HPI  Chest tightness, SOB, dizziness, nausea, off-balance -hospitalized from 5/4 to 5/6.  Patient hospitalized in [place] and was worked up for stroke symptoms.  Workup came back negative and patient was believed to have a TIA.  Today, patient reports that she continues to be dizzy and have some tightness.  Patient continues to eat sweets regularly and have white bread with meals.  Patient overall doing better following hospital    Review of Systems   Constitutional:  Negative for chills and fever.   HENT:  Negative for sore throat.    Respiratory:  Negative for cough and shortness of breath.    Cardiovascular:  Negative for chest pain and palpitations.   Gastrointestinal:  Negative for abdominal pain, constipation, diarrhea, nausea and vomiting.   Genitourinary:  Negative for difficulty urinating and dysuria.   Neurological:  Positive for dizziness. Negative for headaches.       Objective     /80   Pulse 78   Temp 98.7 °F (37.1 °C)   Ht 5' 5\" (1.651 m)   Wt 95.7 kg (211 lb)   LMP  (LMP Unknown)   SpO2 98%   BMI 35.11 kg/m²      Physical Exam  Constitutional:       General: She is not in acute distress.     Appearance: She is well-developed.   HENT:      Head: Normocephalic and atraumatic.   Cardiovascular:      Rate and Rhythm: Normal rate and regular rhythm.      Heart sounds: Normal heart sounds. No murmur heard.  Pulmonary:      Effort: Pulmonary effort is normal. No respiratory distress.      Breath sounds: Normal breath sounds.   Abdominal:      Palpations: Abdomen is soft. "      Tenderness: There is no abdominal tenderness.   Musculoskeletal:      Right lower leg: No edema.      Left lower leg: No edema.   Neurological:      Mental Status: She is alert.       Medications have been reviewed by provider in current encounter    Andres Gutierrez MD

## 2024-05-10 NOTE — TELEPHONE ENCOUNTER
Confirming Upcoming Procedure: Colonoscopy on 05/21/20204  Physician performing: Dr. Fisher  Location of procedure:  [facility]  Prep: 2 Days Nereida

## 2024-05-10 NOTE — PROGRESS NOTES
TCM Call       Date and time call was made  5/8/2024  2:00 PM    Patient was hospitialized at  Other (comment)    [facility]    Date of Admission  05/04/24    Date of discharge  05/06/24    Diagnosis  STROKE LIKE SYMPTOMS    Disposition  Home    Were the patients medications reviewed and updated  Yes    Current Symptoms  None          TCM Call       Post hospital issues  None    Should patient be enrolled in anticoag monitoring?  No    Scheduled for follow up?  Yes    Did you obtain your prescribed medications  Yes    Do you need help managing your prescriptions or medications  No    Is transportation to your appointment needed  No    I have advised the patient to call PCP with any new or worsening symptoms  ELO NIEVES CMA    Living Arrangements  Family members    Support System  Family    The type of support provided  Emotional; Financial; Physical    Do you have social support  Yes, as much as I need    Are you recieving any outpatient services  No    Are you recieving home care services  No    Are you using any community resources  No    Current waiver services  No    Have you fallen in the last 12 months  No    Interperter language line needed  No    Counseling  Patient    Comments  SCHEDULED FOR 8/28/23  TOO LATE FOR TCM

## 2024-05-13 ENCOUNTER — TELEPHONE (OUTPATIENT)
Age: 64
End: 2024-05-13

## 2024-05-20 ENCOUNTER — ANESTHESIA EVENT (OUTPATIENT)
Dept: ANESTHESIOLOGY | Facility: HOSPITAL | Age: 64
End: 2024-05-20

## 2024-05-20 ENCOUNTER — ANESTHESIA (OUTPATIENT)
Dept: ANESTHESIOLOGY | Facility: HOSPITAL | Age: 64
End: 2024-05-20

## 2024-05-20 ENCOUNTER — TELEPHONE (OUTPATIENT)
Age: 64
End: 2024-05-20

## 2024-05-20 NOTE — ANESTHESIA PREPROCEDURE EVALUATION
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         Additional Monitors:     Airway Plan:            Plan Factors-Exercise tolerance (METS): >4 METS.    Chart reviewed.   Existing labs reviewed. Patient summary reviewed.    Patient is not a current smoker.              Induction- intravenous.    Postoperative Plan-         Informed Consent- Anesthetic plan and risks discussed with patient.

## 2024-05-20 NOTE — TELEPHONE ENCOUNTER
Patient calling back as she is waiting to hear if she should reschedule or start her prep; please advise!

## 2024-05-20 NOTE — TELEPHONE ENCOUNTER
Pt called in about procedure tomorrow. I advised that provider recommended rescheduling.     Procedure for tomorrow to be cancelled. Pt requests call back to reschedule and inquired time frame of when she can have colon.

## 2024-05-20 NOTE — TELEPHONE ENCOUNTER
Patients GI provider:  Dr. Fisher    Number to return call: [phone]    Reason for call: Pt calling stating she was in a [place] [facility] on 6/4/24 with poss. TIA, low magnesium, and Sugar values that were up and down.  Please return her call to to advise if ok to go ahead with procedure.    Scheduled procedure/appointment date if applicable: 5/21/24

## 2024-05-20 NOTE — TELEPHONE ENCOUNTER
Pt calling to see if  Approved colonoscopy for tomorrow. Reached out to office and they will call the pt back as soon as they speak with the

## 2024-05-20 NOTE — TELEPHONE ENCOUNTER
Spoke with pt advised she should reschedule, pt would like to proceed. Per pt had MRI in [place] which was negative. Please advise.

## 2024-05-21 NOTE — TELEPHONE ENCOUNTER
I spoke with anesthesia and they recommend the safest thing before undergoing elective anesthesia is to wait 3 months after her TIA. Please call patient to reschedule for 3 months out from her hospitalization.

## 2024-05-22 ENCOUNTER — TELEPHONE (OUTPATIENT)
Age: 64
End: 2024-05-22

## 2024-05-22 NOTE — TELEPHONE ENCOUNTER
Fani stated she has note heard from the Endocrinology office regarding her referral.    Patient has been give [phone] number to call and schedule.

## 2024-05-23 ENCOUNTER — TELEPHONE (OUTPATIENT)
Age: 64
End: 2024-05-23

## 2024-05-23 ENCOUNTER — EVALUATION (OUTPATIENT)
Dept: PHYSICAL THERAPY | Facility: CLINIC | Age: 64
End: 2024-05-23
Payer: COMMERCIAL

## 2024-05-23 DIAGNOSIS — R26.89 BALANCE PROBLEM: ICD-10-CM

## 2024-05-23 PROCEDURE — 97161 PT EVAL LOW COMPLEX 20 MIN: CPT | Performed by: PHYSICAL THERAPIST

## 2024-05-23 PROCEDURE — 97112 NEUROMUSCULAR REEDUCATION: CPT | Performed by: PHYSICAL THERAPIST

## 2024-05-23 NOTE — PROGRESS NOTES
PT Evaluation     Today's date: 2024  Patient name: Ana Rosa Parker  : 1960  MRN: [id number]  Referring provider: Andres Gutierrez MD  Dx:   Encounter Diagnosis     ICD-10-CM    1. Balance problem  R26.89 Ambulatory Referral to Physical Therapy                     Assessment  Impairments: abnormal muscle firing, abnormal muscle tone, abnormal or restricted ROM, impaired physical strength, lacks appropriate home exercise program and pain with function    Assessment details: Ana Rosa Parker is a pleasant 64 y.o. female presents with balance deficits and RLE weakness. Pt required breaks throughout session to allow dizziness to calm but maanged well. Educated on HEP to address.   No further referral appears necessary at this time.     Skilled PT services appropriate to facilitate return to prior level of function with transition to home exercise program for independent management when appropriate.    Understanding of Dx/Px/POC: good     Prognosis: good    Plan  Patient would benefit from: skilled PT  Referral necessary: No  Planned modality interventions: thermotherapy: hydrocollator packs    Planned therapy interventions: home exercise program, manual therapy, neuromuscular re-education, patient education, functional ROM exercises, strengthening, stretching, joint mobilization, graded activity, graded exercise, therapeutic exercise, body mechanics training, motor coordination training and activity modification    Frequency: 2x week  Duration in weeks: 12  Treatment plan discussed with: patient        Subjective Evaluation    History of Present Illness  Mechanism of injury: Pt reports dizziness for 3 months and feels she has to hold onto objects to stay steady. She feels it is hard to focus her vision on close up objects. Pt denies dizziness when laying down or during driving. She feels walking around is the most challenging. Pt reports dizziness has worsened since her recent potential TIA. She notes greater  fatigue recently as well. Dizziness can take a few minutes to resolve when she rests.   Pain  Current pain ratin  At best pain ratin  At worst pain ratin          Objective     Strength/Myotome Testing     Left Hip   Planes of Motion   Flexion: 4+    Right Hip   Planes of Motion   Flexion: 4-    Left Knee   Flexion: 4-  Extension: 4+    Right Knee   Flexion: 4-  Extension: 4-    Left Ankle/Foot   Dorsiflexion: 5  Plantar flexion: 5    Right Ankle/Foot   Dorsiflexion: 4-  Plantar flexion: 5  Great toe extension: 5    Functional Assessment        Comments  Gait: veering L/right  Gait with head turns: increase in waver with greater veering  Tandem balance: 5 second hold with balance loss  Tandem on foam: unable to perform due to balance loss  Convergance test +             Precautions: balance deficits      Manuals                                                                 Neuro Re-Ed             Tandem balance Stationary, head turns             Tandem gait nv            Iso hip flexion nv            Biodex nv                                                   Ther Ex             bike nv                                                                                                       Ther Activity                                       Gait Training                                       Modalities

## 2024-05-28 ENCOUNTER — CONSULT (OUTPATIENT)
Dept: DIABETES SERVICES | Facility: CLINIC | Age: 64
End: 2024-05-28
Payer: COMMERCIAL

## 2024-05-28 VITALS — BODY MASS INDEX: 33.71 KG/M2 | WEIGHT: 202.6 LBS

## 2024-05-28 DIAGNOSIS — E11.8 TYPE 2 DIABETES MELLITUS WITH COMPLICATION (HCC): Primary | ICD-10-CM

## 2024-05-28 PROCEDURE — 97802 MEDICAL NUTRITION INDIV IN: CPT

## 2024-05-28 NOTE — PATIENT INSTRUCTIONS
Eat 3 meals per day, 4-5 hours apart. No meal skipping. Eat your snack 2-3 hours away from your meals.    Eat 30 grams of carbohydrate per meal, and no more than 15-20 grams per snack.    Exercise as able and approved by your physician.     Complete 3-day food log and return completed log at the follow up appointment.

## 2024-05-28 NOTE — PROGRESS NOTES
"Medical Nutrition Therapy        Assessment    Visit Type: Initial visit  Chief complaint/Medical Diagnosis/reason for visit E11.8    HPI Ana Rosa was seen in person for the initial MNT appointment. Patient was pleasant and willing to share assessment information. She also was engaged in learning and took notes during our session.    BG levels are not currently checked. Patient does take diabetes medication as prescribed.     Patient reported no current exercise regimen. She is about to start physical therapy in June. Explained how exercise lowers BG levels by creating more demand for glucose in the muscle cells. Encouraged additional exercise as able and approved by her physician.    Problems identified in food recall include meal skipping, inconsistent carbohydrate intake, high-fat and high-sodium convenience foods, high-fat dairy, limited non-starchy vegetables and whole grains, sugary beverages, and sweets. Consumption of carbohydrates ranges from 0 to 120 grams per meal. Explained basic pathophysiology of diabetes and impact of diet on blood glucose levels and disease complications. Explained how sodium influences volume retention, blood pressure, and complications for heart disease, stroke, and CKD.     Provided patient with a 1322 calorie meal plan to assist with consistency, balance and portion control.  Encouraged the consumption of regular meals at regular times.  Advised patient to keep carbohydrate intake to 30 grams per meal and 15-20 per snack to assist with glycemic control.  Suggested keeping protein intake to 5 ounces a day and fat to 3 servings daily to assist with lipid management and calorie control. Portion booklet and food labels were used to teach basic carbohydrate counting. Patient agreed to keep daily food logs and return them in 2 months for assessment. RD will remain available for further dietary questions/concerns.     Ht Readings from Last 1 Encounters:   05/10/24 5' 5\" (1.651 m)     Wt " "Readings from Last 3 Encounters:   05/28/24 91.9 kg (202 lb 9.6 oz)   05/10/24 95.7 kg (211 lb)   11/01/23 89.4 kg (197 lb 3.2 oz)     Weight Change: No    Barriers to Learning: cognitive    Do you follow any special diet presently?: No  Who shops: patient  Who cooks: patient    Food Log: Completed via the method of food recall    Wakes up 10;30 am    Breakfast:11 am; (skips but \"not often\") 2 slices (white) pb & j toast with diet iced tea with juice with 16 oz Juicy Juice OR 1 c fruit (pineapple or blueberries, cantaloupe, watermelon) with 16 oz Juicy Juice OR tuna pkt and usually with cheese and a slice bread  Morning Snack:tuna packet  Lunch:2 pm; tuna packet with water  Afternoon Snack: chocolate ice cream or candy bar or fruit  Dinner:6-7 pm; steak and broccoli (no carb) OR chicken w/ 1 c veg (corn or broccoli)   Evening Snack:8-9 pm; ice cream or pie or cake or farzana cake or 10-15 grapes (larger size)  Beverages: water, juice, diet iced tea  Eating out/Take out: 2 c ziti with salad (cucumber, tomato, lettuce, peppers) with diet iced tea  Exercise starts in June physical therapy 2x week for about 1 hour    Calorie needs 1322 kcals/day Carbs: 30 g/meal, 15-20 g/snack     Protein:5 ounces/day    Fat: 3 servings/day        Nutrition Diagnosis:  Food and nutrition related knowledge deficit  related to Lack of prior exposure to accurate nutrition related information as evidenced by Verbalizes inaccurate or incomplete information    Intervention: plate method, increased fiber intake, label reading, carbohydrate counting, meal timing, meal planning, exercise guidelines, and food diary     Treatment Goals: Patient understands education and recommendations, Patient will monitor food intake daily with tracker, Patient will consume 3 meals a day, Patient will count carbohydrates, and Patient will exercise    Monitoring and evaluation:    Term code indicator  FH 1.3.2 Food Intake Criteria: Eat 3 meals per day, 4-5 hours " apart. No meal skipping. Eat your snack 2-3 hours away from your meals.  Term code indicator  FH 1.6.3 Carbohydrate Intake Criteria: Eat 30 grams of carbohydrate per meal, and no more than 15-20 grams per snack.  Term code indicator  CH 2.2 Treatments/Therapy/Alternative Medicine Criteria: Exercise as able and approved by your physician.     Materials Provided: portion book, 3-day food log    Patient’s Response to Instruction:  Comprehensiongood  Motivationgood  Expected Compliancegood    Begin Time: 2:28 pm  End Time: 3:28 pm  Referring Provider: Andres Gutierrez MD    Thank you for coming to the [facility] for education today.  Please feel free to call with any questions or concerns.    Grazyna Villarreal, RD  [address] [place] [id number] [phone]

## 2024-05-29 ENCOUNTER — OFFICE VISIT (OUTPATIENT)
Dept: FAMILY MEDICINE CLINIC | Facility: CLINIC | Age: 64
End: 2024-05-29
Payer: COMMERCIAL

## 2024-05-29 VITALS
WEIGHT: 204.4 LBS | TEMPERATURE: 97.9 F | HEIGHT: 65 IN | DIASTOLIC BLOOD PRESSURE: 66 MMHG | OXYGEN SATURATION: 98 % | RESPIRATION RATE: 21 BRPM | HEART RATE: 82 BPM | BODY MASS INDEX: 34.05 KG/M2 | SYSTOLIC BLOOD PRESSURE: 116 MMHG

## 2024-05-29 DIAGNOSIS — E05.90 HYPERTHYROIDISM: ICD-10-CM

## 2024-05-29 DIAGNOSIS — I10 PRIMARY HYPERTENSION: ICD-10-CM

## 2024-05-29 DIAGNOSIS — E78.2 MIXED HYPERLIPIDEMIA: ICD-10-CM

## 2024-05-29 DIAGNOSIS — G45.9 TIA (TRANSIENT ISCHEMIC ATTACK): Primary | ICD-10-CM

## 2024-05-29 DIAGNOSIS — E03.9 HYPOTHYROIDISM, UNSPECIFIED TYPE: ICD-10-CM

## 2024-05-29 DIAGNOSIS — E11.9 TYPE 2 DIABETES MELLITUS WITHOUT COMPLICATION, WITHOUT LONG-TERM CURRENT USE OF INSULIN (HCC): ICD-10-CM

## 2024-05-29 DIAGNOSIS — R07.2 PRECORDIAL PAIN: ICD-10-CM

## 2024-05-29 PROCEDURE — 99214 OFFICE O/P EST MOD 30 MIN: CPT | Performed by: FAMILY MEDICINE

## 2024-05-29 RX ORDER — LEVOTHYROXINE SODIUM 88 UG/1
88 TABLET ORAL
Qty: 90 TABLET | Refills: 1 | Status: SHIPPED | OUTPATIENT
Start: 2024-05-29

## 2024-05-29 NOTE — PROGRESS NOTES
Assessment/Plan: Labs records reviewed.  Patient will refer to cardiology given chest pressure with history of TIA.  Patient will continue levothyroxine for hypothyroid state.  Refills given at this time.  Patient will restart Trulicity for diabetes.  Continue with aspirin therapy.  Patient to see nutritionist.  Follow-up 3 months.       Diagnoses and all orders for this visit:    TIA (transient ischemic attack)  -     CBC and differential; Future  -     Comprehensive metabolic panel; Future  -     Lipid panel; Future  -     TSH, 3rd generation with Free T4 reflex; Future  -     Hemoglobin A1C; Future    Hyperthyroidism  -     levothyroxine 88 mcg tablet; Take 1 tablet (88 mcg total) by mouth daily in the early morning    Primary hypertension  -     CBC and differential; Future  -     Comprehensive metabolic panel; Future  -     Lipid panel; Future  -     TSH, 3rd generation with Free T4 reflex; Future  -     Hemoglobin A1C; Future    Type 2 diabetes mellitus without complication, without long-term current use of insulin (HCC)  -     CBC and differential; Future  -     Comprehensive metabolic panel; Future  -     Lipid panel; Future  -     TSH, 3rd generation with Free T4 reflex; Future  -     Hemoglobin A1C; Future    Hypothyroidism, unspecified type  -     CBC and differential; Future  -     Comprehensive metabolic panel; Future  -     Lipid panel; Future  -     TSH, 3rd generation with Free T4 reflex; Future  -     Hemoglobin A1C; Future    Mixed hyperlipidemia  -     CBC and differential; Future  -     Comprehensive metabolic panel; Future  -     Lipid panel; Future  -     TSH, 3rd generation with Free T4 reflex; Future  -     Hemoglobin A1C; Future    Precordial pain  -     CBC and differential; Future  -     Comprehensive metabolic panel; Future  -     Lipid panel; Future  -     TSH, 3rd generation with Free T4 reflex; Future  -     Hemoglobin A1C; Future  -     Ambulatory Referral to Cardiology; Future       "      Subjective:        Patient ID: Ana Rosa Parker is a 64 y.o. female.      Patient is here to follow-up on TIA, hypertension hypothyroid state with history of hyperlipidemia.  Patient's Crestor was increased and patient is on aspirin on daily basis.  Patient status post hospitalization in [place] for TIA.  Patient still getting some chest tightness.  Patient is in physical therapy.  Patient with some weakness right arm and leg.  Patient is right-handed.          The following portions of the patient's history were reviewed and updated as appropriate: allergies, current medications, past family history, past medical history, past social history, past surgical history and problem list.      Review of Systems   Constitutional: Negative.    HENT: Negative.     Eyes: Negative.    Respiratory: Negative.     Cardiovascular:  Positive for chest pain.   Gastrointestinal: Negative.    Endocrine: Negative.    Genitourinary: Negative.    Musculoskeletal: Negative.    Skin: Negative.    Allergic/Immunologic: Negative.    Neurological:  Positive for dizziness.   Hematological: Negative.    Psychiatric/Behavioral: Negative.             Objective:        Depression Screening and Follow-up Plan: Patient's depression screening was positive with a PHQ-9 score of 11. Patient assessed for underlying major depression. Brief counseling provided and recommend additional follow-up/re-evaluation next office visit.             /66 (BP Location: Left arm, Patient Position: Sitting, Cuff Size: Large)   Pulse 82   Temp 97.9 °F (36.6 °C) (Temporal)   Resp 21   Ht 5' 5\" (1.651 m)   Wt 92.7 kg (204 lb 6.4 oz)   LMP  (LMP Unknown)   SpO2 98%   BMI 34.01 kg/m²          Physical Exam  Vitals and nursing note reviewed.   Constitutional:       General: She is not in acute distress.     Appearance: Normal appearance. She is not ill-appearing, toxic-appearing or diaphoretic.   HENT:      Head: Normocephalic and atraumatic.      Right Ear: " Tympanic membrane, ear canal and external ear normal. There is no impacted cerumen.      Left Ear: Tympanic membrane, ear canal and external ear normal. There is no impacted cerumen.      Nose: Nose normal. No congestion or rhinorrhea.      Mouth/Throat:      Mouth: Mucous membranes are moist.      Pharynx: No oropharyngeal exudate or posterior oropharyngeal erythema.   Eyes:      General: No scleral icterus.        Right eye: No discharge.         Left eye: No discharge.   Neck:      Vascular: No carotid bruit.   Cardiovascular:      Rate and Rhythm: Normal rate and regular rhythm.      Pulses: Normal pulses.      Heart sounds: Normal heart sounds. No murmur heard.     No friction rub. No gallop.   Pulmonary:      Effort: Pulmonary effort is normal. No respiratory distress.      Breath sounds: Normal breath sounds. No stridor. No wheezing, rhonchi or rales.   Chest:      Chest wall: No tenderness.   Musculoskeletal:         General: No swelling, tenderness, deformity or signs of injury. Normal range of motion.      Cervical back: Normal range of motion and neck supple. No rigidity. No muscular tenderness.      Right lower leg: No edema.      Left lower leg: No edema.   Lymphadenopathy:      Cervical: No cervical adenopathy.   Skin:     General: Skin is warm and dry.      Capillary Refill: Capillary refill takes less than 2 seconds.      Coloration: Skin is not jaundiced.      Findings: No bruising, erythema, lesion or rash.   Neurological:      Mental Status: She is alert and oriented to person, place, and time.      Sensory: No sensory deficit.      Gait: Gait normal.   Psychiatric:         Mood and Affect: Mood normal.         Behavior: Behavior normal.         Thought Content: Thought content normal.         Judgment: Judgment normal.

## 2024-05-30 RX ORDER — LISINOPRIL 10 MG/1
10 TABLET ORAL DAILY
Qty: 90 TABLET | Refills: 1 | Status: SHIPPED | OUTPATIENT
Start: 2024-05-30

## 2024-06-04 ENCOUNTER — OFFICE VISIT (OUTPATIENT)
Dept: PHYSICAL THERAPY | Facility: CLINIC | Age: 64
End: 2024-06-04
Payer: COMMERCIAL

## 2024-06-04 DIAGNOSIS — R26.89 BALANCE PROBLEM: Primary | ICD-10-CM

## 2024-06-04 PROCEDURE — 97112 NEUROMUSCULAR REEDUCATION: CPT | Performed by: PHYSICAL THERAPIST

## 2024-06-04 PROCEDURE — 97110 THERAPEUTIC EXERCISES: CPT | Performed by: PHYSICAL THERAPIST

## 2024-06-04 NOTE — PROGRESS NOTES
Daily Note     Today's date: 2024  Patient name: Ana Rosa Parker  : 1960  MRN: [id number]  Referring provider: Andres Gutierrez MD  Dx:   Encounter Diagnosis     ICD-10-CM    1. Balance problem  R26.89                      Subjective: Pt reports some improvement in balance recently.       Objective: See treatment diary below      Assessment: Tolerated treatment well. Patient would benefit from continued PT. Mild increase in dizziness post exercise and leg fatigue.       Plan: Progress note during next visit.      Precautions: balance deficits      Manuals                                                                Neuro Re-Ed             Tandem balance Stationary, head turns  Foam 1' ea            Tandem gait nv 20ftx4           Iso hip flexion nv X10            Biodex nv 6'           SL clamshell  GTB x20           Standing march  X10 ea                        Ther Ex             bike nv 3' L1                                                                                                      Ther Activity                                       Gait Training                                       Modalities

## 2024-06-05 ENCOUNTER — OFFICE VISIT (OUTPATIENT)
Dept: PHYSICAL THERAPY | Facility: CLINIC | Age: 64
End: 2024-06-05
Payer: COMMERCIAL

## 2024-06-05 DIAGNOSIS — R26.89 BALANCE PROBLEM: Primary | ICD-10-CM

## 2024-06-05 PROCEDURE — 97110 THERAPEUTIC EXERCISES: CPT | Performed by: PHYSICAL THERAPIST

## 2024-06-05 PROCEDURE — 97112 NEUROMUSCULAR REEDUCATION: CPT | Performed by: PHYSICAL THERAPIST

## 2024-06-05 NOTE — PROGRESS NOTES
Daily Note    Daily Note     Today's date: 2024  Patient name: Ana Rosa Parker  : 1960  MRN: [id number]  Referring provider: Andres Gutierrez MD  Dx:   Encounter Diagnosis     ICD-10-CM    1. Balance problem  R26.89                      Subjective: Pt reports fatigue after lv but no increased pain complaints.      Objective: See treatment diary below      Assessment: Tolerated treatment well. Patient would benefit from continued PT. Fatigue post visit. No major increase in dizziness post session.       Plan: Continue per plan of care.      Precautions: balance deficits      Manuals                                                               Neuro Re-Ed             Tandem balance Stationary, head turns  Foam 1' ea            Tandem gait nv 20ftx4 20ftx6          Iso hip flexion nv X10            Biodex nv 6' CB          SL clamshell  GTB x20 CB          Standing march  X10 ea CB                       Ther Ex             bike nv 3' L1 3' L0, 2' L1          VG SL squat   X20 ea 45%                                                                                        Ther Activity                                       Gait Training                                       Modalities

## 2024-06-06 ENCOUNTER — APPOINTMENT (OUTPATIENT)
Dept: PHYSICAL THERAPY | Facility: CLINIC | Age: 64
End: 2024-06-06
Payer: COMMERCIAL

## 2024-06-11 ENCOUNTER — OFFICE VISIT (OUTPATIENT)
Dept: PHYSICAL THERAPY | Facility: CLINIC | Age: 64
End: 2024-06-11
Payer: COMMERCIAL

## 2024-06-11 DIAGNOSIS — R26.89 BALANCE PROBLEM: Primary | ICD-10-CM

## 2024-06-11 PROCEDURE — 97112 NEUROMUSCULAR REEDUCATION: CPT | Performed by: PHYSICAL THERAPIST

## 2024-06-11 PROCEDURE — 97110 THERAPEUTIC EXERCISES: CPT | Performed by: PHYSICAL THERAPIST

## 2024-06-11 NOTE — PROGRESS NOTES
Daily Note     Today's date: 2024  Patient name: Ana Rosa Parker  : 1960  MRN: [id number]  Referring provider: Andres Gutierrez MD  Dx:   Encounter Diagnosis     ICD-10-CM    1. Balance problem  R26.89                      Subjective: Pt notes some improvement in balance recently.       Objective: See treatment diary below      Assessment: Tolerated treatment well. Patient would benefit from continued PT. Adequately fatigued post visit. Progress as tolerated.       Plan: Continue per plan of care.      Precautions: balance deficits      Manuals                                                              Neuro Re-Ed             Tandem balance Stationary, head turns  Foam 1' ea   Foam 1' ea HT         Tandem gait nv 20ftx4 20ftx6 CB         Iso hip flexion nv X10            Biodex nv 6' CB CB         SL clamshell  GTB x20 CB CB         Standing march  X10 ea CB nv                      Ther Ex             bike nv 3' L1 3' L0, 2' L1 2' L0, 3' L1         VG SL squat   X20 ea 45% CB                                                                                       Ther Activity                                       Gait Training                                       Modalities

## 2024-06-13 ENCOUNTER — APPOINTMENT (OUTPATIENT)
Dept: PHYSICAL THERAPY | Facility: CLINIC | Age: 64
End: 2024-06-13
Payer: COMMERCIAL

## 2024-06-14 DIAGNOSIS — E11.8 TYPE 2 DIABETES MELLITUS WITH COMPLICATION (HCC): ICD-10-CM

## 2024-06-18 ENCOUNTER — OFFICE VISIT (OUTPATIENT)
Dept: PHYSICAL THERAPY | Facility: CLINIC | Age: 64
End: 2024-06-18
Payer: COMMERCIAL

## 2024-06-18 DIAGNOSIS — R26.89 BALANCE PROBLEM: Primary | ICD-10-CM

## 2024-06-18 PROCEDURE — 97110 THERAPEUTIC EXERCISES: CPT | Performed by: PHYSICAL THERAPIST

## 2024-06-18 PROCEDURE — 97010 HOT OR COLD PACKS THERAPY: CPT | Performed by: PHYSICAL THERAPIST

## 2024-06-18 PROCEDURE — 97140 MANUAL THERAPY 1/> REGIONS: CPT | Performed by: PHYSICAL THERAPIST

## 2024-06-18 NOTE — PROGRESS NOTES
Daily Note     Today's date: 2024  Patient name: Ana Rosa Parker  : 1960  MRN: [id number]  Referring provider: Andres Gutierrez MD  Dx:   Encounter Diagnosis     ICD-10-CM    1. Balance problem  R26.89                      Subjective: Pt notes some improvement in balance recently. She is having increased LBP today and is unsure if she can do a full balance workout today because of this.       Objective: See treatment diary below      Assessment: Tolerated treatment well. Patient would benefit from continued PT. Pt with back pain limiting balance activity and focused on light stretching/strengthening with improvement in pain post session.       Plan: Continue per plan of care.      Precautions: balance deficits      Manuals         B/l paraspinal STM     CB                                               Neuro Re-Ed             Tandem balance Stationary, head turns  Foam 1' ea   Foam 1' ea HT         Tandem gait nv 20ftx4 20ftx6 CB         Iso hip flexion nv X10    10x10; ea        Biodex nv 6' CB CB         SL clamshell  GT x20 CB CB         Standing march  X10 ea CB nv                      Ther Ex             bike nv 3' L1 3' L0, 2' L1 2' L0, 3' L1         VG SL squat   X20 ea 45% CB         Piriformis stretch     10x10: ea                                                                         Ther Activity                                       Gait Training                                       Modalities             MHP supine 90/90     10'

## 2024-06-20 ENCOUNTER — OFFICE VISIT (OUTPATIENT)
Dept: PHYSICAL THERAPY | Facility: CLINIC | Age: 64
End: 2024-06-20
Payer: COMMERCIAL

## 2024-06-20 DIAGNOSIS — R26.89 BALANCE PROBLEM: Primary | ICD-10-CM

## 2024-06-20 DIAGNOSIS — M54.50 ACUTE BILATERAL LOW BACK PAIN WITHOUT SCIATICA: ICD-10-CM

## 2024-06-20 PROCEDURE — 97110 THERAPEUTIC EXERCISES: CPT | Performed by: PHYSICAL THERAPIST

## 2024-06-20 PROCEDURE — 97164 PT RE-EVAL EST PLAN CARE: CPT | Performed by: PHYSICAL THERAPIST

## 2024-06-20 NOTE — PROGRESS NOTES
PT Evaluation     Today's date: 2024  Patient name: Ana Rosa Parker  : 1960  MRN: [id number]  Referring provider: Andres Gutierrez MD  Dx:   Encounter Diagnosis     ICD-10-CM    1. Balance problem  R26.89                      Assessment  Impairments: abnormal muscle firing, abnormal muscle tone, abnormal or restricted ROM, impaired physical strength, lacks appropriate home exercise program and pain with function    Assessment details: Ana Rosa Parker is a pleasant 64 y.o. female presents with b/l LBP. Pt with signficant muscle restriction and limited lumbar/thoracic ROM. She will benefit from further PT to address.     No further referral appears necessary at this time.     Skilled PT services appropriate to facilitate return to prior level of function with transition to home exercise program for independent management when appropriate.    Understanding of Dx/Px/POC: good     Prognosis: good    Goals  Patient will be able to manage symptoms independently  LT weeks  1. pt will reach foto predicted  2. pt will decrease worst pain 75%   ST weeks  1. Pt will decrease worst pain 50%  2. Patient will successfully manage HEP        Plan  Patient would benefit from: skilled PT  Referral necessary: No  Planned modality interventions: thermotherapy: hydrocollator packs    Planned therapy interventions: home exercise program, manual therapy, neuromuscular re-education, patient education, functional ROM exercises, strengthening, stretching, joint mobilization, graded activity, graded exercise, therapeutic exercise, body mechanics training, motor coordination training and activity modification    Frequency: 2x week  Duration in weeks: 12  Treatment plan discussed with: patient      Subjective Evaluation    History of Present Illness  Date of onset: 2024  Mechanism of injury: Pt reports LBP over the last few days. It began in the morning. She is unsure of the cause. She denies radiating pain into the legs.  Pain is across LB and mid back. She has been unable to do her typical gardening. She denies bowel/bladder issues or hx of cancer. She has chronic balance deficits and is being seen in PT for this already. The LBP has not affected her coordination/balance.   Patient Goals  Patient goals for therapy: increased motion, decreased pain and increased strength    Pain  Current pain ratin  At worst pain ratin    Treatments  Current treatment: physical therapy      Objective     Active Range of Motion   Cervical/Thoracic Spine       Thoracic    Flexion:  WFL  Extension:  Restriction level: maximal  Left rotation:  Restriction level: moderate  Right rotation:  Restriction level: moderate    Strength/Myotome Testing     Additional Strength Details  Myotome screen unremarkable    General Comments:      Cervical/Thoracic Comments  Lumbar and thoracic paraspinals restricted b/l             Precautions: none      Manuals                                                                 Neuro Re-Ed                                                                                                        Ther Ex             LTR 10x10:            B/l piriformis stretch 10x10:            B/l hamstring active elongation 10x10:                                                                             Ther Activity                                       Gait Training                                       Modalities

## 2024-06-24 ENCOUNTER — APPOINTMENT (OUTPATIENT)
Dept: RADIOLOGY | Facility: CLINIC | Age: 64
End: 2024-06-24
Payer: COMMERCIAL

## 2024-06-24 ENCOUNTER — OFFICE VISIT (OUTPATIENT)
Dept: URGENT CARE | Facility: CLINIC | Age: 64
End: 2024-06-24
Payer: COMMERCIAL

## 2024-06-24 VITALS
BODY MASS INDEX: 33.82 KG/M2 | SYSTOLIC BLOOD PRESSURE: 104 MMHG | RESPIRATION RATE: 18 BRPM | OXYGEN SATURATION: 97 % | HEART RATE: 91 BPM | HEIGHT: 65 IN | TEMPERATURE: 98 F | WEIGHT: 203 LBS | DIASTOLIC BLOOD PRESSURE: 56 MMHG

## 2024-06-24 DIAGNOSIS — S99.912A ANKLE INJURY, LEFT, INITIAL ENCOUNTER: ICD-10-CM

## 2024-06-24 DIAGNOSIS — S93.402A SPRAIN OF LEFT ANKLE, UNSPECIFIED LIGAMENT, INITIAL ENCOUNTER: Primary | ICD-10-CM

## 2024-06-24 PROCEDURE — 73610 X-RAY EXAM OF ANKLE: CPT

## 2024-06-24 PROCEDURE — 99213 OFFICE O/P EST LOW 20 MIN: CPT

## 2024-06-24 RX ORDER — NAPROXEN 500 MG/1
500 TABLET ORAL 2 TIMES DAILY WITH MEALS
Qty: 14 TABLET | Refills: 0 | Status: SHIPPED | OUTPATIENT
Start: 2024-06-24 | End: 2024-07-01

## 2024-06-24 RX ORDER — GABAPENTIN 100 MG/1
CAPSULE ORAL
COMMUNITY
Start: 2024-06-14

## 2024-06-24 NOTE — PROGRESS NOTES
[facility]        NAME: Ana Rosa Parker is a 64 y.o. female  : 1960    MRN: [id number]  DATE: 2024  TIME: 4:18 PM    Assessment and Plan   Sprain of left ankle, unspecified ligament, initial encounter [S93.402A]  1. Sprain of left ankle, unspecified ligament, initial encounter  XR ankle 3+ vw left    Ambulatory Referral to Orthopedic Surgery    naproxen (Naprosyn) 500 mg tablet        Xray of left ankle initial interpretation: no acute osseous abnormality  Official radiology read pending - We will only notify you if there needs to be a change in your treatment plan.   Will treat with ankle brace and supportive care: RICE  Follow up with PCP/ortho    Patient Instructions     Xray of left ankle initial interpretation: no acute osseous abnormality  Official radiology read pending - We will only notify you if there needs to be a change in your treatment plan.     Take Naprosyn as prescribed   Do not take Naprosyn with other NSAIDs  Can take Tylenol for pain  Wear splint for support (Remove braces and ACE bandages every 3 hours)    Ice 20 minutes 3-4 times per day for 3 days  Insulate the skin from the ice to prevent frostbite  Rest and Elevate  Follow up with orthopedic if symptoms do not improve    Follow up with PCP in 3-5 days.  Proceed to  ER if symptoms worsen.    If tests are performed, our office will contact you with results only if changes need to made to the care plan discussed with you at the visit. You can review your full results on [facility].    Chief Complaint     Chief Complaint   Patient presents with    Ankle Injury     Left ankle pain after falling yesterday.she is being treated for balance problems by PCP.         History of Present Illness       64-year-old female arrives reporting she is currently being treated for balance issues by her primary care provider and was working out in the garden when she rolled her left ankle yesterday.  Patient reports pain and  tenderness to lateral left ankle.  Patient is able to bear weight without any assistance, and has slightly decreased range of motion due to pain.  Patient denies numbness or tingling in affected extremity.  Patient has positive pulse motor sensation intact in affected extremity.  Patient denies hitting head or denies any loss of consciousness at the time of fall.    Ankle Injury   The incident occurred 12 to 24 hours ago. The incident occurred in the yard. The injury mechanism was a fall. The pain is present in the left ankle. The quality of the pain is described as aching. The pain is mild. The pain has been Constant since onset. Pertinent negatives include no inability to bear weight, loss of motion, loss of sensation, muscle weakness, numbness or tingling. She reports no foreign bodies present. The symptoms are aggravated by movement.       Review of Systems   Review of Systems   Constitutional:  Negative for chills and fever.   Respiratory:  Negative for cough and shortness of breath.    Cardiovascular:  Negative for chest pain.   Gastrointestinal:  Negative for abdominal pain.   Musculoskeletal:  Positive for gait problem (patient is already being treated for a balance issue by PCP and PT) and joint swelling. Negative for back pain.   Skin:  Negative for color change and rash.   Neurological:  Negative for tingling and numbness.   All other systems reviewed and are negative.        Current Medications       Current Outpatient Medications:     aspirin 81 mg chewable tablet, Chew 81 mg daily, Disp: , Rfl:     busPIRone (BUSPAR) 10 mg tablet, 10 mg in the morning, Disp: , Rfl:     eszopiclone (LUNESTA) 1 mg tablet, Take 2 mg by mouth daily at bedtime Takes 2-3 daily, Disp: , Rfl:     gabapentin (NEURONTIN) 100 mg capsule, TAKE 1 TO 2 CAPSULES BY MOUTH DAILY AT BEDTIME, Disp: , Rfl:     levothyroxine 88 mcg tablet, Take 1 tablet (88 mcg total) by mouth daily in the early morning, Disp: 90 tablet, Rfl: 1     lisinopril (ZESTRIL) 10 mg tablet, TAKE 1 TABLET BY MOUTH EVERY DAY, Disp: 90 tablet, Rfl: 1    loratadine (CLARITIN) 10 mg tablet, Take 1 tablet (10 mg total) by mouth 2 (two) times a day, Disp: 60 tablet, Rfl: 8    metFORMIN (GLUCOPHAGE) 500 mg tablet, TAKE 2 TABLETS BY MOUTH TWICE A DAY WITH FOOD, Disp: 360 tablet, Rfl: 1    naproxen (Naprosyn) 500 mg tablet, Take 1 tablet (500 mg total) by mouth 2 (two) times a day with meals for 7 days, Disp: 14 tablet, Rfl: 0    omeprazole (PriLOSEC) 20 mg delayed release capsule, Take 1 capsule (20 mg total) by mouth daily before breakfast, Disp: 90 capsule, Rfl: 1    QUEtiapine (SEROquel) 200 mg tablet, Take 1 tablet (200 mg total) by mouth daily at bedtime, Disp: 90 tablet, Rfl: 1    QUEtiapine (SEROquel) 25 mg tablet, Take 25 mg by mouth 2 (two) times a day as needed 2-3 at bedtime prn, Disp: , Rfl:     rosuvastatin (CRESTOR) 20 MG tablet, Take 20 mg by mouth daily, Disp: , Rfl:     sertraline (ZOLOFT) 100 mg tablet, TAKE 2 TABLETS (200 MG TOTAL) BY MOUTH DAILY AT BEDTIME, Disp: 180 tablet, Rfl: 1    traZODone (DESYREL) 50 mg tablet, TAKE 1 TABLET BY MOUTH DAILY AT BEDTIME, Disp: 90 tablet, Rfl: 1    venlafaxine (EFFEXOR-XR) 150 mg 24 hr capsule, TAKE 1 CAPSULE BY MOUTH EVERY DAY, Disp: 90 capsule, Rfl: 1    dulaglutide (Trulicity) 3 MG/0.5ML injection, Inject 0.5 mL (3 mg total) under the skin once a week (Patient not taking: Reported on 5/29/2024), Disp: 3 mL, Rfl: 3    polyethylene glycol (Golytely) 4000 mL solution, Take 4,000 mL by mouth once for 1 dose Take 4000 mL by mouth once for 1 dose. Use as directed (Patient not taking: Reported on 5/29/2024), Disp: 4000 mL, Rfl: 0    Current Allergies     Allergies as of 06/24/2024 - Reviewed 06/24/2024   Allergen Reaction Noted    Darvon [propoxyphene] Dizziness 01/26/2019    Fluoxetine Other (See Comments) 10/01/2012    Meclizine Dizziness 10/01/2012    Morphine and codeine Nausea Only 10/01/2012    Oxycodone-acetaminophen  Other (See Comments) and Tachycardia 10/01/2012    Percolone [oxycodone] Other (See Comments) and Tachycardia 03/20/2019            The following portions of the patient's history were reviewed and updated as appropriate: allergies, current medications, past family history, past medical history, past social history, past surgical history and problem list.     Past Medical History:   Diagnosis Date    Allergic     Anxiety     Arthritis     Asthma     Claustrophobia     CPAP (continuous positive airway pressure) dependence     Depression     Diabetes mellitus (HCC)     Disease of thyroid gland     hypo    GERD (gastroesophageal reflux disease)     Headache     History of COVID-19     Hyperlipemia     Hyperlipidemia     Hypertension     Inflammatory bowel disease     Kidney stone     Kidney stones     Major depressive disorder     Motion sickness     Obesity     Risk for falls     Sleep apnea     TIA (transient ischemic attack)     Use of cane as ambulatory aid     Wears glasses     Wears partial dentures     upper partial       Past Surgical History:   Procedure Laterality Date    ADENOIDECTOMY      COLONOSCOPY      DILATION AND CURETTAGE OF UTERUS      NM ARTHRS KNE SURG W/MENISCECTOMY MED/LAT W/SHVG Left 3/24/2022    Procedure: ARTHROSCOPY KNEE w/ partial medial menisectomy;  Surgeon: Terry Bhatt MD;  Location: [facility] OR;  Service: Orthopedics    NM COLONOSCOPY FLX DX W/COLLJ SPEC WHEN PFRMD N/A 3/15/2019    Procedure: COLONOSCOPY;  Surgeon: Angel Saavedra MD;  Location: [facility];  Service: Gastroenterology    ROTATOR CUFF REPAIR Right     SHOULDER SURGERY Left     impingement    TONSILLECTOMY      TUBAL LIGATION         Family History   Problem Relation Age of Onset    ALS Mother     Venous thrombosis Father     Diabetes Father     Other Family         cerebral embolism    Diabetes Family     Hypertension Family     Kidney disease Family     Breast cancer Neg Hx     Colon cancer Neg Hx     Ovarian  "cancer Neg Hx     Uterine cancer Neg Hx          Medications have been verified.        Objective   /56   Pulse 91   Temp 98 °F (36.7 °C)   Resp 18   Ht 5' 5\" (1.651 m)   Wt 92.1 kg (203 lb)   LMP  (LMP Unknown)   SpO2 97%   BMI 33.78 kg/m²        Physical Exam     Physical Exam  Vitals and nursing note reviewed.   Constitutional:       General: She is not in acute distress.     Appearance: Normal appearance. She is not ill-appearing.   HENT:      Head: Normocephalic.      Right Ear: External ear normal.      Left Ear: External ear normal.      Nose: Nose normal.   Eyes:      Pupils: Pupils are equal, round, and reactive to light.   Cardiovascular:      Rate and Rhythm: Normal rate and regular rhythm.      Pulses: Normal pulses.      Heart sounds: Normal heart sounds.   Pulmonary:      Effort: Pulmonary effort is normal. No respiratory distress.      Breath sounds: Normal breath sounds. No stridor. No wheezing, rhonchi or rales.   Chest:      Chest wall: No tenderness.   Musculoskeletal:         General: Swelling, tenderness and signs of injury present. No deformity.      Cervical back: Normal range of motion and neck supple.      Left ankle: Swelling and ecchymosis present. No deformity or lacerations. Tenderness present over the lateral malleolus. Decreased range of motion. Normal pulse.      Left Achilles Tendon: Normal.        Legs:    Lymphadenopathy:      Cervical: No cervical adenopathy.   Skin:     General: Skin is warm and dry.      Capillary Refill: Capillary refill takes less than 2 seconds.   Neurological:      General: No focal deficit present.      Mental Status: She is alert and oriented to person, place, and time.   Psychiatric:         Mood and Affect: Mood normal.         Behavior: Behavior normal.       Orthopedic injury treatment    Date/Time: 6/24/2024 4:00 PM    Performed by: TERELL Christensen  Authorized by: TERELL Christensen    Patient Location:  " [facility]  Universal Protocol:  Consent: Verbal consent obtained.  Risks and benefits: risks, benefits and alternatives were discussed  Consent given by: patient  Patient understanding: patient states understanding of the procedure being performed  Patient identity confirmed: verbally with patient    Injury location:  Ankle  Location details:  Left ankle  Injury type:  Soft tissue  Neurovascular status: Neurovascularly intact    Distal perfusion: normal    Neurological function: normal    Range of motion: reduced    Immobilization:  Brace (ankle brace)  Neurovascular status: Neurovascularly intact    Distal perfusion: normal    Neurological function: normal    Range of motion: unchanged    Patient tolerance:  Patient tolerated the procedure well with no immediate complications

## 2024-06-24 NOTE — PATIENT INSTRUCTIONS
Xray of left ankle initial interpretation: no acute osseous abnormality  Official radiology read pending - We will only notify you if there needs to be a change in your treatment plan.     Take Naprosyn as prescribed   Do not take Naprosyn with other NSAIDs  Can take Tylenol for pain  Wear splint for support (Remove braces and ACE bandages every 3 hours)    Ice 20 minutes 3-4 times per day for 3 days  Insulate the skin from the ice to prevent frostbite  Rest and Elevate  Follow up with orthopedic if symptoms do not improve  Follow up with PCP in 3-5 days.  Proceed to  ER if symptoms worsen.  If tests are performed, our office will contact you with results only if changes need to made to the care plan discussed with you at the visit. You can review your full results on [facility].    Ankle Sprain   WHAT YOU NEED TO KNOW:   An ankle sprain happens when 1 or more ligaments in your ankle joint stretch or tear. Ligaments are tough tissues that connect bones. Ligaments support your joints and keep your bones in place.  DISCHARGE INSTRUCTIONS:   Return to the emergency department if:   You have severe pain in your ankle.    Your foot or toes are cold or numb.    Your ankle becomes more weak or unstable (wobbly).    You are unable to put any weight on your ankle or foot.    Your swelling has increased or returned.    Call your doctor if:   Your pain does not go away, even after treatment.    You have questions or concerns about your condition or care.    Medicines:  You may need any of the following:  NSAIDs , such as ibuprofen, help decrease swelling, pain, and fever. This medicine is available with or without a doctor's order. NSAIDs can cause stomach bleeding or kidney problems in certain people. If you take blood thinner medicine, always ask your healthcare provider if NSAIDs are safe for you. Always read the medicine label and follow directions.    Acetaminophen  decreases pain and fever. It is available  without a doctor's order. Ask how much to take and how often to take it. Follow directions. Read the labels of all other medicines you are using to see if they also contain acetaminophen, or ask your doctor or pharmacist. Acetaminophen can cause liver damage if not taken correctly.    Prescription pain medicine  may be given. Ask your healthcare provider how to take this medicine safely. Some prescription pain medicines contain acetaminophen. Do not take other medicines that contain acetaminophen without talking to your healthcare provider. Too much acetaminophen may cause liver damage. Prescription pain medicine may cause constipation. Ask your healthcare provider how to prevent or treat constipation.     Take your medicine as directed.  Contact your healthcare provider if you think your medicine is not helping or if you have side effects. Tell your provider if you are allergic to any medicine. Keep a list of the medicines, vitamins, and herbs you take. Include the amounts, and when and why you take them. Bring the list or the pill bottles to follow-up visits. Carry your medicine list with you in case of an emergency.    Self-care:   Use support devices , such as a brace, cast, or splint, to limit your movement and protect your joint. You may need to use crutches to decrease your pain as you move around.    Go to physical therapy  as directed. A physical therapist teaches you exercises to help improve movement and strength, and to decrease pain.    Rest  your ankle so that it can heal. Return to normal activities as directed.    Apply ice  on your ankle for 15 to 20 minutes every hour or as directed. Use an ice pack, or put crushed ice in a plastic bag. Cover the ice pack or bag with a towel before you put it on your injury. Ice helps prevent tissue damage and decreases swelling and pain.    Compress  your ankle. Ask if you should wrap an elastic bandage around your injured ligament. An elastic bandage provides  support and helps decrease swelling and movement so your joint can heal. Wear as long as directed.         Elevate  your ankle above the level of your heart as often as you can. This will help decrease swelling and pain. Prop your ankle on pillows or blankets to keep it elevated comfortably.       Prevent another ankle sprain:   Let your ankle heal.  Find out how long your ligament needs to heal. Do not do any physical activity until your healthcare provider says it is okay. If you start activity too soon, you may develop a more serious injury.    Warm up and stretch before you exercise or play sports.  This helps your joints become strong and flexible.    Use the right equipment.  Always wear shoes that fit well and are made for the activity that you are doing. You may also need ankle supports, elbow and knee pads, or braces.    Follow up with your doctor as directed:  Write down your questions so you remember to ask them during your visits.  © Copyright [employer] 2023 Information is for End User's use only and may not be sold, redistributed or otherwise used for commercial purposes.  The above information is an  only. It is not intended as medical advice for individual conditions or treatments. Talk to your doctor, nurse or pharmacist before following any medical regimen to see if it is safe and effective for you.

## 2024-06-25 ENCOUNTER — OFFICE VISIT (OUTPATIENT)
Dept: PHYSICAL THERAPY | Facility: CLINIC | Age: 64
End: 2024-06-25
Payer: COMMERCIAL

## 2024-06-25 DIAGNOSIS — R26.89 BALANCE PROBLEM: Primary | ICD-10-CM

## 2024-06-25 DIAGNOSIS — M54.50 ACUTE BILATERAL LOW BACK PAIN WITHOUT SCIATICA: ICD-10-CM

## 2024-06-25 PROCEDURE — 97140 MANUAL THERAPY 1/> REGIONS: CPT | Performed by: PHYSICAL THERAPIST

## 2024-06-25 PROCEDURE — 97110 THERAPEUTIC EXERCISES: CPT | Performed by: PHYSICAL THERAPIST

## 2024-06-25 NOTE — PROGRESS NOTES
Daily Note     Today's date: 2024  Patient name: Ana Rosa Parker  : 1960  MRN: [id number]  Referring provider: Andres Gutierrez MD  Dx:   Encounter Diagnosis     ICD-10-CM    1. Balance problem  R26.89       2. Acute bilateral low back pain without sciatica  M54.50                      Subjective: Pt reports overall improvement in the       Objective: See treatment diary below      Assessment: Tolerated treatment well. Patient would benefit from continued PT      Plan: Continue per plan of care.      Precautions: none      Manuals            B/l paraspinal STM                                                    Neuro Re-Ed                                                                                                        Ther Ex             LTR 10x10: CB           B/l piriformis stretch 10x10: CB           B/l hamstring active elongation 10x10: CB           SL clamshells  10x10:            B/l hip flexion  10x10:                                                   Ther Activity                                       Gait Training                                       Modalities

## 2024-06-27 ENCOUNTER — OFFICE VISIT (OUTPATIENT)
Dept: PHYSICAL THERAPY | Facility: CLINIC | Age: 64
End: 2024-06-27
Payer: COMMERCIAL

## 2024-06-27 DIAGNOSIS — M54.50 ACUTE BILATERAL LOW BACK PAIN WITHOUT SCIATICA: ICD-10-CM

## 2024-06-27 DIAGNOSIS — R26.89 BALANCE PROBLEM: Primary | ICD-10-CM

## 2024-06-27 PROCEDURE — 97112 NEUROMUSCULAR REEDUCATION: CPT | Performed by: PHYSICAL THERAPIST

## 2024-06-27 PROCEDURE — 97140 MANUAL THERAPY 1/> REGIONS: CPT | Performed by: PHYSICAL THERAPIST

## 2024-06-27 PROCEDURE — 97110 THERAPEUTIC EXERCISES: CPT | Performed by: PHYSICAL THERAPIST

## 2024-06-27 NOTE — PROGRESS NOTES
Daily Note     Today's date: 2024  Patient name: Ana Rosa Parker  : 1960  MRN: [id number]  Referring provider: Andres Gutierrez MD  Dx:   Encounter Diagnosis     ICD-10-CM    1. Balance problem  R26.89       2. Acute bilateral low back pain without sciatica  M54.50                      Subjective: Pt reports some continued back pain but improving.       Objective: See treatment diary below      Assessment: Tolerated treatment well. Patient would benefit from continued PT. Able to return to greater balance activity with minimal increase in pain.       Plan: Continue per plan of care.      Precautions: none      Manuals           B/l paraspinal STM CB CB CB                                                 Neuro Re-Ed             BIODEX   6'          Heel toe gait   40ftx4          Lunge to bosu   x10ea                                                              Ther Ex             LTR 10x10: CB CB          B/l piriformis stretch 10x10: CB CB          B/l hamstring active elongation 10x10: CB CB           SL clamshells  10x10:            B/l hip flexion  10x10:                                                   Ther Activity                                       Gait Training                                       Modalities

## 2024-07-02 ENCOUNTER — APPOINTMENT (OUTPATIENT)
Dept: PHYSICAL THERAPY | Facility: CLINIC | Age: 64
End: 2024-07-02
Payer: COMMERCIAL

## 2024-07-09 ENCOUNTER — OFFICE VISIT (OUTPATIENT)
Dept: PHYSICAL THERAPY | Facility: CLINIC | Age: 64
End: 2024-07-09
Payer: COMMERCIAL

## 2024-07-09 DIAGNOSIS — R26.89 BALANCE PROBLEM: Primary | ICD-10-CM

## 2024-07-09 DIAGNOSIS — M54.50 ACUTE BILATERAL LOW BACK PAIN WITHOUT SCIATICA: ICD-10-CM

## 2024-07-09 PROCEDURE — 97112 NEUROMUSCULAR REEDUCATION: CPT | Performed by: PHYSICAL THERAPIST

## 2024-07-09 PROCEDURE — 97140 MANUAL THERAPY 1/> REGIONS: CPT | Performed by: PHYSICAL THERAPIST

## 2024-07-09 PROCEDURE — 97110 THERAPEUTIC EXERCISES: CPT | Performed by: PHYSICAL THERAPIST

## 2024-07-09 NOTE — PROGRESS NOTES
Daily Note     Today's date: 2024  Patient name: Ana Rosa Parker  : 1960  MRN: [id number]  Referring provider: Andres Gutierrez MD  Dx:   Encounter Diagnosis     ICD-10-CM    1. Balance problem  R26.89       2. Acute bilateral low back pain without sciatica  M54.50                      Subjective: Pt reports decreased pain in the LB and balance has been off a little.      Objective: See treatment diary below      Assessment: Tolerated treatment well. Patient would benefit from continued PT      Plan: Continue per plan of care.      Precautions: none      Manuals          B/l paraspinal STM CB CB CB CB                                                Neuro Re-Ed             BIODEX   6' CB         Heel toe gait   40ftx4 CB         Lunge to bosu   x10ea CB         VG squats    40%x10 DL, x10 SL                                                Ther Ex             LTR 10x10: CB CB CB         B/l piriformis stretch 10x10: CB CB CB         B/l hamstring active elongation 10x10: CB CB  CB         SL clamshells  10x10:   10x10: BTB         B/l hip flexion  10x10:   nv                                                Ther Activity                                       Gait Training                                       Modalities

## 2024-07-11 ENCOUNTER — OFFICE VISIT (OUTPATIENT)
Dept: PHYSICAL THERAPY | Facility: CLINIC | Age: 64
End: 2024-07-11
Payer: COMMERCIAL

## 2024-07-11 DIAGNOSIS — M54.50 ACUTE BILATERAL LOW BACK PAIN WITHOUT SCIATICA: ICD-10-CM

## 2024-07-11 DIAGNOSIS — R26.89 BALANCE PROBLEM: Primary | ICD-10-CM

## 2024-07-11 PROCEDURE — 97140 MANUAL THERAPY 1/> REGIONS: CPT | Performed by: PHYSICAL THERAPIST

## 2024-07-11 PROCEDURE — 97112 NEUROMUSCULAR REEDUCATION: CPT | Performed by: PHYSICAL THERAPIST

## 2024-07-11 PROCEDURE — 97110 THERAPEUTIC EXERCISES: CPT | Performed by: PHYSICAL THERAPIST

## 2024-07-11 NOTE — PROGRESS NOTES
Daily Note     Today's date: 2024  Patient name: Ana Rosa Parker  : 1960  MRN: [id number]  Referring provider: Andres Gutierrez MD  Dx:   Encounter Diagnosis     ICD-10-CM    1. Balance problem  R26.89       2. Acute bilateral low back pain without sciatica  M54.50                      Subjective: Pt notes some soreness in her LB after lv and attributes to doing squats and gardening that night.       Objective: See treatment diary below      Assessment: Tolerated treatment well. Patient would benefit from continued PT. Continued restriction L side lumbar and thoracic.       Plan: Continue per plan of care.      Precautions: none      Manuals         B/l paraspinal STM CB CB CB CB CB                                               Neuro Re-Ed             BIODEX   6' CB CB        Heel toe gait   40ftx4 CB CB        Lunge to bosu   x10ea CB CB        VG squats    40%x10 DL, x10 SL CB                                               Ther Ex             LTR 10x10: CB CB CB CB        B/l piriformis stretch 10x10: CB CB CB CB        B/l hamstring active elongation 10x10: CB CB  CB CB        SL clamshells  10x10:   10x10: BTB CB        B/l hip flexion  10x10:   nv                                                Ther Activity                                       Gait Training                                       Modalities

## 2024-07-15 ENCOUNTER — OFFICE VISIT (OUTPATIENT)
Dept: PHYSICAL THERAPY | Facility: CLINIC | Age: 64
End: 2024-07-15
Payer: COMMERCIAL

## 2024-07-15 DIAGNOSIS — R26.89 BALANCE PROBLEM: Primary | ICD-10-CM

## 2024-07-15 DIAGNOSIS — M54.50 ACUTE BILATERAL LOW BACK PAIN WITHOUT SCIATICA: ICD-10-CM

## 2024-07-15 PROCEDURE — 97112 NEUROMUSCULAR REEDUCATION: CPT

## 2024-07-15 PROCEDURE — 97110 THERAPEUTIC EXERCISES: CPT

## 2024-07-15 PROCEDURE — 97140 MANUAL THERAPY 1/> REGIONS: CPT

## 2024-07-15 NOTE — PROGRESS NOTES
Daily Note     Today's date: 7/15/2024  Patient name: Ana Rosa Parker  : 1960  MRN: [id number]  Referring provider: Andres Gutierrez MD  Dx:   Encounter Diagnosis     ICD-10-CM    1. Balance problem  R26.89       2. Acute bilateral low back pain without sciatica  M54.50                      Subjective: Pt reports less soreness after last visit.  Pt c/o increase right sided back pain the past 2 days without any known reason.  Pt states some days are better than others with her balance.       Objective: See treatment diary below      Assessment: Tolerated treatment well.  Minimal TTP/soft tissue restriction noted with manual therapy right thoracic/lumbar paraspinals.  Good tolerance to exercise with verbal/tactile cues provided for proper technique. Balance slowly improving.  Pt reported minimal muscle fatigue post treatment. Patient would benefit from continued PT.        Plan: Continue per plan of care.      Precautions: none      Manuals 6/20 6/25 6/27 7/9 7/11 7/15       B/l paraspinal STM CB CB CB CB CB GH R>L                                              Neuro Re-Ed             BIODEX   6' CB CB LOS L10        Heel toe gait   40ftx4 CB CB Fwd/ bwd 40 ft x4       Lunge to bosu   x10ea CB CB GH       VG squats    40%x10 DL, x10 SL CB nv                                              Ther Ex             LTR 10x10: CB CB CB CB HEP       B/l piriformis stretch 10x10: CB CB CB CB GH       B/l hamstring active elongation 10x10: CB CB  CB CB GH       SL clamshells  10x10:   10x10: BTB CB GH       B/l hip flexion  10x10:   nv                                                Ther Activity                                       Gait Training                                       Modalities

## 2024-07-16 ENCOUNTER — APPOINTMENT (OUTPATIENT)
Dept: PHYSICAL THERAPY | Facility: CLINIC | Age: 64
End: 2024-07-16
Payer: COMMERCIAL

## 2024-07-17 ENCOUNTER — OFFICE VISIT (OUTPATIENT)
Dept: PHYSICAL THERAPY | Facility: CLINIC | Age: 64
End: 2024-07-17
Payer: COMMERCIAL

## 2024-07-17 DIAGNOSIS — M54.50 ACUTE BILATERAL LOW BACK PAIN WITHOUT SCIATICA: ICD-10-CM

## 2024-07-17 DIAGNOSIS — R26.89 BALANCE PROBLEM: Primary | ICD-10-CM

## 2024-07-17 PROCEDURE — 97112 NEUROMUSCULAR REEDUCATION: CPT | Performed by: PHYSICAL THERAPIST

## 2024-07-17 PROCEDURE — 97140 MANUAL THERAPY 1/> REGIONS: CPT | Performed by: PHYSICAL THERAPIST

## 2024-07-17 PROCEDURE — 97110 THERAPEUTIC EXERCISES: CPT | Performed by: PHYSICAL THERAPIST

## 2024-07-17 NOTE — PROGRESS NOTES
Daily Note     Today's date: 2024  Patient name: Ana Rosa Parker  : 1960  MRN: [id number]  Referring provider: Andres Gutierrez MD  Dx:   Encounter Diagnosis     ICD-10-CM    1. Balance problem  R26.89       2. Acute bilateral low back pain without sciatica  M54.50                      Subjective: Pt reports no new complaints      Objective: See treatment diary below      Assessment: Tolerated treatment well. Patient would benefit from continued PT.      Plan: Continue per plan of care.      Precautions: none      Manuals 6/20 6/25 6/27 7/9 7/11 7/15 7/17      B/l paraspinal STM CB CB CB CB CB GH R>L CB                                             Neuro Re-Ed             BIODEX   6' CB CB LOS L10  CB      Heel toe gait   40ftx4 CB CB Fwd/ bwd 40 ft x4 CB      Lunge to bosu   x10ea CB CB GH CB      VG squats    40%x10 DL, x10 SL CB nv CB                                             Ther Ex             LTR 10x10: CB CB CB CB HEP       B/l piriformis stretch 10x10: CB CB CB CB GH       B/l hamstring active elongation 10x10: CB CB  CB CB GH       SL clamshells  10x10:   10x10: BTB CB GH CB      B/l hip flexion  10x10:   nv                                                Ther Activity                                       Gait Training                                       Modalities

## 2024-07-18 ENCOUNTER — APPOINTMENT (OUTPATIENT)
Dept: PHYSICAL THERAPY | Facility: CLINIC | Age: 64
End: 2024-07-18
Payer: COMMERCIAL

## 2024-07-23 ENCOUNTER — APPOINTMENT (OUTPATIENT)
Dept: PHYSICAL THERAPY | Facility: CLINIC | Age: 64
End: 2024-07-23
Payer: COMMERCIAL

## 2024-07-25 ENCOUNTER — APPOINTMENT (OUTPATIENT)
Dept: PHYSICAL THERAPY | Facility: CLINIC | Age: 64
End: 2024-07-25
Payer: COMMERCIAL

## 2024-07-30 ENCOUNTER — OFFICE VISIT (OUTPATIENT)
Dept: PHYSICAL THERAPY | Facility: CLINIC | Age: 64
End: 2024-07-30
Payer: COMMERCIAL

## 2024-07-30 DIAGNOSIS — M54.50 ACUTE BILATERAL LOW BACK PAIN WITHOUT SCIATICA: Primary | ICD-10-CM

## 2024-07-30 PROCEDURE — 97110 THERAPEUTIC EXERCISES: CPT | Performed by: PHYSICAL THERAPIST

## 2024-07-30 PROCEDURE — 97112 NEUROMUSCULAR REEDUCATION: CPT | Performed by: PHYSICAL THERAPIST

## 2024-07-30 NOTE — PROGRESS NOTES
Daily Note     Today's date: 2024  Patient name: Ana Rosa Parker  : 1960  MRN: [id number]  Referring provider: Andres Gutierrez MD  Dx:   Encounter Diagnosis     ICD-10-CM    1. Acute bilateral low back pain without sciatica  M54.50                      Subjective: Pt notes over the last few weeks she has been having occasional dizziness and some chest pains. Her PCP is aware and she is scheduled for an appointment with them tomorrow. No recent medication changes noted. She currently is not feeling these sxs.      Objective: See treatment diary below  BP: 130/72  HR: 80 bpm    Assessment: Tolerated treatment well. Patient would benefit from continued PT. Pt with stable vitals throughout session and no sxs noted. We did focus on light LB stretches today secondary to her prior sxs. She will f/u with PCP prior to returning to activity requiring more cardiovascular activity. She understands if sxs were to worsen or additional SOB to report to ED for further workup. Pt was stable and nonsymptomatic upon leaving the clinic. LBP was slightly improved post.       Plan: Continue per plan of care.      Precautions: none      Manuals  7/9 7/11 7/15 7/17 7/30     B/l paraspinal STM CB CB CB CB CB GH R>L CB CB                                            Neuro Re-Ed             BIODEX   6' CB CB LOS L10  CB      Heel toe gait   40ftx4 CB CB Fwd/ bwd 40 ft x4 CB      Lunge to bosu   x10ea CB CB GH CB      VG squats    40%x10 DL, x10 SL CB nv CB                                             Ther Ex             LTR 10x10: CB CB CB CB HEP  CB     B/l piriformis stretch 10x10: CB CB CB CB GH  CB     B/l hamstring active elongation 10x10: CB CB  CB CB GH  CB     SL clamshells  10x10:   10x10: BTB CB GH CB CB     B/l hip flexion  10x10:   nv    nv                                            Ther Activity                                       Gait Training                                       Modalities

## 2024-08-01 ENCOUNTER — OFFICE VISIT (OUTPATIENT)
Dept: FAMILY MEDICINE CLINIC | Facility: CLINIC | Age: 64
End: 2024-08-01
Payer: COMMERCIAL

## 2024-08-01 ENCOUNTER — OFFICE VISIT (OUTPATIENT)
Dept: PHYSICAL THERAPY | Facility: CLINIC | Age: 64
End: 2024-08-01
Payer: COMMERCIAL

## 2024-08-01 VITALS
HEART RATE: 94 BPM | SYSTOLIC BLOOD PRESSURE: 128 MMHG | WEIGHT: 208 LBS | OXYGEN SATURATION: 97 % | BODY MASS INDEX: 34.66 KG/M2 | DIASTOLIC BLOOD PRESSURE: 68 MMHG | HEIGHT: 65 IN | TEMPERATURE: 98.1 F

## 2024-08-01 DIAGNOSIS — M54.50 ACUTE BILATERAL LOW BACK PAIN WITHOUT SCIATICA: Primary | ICD-10-CM

## 2024-08-01 DIAGNOSIS — E11.9 TYPE 2 DIABETES MELLITUS WITHOUT COMPLICATION, WITHOUT LONG-TERM CURRENT USE OF INSULIN (HCC): ICD-10-CM

## 2024-08-01 DIAGNOSIS — K21.00 GASTROESOPHAGEAL REFLUX DISEASE WITH ESOPHAGITIS: ICD-10-CM

## 2024-08-01 DIAGNOSIS — E78.2 MIXED HYPERLIPIDEMIA: ICD-10-CM

## 2024-08-01 DIAGNOSIS — G43.009 MIGRAINE WITHOUT AURA AND WITHOUT STATUS MIGRAINOSUS, NOT INTRACTABLE: Primary | ICD-10-CM

## 2024-08-01 DIAGNOSIS — F33.2 SEVERE RECURRENT MAJOR DEPRESSION WITHOUT PSYCHOTIC FEATURES (HCC): ICD-10-CM

## 2024-08-01 DIAGNOSIS — G45.9 TIA (TRANSIENT ISCHEMIC ATTACK): ICD-10-CM

## 2024-08-01 DIAGNOSIS — I10 PRIMARY HYPERTENSION: ICD-10-CM

## 2024-08-01 DIAGNOSIS — E05.90 HYPERTHYROIDISM: ICD-10-CM

## 2024-08-01 DIAGNOSIS — G47.33 OSA (OBSTRUCTIVE SLEEP APNEA): ICD-10-CM

## 2024-08-01 DIAGNOSIS — R26.89 BALANCE PROBLEM: ICD-10-CM

## 2024-08-01 DIAGNOSIS — R06.02 SHORTNESS OF BREATH: ICD-10-CM

## 2024-08-01 PROCEDURE — 3074F SYST BP LT 130 MM HG: CPT | Performed by: FAMILY MEDICINE

## 2024-08-01 PROCEDURE — 97140 MANUAL THERAPY 1/> REGIONS: CPT | Performed by: PHYSICAL THERAPIST

## 2024-08-01 PROCEDURE — 99214 OFFICE O/P EST MOD 30 MIN: CPT | Performed by: FAMILY MEDICINE

## 2024-08-01 PROCEDURE — 3078F DIAST BP <80 MM HG: CPT | Performed by: FAMILY MEDICINE

## 2024-08-01 PROCEDURE — 97110 THERAPEUTIC EXERCISES: CPT | Performed by: PHYSICAL THERAPIST

## 2024-08-01 RX ORDER — OMEPRAZOLE 20 MG/1
20 CAPSULE, DELAYED RELEASE ORAL
Qty: 90 CAPSULE | Refills: 1 | Status: SHIPPED | OUTPATIENT
Start: 2024-08-01

## 2024-08-01 RX ORDER — LEVOTHYROXINE SODIUM 88 UG/1
88 TABLET ORAL
Qty: 90 TABLET | Refills: 1 | Status: SHIPPED | OUTPATIENT
Start: 2024-08-01

## 2024-08-01 RX ORDER — ROSUVASTATIN CALCIUM 20 MG/1
20 TABLET, COATED ORAL DAILY
Qty: 100 TABLET | Refills: 3 | Status: SHIPPED | OUTPATIENT
Start: 2024-08-01

## 2024-08-01 RX ORDER — LISINOPRIL 10 MG/1
10 TABLET ORAL DAILY
Qty: 90 TABLET | Refills: 1 | Status: SHIPPED | OUTPATIENT
Start: 2024-08-01

## 2024-08-01 NOTE — PROGRESS NOTES
Daily Note     Today's date: 2024  Patient name: Ana Rosa Parker  : 1960  MRN: [id number]  Referring provider: Andres Gutierrez MD  Dx:   Encounter Diagnosis     ICD-10-CM    1. Acute bilateral low back pain without sciatica  M54.50       2. Balance problem  R26.89                      Subjective: Pt reports some continued soreness in the LB. She has a consult with cardiology for SOB and chest pain. This has not happened in the last days.       Objective: See treatment diary below      Assessment: Tolerated treatment well. Patient would benefit from continued PT. Continued with mobilization to R side lumbar and thoracic ST.       Plan: Continue per plan of care.      Precautions: none      Manuals 6/20 6/25 6/27 7/9 7/11 7/15 7/17 7/30 8/1    B/l paraspinal STM CB CB CB CB CB GH R>L CB CB CB                                           Neuro Re-Ed             BIODEX   6' CB CB LOS L10  CB      Heel toe gait   40ftx4 CB CB Fwd/ bwd 40 ft x4 CB      Lunge to bosu   x10ea CB CB GH CB      VG squats    40%x10 DL, x10 SL CB nv CB                                             Ther Ex             LTR 10x10: CB CB CB CB HEP  CB CB    B/l piriformis stretch 10x10: CB CB CB CB GH  CB     B/l hamstring active elongation 10x10: CB CB  CB CB GH  CB     SL clamshells  10x10:   10x10: BTB CB GH CB CB CB    B/l hip flexion  10x10:   nv    nv                                            Ther Activity                                       Gait Training                                       Modalities

## 2024-08-01 NOTE — PROGRESS NOTES
Assessment/Plan: A1c 6.6.  Labs reviewed at this time.  Patient will be seeing psychiatry on Monday for depression.  Continue current regimen at this time.  Patient will continue with counseling.  Patient will refer to neurology and cardiology appropriately for conditions listed below.  Patient will have sleep study for obstructive sleep apnea.  Patient will be starting Trulicity.  Patient will continue with metformin 1 pill twice daily.  Refills given at this time.  Follow-up in 6 months.       Diagnoses and all orders for this visit:    Migraine without aura and without status migrainosus, not intractable    Primary hypertension  -     lisinopril (ZESTRIL) 10 mg tablet; Take 1 tablet (10 mg total) by mouth daily    Mixed hyperlipidemia  -     rosuvastatin (CRESTOR) 20 MG tablet; Take 1 tablet (20 mg total) by mouth daily    Severe recurrent major depression without psychotic features (HCC)    Shortness of breath  -     Ambulatory Referral to Cardiology; Future    TIA (transient ischemic attack)  -     Ambulatory Referral to Neurology; Future    Type 2 diabetes mellitus without complication, without long-term current use of insulin (HCC)    FELIX (obstructive sleep apnea)  -     Ambulatory Referral to Sleep Medicine; Future    Hyperthyroidism  -     levothyroxine 88 mcg tablet; Take 1 tablet (88 mcg total) by mouth daily in the early morning    Gastroesophageal reflux disease with esophagitis  -     omeprazole (PriLOSEC) 20 mg delayed release capsule; Take 1 capsule (20 mg total) by mouth daily before breakfast            Subjective:        Patient ID: Ana Rosa Parker is a 64 y.o. female.      Patient is here for follow-up on diabetes, TIA, shortness of breath hyperlipidemia hypertension.  Patient using metformin 1 pill twice daily.  Patient recently got  in April.  Patient with some increased depressive symptoms.  Patient with some chest pain and shortness of breath as well as dizziness.  No new weakness upper  "extremities lower extremities.  No dysarthria.  No significant change urination or defecation.  Is on aspirin and medication for event.    Depression  Associated symptoms include chest pain.         The following portions of the patient's history were reviewed and updated as appropriate: allergies, current medications, past family history, past medical history, past social history, past surgical history and problem list.      Review of Systems   Constitutional: Negative.    HENT: Negative.     Eyes: Negative.    Respiratory:  Positive for shortness of breath.    Cardiovascular:  Positive for chest pain.   Gastrointestinal: Negative.    Endocrine: Negative.    Genitourinary: Negative.    Musculoskeletal: Negative.    Skin: Negative.    Allergic/Immunologic: Negative.    Neurological:  Positive for dizziness.   Hematological: Negative.    Psychiatric/Behavioral:  Positive for depression and dysphoric mood.            Objective:               /68 (BP Location: Left arm, Patient Position: Sitting, Cuff Size: Standard)   Pulse 94   Temp 98.1 °F (36.7 °C) (Temporal)   Ht 5' 5\" (1.651 m)   Wt 94.3 kg (208 lb)   LMP  (LMP Unknown)   SpO2 97%   BMI 34.61 kg/m²          Physical Exam  Vitals and nursing note reviewed.   Constitutional:       General: She is not in acute distress.     Appearance: Normal appearance. She is not ill-appearing, toxic-appearing or diaphoretic.   HENT:      Head: Normocephalic and atraumatic.      Right Ear: Tympanic membrane, ear canal and external ear normal. There is no impacted cerumen.      Left Ear: Tympanic membrane, ear canal and external ear normal. There is no impacted cerumen.      Nose: Nose normal. No congestion or rhinorrhea.      Mouth/Throat:      Mouth: Mucous membranes are moist.      Pharynx: No oropharyngeal exudate or posterior oropharyngeal erythema.   Eyes:      General: No scleral icterus.        Right eye: No discharge.         Left eye: No discharge.      " Extraocular Movements: Extraocular movements intact.      Conjunctiva/sclera: Conjunctivae normal.      Pupils: Pupils are equal, round, and reactive to light.   Neck:      Vascular: No carotid bruit.   Cardiovascular:      Rate and Rhythm: Normal rate and regular rhythm.      Pulses: Normal pulses.      Heart sounds: Normal heart sounds. No murmur heard.     No friction rub. No gallop.   Pulmonary:      Effort: Pulmonary effort is normal. No respiratory distress.      Breath sounds: Normal breath sounds. No stridor. No wheezing, rhonchi or rales.   Chest:      Chest wall: No tenderness.   Musculoskeletal:         General: No swelling, tenderness, deformity or signs of injury. Normal range of motion.      Cervical back: Normal range of motion and neck supple. No rigidity. No muscular tenderness.      Right lower leg: No edema.      Left lower leg: No edema.   Lymphadenopathy:      Cervical: No cervical adenopathy.   Skin:     General: Skin is warm and dry.      Capillary Refill: Capillary refill takes less than 2 seconds.      Coloration: Skin is not jaundiced.      Findings: No bruising, erythema, lesion or rash.   Neurological:      Mental Status: She is alert and oriented to person, place, and time. Mental status is at baseline.      Cranial Nerves: No cranial nerve deficit.      Sensory: No sensory deficit.      Motor: No weakness.      Coordination: Coordination normal.      Gait: Gait normal.   Psychiatric:         Behavior: Behavior normal.         Thought Content: Thought content normal.         Judgment: Judgment normal.      Comments: Depressed mood

## 2024-08-02 ENCOUNTER — HOSPITAL ENCOUNTER (EMERGENCY)
Facility: HOSPITAL | Age: 64
Discharge: STILL ADMITTED OR ADMINISTRATIVE | End: 2024-08-02
Payer: COMMERCIAL

## 2024-08-02 ENCOUNTER — APPOINTMENT (OUTPATIENT)
Dept: LAB | Facility: CLINIC | Age: 64
End: 2024-08-02
Payer: COMMERCIAL

## 2024-08-02 ENCOUNTER — OFFICE VISIT (OUTPATIENT)
Dept: LAB | Facility: HOSPITAL | Age: 64
End: 2024-08-02
Payer: COMMERCIAL

## 2024-08-02 DIAGNOSIS — I10 PRIMARY HYPERTENSION: ICD-10-CM

## 2024-08-02 DIAGNOSIS — Z79.899 ENCOUNTER FOR LONG-TERM (CURRENT) USE OF OTHER MEDICATIONS: ICD-10-CM

## 2024-08-02 DIAGNOSIS — E03.9 HYPOTHYROIDISM, UNSPECIFIED TYPE: ICD-10-CM

## 2024-08-02 DIAGNOSIS — R07.2 PRECORDIAL PAIN: ICD-10-CM

## 2024-08-02 DIAGNOSIS — E78.2 MIXED HYPERLIPIDEMIA: ICD-10-CM

## 2024-08-02 DIAGNOSIS — E11.9 TYPE 2 DIABETES MELLITUS WITHOUT COMPLICATION, WITHOUT LONG-TERM CURRENT USE OF INSULIN (HCC): ICD-10-CM

## 2024-08-02 DIAGNOSIS — G45.9 TIA (TRANSIENT ISCHEMIC ATTACK): ICD-10-CM

## 2024-08-02 LAB
ALBUMIN SERPL BCG-MCNC: 3.9 G/DL (ref 3.5–5)
ALP SERPL-CCNC: 85 U/L (ref 34–104)
ALT SERPL W P-5'-P-CCNC: 11 U/L (ref 7–52)
ANION GAP SERPL CALCULATED.3IONS-SCNC: 6 MMOL/L (ref 4–13)
AST SERPL W P-5'-P-CCNC: 12 U/L (ref 13–39)
ATRIAL RATE: 75 BPM
BASOPHILS # BLD AUTO: 0.07 THOUSANDS/ÂΜL (ref 0–0.1)
BASOPHILS NFR BLD AUTO: 1 % (ref 0–1)
BILIRUB SERPL-MCNC: 0.2 MG/DL (ref 0.2–1)
BUN SERPL-MCNC: 26 MG/DL (ref 5–25)
CALCIUM SERPL-MCNC: 8.6 MG/DL (ref 8.4–10.2)
CHLORIDE SERPL-SCNC: 108 MMOL/L (ref 96–108)
CHOLEST SERPL-MCNC: 154 MG/DL
CO2 SERPL-SCNC: 24 MMOL/L (ref 21–32)
CREAT SERPL-MCNC: 1.24 MG/DL (ref 0.6–1.3)
EOSINOPHIL # BLD AUTO: 0.71 THOUSAND/ÂΜL (ref 0–0.61)
EOSINOPHIL NFR BLD AUTO: 11 % (ref 0–6)
ERYTHROCYTE [DISTWIDTH] IN BLOOD BY AUTOMATED COUNT: 13 % (ref 11.6–15.1)
EST. AVERAGE GLUCOSE BLD GHB EST-MCNC: 177 MG/DL
GFR SERPL CREATININE-BSD FRML MDRD: 46 ML/MIN/1.73SQ M
GLUCOSE P FAST SERPL-MCNC: 132 MG/DL (ref 65–99)
HBA1C MFR BLD: 7.8 %
HCT VFR BLD AUTO: 33 % (ref 34.8–46.1)
HDLC SERPL-MCNC: 51 MG/DL
HGB BLD-MCNC: 10.5 G/DL (ref 11.5–15.4)
IMM GRANULOCYTES # BLD AUTO: 0.02 THOUSAND/UL (ref 0–0.2)
IMM GRANULOCYTES NFR BLD AUTO: 0 % (ref 0–2)
LDLC SERPL CALC-MCNC: 65 MG/DL (ref 0–100)
LYMPHOCYTES # BLD AUTO: 2.23 THOUSANDS/ÂΜL (ref 0.6–4.47)
LYMPHOCYTES NFR BLD AUTO: 34 % (ref 14–44)
MCH RBC QN AUTO: 29.2 PG (ref 26.8–34.3)
MCHC RBC AUTO-ENTMCNC: 31.8 G/DL (ref 31.4–37.4)
MCV RBC AUTO: 92 FL (ref 82–98)
MONOCYTES # BLD AUTO: 0.53 THOUSAND/ÂΜL (ref 0.17–1.22)
MONOCYTES NFR BLD AUTO: 8 % (ref 4–12)
NEUTROPHILS # BLD AUTO: 2.97 THOUSANDS/ÂΜL (ref 1.85–7.62)
NEUTS SEG NFR BLD AUTO: 46 % (ref 43–75)
NONHDLC SERPL-MCNC: 103 MG/DL
NRBC BLD AUTO-RTO: 0 /100 WBCS
P AXIS: 30 DEGREES
PLATELET # BLD AUTO: 232 THOUSANDS/UL (ref 149–390)
PMV BLD AUTO: 10.2 FL (ref 8.9–12.7)
POTASSIUM SERPL-SCNC: 5.2 MMOL/L (ref 3.5–5.3)
PR INTERVAL: 152 MS
PROT SERPL-MCNC: 6.5 G/DL (ref 6.4–8.4)
QRS AXIS: 2 DEGREES
QRSD INTERVAL: 90 MS
QT INTERVAL: 378 MS
QTC INTERVAL: 422 MS
RBC # BLD AUTO: 3.6 MILLION/UL (ref 3.81–5.12)
SODIUM SERPL-SCNC: 138 MMOL/L (ref 135–147)
T WAVE AXIS: 38 DEGREES
TRIGL SERPL-MCNC: 191 MG/DL
TSH SERPL DL<=0.05 MIU/L-ACNC: 0.98 UIU/ML (ref 0.45–4.5)
VENTRICULAR RATE: 75 BPM
WBC # BLD AUTO: 6.53 THOUSAND/UL (ref 4.31–10.16)

## 2024-08-02 PROCEDURE — 85025 COMPLETE CBC W/AUTO DIFF WBC: CPT

## 2024-08-02 PROCEDURE — 93010 ELECTROCARDIOGRAM REPORT: CPT | Performed by: INTERNAL MEDICINE

## 2024-08-02 PROCEDURE — 93005 ELECTROCARDIOGRAM TRACING: CPT

## 2024-08-02 PROCEDURE — 80366 DRUG SCREENING PREGABALIN: CPT

## 2024-08-02 PROCEDURE — 80346 BENZODIAZEPINES1-12: CPT

## 2024-08-02 PROCEDURE — 83036 HEMOGLOBIN GLYCOSYLATED A1C: CPT

## 2024-08-02 PROCEDURE — 80324 DRUG SCREEN AMPHETAMINES 1/2: CPT

## 2024-08-02 PROCEDURE — 80359 METHYLENEDIOXYAMPHETAMINES: CPT

## 2024-08-02 PROCEDURE — 80061 LIPID PANEL: CPT

## 2024-08-02 PROCEDURE — 80355 GABAPENTIN NON-BLOOD: CPT

## 2024-08-02 PROCEDURE — 80053 COMPREHEN METABOLIC PANEL: CPT

## 2024-08-02 PROCEDURE — 3051F HG A1C>EQUAL 7.0%<8.0%: CPT | Performed by: FAMILY MEDICINE

## 2024-08-02 PROCEDURE — 80307 DRUG TEST PRSMV CHEM ANLYZR: CPT

## 2024-08-02 PROCEDURE — 36415 COLL VENOUS BLD VENIPUNCTURE: CPT

## 2024-08-02 PROCEDURE — 84443 ASSAY THYROID STIM HORMONE: CPT

## 2024-08-06 ENCOUNTER — OFFICE VISIT (OUTPATIENT)
Dept: PHYSICAL THERAPY | Facility: CLINIC | Age: 64
End: 2024-08-06
Payer: COMMERCIAL

## 2024-08-06 ENCOUNTER — TELEPHONE (OUTPATIENT)
Dept: FAMILY MEDICINE CLINIC | Facility: CLINIC | Age: 64
End: 2024-08-06

## 2024-08-06 DIAGNOSIS — R26.89 BALANCE PROBLEM: ICD-10-CM

## 2024-08-06 DIAGNOSIS — M54.50 ACUTE BILATERAL LOW BACK PAIN WITHOUT SCIATICA: Primary | ICD-10-CM

## 2024-08-06 LAB
1OH-MIDAZOLAM UR CFM-MCNC: NOT DETECTED NG/MG CREAT
6MAM UR QL SCN: NEGATIVE NG/ML
7AMINOCLONAZEPAM UR CFM-MCNC: NOT DETECTED NG/MG CREAT
A-OH ALPRAZ UR QL CFM: NOT DETECTED NG/MG CREAT
ACCEPTABLE CREAT UR QL: 162 MG/DL
ALPRAZ/CREAT UR CFM: NOT DETECTED NG/MG CREAT
AMPHET UR QL CFM: NOT DETECTED NG/MG CREAT
AMPHET UR QL SCN: NEGATIVE NG/ML
BARBITURATES UR QL SCN: NEGATIVE NG/ML
BENZODIAZ UR QL SCN: NEGATIVE NG/ML
BUPRENORPHINE UR QL CFM: NEGATIVE NG/ML
CANNABINOIDS UR QL SCN: NEGATIVE NG/ML
CARISOPRODOL UR QL: NEGATIVE NG/ML
CLONAZEPAM/CREAT UR CFM: NOT DETECTED NG/MG CREAT
COCAINE+BZE UR QL SCN: NEGATIVE NG/ML
DIAZEPAM/CREAT UR: NOT DETECTED NG/MG CREAT
ETHYL GLUCURONIDE UR QL SCN: NEGATIVE NG/ML
FENTANYL UR QL SCN: NEGATIVE NG/ML
FLUNITRAZEPAM UR-MCNC: NOT DETECTED NG/MG CREAT
GABAPENTIN SERPLBLD QL SCN: ABNORMAL UG/ML
GABAPENTIN UR QL CFM: PRESENT
LORAZEPAM UR CFM-MCNC: NOT DETECTED NG/MG CREAT
MDMA UR QL CFM: NOT DETECTED NG/MG CREAT
MDMA UR QL CFM: NOT DETECTED NG/MG CREAT
METHADONE UR QL SCN: NEGATIVE NG/ML
METHAMPHET UR QL CFM: NOT DETECTED NG/MG CREAT
MIDAZOLAM/CREAT UR CFM: NOT DETECTED NG/MG CREAT
NITRITE UR QL STRIP: NEGATIVE UG/ML
NORDIAZEPAM UR CFM-MCNC: NOT DETECTED NG/MG CREAT
NORFLUNITRAZEPAM UR-MCNC: NOT DETECTED NG/MG CREAT
OH-ETHYLFLURAZ UR CFM-MCNC: NOT DETECTED NG/MG CREAT
OH-TRIAZOLAM UR CFM-MCNC: NOT DETECTED NG/MG CREAT
OPIATES UR QL SCN: NEGATIVE NG/ML
OXAZEPAM CTO UR CFM-MCNC: NOT DETECTED NG/MG CREAT
OXYCODONE+OXYMORPHONE UR QL SCN: NEGATIVE NG/ML
PCP UR QL SCN: NEGATIVE NG/ML
PREGABALIN UR QL CFM: NOT DETECTED
PROPOXYPH UR QL SCN: NEGATIVE NG/ML
SPECIMEN PH ACCEPTABLE UR: 5.5 (ref 4.5–8.9)
TAPENTADOL UR QL SCN: NEGATIVE NG/ML
TEMAZEPAM UR CFM-MCNC: NOT DETECTED NG/MG CREAT
TRAMADOL UR QL SCN: NEGATIVE NG/ML

## 2024-08-06 PROCEDURE — 97112 NEUROMUSCULAR REEDUCATION: CPT | Performed by: PHYSICAL THERAPIST

## 2024-08-06 PROCEDURE — 97110 THERAPEUTIC EXERCISES: CPT | Performed by: PHYSICAL THERAPIST

## 2024-08-06 PROCEDURE — 97140 MANUAL THERAPY 1/> REGIONS: CPT | Performed by: PHYSICAL THERAPIST

## 2024-08-06 NOTE — PROGRESS NOTES
Daily Note     Today's date: 2024  Patient name: Ana Rosa Parker  : 1960  MRN: [id number]  Referring provider: Andres Gutierrez MD  Dx:   Encounter Diagnosis     ICD-10-CM    1. Acute bilateral low back pain without sciatica  M54.50       2. Balance problem  R26.89           Start Time: 1345  Stop Time: 1430  Total time in clinic (min): 45 minutes    Subjective: Pt saw PCP since lv and was referred for cardiology and neurology. No precautions given as far as activity. She denies any recent dizziness or SOB.       Objective: See treatment diary below      Assessment: Tolerated treatment well. Patient would benefit from continued PT. Able to progress back to balance activity well today.       Plan: Continue per plan of care.      Precautions: none      Manuals  7/9 7/11 7/15 7/17 7/30 8/1 8/6   B/l paraspinal STM CB CB CB CB CB GH R>L CB CB CB CB                                          Neuro Re-Ed             BIODEX   6' CB CB LOS L10  CB   CB   Heel toe gait   40ftx4 CB CB Fwd/ bwd 40 ft x4 CB      Lunge to bosu   x10ea CB CB GH CB   CB   VG squats    40%x10 DL, x10 SL CB nv CB   CB                                          Ther Ex             LTR 10x10: CB CB CB CB HEP  CB CB CB   B/l piriformis stretch 10x10: CB CB CB CB GH  CB     B/l hamstring active elongation 10x10: CB CB  CB CB GH  CB     SL clamshells  10x10:   10x10: BTB CB GH CB CB CB CB   B/l hip flexion  10x10:   nv    nv                                            Ther Activity                                       Gait Training                                       Modalities

## 2024-08-07 DIAGNOSIS — E11.9 TYPE 2 DIABETES MELLITUS WITHOUT COMPLICATION, WITHOUT LONG-TERM CURRENT USE OF INSULIN (HCC): Primary | ICD-10-CM

## 2024-08-08 ENCOUNTER — OFFICE VISIT (OUTPATIENT)
Dept: PHYSICAL THERAPY | Facility: CLINIC | Age: 64
End: 2024-08-08
Payer: COMMERCIAL

## 2024-08-08 DIAGNOSIS — R26.89 BALANCE PROBLEM: ICD-10-CM

## 2024-08-08 DIAGNOSIS — M54.50 ACUTE BILATERAL LOW BACK PAIN WITHOUT SCIATICA: Primary | ICD-10-CM

## 2024-08-08 PROCEDURE — 97112 NEUROMUSCULAR REEDUCATION: CPT | Performed by: PHYSICAL THERAPIST

## 2024-08-08 PROCEDURE — 97140 MANUAL THERAPY 1/> REGIONS: CPT | Performed by: PHYSICAL THERAPIST

## 2024-08-08 PROCEDURE — 97110 THERAPEUTIC EXERCISES: CPT | Performed by: PHYSICAL THERAPIST

## 2024-08-08 RX ORDER — DULAGLUTIDE 4.5 MG/.5ML
4.5 INJECTION, SOLUTION SUBCUTANEOUS
Qty: 2 ML | Refills: 5 | Status: SHIPPED | OUTPATIENT
Start: 2024-08-08

## 2024-08-08 NOTE — PROGRESS NOTES
Daily Note     Today's date: 2024  Patient name: Ana Rosa Parker  : 1960  MRN: [id number]  Referring provider: Andres Gutierrez MD  Dx:   Encounter Diagnosis     ICD-10-CM    1. Acute bilateral low back pain without sciatica  M54.50       2. Balance problem  R26.89                      Subjective: Pt reports decreased LBP today.       Objective: See treatment diary below      Assessment: Tolerated treatment well. Patient would benefit from continued PT. Improvement in control during balance activity today.       Plan: Continue per plan of care.      Precautions: none      Manuals 8/8 6/25 6/27 7/9 7/11 7/15 7/17 7/30 8/1 8/6   B/l paraspinal STM CB CB CB CB CB GH R>L CB CB CB CB                                          Neuro Re-Ed             BIODEX CB  6' CB CB LOS L10  CB   CB   Heel toe gait CB  40ftx4 CB CB Fwd/ bwd 40 ft x4 CB      Lunge to bosu CB  x10ea CB CB GH CB   CB   VG squats CB   40%x10 DL, x10 SL CB nv CB   CB                                          Ther Ex             LTR  CB CB CB CB HEP  CB CB CB   B/l piriformis stretch  CB CB CB CB GH  CB     B/l hamstring active elongation  CB CB  CB CB GH  CB     SL clamshells BTB x15 10x10:   10x10: BTB CB GH CB CB CB CB   B/l hip flexion  10x10:   nv    nv                                            Ther Activity                                       Gait Training                                       Modalities

## 2024-08-08 NOTE — RESULT ENCOUNTER NOTE
How Severe Is Your Rash?: mild
Left message asking pt to call back re: trulicity increase  
Message left for patient to call back.  
[employer] message sent to patient to call office. Refill submitted for approval.  
Is This A New Presentation, Or A Follow-Up?: Rash

## 2024-08-12 NOTE — TELEPHONE ENCOUNTER
Spoke with patient and advised of results and medication adjustment. Patient verbalized understanding and agrees. She is asking for clarification with her red blood cell count being low. She is asking if this is why she is always tired. Please advise.    Thank you

## 2024-08-13 ENCOUNTER — APPOINTMENT (OUTPATIENT)
Dept: PHYSICAL THERAPY | Facility: CLINIC | Age: 64
End: 2024-08-13
Payer: COMMERCIAL

## 2024-08-15 ENCOUNTER — OFFICE VISIT (OUTPATIENT)
Dept: PHYSICAL THERAPY | Facility: CLINIC | Age: 64
End: 2024-08-15
Payer: COMMERCIAL

## 2024-08-15 DIAGNOSIS — M54.50 ACUTE BILATERAL LOW BACK PAIN WITHOUT SCIATICA: Primary | ICD-10-CM

## 2024-08-15 DIAGNOSIS — R26.89 BALANCE PROBLEM: ICD-10-CM

## 2024-08-15 PROCEDURE — 97140 MANUAL THERAPY 1/> REGIONS: CPT | Performed by: PHYSICAL THERAPIST

## 2024-08-15 PROCEDURE — 97110 THERAPEUTIC EXERCISES: CPT | Performed by: PHYSICAL THERAPIST

## 2024-08-15 PROCEDURE — 97112 NEUROMUSCULAR REEDUCATION: CPT | Performed by: PHYSICAL THERAPIST

## 2024-08-15 NOTE — PROGRESS NOTES
Daily Note     Today's date: 8/15/2024  Patient name: Ana Rosa Parker  : 1960  MRN: [id number]  Referring provider: Andres Gutierrez MD  Dx:   Encounter Diagnosis     ICD-10-CM    1. Acute bilateral low back pain without sciatica  M54.50       2. Balance problem  R26.89                      Subjective: Pt reports decreased pain in the back recently.      Objective: See treatment diary below      Assessment: Tolerated treatment well. Patient would benefit from continued PT. Improved balance noted today.       Plan: Continue per plan of care.      Precautions: none      Manuals 8/8 8/15 6/27 7/9 7/11 7/15 7/17 7/30 8/1 8/6   B/l paraspinal STM CB CB CB CB CB GH R>L CB CB CB CB                                          Neuro Re-Ed             BIODEX CB CB 6' CB CB LOS L10  CB   CB   Heel toe gait CB CB 40ftx4 CB CB Fwd/ bwd 40 ft x4 CB      Lunge to bosu CB CB x10ea CB CB GH CB   CB   VG squats CB CB  40%x10 DL, x10 SL CB nv CB   CB                                          Ther Ex             LTR  Open books x10 CB CB CB HEP  CB CB CB   B/l piriformis stretch 10x10: CB CB CB CB GH  CB     B/l hamstring active elongation  CB CB  CB CB GH  CB     SL clamshells BTB x15 10x10:   10x10: BTB CB GH CB CB CB CB   B/l hip flexion  10x10:   nv    nv                                            Ther Activity                                       Gait Training                                       Modalities

## 2024-08-20 ENCOUNTER — OFFICE VISIT (OUTPATIENT)
Dept: PHYSICAL THERAPY | Facility: CLINIC | Age: 64
End: 2024-08-20
Payer: COMMERCIAL

## 2024-08-20 DIAGNOSIS — M54.50 ACUTE BILATERAL LOW BACK PAIN WITHOUT SCIATICA: Primary | ICD-10-CM

## 2024-08-20 DIAGNOSIS — R26.89 BALANCE PROBLEM: ICD-10-CM

## 2024-08-20 PROCEDURE — 97140 MANUAL THERAPY 1/> REGIONS: CPT | Performed by: PHYSICAL THERAPIST

## 2024-08-20 PROCEDURE — 97112 NEUROMUSCULAR REEDUCATION: CPT | Performed by: PHYSICAL THERAPIST

## 2024-08-20 PROCEDURE — 97110 THERAPEUTIC EXERCISES: CPT | Performed by: PHYSICAL THERAPIST

## 2024-08-20 NOTE — PROGRESS NOTES
Daily Note     Today's date: 2024  Patient name: Ana Rosa Parker  : 1960  MRN: [id number]  Referring provider: Andres Gutierrez MD  Dx:   Encounter Diagnosis     ICD-10-CM    1. Acute bilateral low back pain without sciatica  M54.50       2. Balance problem  R26.89                      Subjective: Pt reports decreased decreased pain in the LB and is hoping to progress her balance activity some.       Objective: See treatment diary below      Assessment: Tolerated treatment well. Patient would benefit from continued PT. Continued dynamic balance deficits noted.      Plan: Continue per plan of care.      Precautions: none      Manuals 8/8 8/15 8/20          B/l paraspinal STM CB CB CB                                                 Neuro Re-Ed             BIODEX CB CB 6'          Heel toe gait CB CB 40ftx4          Lunge to bosu CB CB x10ea          VG squats CB CB 45% x20                                                 Ther Ex             LTR  Open books x10 CB          B/l piriformis stretch 10x10: CB CB          B/l hamstring active elongation  CB CB           SL clamshells BTB x15 10x10:  nv          B/l hip flexion  10x10:  nv                                                 Ther Activity                                       Gait Training                                       Modalities

## 2024-08-22 ENCOUNTER — OFFICE VISIT (OUTPATIENT)
Dept: PHYSICAL THERAPY | Facility: CLINIC | Age: 64
End: 2024-08-22
Payer: COMMERCIAL

## 2024-08-22 DIAGNOSIS — R26.89 BALANCE PROBLEM: ICD-10-CM

## 2024-08-22 DIAGNOSIS — M54.50 ACUTE BILATERAL LOW BACK PAIN WITHOUT SCIATICA: Primary | ICD-10-CM

## 2024-08-22 PROCEDURE — 97110 THERAPEUTIC EXERCISES: CPT | Performed by: PHYSICAL THERAPIST

## 2024-08-22 PROCEDURE — 97112 NEUROMUSCULAR REEDUCATION: CPT | Performed by: PHYSICAL THERAPIST

## 2024-08-22 PROCEDURE — 97140 MANUAL THERAPY 1/> REGIONS: CPT | Performed by: PHYSICAL THERAPIST

## 2024-08-22 NOTE — PROGRESS NOTES
Daily Note     Today's date: 2024  Patient name: Aan Rosa Parker  : 1960  MRN: [id number]  Referring provider: Andres Gutierrez MD  Dx:   Encounter Diagnosis     ICD-10-CM    1. Acute bilateral low back pain without sciatica  M54.50       2. Balance problem  R26.89                      Subjective: Pt reports some mild soreness in the LB today but overall doing well.       Objective: See treatment diary below      Assessment: Tolerated treatment well. Patient would benefit from continued PT.       Plan: Continue per plan of care.      Precautions: none      Manuals 8/8 8/15 8/20 8/22         B/l paraspinal STM CB CB CB CB                                                Neuro Re-Ed             BIODEX CB CB 6'          Heel toe gait CB CB 40ftx4 MK         Lunge to bosu CB CB x10ea MK         VG squats CB CB 45% x20 45% X20         Bosu weight shifts    X10 ea         Blazepod taps    30:x3                      Ther Ex             LTR  Open books x10 CB CB         B/l piriformis stretch 10x10: CB CB nv         B/l hamstring active elongation  CB CB  nv         SL clamshells BTB x15 10x10:  nv nv         B/l hip flexion  10x10:  nv nv                                                Ther Activity                                       Gait Training                                       Modalities

## 2024-08-27 ENCOUNTER — OFFICE VISIT (OUTPATIENT)
Dept: URGENT CARE | Facility: CLINIC | Age: 64
End: 2024-08-27
Payer: COMMERCIAL

## 2024-08-27 VITALS
DIASTOLIC BLOOD PRESSURE: 60 MMHG | SYSTOLIC BLOOD PRESSURE: 120 MMHG | TEMPERATURE: 97.3 F | BODY MASS INDEX: 34.66 KG/M2 | OXYGEN SATURATION: 100 % | HEIGHT: 65 IN | RESPIRATION RATE: 18 BRPM | HEART RATE: 89 BPM | WEIGHT: 208 LBS

## 2024-08-27 DIAGNOSIS — J01.40 ACUTE NON-RECURRENT PANSINUSITIS: Primary | ICD-10-CM

## 2024-08-27 PROCEDURE — 99213 OFFICE O/P EST LOW 20 MIN: CPT | Performed by: NURSE PRACTITIONER

## 2024-08-27 RX ORDER — BENZONATATE 100 MG/1
CAPSULE ORAL
Qty: 30 CAPSULE | Refills: 0 | Status: SHIPPED | OUTPATIENT
Start: 2024-08-27

## 2024-08-27 RX ORDER — PREDNISONE 20 MG/1
20 TABLET ORAL 2 TIMES DAILY WITH MEALS
Qty: 10 TABLET | Refills: 0 | Status: SHIPPED | OUTPATIENT
Start: 2024-08-27 | End: 2024-09-01

## 2024-08-27 RX ORDER — ALBUTEROL SULFATE 90 UG/1
2 AEROSOL, METERED RESPIRATORY (INHALATION) EVERY 4 HOURS PRN
Qty: 8.5 G | Refills: 1 | Status: SHIPPED | OUTPATIENT
Start: 2024-08-27

## 2024-08-27 RX ORDER — AMOXICILLIN 875 MG
875 TABLET ORAL 2 TIMES DAILY
Qty: 20 TABLET | Refills: 0 | Status: SHIPPED | OUTPATIENT
Start: 2024-08-27 | End: 2024-09-06

## 2024-08-27 NOTE — PATIENT INSTRUCTIONS
"Patient Education     Sinusitis in adults   The Basics   Written by the doctors and editors at [facility]   What is sinusitis? -- Sinusitis is a condition that can cause a stuffy nose, pain in the face, and discharge or \"mucus\" from the nose.  The sinuses are hollow areas in the bones of the face (figure 1). They have a thin lining that normally makes a small amount of mucus. When this lining gets irritated or infected, it swells and makes extra mucus. This causes symptoms.  Sinusitis usually happens after a person gets sick with a cold. The germs causing the cold can infect the sinuses, too. Sometimes, other germs can be the cause of the infection. Often, a person feels like their cold is getting better. But then, they get sinusitis and begin to feel sick again.  What are the symptoms of sinusitis? -- Common symptoms of sinusitis include:   Stuffy or blocked nose   Thick white, yellow, or green discharge from the nose   Pain in the teeth   Pain or pressure in the face - This often feels worse when a person bends forward.  People with sinusitis can also have other symptoms, such as:   Fever   Cough   Trouble smelling   Ear pressure or fullness   Headache   Bad breath   Feeling tired  Most of the time, symptoms start to improve in 7 to 10 days.  Should I see a doctor or nurse? -- See your doctor or nurse if your symptoms last more than 10 days, or if your symptoms first get better but then get worse.  Rarely, sinusitis can lead to serious problems. See your doctor or nurse right away (do not wait 10 days) if you have:   Fever higher than 102°F (38.9°C)   Sudden and severe pain in the face and head   Trouble seeing, or seeing double   Trouble thinking clearly   Swelling or redness around 1 or both eyes   Stiff neck  Is there anything I can do on my own to feel better? -- Yes. To help with your symptoms, you can:   Take an over-the-counter pain reliever to reduce the pain.   Rinse your nose and sinuses with salt water a " "few times a day - Ask your doctor or nurse about the best way to do this.   Drink plenty of fluids - Staying hydrated might help to thin the mucus and make it drain more easily.  Your doctor might also recommend a steroid nose spray to reduce the swelling in your nose, especially if you have allergies. Talk to your doctor if you are thinking of using a steroid spray.  How is sinusitis treated? -- Most of the time, sinusitis does not need to be treated with antibiotic medicines. This is because most sinusitis is caused by viruses, not bacteria, and antibiotics do not kill viruses. In fact, even sinusitis caused by bacteria will usually get better on its own without antibiotics.  Some people with sinusitis do need treatment with antibiotics. If your symptoms have not improved after 10 days, ask your doctor if you should take antibiotics. They might recommend that you wait 1 more week to see if your symptoms improve. But if you have symptoms such as a fever or a lot of pain, they might prescribe antibiotics. If you do get antibiotics, follow all of your doctor's instructions about taking them.  What if my symptoms do not get better? -- If your symptoms do not get better, talk with your doctor or nurse. They might order tests to figure out why you still have symptoms. These can include:   CT scan or other imaging tests - Imaging tests create pictures of the inside of the body.   A test to look inside the sinuses - For this test, a doctor puts a thin tube with a camera on the end into the nose and up into the sinuses.  Some people get a lot of sinus infections or have symptoms that last at least 3 months. These people can have a different type of sinusitis called \"chronic sinusitis.\" Chronic sinusitis can be caused by different things. For example, some people have growths inside their nose or sinuses that are called \"polyps.\" Other people have allergies that cause their symptoms.  Chronic sinusitis can be treated in " different ways. If you have chronic sinusitis, talk with your doctor about which treatments are right for you.  All topics are updated as new evidence becomes available and our peer review process is complete.  This topic retrieved from [employer] on: Feb 28, 2024.  Topic 83623 Version 21.0  Release: 32.2.4 - C32.58  © 2024 UpToDate, Inc. and/or its affiliates. All rights reserved.  figure 1: Sinuses of the face     The sinuses are hollow areas in the bones of the face. This drawing shows where the sinuses are, from the side and front views. There are 4 pairs of sinuses, named for the bones around them: sphenoid, frontal, ethmoid, and maxillary.  Graphic 672403 Version 3.0  Consumer Information Use and Disclaimer   Disclaimer: This generalized information is a limited summary of diagnosis, treatment, and/or medication information. It is not meant to be comprehensive and should be used as a tool to help the user understand and/or assess potential diagnostic and treatment options. It does NOT include all information about conditions, treatments, medications, side effects, or risks that may apply to a specific patient. It is not intended to be medical advice or a substitute for the medical advice, diagnosis, or treatment of a health care provider based on the health care provider's examination and assessment of a patient's specific and unique circumstances. Patients must speak with a health care provider for complete information about their health, medical questions, and treatment options, including any risks or benefits regarding use of medications. This information does not endorse any treatments or medications as safe, effective, or approved for treating a specific patient. Up[employer], Inc. and its affiliates disclaim any warranty or liability relating to this information or the use thereof.The use of this information is governed by the Terms of Use, available at  https://www.w[employer]tersp[employer] intelligenceuwer.com/en/know/clinical-effectiveness-terms. 2024© [employer] and its affiliates and/or licensors. All rights reserved.  Copyright   © 2024 [employer] and/or its affiliates. All rights reserved.

## 2024-08-27 NOTE — PROGRESS NOTES
"  [facility]        NAME: Ana Rosa Parker is a 64 y.o. female  : 1960    MRN: [id number]  DATE: 2024  TIME: 4:28 PM      Assessment and Plan     Acute non-recurrent pansinusitis [J01.40]  1. Acute non-recurrent pansinusitis  amoxicillin (AMOXIL) 875 mg tablet    albuterol (ProAir HFA) 90 mcg/act inhaler    predniSONE 20 mg tablet    benzonatate (TESSALON PERLES) 100 mg capsule            Patient Instructions     Patient Instructions   Patient Education     Sinusitis in adults   The Basics   Written by the doctors and editors at [facility]   What is sinusitis? -- Sinusitis is a condition that can cause a stuffy nose, pain in the face, and discharge or \"mucus\" from the nose.  The sinuses are hollow areas in the bones of the face (figure 1). They have a thin lining that normally makes a small amount of mucus. When this lining gets irritated or infected, it swells and makes extra mucus. This causes symptoms.  Sinusitis usually happens after a person gets sick with a cold. The germs causing the cold can infect the sinuses, too. Sometimes, other germs can be the cause of the infection. Often, a person feels like their cold is getting better. But then, they get sinusitis and begin to feel sick again.  What are the symptoms of sinusitis? -- Common symptoms of sinusitis include:   Stuffy or blocked nose   Thick white, yellow, or green discharge from the nose   Pain in the teeth   Pain or pressure in the face - This often feels worse when a person bends forward.  People with sinusitis can also have other symptoms, such as:   Fever   Cough   Trouble smelling   Ear pressure or fullness   Headache   Bad breath   Feeling tired  Most of the time, symptoms start to improve in 7 to 10 days.  Should I see a doctor or nurse? -- See your doctor or nurse if your symptoms last more than 10 days, or if your symptoms first get better but then get worse.  Rarely, sinusitis can lead to serious problems. See your " doctor or nurse right away (do not wait 10 days) if you have:   Fever higher than 102°F (38.9°C)   Sudden and severe pain in the face and head   Trouble seeing, or seeing double   Trouble thinking clearly   Swelling or redness around 1 or both eyes   Stiff neck  Is there anything I can do on my own to feel better? -- Yes. To help with your symptoms, you can:   Take an over-the-counter pain reliever to reduce the pain.   Rinse your nose and sinuses with salt water a few times a day - Ask your doctor or nurse about the best way to do this.   Drink plenty of fluids - Staying hydrated might help to thin the mucus and make it drain more easily.  Your doctor might also recommend a steroid nose spray to reduce the swelling in your nose, especially if you have allergies. Talk to your doctor if you are thinking of using a steroid spray.  How is sinusitis treated? -- Most of the time, sinusitis does not need to be treated with antibiotic medicines. This is because most sinusitis is caused by viruses, not bacteria, and antibiotics do not kill viruses. In fact, even sinusitis caused by bacteria will usually get better on its own without antibiotics.  Some people with sinusitis do need treatment with antibiotics. If your symptoms have not improved after 10 days, ask your doctor if you should take antibiotics. They might recommend that you wait 1 more week to see if your symptoms improve. But if you have symptoms such as a fever or a lot of pain, they might prescribe antibiotics. If you do get antibiotics, follow all of your doctor's instructions about taking them.  What if my symptoms do not get better? -- If your symptoms do not get better, talk with your doctor or nurse. They might order tests to figure out why you still have symptoms. These can include:   CT scan or other imaging tests - Imaging tests create pictures of the inside of the body.   A test to look inside the sinuses - For this test, a doctor puts a thin tube with  "a camera on the end into the nose and up into the sinuses.  Some people get a lot of sinus infections or have symptoms that last at least 3 months. These people can have a different type of sinusitis called \"chronic sinusitis.\" Chronic sinusitis can be caused by different things. For example, some people have growths inside their nose or sinuses that are called \"polyps.\" Other people have allergies that cause their symptoms.  Chronic sinusitis can be treated in different ways. If you have chronic sinusitis, talk with your doctor about which treatments are right for you.  All topics are updated as new evidence becomes available and our peer review process is complete.  This topic retrieved from [employer] on: Feb 28, 2024.  Topic 59251 Version 21.0  Release: 32.2.4 - C32.58  © 2024 UpToDate, Inc. and/or its affiliates. All rights reserved.  figure 1: Sinuses of the face     The sinuses are hollow areas in the bones of the face. This drawing shows where the sinuses are, from the side and front views. There are 4 pairs of sinuses, named for the bones around them: sphenoid, frontal, ethmoid, and maxillary.  Graphic 474830 Version 3.0  Consumer Information Use and Disclaimer   Disclaimer: This generalized information is a limited summary of diagnosis, treatment, and/or medication information. It is not meant to be comprehensive and should be used as a tool to help the user understand and/or assess potential diagnostic and treatment options. It does NOT include all information about conditions, treatments, medications, side effects, or risks that may apply to a specific patient. It is not intended to be medical advice or a substitute for the medical advice, diagnosis, or treatment of a health care provider based on the health care provider's examination and assessment of a patient's specific and unique circumstances. Patients must speak with a health care provider for complete information about their health, medical " questions, and treatment options, including any risks or benefits regarding use of medications. This information does not endorse any treatments or medications as safe, effective, or approved for treating a specific patient. UpToDate, Inc. and its affiliates disclaim any warranty or liability relating to this information or the use thereof.The use of this information is governed by the Terms of Use, available at https://www.[employer].com/en/know/clinical-effectiveness-terms. 2024© UpToDate, Inc. and its affiliates and/or licensors. All rights reserved.  Copyright   © 2024 Up[employer]Date, Inc. and/or its affiliates. All rights reserved.      Follow up with PCP in 3-5 days.  Proceed to  ER if symptoms worsen.    Chief Complaint     Chief Complaint   Patient presents with    Eye Pain     Left eye. Drainage since Saturday    Sinusitis     Since Saturday has had post nasal drip worse at night. Head congestion, sore throat, non productive cough and swollen glands.          History of Present Illness     Hx asthma.  ? TIA in May.  Underlying balance issues.  Notes decreased immune system--takes a while to get over things although no formal autoimmune dx or immunosuppression medications.    Onset s/s illness Saturday.  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  Caregiving a few days ago and she herself became too sick.  Notes a lot of sinus congestion as well as pressure around her left eye.        Review of Systems     Review of Systems   Constitutional:  Positive for fatigue.   HENT:  Positive for congestion, sinus pressure, sinus pain and sore throat (irritated).    Eyes:  Positive for pain and discharge (Increased tears, watery).   Respiratory:  Positive for cough. Negative for chest tightness, shortness of breath and wheezing.         Has albuterol available at home but has not needed it     Hematological:  Positive for adenopathy.   All other systems reviewed and are  negative.        Current Medications       Current Outpatient Medications:     albuterol (ProAir HFA) 90 mcg/act inhaler, Inhale 2 puffs every 4 (four) hours as needed for wheezing or shortness of breath, Disp: 8.5 g, Rfl: 1    amoxicillin (AMOXIL) 875 mg tablet, Take 1 tablet (875 mg total) by mouth 2 (two) times a day for 10 days, Disp: 20 tablet, Rfl: 0    aspirin 81 mg chewable tablet, Chew 81 mg daily, Disp: , Rfl:     benzonatate (TESSALON PERLES) 100 mg capsule, 1-2 caps every 8 hours as needed for cough, Disp: 30 capsule, Rfl: 0    busPIRone (BUSPAR) 10 mg tablet, 10 mg in the morning, Disp: , Rfl:     dulaglutide (Trulicity) 4.5 MG/0.5ML injection, Inject 0.5 mL (4.5 mg total) under the skin every 7 days, Disp: 2 mL, Rfl: 5    eszopiclone (LUNESTA) 1 mg tablet, Take 2 mg by mouth daily at bedtime Takes 2-3 daily, Disp: , Rfl:     gabapentin (NEURONTIN) 100 mg capsule, TAKE 1 TO 2 CAPSULES BY MOUTH DAILY AT BEDTIME, Disp: , Rfl:     levothyroxine 88 mcg tablet, Take 1 tablet (88 mcg total) by mouth daily in the early morning, Disp: 90 tablet, Rfl: 1    lisinopril (ZESTRIL) 10 mg tablet, Take 1 tablet (10 mg total) by mouth daily, Disp: 90 tablet, Rfl: 1    loratadine (CLARITIN) 10 mg tablet, Take 1 tablet (10 mg total) by mouth 2 (two) times a day, Disp: 60 tablet, Rfl: 8    metFORMIN (GLUCOPHAGE) 500 mg tablet, TAKE 2 TABLETS BY MOUTH TWICE A DAY WITH FOOD, Disp: 360 tablet, Rfl: 1    omeprazole (PriLOSEC) 20 mg delayed release capsule, Take 1 capsule (20 mg total) by mouth daily before breakfast, Disp: 90 capsule, Rfl: 1    predniSONE 20 mg tablet, Take 1 tablet (20 mg total) by mouth 2 (two) times a day with meals for 5 days, Disp: 10 tablet, Rfl: 0    QUEtiapine (SEROquel) 200 mg tablet, Take 1 tablet (200 mg total) by mouth daily at bedtime, Disp: 90 tablet, Rfl: 1    QUEtiapine (SEROquel) 25 mg tablet, Take 25 mg by mouth 2 (two) times a day as needed 2-3 at bedtime prn, Disp: , Rfl:     rosuvastatin  (CRESTOR) 20 MG tablet, Take 1 tablet (20 mg total) by mouth daily, Disp: 100 tablet, Rfl: 3    sertraline (ZOLOFT) 100 mg tablet, TAKE 2 TABLETS (200 MG TOTAL) BY MOUTH DAILY AT BEDTIME, Disp: 180 tablet, Rfl: 1    traZODone (DESYREL) 50 mg tablet, TAKE 1 TABLET BY MOUTH DAILY AT BEDTIME, Disp: 90 tablet, Rfl: 1    venlafaxine (EFFEXOR-XR) 150 mg 24 hr capsule, TAKE 1 CAPSULE BY MOUTH EVERY DAY, Disp: 90 capsule, Rfl: 1    naproxen (Naprosyn) 500 mg tablet, Take 1 tablet (500 mg total) by mouth 2 (two) times a day with meals for 7 days, Disp: 14 tablet, Rfl: 0    polyethylene glycol (Golytely) 4000 mL solution, Take 4,000 mL by mouth once for 1 dose Take 4000 mL by mouth once for 1 dose. Use as directed (Patient not taking: Reported on 5/29/2024), Disp: 4000 mL, Rfl: 0    Current Allergies     Allergies as of 08/27/2024 - Reviewed 08/27/2024   Allergen Reaction Noted    Darvon [propoxyphene] Dizziness 01/26/2019    Fluoxetine Other (See Comments) 10/01/2012    Meclizine Dizziness 10/01/2012    Morphine and codeine Nausea Only 10/01/2012    Oxycodone-acetaminophen Other (See Comments) and Tachycardia 10/01/2012    Percolone [oxycodone] Other (See Comments) and Tachycardia 03/20/2019              The following portions of the patient's history were reviewed and updated as appropriate: allergies, current medications, past family history, past medical history, past social history, past surgical history and problem list.     Past Medical History:   Diagnosis Date    Allergic     Anxiety     Arthritis     Asthma     Claustrophobia     CPAP (continuous positive airway pressure) dependence     Depression     Diabetes mellitus (HCC)     Disease of thyroid gland     hypo    GERD (gastroesophageal reflux disease)     Headache     History of COVID-19     Hyperlipemia     Hyperlipidemia     Hypertension     Inflammatory bowel disease     Kidney stone     Kidney stones     Major depressive disorder     Motion sickness      "Obesity     Risk for falls     Sleep apnea     TIA (transient ischemic attack)     Use of cane as ambulatory aid     Wears glasses     Wears partial dentures     upper partial       Past Surgical History:   Procedure Laterality Date    ADENOIDECTOMY      COLONOSCOPY      DILATION AND CURETTAGE OF UTERUS      NC ARTHRS KNE SURG W/MENISCECTOMY MED/LAT W/SHVG Left 3/24/2022    Procedure: ARTHROSCOPY KNEE w/ partial medial menisectomy;  Surgeon: eTrry Bhatt MD;  Location: [facility];  Service: Orthopedics    NC COLONOSCOPY FLX DX W/COLLJ SPEC WHEN PFRMD N/A 3/15/2019    Procedure: COLONOSCOPY;  Surgeon: Angel Saavedra MD;  Location: [facility];  Service: Gastroenterology    ROTATOR CUFF REPAIR Right     SHOULDER SURGERY Left     impingement    TONSILLECTOMY      TUBAL LIGATION         Family History   Problem Relation Age of Onset    ALS Mother     Venous thrombosis Father     Diabetes Father     Other Family         cerebral embolism    Diabetes Family     Hypertension Family     Kidney disease Family     Breast cancer Neg Hx     Colon cancer Neg Hx     Ovarian cancer Neg Hx     Uterine cancer Neg Hx          Medications have been verified.        Objective     /60   Pulse 89   Temp (!) 97.3 °F (36.3 °C)   Resp 18   Ht 5' 5\" (1.651 m)   Wt 94.3 kg (208 lb)   LMP  (LMP Unknown)   SpO2 100%   BMI 34.61 kg/m²   No LMP recorded (lmp unknown). Patient is postmenopausal.         Physical Exam     Physical Exam  Vitals and nursing note reviewed.   Constitutional:       General: She is not in acute distress.     Appearance: Normal appearance. She is well-developed and well-groomed. She is ill-appearing. She is not toxic-appearing or diaphoretic.   HENT:      Head: Normocephalic and atraumatic.      Right Ear: Hearing, ear canal and external ear normal. Tenderness present. A middle ear effusion is present. Tympanic membrane is not erythematous.      Left Ear: Hearing, ear canal and external ear " normal. Tenderness present. A middle ear effusion is present. Tympanic membrane is not erythematous.      Nose: Mucosal edema and congestion present.      Right Sinus: Maxillary sinus tenderness and frontal sinus tenderness present.      Left Sinus: Maxillary sinus tenderness and frontal sinus tenderness present.      Mouth/Throat:      Mouth: Oropharynx is clear and moist and mucous membranes are normal. Mucous membranes are moist.      Pharynx: Oropharynx is clear. Uvula midline. No oropharyngeal exudate or posterior oropharyngeal erythema.   Eyes:      General: Lids are normal. Vision grossly intact. Gaze aligned appropriately.      Conjunctiva/sclera: Conjunctivae normal.      Right eye: Right conjunctiva is not injected. No chemosis or exudate.     Left eye: Left conjunctiva is not injected. No chemosis or exudate.     Pupils: Pupils are equal, round, and reactive to light.      Funduscopic exam:     Right eye: No hemorrhage or papilledema. Red reflex present.         Left eye: No hemorrhage or papilledema. Red reflex present.  Cardiovascular:      Rate and Rhythm: Normal rate and regular rhythm. No extrasystoles are present.     Heart sounds: Normal heart sounds, S1 normal and S2 normal. No murmur heard.     No friction rub. No gallop.   Pulmonary:      Effort: Pulmonary effort is normal. No tachypnea, bradypnea, accessory muscle usage, respiratory distress or apnea.      Breath sounds: Normal breath sounds. No stridor. No decreased breath sounds, wheezing (clear, good air movement), rhonchi or rales.   Abdominal:      General: Bowel sounds are normal. There is no distension.      Palpations: Abdomen is soft.      Tenderness: There is no abdominal tenderness.   Musculoskeletal:         General: Normal range of motion.      Cervical back: Normal range of motion and neck supple.   Lymphadenopathy:      Cervical: Cervical adenopathy present.   Skin:     General: Skin is warm and dry.      Capillary Refill:  Capillary refill takes less than 2 seconds.   Neurological:      General: No focal deficit present.      Mental Status: She is alert and oriented to person, place, and time.   Psychiatric:         Mood and Affect: Mood and affect and mood normal.         Behavior: Behavior normal. Behavior is cooperative.         Thought Content: Thought content normal.         Judgment: Judgment normal.

## 2024-09-05 ENCOUNTER — OFFICE VISIT (OUTPATIENT)
Dept: URGENT CARE | Facility: CLINIC | Age: 64
End: 2024-09-05
Payer: COMMERCIAL

## 2024-09-05 ENCOUNTER — HOSPITAL ENCOUNTER (EMERGENCY)
Facility: HOSPITAL | Age: 64
Discharge: HOME | End: 2024-09-05
Attending: EMERGENCY MEDICINE
Payer: COMMERCIAL

## 2024-09-05 ENCOUNTER — APPOINTMENT (EMERGENCY)
Dept: RADIOLOGY | Facility: HOSPITAL | Age: 64
End: 2024-09-05
Payer: COMMERCIAL

## 2024-09-05 VITALS
TEMPERATURE: 97.7 F | HEART RATE: 94 BPM | BODY MASS INDEX: 34.66 KG/M2 | HEIGHT: 65 IN | WEIGHT: 208 LBS | RESPIRATION RATE: 18 BRPM | OXYGEN SATURATION: 100 % | DIASTOLIC BLOOD PRESSURE: 62 MMHG | SYSTOLIC BLOOD PRESSURE: 116 MMHG

## 2024-09-05 VITALS
OXYGEN SATURATION: 100 % | SYSTOLIC BLOOD PRESSURE: 110 MMHG | RESPIRATION RATE: 17 BRPM | TEMPERATURE: 97.4 F | DIASTOLIC BLOOD PRESSURE: 60 MMHG | HEART RATE: 87 BPM

## 2024-09-05 DIAGNOSIS — R42 DIZZINESS: ICD-10-CM

## 2024-09-05 DIAGNOSIS — E83.42 HYPOMAGNESEMIA: ICD-10-CM

## 2024-09-05 DIAGNOSIS — R42 DIZZINESS AND GIDDINESS: Primary | ICD-10-CM

## 2024-09-05 DIAGNOSIS — J06.9 VIRAL URI: Primary | ICD-10-CM

## 2024-09-05 DIAGNOSIS — N17.9 AKI (ACUTE KIDNEY INJURY) (HCC): ICD-10-CM

## 2024-09-05 LAB
ALBUMIN SERPL BCG-MCNC: 3.9 G/DL (ref 3.5–5)
ALP SERPL-CCNC: 82 U/L (ref 34–104)
ALT SERPL W P-5'-P-CCNC: 9 U/L (ref 7–52)
ANION GAP SERPL CALCULATED.3IONS-SCNC: 12 MMOL/L (ref 4–13)
ANION GAP SERPL CALCULATED.3IONS-SCNC: 8 MMOL/L (ref 4–13)
AST SERPL W P-5'-P-CCNC: 11 U/L (ref 13–39)
ATRIAL RATE: 83 BPM
B BURGDOR IGG+IGM SER QL IA: NEGATIVE
BASOPHILS # BLD AUTO: 0.07 THOUSANDS/ÂΜL (ref 0–0.1)
BASOPHILS NFR BLD AUTO: 1 % (ref 0–1)
BILIRUB SERPL-MCNC: 0.28 MG/DL (ref 0.2–1)
BUN SERPL-MCNC: 38 MG/DL (ref 5–25)
BUN SERPL-MCNC: 40 MG/DL (ref 5–25)
CALCIUM SERPL-MCNC: 8.4 MG/DL (ref 8.4–10.2)
CALCIUM SERPL-MCNC: 9.4 MG/DL (ref 8.4–10.2)
CARDIAC TROPONIN I PNL SERPL HS: 3 NG/L
CHLORIDE SERPL-SCNC: 101 MMOL/L (ref 96–108)
CHLORIDE SERPL-SCNC: 106 MMOL/L (ref 96–108)
CO2 SERPL-SCNC: 20 MMOL/L (ref 21–32)
CO2 SERPL-SCNC: 20 MMOL/L (ref 21–32)
CREAT SERPL-MCNC: 1.69 MG/DL (ref 0.6–1.3)
CREAT SERPL-MCNC: 1.85 MG/DL (ref 0.6–1.3)
EOSINOPHIL # BLD AUTO: 0.25 THOUSAND/ÂΜL (ref 0–0.61)
EOSINOPHIL NFR BLD AUTO: 2 % (ref 0–6)
ERYTHROCYTE [DISTWIDTH] IN BLOOD BY AUTOMATED COUNT: 13.3 % (ref 11.6–15.1)
FLUAV RNA RESP QL NAA+PROBE: NEGATIVE
FLUBV RNA RESP QL NAA+PROBE: NEGATIVE
GFR SERPL CREATININE-BSD FRML MDRD: 28 ML/MIN/1.73SQ M
GFR SERPL CREATININE-BSD FRML MDRD: 32 ML/MIN/1.73SQ M
GLUCOSE SERPL-MCNC: 148 MG/DL (ref 65–140)
GLUCOSE SERPL-MCNC: 159 MG/DL (ref 65–140)
HCT VFR BLD AUTO: 38.7 % (ref 34.8–46.1)
HETEROPH AB SER QL: NEGATIVE
HGB BLD-MCNC: 12.3 G/DL (ref 11.5–15.4)
IMM GRANULOCYTES # BLD AUTO: 0.28 THOUSAND/UL (ref 0–0.2)
IMM GRANULOCYTES NFR BLD AUTO: 2 % (ref 0–2)
LYMPHOCYTES # BLD AUTO: 5.17 THOUSANDS/ÂΜL (ref 0.6–4.47)
LYMPHOCYTES NFR BLD AUTO: 40 % (ref 14–44)
MAGNESIUM SERPL-MCNC: 1.4 MG/DL (ref 1.9–2.7)
MCH RBC QN AUTO: 29.4 PG (ref 26.8–34.3)
MCHC RBC AUTO-ENTMCNC: 31.8 G/DL (ref 31.4–37.4)
MCV RBC AUTO: 93 FL (ref 82–98)
MONOCYTES # BLD AUTO: 1.01 THOUSAND/ÂΜL (ref 0.17–1.22)
MONOCYTES NFR BLD AUTO: 8 % (ref 4–12)
NEUTROPHILS # BLD AUTO: 6.25 THOUSANDS/ÂΜL (ref 1.85–7.62)
NEUTS SEG NFR BLD AUTO: 47 % (ref 43–75)
NRBC BLD AUTO-RTO: 0 /100 WBCS
P AXIS: 54 DEGREES
PLATELET # BLD AUTO: 273 THOUSANDS/UL (ref 149–390)
PMV BLD AUTO: 9.1 FL (ref 8.9–12.7)
POTASSIUM SERPL-SCNC: 5.2 MMOL/L (ref 3.5–5.3)
POTASSIUM SERPL-SCNC: 5.4 MMOL/L (ref 3.5–5.3)
PR INTERVAL: 142 MS
PROT SERPL-MCNC: 6.9 G/DL (ref 6.4–8.4)
QRS AXIS: 32 DEGREES
QRSD INTERVAL: 80 MS
QT INTERVAL: 354 MS
QTC INTERVAL: 415 MS
RBC # BLD AUTO: 4.18 MILLION/UL (ref 3.81–5.12)
RSV RNA RESP QL NAA+PROBE: NEGATIVE
SARS-COV-2 RNA RESP QL NAA+PROBE: NEGATIVE
SODIUM SERPL-SCNC: 133 MMOL/L (ref 135–147)
SODIUM SERPL-SCNC: 134 MMOL/L (ref 135–147)
T WAVE AXIS: 41 DEGREES
VENTRICULAR RATE: 83 BPM
WBC # BLD AUTO: 13.03 THOUSAND/UL (ref 4.31–10.16)

## 2024-09-05 PROCEDURE — 86618 LYME DISEASE ANTIBODY: CPT | Performed by: EMERGENCY MEDICINE

## 2024-09-05 PROCEDURE — 86308 HETEROPHILE ANTIBODY SCREEN: CPT | Performed by: EMERGENCY MEDICINE

## 2024-09-05 PROCEDURE — 84484 ASSAY OF TROPONIN QUANT: CPT | Performed by: EMERGENCY MEDICINE

## 2024-09-05 PROCEDURE — 80048 BASIC METABOLIC PNL TOTAL CA: CPT | Performed by: EMERGENCY MEDICINE

## 2024-09-05 PROCEDURE — 99285 EMERGENCY DEPT VISIT HI MDM: CPT | Performed by: EMERGENCY MEDICINE

## 2024-09-05 PROCEDURE — 96375 TX/PRO/DX INJ NEW DRUG ADDON: CPT

## 2024-09-05 PROCEDURE — 36415 COLL VENOUS BLD VENIPUNCTURE: CPT | Performed by: EMERGENCY MEDICINE

## 2024-09-05 PROCEDURE — 96361 HYDRATE IV INFUSION ADD-ON: CPT

## 2024-09-05 PROCEDURE — 83735 ASSAY OF MAGNESIUM: CPT | Performed by: EMERGENCY MEDICINE

## 2024-09-05 PROCEDURE — 85025 COMPLETE CBC W/AUTO DIFF WBC: CPT | Performed by: EMERGENCY MEDICINE

## 2024-09-05 PROCEDURE — 0241U HB NFCT DS VIR RESP RNA 4 TRGT: CPT | Performed by: EMERGENCY MEDICINE

## 2024-09-05 PROCEDURE — 99284 EMERGENCY DEPT VISIT MOD MDM: CPT

## 2024-09-05 PROCEDURE — 96365 THER/PROPH/DIAG IV INF INIT: CPT

## 2024-09-05 PROCEDURE — 93005 ELECTROCARDIOGRAM TRACING: CPT

## 2024-09-05 PROCEDURE — 99213 OFFICE O/P EST LOW 20 MIN: CPT | Performed by: ORTHOPAEDIC SURGERY

## 2024-09-05 PROCEDURE — 93010 ELECTROCARDIOGRAM REPORT: CPT | Performed by: INTERNAL MEDICINE

## 2024-09-05 PROCEDURE — 71045 X-RAY EXAM CHEST 1 VIEW: CPT

## 2024-09-05 PROCEDURE — 80053 COMPREHEN METABOLIC PANEL: CPT | Performed by: EMERGENCY MEDICINE

## 2024-09-05 RX ORDER — DEXAMETHASONE SODIUM PHOSPHATE 10 MG/ML
10 INJECTION, SOLUTION INTRAMUSCULAR; INTRAVENOUS ONCE
Status: COMPLETED | OUTPATIENT
Start: 2024-09-05 | End: 2024-09-05

## 2024-09-05 RX ORDER — MAGNESIUM SULFATE HEPTAHYDRATE 40 MG/ML
2 INJECTION, SOLUTION INTRAVENOUS ONCE
Status: COMPLETED | OUTPATIENT
Start: 2024-09-05 | End: 2024-09-05

## 2024-09-05 RX ORDER — FLUTICASONE PROPIONATE 50 MCG
1 SPRAY, SUSPENSION (ML) NASAL DAILY
Qty: 16 G | Refills: 0 | Status: SHIPPED | OUTPATIENT
Start: 2024-09-05 | End: 2024-09-05

## 2024-09-05 RX ORDER — MECLIZINE HCL 12.5 MG 12.5 MG/1
25 TABLET ORAL ONCE
Status: COMPLETED | OUTPATIENT
Start: 2024-09-05 | End: 2024-09-05

## 2024-09-05 RX ORDER — MECLIZINE HYDROCHLORIDE 25 MG/1
25 TABLET ORAL 3 TIMES DAILY PRN
Qty: 30 TABLET | Refills: 0 | Status: SHIPPED | OUTPATIENT
Start: 2024-09-05

## 2024-09-05 RX ORDER — MECLIZINE HYDROCHLORIDE 25 MG/1
25 TABLET ORAL 3 TIMES DAILY PRN
Qty: 30 TABLET | Refills: 0 | Status: SHIPPED | OUTPATIENT
Start: 2024-09-05 | End: 2024-09-05

## 2024-09-05 RX ORDER — FLUTICASONE PROPIONATE 50 MCG
1 SPRAY, SUSPENSION (ML) NASAL DAILY
Qty: 16 G | Refills: 0 | Status: SHIPPED | OUTPATIENT
Start: 2024-09-05

## 2024-09-05 RX ADMIN — SODIUM CHLORIDE 1000 ML: 0.9 INJECTION, SOLUTION INTRAVENOUS at 12:41

## 2024-09-05 RX ADMIN — MECLIZINE HYDROCHLORIDE 25 MG: 12.5 TABLET ORAL at 12:43

## 2024-09-05 RX ADMIN — DEXAMETHASONE SODIUM PHOSPHATE 10 MG: 10 INJECTION, SOLUTION INTRAMUSCULAR; INTRAVENOUS at 12:42

## 2024-09-05 RX ADMIN — SODIUM CHLORIDE 1000 ML: 0.9 INJECTION, SOLUTION INTRAVENOUS at 13:36

## 2024-09-05 RX ADMIN — MAGNESIUM SULFATE HEPTAHYDRATE 2 G: 40 INJECTION, SOLUTION INTRAVENOUS at 14:34

## 2024-09-05 NOTE — DISCHARGE INSTRUCTIONS
-Today you were diagnosed with acute kidney injury.  Your kidney function did improve with some IV fluids, please make sure you stay well-hydrated and get a repeat BMP in 2 days to make sure that this is improving.    -Follow-up with your family doctor early next week.  You may take meclizine as needed for dizziness symptoms.

## 2024-09-05 NOTE — ED PROVIDER NOTES
"History  Chief Complaint   Patient presents with    Dizziness     Patient feels like she is spinning      Fatigue     Patient reporting \"severe fatigue\"     HPI    Prior to Admission Medications   Prescriptions Last Dose Informant Patient Reported? Taking?   QUEtiapine (SEROquel) 200 mg tablet  Self No No   Sig: Take 1 tablet (200 mg total) by mouth daily at bedtime   QUEtiapine (SEROquel) 25 mg tablet   Yes No   Sig: Take 25 mg by mouth 2 (two) times a day as needed 2-3 at bedtime prn   albuterol (ProAir HFA) 90 mcg/act inhaler   No No   Sig: Inhale 2 puffs every 4 (four) hours as needed for wheezing or shortness of breath   amoxicillin (AMOXIL) 875 mg tablet   No No   Sig: Take 1 tablet (875 mg total) by mouth 2 (two) times a day for 10 days   aspirin 81 mg chewable tablet   Yes No   Sig: Chew 81 mg daily   benzonatate (TESSALON PERLES) 100 mg capsule   No No   Si-2 caps every 8 hours as needed for cough   busPIRone (BUSPAR) 10 mg tablet   Yes No   Sig: 10 mg in the morning   dulaglutide (Trulicity) 4.5 MG/0.5ML injection   No No   Sig: Inject 0.5 mL (4.5 mg total) under the skin every 7 days   eszopiclone (LUNESTA) 1 mg tablet   Yes No   Sig: Take 2 mg by mouth daily at bedtime Takes 2-3 daily   gabapentin (NEURONTIN) 100 mg capsule   Yes No   Sig: TAKE 1 TO 2 CAPSULES BY MOUTH DAILY AT BEDTIME   levothyroxine 88 mcg tablet   No No   Sig: Take 1 tablet (88 mcg total) by mouth daily in the early morning   lisinopril (ZESTRIL) 10 mg tablet   No No   Sig: Take 1 tablet (10 mg total) by mouth daily   loratadine (CLARITIN) 10 mg tablet   No No   Sig: Take 1 tablet (10 mg total) by mouth 2 (two) times a day   metFORMIN (GLUCOPHAGE) 500 mg tablet   No No   Sig: TAKE 2 TABLETS BY MOUTH TWICE A DAY WITH FOOD   naproxen (Naprosyn) 500 mg tablet   No No   Sig: Take 1 tablet (500 mg total) by mouth 2 (two) times a day with meals for 7 days   omeprazole (PriLOSEC) 20 mg delayed release capsule   No No   Sig: Take 1 " capsule (20 mg total) by mouth daily before breakfast   polyethylene glycol (Golytely) 4000 mL solution   No No   Sig: Take 4,000 mL by mouth once for 1 dose Take 4000 mL by mouth once for 1 dose. Use as directed   Patient not taking: Reported on 5/29/2024   rosuvastatin (CRESTOR) 20 MG tablet   No No   Sig: Take 1 tablet (20 mg total) by mouth daily   sertraline (ZOLOFT) 100 mg tablet   No No   Sig: TAKE 2 TABLETS (200 MG TOTAL) BY MOUTH DAILY AT BEDTIME   traZODone (DESYREL) 50 mg tablet   No No   Sig: TAKE 1 TABLET BY MOUTH DAILY AT BEDTIME   venlafaxine (EFFEXOR-XR) 150 mg 24 hr capsule   No No   Sig: TAKE 1 CAPSULE BY MOUTH EVERY DAY      Facility-Administered Medications: None       Past Medical History:   Diagnosis Date    Allergic     Anxiety     Arthritis     Asthma     Claustrophobia     CPAP (continuous positive airway pressure) dependence     Depression     Diabetes mellitus (HCC)     Disease of thyroid gland     hypo    GERD (gastroesophageal reflux disease)     Headache     History of COVID-19     Hyperlipemia     Hyperlipidemia     Hypertension     Inflammatory bowel disease     Kidney stone     Kidney stones     Major depressive disorder     Motion sickness     Obesity     Risk for falls     Sleep apnea     TIA (transient ischemic attack)     Use of cane as ambulatory aid     Wears glasses     Wears partial dentures     upper partial       Past Surgical History:   Procedure Laterality Date    ADENOIDECTOMY      COLONOSCOPY      DILATION AND CURETTAGE OF UTERUS      MS ARTHRS KNE SURG W/MENISCECTOMY MED/LAT W/SHVG Left 3/24/2022    Procedure: ARTHROSCOPY KNEE w/ partial medial menisectomy;  Surgeon: Terry Bhatt MD;  Location: [facility] OR;  Service: Orthopedics    MS COLONOSCOPY FLX DX W/COLLJ SPEC WHEN PFRMD N/A 3/15/2019    Procedure: COLONOSCOPY;  Surgeon: Angel Saavedra MD;  Location: [facility] GI LAB;  Service: Gastroenterology    ROTATOR CUFF REPAIR Right     SHOULDER SURGERY Left      impingement    TONSILLECTOMY      TUBAL LIGATION         Family History   Problem Relation Age of Onset    ALS Mother     Venous thrombosis Father     Diabetes Father     Other Family         cerebral embolism    Diabetes Family     Hypertension Family     Kidney disease Family     Breast cancer Neg Hx     Colon cancer Neg Hx     Ovarian cancer Neg Hx     Uterine cancer Neg Hx      I have reviewed and agree with the history as documented.    E-Cigarette/Vaping    E-Cigarette Use Never User      E-Cigarette/Vaping Substances    Nicotine No     THC No     CBD No     Flavoring No     Other No     Unknown No      Social History     Tobacco Use    Smoking status: Never     Passive exposure: Never    Smokeless tobacco: Never   Vaping Use    Vaping status: Never Used   Substance Use Topics    Alcohol use: No    Drug use: Never       Review of Systems    Physical Exam  Physical Exam    Vital Signs  ED Triage Vitals [09/05/24 1229]   Temperature Pulse Respirations Blood Pressure SpO2   (!) 97.4 °F (36.3 °C) 87 17 110/60 100 %      Temp Source Heart Rate Source Patient Position - Orthostatic VS BP Location FiO2 (%)   Tympanic Monitor Sitting Left arm --      Pain Score       No Pain           Vitals:    09/05/24 1229   BP: 110/60   Pulse: 87   Patient Position - Orthostatic VS: Sitting         Visual Acuity      ED Medications  Medications   sodium chloride 0.9 % bolus 1,000 mL (1,000 mL Intravenous New Bag 9/5/24 1241)   dexamethasone (PF) (DECADRON) injection 10 mg (10 mg Intravenous Given 9/5/24 1242)   meclizine (ANTIVERT) tablet 25 mg (25 mg Oral Given 9/5/24 1243)       Diagnostic Studies  Results Reviewed       Procedure Component Value Units Date/Time    CBC and differential [[id number]] Collected: 09/05/24 1243    Lab Status: No result Specimen: Blood from Arm, Right     Comprehensive metabolic panel [[id number]] Collected: 09/05/24 1243    Lab Status: No result Specimen: Blood from Arm, Right     Mononucleosis screen  [[id number]] Collected: 09/05/24 1243    Lab Status: No result Specimen: Blood from Arm, Right     HS Troponin 0hr (reflex protocol) [[id number]] Collected: 09/05/24 1243    Lab Status: No result Specimen: Blood from Arm, Right     FLU/RSV/COVID - if FLU/RSV clinically relevant [[id number]] Collected: 09/05/24 1243    Lab Status: No result Specimen: Nares from Nose     Lyme Total AB W Reflex to IGM/IGG [[id number]] Collected: 09/05/24 1243    Lab Status: No result Specimen: Blood from Arm, Right     Narrative:      The following orders were created for panel order Lyme Total AB W Reflex to IGM/IGG.  Procedure                               Abnormality         Status                     ---------                               -----------         ------                     Lyme Total AB W Reflex t...[[id number]]                                                   Please view results for these tests on the individual orders.    Lyme Total AB W Reflex to IGM/IGG [[id number]] Collected: 09/05/24 1243    Lab Status: No result Specimen: Blood from Arm, Right                    XR chest 1 view portable    (Results Pending)              Procedures  Procedures         ED Course                                               Medical Decision Making  Amount and/or Complexity of Data Reviewed  Labs: ordered.  Radiology: ordered.    Risk  Prescription drug management.                 Disposition  Final diagnoses:   None     ED Disposition       None          Follow-up Information    None         Patient's Medications   Discharge Prescriptions    No medications on file       No discharge procedures on file.    PDMP Review         Value Time User    PDMP Reviewed  Yes 3/24/2022  1:30 PM Min Paz PA-C            ED Provider  Electronically Signed by             Patient Position - Orthostatic VS: Sitting         Visual Acuity      ED Medications  Medications   sodium chloride 0.9 % bolus 1,000 mL (0 mL Intravenous Stopped 9/5/24 1336)   dexamethasone (PF) (DECADRON) injection 10 mg (10 mg Intravenous Given 9/5/24 1242)   meclizine (ANTIVERT) tablet 25 mg (25 mg Oral Given 9/5/24 1243)   sodium chloride 0.9 % bolus 1,000 mL (0 mL Intravenous Stopped 9/5/24 1536)   magnesium sulfate 2 g/50 mL IVPB (premix) 2 g (0 g Intravenous Stopped 9/5/24 1535)       Diagnostic Studies  Results Reviewed       Procedure Component Value Units Date/Time    Mononucleosis screen [[id number]]  (Normal) Collected: 09/05/24 1243    Lab Status: Final result Specimen: Blood from Arm, Right Updated: 09/05/24 2140     Monotest Negative    Lyme Total AB W Reflex to IGM/IGG [[id number]]  (Normal) Collected: 09/05/24 1243    Lab Status: Final result Specimen: Blood from Arm, Right Updated: 09/05/24 2138    Narrative:      The following orders were created for panel order Lyme Total AB W Reflex to IGM/IGG.  Procedure                               Abnormality         Status                     ---------                               -----------         ------                     Lyme Total AB W Reflex t...[[id number]]  Normal              Final result                 Please view results for these tests on the individual orders.    Lyme Total AB W Reflex to IGM/IGG [[id number]]  (Normal) Collected: 09/05/24 1243    Lab Status: Final result Specimen: Blood from Arm, Right Updated: 09/05/24 2138     Lyme Total Antibodies Negative    Basic metabolic panel [[id number]]  (Abnormal) Collected: 09/05/24 1457    Lab Status: Final result Specimen: Blood from Arm, Left Updated: 09/05/24 1523     Sodium 134 mmol/L      Potassium 5.4 mmol/L      Chloride 106 mmol/L      CO2 20 mmol/L      ANION GAP 8 mmol/L      BUN 38 mg/dL      Creatinine 1.69 mg/dL      Glucose 148 mg/dL      Calcium 8.4 mg/dL      eGFR 32  ml/min/1.73sq m     Narrative:      National Kidney Disease Foundation guidelines for Chronic Kidney Disease (CKD):     Stage 1 with normal or high GFR (GFR > 90 mL/min/1.73 square meters)    Stage 2 Mild CKD (GFR = 60-89 mL/min/1.73 square meters)    Stage 3A Moderate CKD (GFR = 45-59 mL/min/1.73 square meters)    Stage 3B Moderate CKD (GFR = 30-44 mL/min/1.73 square meters)    Stage 4 Severe CKD (GFR = 15-29 mL/min/1.73 square meters)    Stage 5 End Stage CKD (GFR <15 mL/min/1.73 square meters)  Note: GFR calculation is accurate only with a steady state creatinine    Magnesium [[id number]]  (Abnormal) Collected: 09/05/24 1243    Lab Status: Final result Specimen: Blood from Arm, Right Updated: 09/05/24 1425     Magnesium 1.4 mg/dL     FLU/RSV/COVID - if FLU/RSV clinically relevant [[id number]]  (Normal) Collected: 09/05/24 1243    Lab Status: Final result Specimen: Nares from Nose Updated: 09/05/24 1326     SARS-CoV-2 Negative     INFLUENZA A PCR Negative     INFLUENZA B PCR Negative     RSV PCR Negative    Narrative:      This test has been performed using the CoV-2/Flu/RSV plus assay on the [employer] GeneXpert platform. This test has been validated by the  and verified by the performing laboratory.     This test is designed to amplify and detect the following: nucleocapsid (N), envelope (E), and RNA-dependent RNA polymerase (RdRP) genes of the SARS-CoV-2 genome; matrix (M), basic polymerase (PB2), and acidic protein (PA) segments of the influenza A genome; matrix (M) and non-structural protein (NS) segments of the influenza B genome, and the nucleocapsid genes of RSV A and RSV B.     Positive results are indicative of the presence of Flu A, Flu B, RSV, and/or SARS-CoV-2 RNA. Positive results for SARS-CoV-2 or suspected novel influenza should be reported to state, local, or federal health departments according to local reporting requirements.      All results should be assessed in conjunction with  clinical presentation and other laboratory markers for clinical management.     FOR PEDIATRIC PATIENTS - copy/paste COVID Guidelines URL to browser: [email or web address]       HS Troponin 0hr (reflex protocol) [[id number]]  (Normal) Collected: 09/05/24 1243    Lab Status: Final result Specimen: Blood from Arm, Right Updated: 09/05/24 1315     hs TnI 0hr 3 ng/L     Comprehensive metabolic panel [[id number]]  (Abnormal) Collected: 09/05/24 1243    Lab Status: Final result Specimen: Blood from Arm, Right Updated: 09/05/24 1307     Sodium 133 mmol/L      Potassium 5.2 mmol/L      Chloride 101 mmol/L      CO2 20 mmol/L      ANION GAP 12 mmol/L      BUN 40 mg/dL      Creatinine 1.85 mg/dL      Glucose 159 mg/dL      Calcium 9.4 mg/dL      AST 11 U/L      ALT 9 U/L      Alkaline Phosphatase 82 U/L      Total Protein 6.9 g/dL      Albumin 3.9 g/dL      Total Bilirubin 0.28 mg/dL      eGFR 28 ml/min/1.73sq m     Narrative:      National Kidney Disease Foundation guidelines for Chronic Kidney Disease (CKD):     Stage 1 with normal or high GFR (GFR > 90 mL/min/1.73 square meters)    Stage 2 Mild CKD (GFR = 60-89 mL/min/1.73 square meters)    Stage 3A Moderate CKD (GFR = 45-59 mL/min/1.73 square meters)    Stage 3B Moderate CKD (GFR = 30-44 mL/min/1.73 square meters)    Stage 4 Severe CKD (GFR = 15-29 mL/min/1.73 square meters)    Stage 5 End Stage CKD (GFR <15 mL/min/1.73 square meters)  Note: GFR calculation is accurate only with a steady state creatinine    CBC and differential [[id number]]  (Abnormal) Collected: 09/05/24 1243    Lab Status: Final result Specimen: Blood from Arm, Right Updated: 09/05/24 1253     WBC 13.03 Thousand/uL      RBC 4.18 Million/uL      Hemoglobin 12.3 g/dL      Hematocrit 38.7 %      MCV 93 fL      MCH 29.4 pg      MCHC 31.8 g/dL      RDW 13.3 %      MPV 9.1 fL      Platelets 273 Thousands/uL      nRBC 0 /100 WBCs      Segmented % 47 %       Immature Grans % 2 %      Lymphocytes % 40 %      Monocytes % 8 %      Eosinophils Relative 2 %      Basophils Relative 1 %      Absolute Neutrophils 6.25 Thousands/µL      Absolute Immature Grans 0.28 Thousand/uL      Absolute Lymphocytes 5.17 Thousands/µL      Absolute Monocytes 1.01 Thousand/µL      Eosinophils Absolute 0.25 Thousand/µL      Basophils Absolute 0.07 Thousands/µL                    XR chest 1 view portable   Final Result by Nichelle Cruz MD (09/05 1539)      No acute cardiopulmonary disease.            Workstation performed: [id number]                    Procedures  ECG 12 Lead Documentation Only    Date/Time: 9/10/2024 7:30 AM    Performed by: Papi Coreas MD  Authorized by: Papi Coreas MD    ECG reviewed by me, the ED Provider: yes    Patient location:  ED  Previous ECG:     Previous ECG:  Unavailable    Comparison to cardiac monitor: Yes    Interpretation:     Interpretation: normal    Rate:     ECG rate assessment: normal    Rhythm:     Rhythm: sinus rhythm    Ectopy:     Ectopy: none    QRS:     QRS axis:  Normal    QRS intervals:  Normal  Conduction:     Conduction: normal    ST segments:     ST segments:  Normal  T waves:     T waves: normal             ED Course                                 SBIRT 20yo+      Flowsheet Row Most Recent Value   Initial Alcohol Screen: US AUDIT-C     1. How often do you have a drink containing alcohol? 0 Filed at: 09/05/2024 1245   2. How many drinks containing alcohol do you have on a typical day you are drinking?  0 Filed at: 09/05/2024 1245   3b. FEMALE Any Age, or MALE 65+: How often do you have 4 or more drinks on one occassion? 0 Filed at: 09/05/2024 1245   Audit-C Score 0 Filed at: 09/05/2024 1245                      Medical Decision Making  Patient is a 64-year-old female presents for evaluation of upper respiratory symptoms, dizziness and fatigue.  Likely viral URI with worsening of her chronic vertigo.  Blood work reviewed and  shows elevated creatinine, did improve with IV fluids.  Instructed her to aggressively hydrate at home and have a repeat BMP in the next 2 to 3 days.  Lyme testing and mono testing sent.  Chest x-ray reviewed and unremarkable.  Magnesium also repleted.    Amount and/or Complexity of Data Reviewed  Labs: ordered.  Radiology: ordered.    Risk  Prescription drug management.                 Disposition  Final diagnoses:   Viral URI   Dizziness   KEENAN (acute kidney injury) ([facility])   Hypomagnesemia     Time reflects when diagnosis was documented in both MDM as applicable and the Disposition within this note       Time User Action Codes Description Comment    9/5/2024  3:27 PM Shugars, Papi Add [J06.9] Viral URI     9/5/2024  3:27 PM Shugars, Papi Add [R42] Dizziness     9/5/2024  3:27 PM Shugars, Papi Add [N17.9] KEENAN (acute kidney injury) ([facility])     9/5/2024  3:29 PM Shugars, Papi Add [E83.42] Hypomagnesemia           ED Disposition       ED Disposition   Discharge    Condition   Stable    Date/Time   Thu Sep 5, 2024 1527    Comment   Ana Rosa Parker discharge to home/self care.                   Follow-up Information       Follow up With Specialties Details Why Contact Info Additional Information    [facility] Emergency Department Emergency Medicine  If symptoms worsen [address] [facility] [address] [phone] [facility] Emergency Department, [address] [place], [place] [address]            Discharge Medication List as of 9/5/2024  3:28 PM        START taking these medications    Details   meclizine (ANTIVERT) 25 mg tablet Take 1 tablet (25 mg total) by mouth 3 (three) times a day as needed for dizziness, Starting Thu 9/5/2024, Normal           CONTINUE these medications which have NOT CHANGED    Details   albuterol (ProAir HFA) 90 mcg/act inhaler Inhale 2 puffs every 4 (four) hours as needed for wheezing or shortness of breath, Starting Tue  8/27/2024, Normal      amoxicillin (AMOXIL) 875 mg tablet Take 1 tablet (875 mg total) by mouth 2 (two) times a day for 10 days, Starting Tue 8/27/2024, Until Fri 9/6/2024, Normal      aspirin 81 mg chewable tablet Chew 81 mg daily, Historical Med      benzonatate (TESSALON PERLES) 100 mg capsule 1-2 caps every 8 hours as needed for cough, Normal      busPIRone (BUSPAR) 10 mg tablet 10 mg in the morning, Starting Tue 2/13/2024, Historical Med      dulaglutide (Trulicity) 4.5 MG/0.5ML injection Inject 0.5 mL (4.5 mg total) under the skin every 7 days, Starting Thu 8/8/2024, Normal      eszopiclone (LUNESTA) 1 mg tablet Take 2 mg by mouth daily at bedtime Takes 2-3 daily, Starting Wed 2/14/2024, Historical Med      gabapentin (NEURONTIN) 100 mg capsule TAKE 1 TO 2 CAPSULES BY MOUTH DAILY AT BEDTIME, Historical Med      levothyroxine 88 mcg tablet Take 1 tablet (88 mcg total) by mouth daily in the early morning, Starting Thu 8/1/2024, Normal      lisinopril (ZESTRIL) 10 mg tablet Take 1 tablet (10 mg total) by mouth daily, Starting Thu 8/1/2024, Normal      loratadine (CLARITIN) 10 mg tablet Take 1 tablet (10 mg total) by mouth 2 (two) times a day, Starting Mon 8/28/2023, Normal      metFORMIN (GLUCOPHAGE) 500 mg tablet TAKE 2 TABLETS BY MOUTH TWICE A DAY WITH FOOD, Normal      naproxen (Naprosyn) 500 mg tablet Take 1 tablet (500 mg total) by mouth 2 (two) times a day with meals for 7 days, Starting Mon 6/24/2024, Until Mon 7/1/2024, Normal      omeprazole (PriLOSEC) 20 mg delayed release capsule Take 1 capsule (20 mg total) by mouth daily before breakfast, Starting Thu 8/1/2024, Normal      polyethylene glycol (Golytely) 4000 mL solution Take 4,000 mL by mouth once for 1 dose Take 4000 mL by mouth once for 1 dose. Use as directed, Starting Wed 11/1/2023, Normal      !! QUEtiapine (SEROquel) 200 mg tablet Take 1 tablet (200 mg total) by mouth daily at bedtime, Starting Fri 1/7/2022, Normal      !! QUEtiapine (SEROquel)  25 mg tablet Take 25 mg by mouth 2 (two) times a day as needed 2-3 at bedtime prn, Starting Wed 2/1/2023, Historical Med      rosuvastatin (CRESTOR) 20 MG tablet Take 1 tablet (20 mg total) by mouth daily, Starting Thu 8/1/2024, Normal      sertraline (ZOLOFT) 100 mg tablet TAKE 2 TABLETS (200 MG TOTAL) BY MOUTH DAILY AT BEDTIME, Starting Thu 2/9/2023, Normal      traZODone (DESYREL) 50 mg tablet TAKE 1 TABLET BY MOUTH DAILY AT BEDTIME, Normal      venlafaxine (EFFEXOR-XR) 150 mg 24 hr capsule TAKE 1 CAPSULE BY MOUTH EVERY DAY, Normal       !! - Potential duplicate medications found. Please discuss with provider.          Outpatient Discharge Orders   Basic metabolic panel   Standing Status: Future Standing Exp. Date: 09/05/25       PDMP Review         Value Time User    PDMP Reviewed  Yes 3/24/2022  1:30 PM Min Paz PA-C            ED Provider  Electronically Signed by             Papi Coreas MD  09/10/24 0708

## 2024-09-05 NOTE — PROGRESS NOTES
"  [facility]        NAME: Ana Rosa Parker is a 64 y.o. female  : 1960    MRN: [id number]  DATE: 2024  TIME: 11:50 AM    Assessment and Plan   Dizziness and giddiness [R42]  1. Dizziness and giddiness          Negative NIH stroke scale on exam, though with sudden onset of dizziness and history of TIA this past May, I recommend that the patient be further evaluated at the ED. The patient verbalized understanding. She declined EMS transport, opting to have her  drive her to [facility]. I am in agreement with the plan.    Patient Instructions     Proceed directly to the ED.    If tests are performed, our office will contact you with results only if changes need to made to the care plan discussed with you at the visit. You can review your full results on [facility].    Chief Complaint     Chief Complaint   Patient presents with    Fatigue     Patient reports fatigue for the past few days.    Dizziness     Patient reports dizziness that started this morning.         History of Present Illness       64-year-old female presents to the urgent care for evaluation of sudden onset dizziness.  She states the dizziness occurred this morning, occurring constantly with or without positional changes. Last week she was seen at the [facility] clinic for a sinus infection, prescribed amoxicillin and prednisone.  However, the patient states that she stopped taking these medications as they were \"too strong\".  She notes yesterday into today she was feeling much better from her sinus symptoms, however, she has been experiencing \"severe\" fatigue.  She does have a history of a TIA that occurred this past May.  She states that at that time she was experiencing shortness of breath and dizziness with a feeling of her heart racing and headache.  She currently denies any shortness of breath, chest pain, heart palpitations, but worries that her dizziness feels similar to what she experienced in " May.        Review of Systems   Review of Systems   Constitutional:  Positive for fatigue. Negative for chills and fever.   HENT:  Negative for ear pain and sore throat.    Eyes:  Negative for pain and visual disturbance.   Respiratory:  Negative for cough and shortness of breath.    Cardiovascular:  Negative for chest pain and palpitations.   Gastrointestinal:  Negative for abdominal pain, diarrhea, nausea and vomiting.   Genitourinary:  Negative for dysuria and hematuria.   Musculoskeletal:  Negative for arthralgias and back pain.   Skin:  Negative for color change and rash.   Neurological:  Positive for dizziness. Negative for seizures, syncope, weakness, light-headedness and headaches.   Psychiatric/Behavioral: Negative.     All other systems reviewed and are negative.        Current Medications       Current Outpatient Medications:     albuterol (ProAir HFA) 90 mcg/act inhaler, Inhale 2 puffs every 4 (four) hours as needed for wheezing or shortness of breath, Disp: 8.5 g, Rfl: 1    amoxicillin (AMOXIL) 875 mg tablet, Take 1 tablet (875 mg total) by mouth 2 (two) times a day for 10 days, Disp: 20 tablet, Rfl: 0    aspirin 81 mg chewable tablet, Chew 81 mg daily, Disp: , Rfl:     benzonatate (TESSALON PERLES) 100 mg capsule, 1-2 caps every 8 hours as needed for cough, Disp: 30 capsule, Rfl: 0    busPIRone (BUSPAR) 10 mg tablet, 10 mg in the morning, Disp: , Rfl:     dulaglutide (Trulicity) 4.5 MG/0.5ML injection, Inject 0.5 mL (4.5 mg total) under the skin every 7 days, Disp: 2 mL, Rfl: 5    eszopiclone (LUNESTA) 1 mg tablet, Take 2 mg by mouth daily at bedtime Takes 2-3 daily, Disp: , Rfl:     gabapentin (NEURONTIN) 100 mg capsule, TAKE 1 TO 2 CAPSULES BY MOUTH DAILY AT BEDTIME, Disp: , Rfl:     levothyroxine 88 mcg tablet, Take 1 tablet (88 mcg total) by mouth daily in the early morning, Disp: 90 tablet, Rfl: 1    lisinopril (ZESTRIL) 10 mg tablet, Take 1 tablet (10 mg total) by mouth daily, Disp: 90 tablet,  Rfl: 1    loratadine (CLARITIN) 10 mg tablet, Take 1 tablet (10 mg total) by mouth 2 (two) times a day, Disp: 60 tablet, Rfl: 8    metFORMIN (GLUCOPHAGE) 500 mg tablet, TAKE 2 TABLETS BY MOUTH TWICE A DAY WITH FOOD, Disp: 360 tablet, Rfl: 1    omeprazole (PriLOSEC) 20 mg delayed release capsule, Take 1 capsule (20 mg total) by mouth daily before breakfast, Disp: 90 capsule, Rfl: 1    QUEtiapine (SEROquel) 200 mg tablet, Take 1 tablet (200 mg total) by mouth daily at bedtime, Disp: 90 tablet, Rfl: 1    QUEtiapine (SEROquel) 25 mg tablet, Take 25 mg by mouth 2 (two) times a day as needed 2-3 at bedtime prn, Disp: , Rfl:     rosuvastatin (CRESTOR) 20 MG tablet, Take 1 tablet (20 mg total) by mouth daily, Disp: 100 tablet, Rfl: 3    sertraline (ZOLOFT) 100 mg tablet, TAKE 2 TABLETS (200 MG TOTAL) BY MOUTH DAILY AT BEDTIME, Disp: 180 tablet, Rfl: 1    traZODone (DESYREL) 50 mg tablet, TAKE 1 TABLET BY MOUTH DAILY AT BEDTIME, Disp: 90 tablet, Rfl: 1    venlafaxine (EFFEXOR-XR) 150 mg 24 hr capsule, TAKE 1 CAPSULE BY MOUTH EVERY DAY, Disp: 90 capsule, Rfl: 1    naproxen (Naprosyn) 500 mg tablet, Take 1 tablet (500 mg total) by mouth 2 (two) times a day with meals for 7 days, Disp: 14 tablet, Rfl: 0    polyethylene glycol (Golytely) 4000 mL solution, Take 4,000 mL by mouth once for 1 dose Take 4000 mL by mouth once for 1 dose. Use as directed (Patient not taking: Reported on 5/29/2024), Disp: 4000 mL, Rfl: 0    Current Allergies     Allergies as of 09/05/2024 - Reviewed 09/05/2024   Allergen Reaction Noted    Darvon [propoxyphene] Dizziness 01/26/2019    Fluoxetine Other (See Comments) 10/01/2012    Meclizine Dizziness 10/01/2012    Morphine and codeine Nausea Only 10/01/2012    Oxycodone-acetaminophen Other (See Comments) and Tachycardia 10/01/2012    Percolone [oxycodone] Other (See Comments) and Tachycardia 03/20/2019            The following portions of the patient's history were reviewed and updated as appropriate:  "allergies, current medications, past family history, past medical history, past social history, past surgical history and problem list.     Past Medical History:   Diagnosis Date    Allergic     Anxiety     Arthritis     Asthma     Claustrophobia     CPAP (continuous positive airway pressure) dependence     Depression     Diabetes mellitus (HCC)     Disease of thyroid gland     hypo    GERD (gastroesophageal reflux disease)     Headache     History of COVID-19     Hyperlipemia     Hyperlipidemia     Hypertension     Inflammatory bowel disease     Kidney stone     Kidney stones     Major depressive disorder     Motion sickness     Obesity     Risk for falls     Sleep apnea     TIA (transient ischemic attack)     Use of cane as ambulatory aid     Wears glasses     Wears partial dentures     upper partial       Past Surgical History:   Procedure Laterality Date    ADENOIDECTOMY      COLONOSCOPY      DILATION AND CURETTAGE OF UTERUS      ID ARTHRS KNE SURG W/MENISCECTOMY MED/LAT W/SHVG Left 3/24/2022    Procedure: ARTHROSCOPY KNEE w/ partial medial menisectomy;  Surgeon: Terry Bhatt MD;  Location: [facility] OR;  Service: Orthopedics    ID COLONOSCOPY FLX DX W/COLLJ SPEC WHEN PFRMD N/A 3/15/2019    Procedure: COLONOSCOPY;  Surgeon: Angel Saavedra MD;  Location: [facility];  Service: Gastroenterology    ROTATOR CUFF REPAIR Right     SHOULDER SURGERY Left     impingement    TONSILLECTOMY      TUBAL LIGATION         Family History   Problem Relation Age of Onset    ALS Mother     Venous thrombosis Father     Diabetes Father     Other Family         cerebral embolism    Diabetes Family     Hypertension Family     Kidney disease Family     Breast cancer Neg Hx     Colon cancer Neg Hx     Ovarian cancer Neg Hx     Uterine cancer Neg Hx          Medications have been verified.        Objective   /62   Pulse 94   Temp 97.7 °F (36.5 °C) (Temporal)   Resp 18   Ht 5' 5\" (1.651 m)   Wt 94.3 kg (208 lb)   LMP  " (LMP Unknown)   SpO2 100%   BMI 34.61 kg/m²        Physical Exam     Physical Exam  Vitals and nursing note reviewed.   Constitutional:       General: She is not in acute distress.     Appearance: Normal appearance. She is not ill-appearing.   HENT:      Head: Normocephalic and atraumatic.      Nose: Nose normal.      Mouth/Throat:      Mouth: Mucous membranes are moist.      Pharynx: Oropharynx is clear.   Eyes:      Extraocular Movements: Extraocular movements intact.      Pupils: Pupils are equal, round, and reactive to light.   Cardiovascular:      Rate and Rhythm: Normal rate and regular rhythm.      Pulses: Normal pulses.   Pulmonary:      Effort: Pulmonary effort is normal. No respiratory distress.   Musculoskeletal:         General: Normal range of motion.      Cervical back: Normal range of motion.   Skin:     General: Skin is warm and dry.      Capillary Refill: Capillary refill takes less than 2 seconds.   Neurological:      General: No focal deficit present.      Mental Status: She is alert and oriented to person, place, and time.      GCS: GCS eye subscore is 4. GCS verbal subscore is 5. GCS motor subscore is 6.      Cranial Nerves: Cranial nerves 2-12 are intact.      Sensory: Sensation is intact.      Motor: Motor function is intact. No weakness, tremor or pronator drift.      Coordination: Coordination is intact. Coordination normal.      Gait: Gait is intact.      Comments: NIH stroke scale negative   Psychiatric:         Mood and Affect: Mood normal.         Behavior: Behavior normal.

## 2024-09-09 ENCOUNTER — HOSPITAL ENCOUNTER (EMERGENCY)
Facility: HOSPITAL | Age: 64
Discharge: HOME | End: 2024-09-09
Attending: FAMILY MEDICINE
Payer: COMMERCIAL

## 2024-09-09 ENCOUNTER — TELEPHONE (OUTPATIENT)
Dept: NEUROLOGY | Facility: CLINIC | Age: 64
End: 2024-09-09

## 2024-09-09 VITALS
TEMPERATURE: 97 F | SYSTOLIC BLOOD PRESSURE: 111 MMHG | RESPIRATION RATE: 17 BRPM | OXYGEN SATURATION: 96 % | HEART RATE: 77 BPM | WEIGHT: 208 LBS | DIASTOLIC BLOOD PRESSURE: 56 MMHG | BODY MASS INDEX: 34.61 KG/M2

## 2024-09-09 DIAGNOSIS — E83.42 HYPOMAGNESEMIA: ICD-10-CM

## 2024-09-09 DIAGNOSIS — R53.1 WEAKNESS: Primary | ICD-10-CM

## 2024-09-09 LAB
ANION GAP SERPL CALCULATED.3IONS-SCNC: 8 MMOL/L (ref 4–13)
BASOPHILS # BLD AUTO: 0.04 THOUSANDS/ÂΜL (ref 0–0.1)
BASOPHILS NFR BLD AUTO: 0 % (ref 0–1)
BUN SERPL-MCNC: 32 MG/DL (ref 5–25)
CALCIUM SERPL-MCNC: 9 MG/DL (ref 8.4–10.2)
CHLORIDE SERPL-SCNC: 108 MMOL/L (ref 96–108)
CO2 SERPL-SCNC: 19 MMOL/L (ref 21–32)
CREAT SERPL-MCNC: 1.45 MG/DL (ref 0.6–1.3)
EOSINOPHIL # BLD AUTO: 0.35 THOUSAND/ÂΜL (ref 0–0.61)
EOSINOPHIL NFR BLD AUTO: 3 % (ref 0–6)
ERYTHROCYTE [DISTWIDTH] IN BLOOD BY AUTOMATED COUNT: 13.2 % (ref 11.6–15.1)
FLUAV RNA RESP QL NAA+PROBE: NEGATIVE
FLUBV RNA RESP QL NAA+PROBE: NEGATIVE
GFR SERPL CREATININE-BSD FRML MDRD: 38 ML/MIN/1.73SQ M
GLUCOSE SERPL-MCNC: 161 MG/DL (ref 65–140)
HCT VFR BLD AUTO: 36 % (ref 34.8–46.1)
HGB BLD-MCNC: 11.2 G/DL (ref 11.5–15.4)
IMM GRANULOCYTES # BLD AUTO: 0.15 THOUSAND/UL (ref 0–0.2)
IMM GRANULOCYTES NFR BLD AUTO: 1 % (ref 0–2)
LYMPHOCYTES # BLD AUTO: 2.78 THOUSANDS/ÂΜL (ref 0.6–4.47)
LYMPHOCYTES NFR BLD AUTO: 23 % (ref 14–44)
MAGNESIUM SERPL-MCNC: 1.4 MG/DL (ref 1.9–2.7)
MCH RBC QN AUTO: 29.2 PG (ref 26.8–34.3)
MCHC RBC AUTO-ENTMCNC: 31.1 G/DL (ref 31.4–37.4)
MCV RBC AUTO: 94 FL (ref 82–98)
MONOCYTES # BLD AUTO: 0.84 THOUSAND/ÂΜL (ref 0.17–1.22)
MONOCYTES NFR BLD AUTO: 7 % (ref 4–12)
NEUTROPHILS # BLD AUTO: 7.81 THOUSANDS/ÂΜL (ref 1.85–7.62)
NEUTS SEG NFR BLD AUTO: 66 % (ref 43–75)
NRBC BLD AUTO-RTO: 0 /100 WBCS
PLATELET # BLD AUTO: 215 THOUSANDS/UL (ref 149–390)
PMV BLD AUTO: 9.5 FL (ref 8.9–12.7)
POTASSIUM SERPL-SCNC: 5.1 MMOL/L (ref 3.5–5.3)
RBC # BLD AUTO: 3.84 MILLION/UL (ref 3.81–5.12)
RSV RNA RESP QL NAA+PROBE: NEGATIVE
SARS-COV-2 RNA RESP QL NAA+PROBE: NEGATIVE
SODIUM SERPL-SCNC: 135 MMOL/L (ref 135–147)
WBC # BLD AUTO: 11.97 THOUSAND/UL (ref 4.31–10.16)

## 2024-09-09 PROCEDURE — 83735 ASSAY OF MAGNESIUM: CPT | Performed by: FAMILY MEDICINE

## 2024-09-09 PROCEDURE — 0241U HB NFCT DS VIR RESP RNA 4 TRGT: CPT | Performed by: FAMILY MEDICINE

## 2024-09-09 PROCEDURE — 93005 ELECTROCARDIOGRAM TRACING: CPT

## 2024-09-09 PROCEDURE — 85025 COMPLETE CBC W/AUTO DIFF WBC: CPT | Performed by: FAMILY MEDICINE

## 2024-09-09 PROCEDURE — 96361 HYDRATE IV INFUSION ADD-ON: CPT

## 2024-09-09 PROCEDURE — 36415 COLL VENOUS BLD VENIPUNCTURE: CPT | Performed by: FAMILY MEDICINE

## 2024-09-09 PROCEDURE — 99285 EMERGENCY DEPT VISIT HI MDM: CPT

## 2024-09-09 PROCEDURE — 96365 THER/PROPH/DIAG IV INF INIT: CPT

## 2024-09-09 PROCEDURE — 80048 BASIC METABOLIC PNL TOTAL CA: CPT | Performed by: FAMILY MEDICINE

## 2024-09-09 PROCEDURE — 99284 EMERGENCY DEPT VISIT MOD MDM: CPT | Performed by: FAMILY MEDICINE

## 2024-09-09 RX ORDER — MAGNESIUM SULFATE HEPTAHYDRATE 40 MG/ML
2 INJECTION, SOLUTION INTRAVENOUS ONCE
Status: COMPLETED | OUTPATIENT
Start: 2024-09-09 | End: 2024-09-09

## 2024-09-09 RX ADMIN — SODIUM CHLORIDE 1000 ML: 0.9 INJECTION, SOLUTION INTRAVENOUS at 11:20

## 2024-09-09 RX ADMIN — MAGNESIUM SULFATE HEPTAHYDRATE 2 G: 40 INJECTION, SOLUTION INTRAVENOUS at 13:35

## 2024-09-09 NOTE — TELEPHONE ENCOUNTER
LMOM to confirm patient's appointment on  9/17 with Joe. Reminded pt to arrive 15 min prior to allow enough time for check in.

## 2024-09-10 ENCOUNTER — TELEPHONE (OUTPATIENT)
Age: 64
End: 2024-09-10

## 2024-09-10 LAB
ATRIAL RATE: 84 BPM
P AXIS: 46 DEGREES
PR INTERVAL: 142 MS
QRS AXIS: 21 DEGREES
QRSD INTERVAL: 86 MS
QT INTERVAL: 370 MS
QTC INTERVAL: 437 MS
T WAVE AXIS: 45 DEGREES
VENTRICULAR RATE: 84 BPM

## 2024-09-10 PROCEDURE — 93010 ELECTROCARDIOGRAM REPORT: CPT | Performed by: INTERNAL MEDICINE

## 2024-09-10 NOTE — TELEPHONE ENCOUNTER
Pt called today and asked if you can please move her appt to the 300 pm slot tomorrow. Please advise [phone] thank you.

## 2024-09-10 NOTE — TELEPHONE ENCOUNTER
Pt called, was seen in the ED twice in the past 2 weeks 9/5 and 9/9 - Pt is aware that Dr. Ellis is not available until 9/16 - Pt states Dr. Ellis is familiar with her medical history - due to the urgency, is he available to call the patient?  Please advise.    Pt states she was treated in the ED for  severe fatigue - unable to walk  - dizziness - sinus infection -3x,past 4 months. In addition, her magnesium is dangerously low. She has been getting IV.    The hospital referred her to see a kidney specialist - looking for recommendations on who to see?    Further discussing with the patient, she decided o schedule visit with Dr Ayala.  Tomorrow  9/11 - will try to find a ride; if unable to find a ride - will the provider do virtual visit?  If yes, kindly contact patient prior to the apt.

## 2024-09-11 ENCOUNTER — OFFICE VISIT (OUTPATIENT)
Dept: FAMILY MEDICINE CLINIC | Facility: CLINIC | Age: 64
End: 2024-09-11
Payer: COMMERCIAL

## 2024-09-11 VITALS
DIASTOLIC BLOOD PRESSURE: 82 MMHG | TEMPERATURE: 98.3 F | OXYGEN SATURATION: 97 % | WEIGHT: 206.4 LBS | HEIGHT: 65 IN | SYSTOLIC BLOOD PRESSURE: 132 MMHG | BODY MASS INDEX: 34.39 KG/M2 | HEART RATE: 108 BPM

## 2024-09-11 DIAGNOSIS — N17.9 AKI (ACUTE KIDNEY INJURY) (HCC): Primary | ICD-10-CM

## 2024-09-11 DIAGNOSIS — E83.42 HYPOMAGNESEMIA: ICD-10-CM

## 2024-09-11 DIAGNOSIS — E87.5 HYPERKALEMIA: ICD-10-CM

## 2024-09-11 DIAGNOSIS — J30.1 ALLERGIC RHINITIS DUE TO POLLEN, UNSPECIFIED SEASONALITY: ICD-10-CM

## 2024-09-11 DIAGNOSIS — N18.32 STAGE 3B CHRONIC KIDNEY DISEASE (HCC): ICD-10-CM

## 2024-09-11 PROCEDURE — 99214 OFFICE O/P EST MOD 30 MIN: CPT | Performed by: FAMILY MEDICINE

## 2024-09-16 ENCOUNTER — TELEPHONE (OUTPATIENT)
Age: 64
End: 2024-09-16

## 2024-09-16 DIAGNOSIS — J32.9 SINUSITIS, UNSPECIFIED CHRONICITY, UNSPECIFIED LOCATION: Primary | ICD-10-CM

## 2024-09-16 PROBLEM — J30.1 ALLERGIC RHINITIS DUE TO POLLEN: Status: ACTIVE | Noted: 2024-09-16

## 2024-09-16 RX ORDER — AMOXICILLIN 500 MG/1
1000 CAPSULE ORAL EVERY 12 HOURS SCHEDULED
Qty: 56 CAPSULE | Refills: 0 | Status: SHIPPED | OUTPATIENT
Start: 2024-09-16 | End: 2024-09-30

## 2024-09-16 NOTE — TELEPHONE ENCOUNTER
Patient was seen 9/11.  Patient states she was told to call if her symptoms do not improve so an antibiotic can be called in. Patient states she has been sick with this sinus infection for over 2 weeks not and is not getting better.  Patient would like the antibiotic called in. Please advise and return patients call.

## 2024-09-16 NOTE — PROGRESS NOTES
Assessment/Plan:    65 y/o woman with: KEENAN on CKD3, hypomagnesemia, hyperkalemia, allergic rhinitis. Will refer to nephrology reviewed recent labs encouraged follow-up labs and ample hydration with electrolytes. Discussed supportive care and return parameters.     No problem-specific Assessment & Plan notes found for this encounter.       Diagnoses and all orders for this visit:    KEENAN (acute kidney injury) (HCC)  -     Ambulatory Referral to Nephrology; Future  -     Comprehensive metabolic panel; Future  -     Magnesium; Future  -     Protein / creatinine ratio, urine; Future  -     CK; Future  -     C-reactive protein; Future  -     Toxoplasma gondii antibody, IgG & Igm; Future    Stage 3b chronic kidney disease (HCC)  -     Ambulatory Referral to Nephrology; Future  -     Comprehensive metabolic panel; Future  -     Magnesium; Future  -     Protein / creatinine ratio, urine; Future  -     CK; Future  -     C-reactive protein; Future  -     Toxoplasma gondii antibody, IgG & Igm; Future    Hypomagnesemia  -     Ambulatory Referral to Nephrology; Future  -     Comprehensive metabolic panel; Future  -     Magnesium; Future  -     Protein / creatinine ratio, urine; Future  -     CK; Future  -     C-reactive protein; Future  -     Toxoplasma gondii antibody, IgG & Igm; Future    Hyperkalemia  -     Ambulatory Referral to Nephrology; Future  -     Comprehensive metabolic panel; Future  -     Magnesium; Future  -     Protein / creatinine ratio, urine; Future  -     CK; Future  -     C-reactive protein; Future  -     Toxoplasma gondii antibody, IgG & Igm; Future    Allergic rhinitis due to pollen, unspecified seasonality  -     Ambulatory Referral to Allergy; Future  -     Ambulatory Referral to Allergy; Future          Subjective:     Chief Complaint   Patient presents with    Follow-up     Patient is here for a ED follow up, she is having severe fatigue and difficulty walking, magnesium dangerously low- getting  "infusions for that.     Dizziness     Dizziness and difficulty walking         Patient ID: Ana Rosa Parker is a 64 y.o. female.    Patient is a 65 y/o woman who presents for follow-up on KEENAN on CKD3, hypomagnesemia, hyperkalemia, allergic rhinitis. Pt admits she was dehydrated and went to the ED and had fluids and electrolytes repleted. No fevers chills, tolerating PO intake.    Dizziness        The following portions of the patient's history were reviewed and updated as appropriate: allergies, current medications, past family history, past medical history, past social history, past surgical history and problem list.    Review of Systems   Constitutional: Negative.    HENT: Negative.     Eyes: Negative.    Respiratory: Negative.     Cardiovascular: Negative.    Gastrointestinal: Negative.    Endocrine: Negative.    Genitourinary: Negative.    Musculoskeletal: Negative.    Allergic/Immunologic: Negative.    Neurological:  Positive for dizziness.   Hematological: Negative.    Psychiatric/Behavioral: Negative.     All other systems reviewed and are negative.        Objective:      /82 (BP Location: Left arm, Patient Position: Sitting, Cuff Size: Standard)   Pulse (!) 108   Temp 98.3 °F (36.8 °C) (Temporal)   Ht 5' 5\" (1.651 m)   Wt 93.6 kg (206 lb 6.4 oz)   LMP  (LMP Unknown)   SpO2 97%   BMI 34.35 kg/m²          Physical Exam  Constitutional:       Appearance: She is well-developed.   HENT:      Head: Atraumatic.      Right Ear: External ear normal.      Left Ear: External ear normal.   Eyes:      Extraocular Movements: EOM normal.      Conjunctiva/sclera: Conjunctivae normal.      Pupils: Pupils are equal, round, and reactive to light.   Cardiovascular:      Rate and Rhythm: Normal rate and regular rhythm.      Heart sounds: Normal heart sounds.   Pulmonary:      Effort: Pulmonary effort is normal. No respiratory distress.      Breath sounds: Normal breath sounds.   Abdominal:      General: There is no " distension.      Palpations: Abdomen is soft.      Tenderness: There is no abdominal tenderness. There is no guarding or rebound.   Musculoskeletal:         General: Normal range of motion.      Cervical back: Normal range of motion.   Skin:     General: Skin is warm and dry.   Neurological:      Mental Status: She is alert and oriented to person, place, and time.      Cranial Nerves: No cranial nerve deficit.   Psychiatric:         Mood and Affect: Mood and affect normal.         Behavior: Behavior normal.         Thought Content: Thought content normal.         Judgment: Judgment normal.

## 2024-09-17 ENCOUNTER — TELEPHONE (OUTPATIENT)
Dept: NEUROLOGY | Facility: CLINIC | Age: 64
End: 2024-09-17

## 2024-09-17 ENCOUNTER — CONSULT (OUTPATIENT)
Age: 64
End: 2024-09-17
Payer: COMMERCIAL

## 2024-09-17 VITALS
HEART RATE: 88 BPM | OXYGEN SATURATION: 96 % | DIASTOLIC BLOOD PRESSURE: 72 MMHG | SYSTOLIC BLOOD PRESSURE: 112 MMHG | HEIGHT: 65 IN | WEIGHT: 206 LBS | BODY MASS INDEX: 34.32 KG/M2

## 2024-09-17 DIAGNOSIS — G45.9 TIA (TRANSIENT ISCHEMIC ATTACK): Primary | ICD-10-CM

## 2024-09-17 PROCEDURE — 99244 OFF/OP CNSLTJ NEW/EST MOD 40: CPT

## 2024-09-17 RX ORDER — MAGNESIUM OXIDE 400 MG/1
1 TABLET ORAL EVERY MORNING
COMMUNITY
Start: 2024-09-09 | End: 2024-09-27 | Stop reason: SDUPTHER

## 2024-09-17 NOTE — TELEPHONE ENCOUNTER
Release form received. Forms filled out and signed by Pt. Forms faxed and confirmed to [facility] fax # [phone]. Awaiting records.

## 2024-09-17 NOTE — PATIENT INSTRUCTIONS
- Continue with good blood pressure control; I would recommend monitoring at home at least 3 times per week; Goal of <130/80  - Continue with good cholesterol control; Goal LDL <70; at goal  - Continue with good blood sugar control; Goal HgbA1c <7.0; not at goal continue to work on this   - Will defer monitoring of cholesterol and blood sugar and management of hypertensive medications to the primary care provider  - Stay well hydrated by drinking enough water   - Will request records from [place] today; pending review will decide on the need for carotid doppler or other imaging   - Eat a healthy diet, high in lean meats fish, turkey, chicken. Low in fats, cholesterol, sugars and sodium. Avoid canned foods,  get lets of fresh/frozen vegetables/fruits.  - Get routine exercise/physical activity as much as able to tolerate  - Keep follow ups with your other health care providers; including cardiology and nephrology   - Continue Aspirin 81 mg daily and Crestor 20 mg daily.  -  Fall precautions  -  Follow up for treatment of FELIX    I will plan for her to return to the office in 6 months time but would be happy to see her sooner if the need should arise.  If she has any symptoms concerning for TIA or stroke including sudden painless loss of vision or double vision, difficulty speaking or swallowing, vertigo/room spinning that does not quickly resolve, or weakness/numbness affecting 1 side of the face or body she should proceed by ambulance to the nearest emergency room immediately.

## 2024-09-17 NOTE — TELEPHONE ENCOUNTER
Called to ask for records of pts TIA attack on May 4th. Pt stated she went to this hospital to receive treatment. Medical Records dep. Stated they are able to fax records once pt signs release form. Asked them to fax over release form to get it signed. Waiting on release form.

## 2024-09-17 NOTE — PROGRESS NOTES
Patient ID: Ana Rosa Parker is a 64 y.o. female.    Assessment/Plan:    TIA (transient ischemic attack)  Ana Rosa Parker is a 64 year old female who was suspected to have suffered a TIA in May 2024. Unfortunately this occurred while in [place] and we do not have records from her admission to review today. She did sign a release for records today. Nonetheless, she is now appropriately maintained on ASA 81 mg and Rosuvastatin 20 mg daily. She does not have any residual symptoms from this event, although she has recently been dealing with other unrelated symptoms likely related to electrolyte imbalance 2/2 CKD. Her BP and LDL are well controlled, although her diabetic control continues to be suboptimal. She was encouraged to follow up with her nutritionist and to adhere to a strict diabetic diet. Pending review of testing competed in [place], I will decide if additional testing such as a carotid doppler is necessary at this time. Her neurologic exam is intact and non focal. She was encouraged to follow up for treatment of her FELIX. We discussed the increased risk of CVA with untreated FELIX. We reviewed her stroke risk factors and management of the same. She was provided stroke education and we reviewed stroke warning signs/symptoms and reasons to return by ambulance to the ER.       Plan:  - Continue with good blood pressure control; I would recommend monitoring at home at least 3 times per week; Goal of <130/80; at goal   - Continue with good cholesterol control; Goal LDL <70; at goal  - Continue with good blood sugar control; Goal HgbA1c <7.0; not at goal continue to work on this   - Will defer monitoring of cholesterol and blood sugar and management of hypertensive medications to the primary care provider  - Stay well hydrated by drinking enough water   - Will request records from [place] today; pending review will decide on the need for carotid doppler or other imaging   - Eat a healthy diet, high in lean meats fish, turkey,  chicken. Low in fats, cholesterol, sugars and sodium. Avoid canned foods,  get lets of fresh/frozen vegetables/fruits.  - Get routine exercise/physical activity as much as able to tolerate  - Keep follow ups with your other health care providers; including cardiology and nephrology   - Continue Aspirin 81 mg daily and Crestor 20 mg daily.  -  Fall precautions  -  Follow up for treatment of FELIX    I will plan for her to return to the office in 6 months time but would be happy to see her sooner if the need should arise.  If she has any symptoms concerning for TIA or stroke including sudden painless loss of vision or double vision, difficulty speaking or swallowing, vertigo/room spinning that does not quickly resolve, or weakness/numbness affecting 1 side of the face or body she should proceed by ambulance to the nearest emergency room immediately.        Diagnoses and all orders for this visit:    TIA (transient ischemic attack)  -     Ambulatory Referral to Neurology    Other orders  -     magnesium oxide (MAG-OX) 400 mg tablet; Take 1 tablet by mouth every morning         I have spent a total time of 60 minutes in caring for this patient on the day of the visit/encounter including Diagnostic results, Prognosis, Risks and benefits of tx options, Instructions for management, Patient and family education, Importance of tx compliance, Risk factor reductions, Impressions, Documenting in the medical record, Reviewing / ordering tests, medicine, procedures  , and Obtaining or reviewing history  .      Subjective:    ROBERTA Parker is a 64 year old female with a pmhx of migraine headache, hypertension, TIA, asthma, FELIX, allergic rhinits, GERD, type 2 diabetes, hypothyroidism, knee pain, arthritis, CKD, anxiety, depression, anemia, and hyperlipidemia who presents to the office in consultation referred by Faheem Ellis DO for hx of TIA.    In May 2024 she reports being in [place] on her honeymoon and she suddenly  developed heart palpitations, SOB x 45 minutes, hot/dizzy, nausea, off balance and headache. She states she went to the ER and was admitted and treated for a suspected TIA. She states she received IV fluids, magnesium and insulin (reports her blood sugar was very elevated). She believes she had CT and MRI imaging which was reportedly normal. Unfortunately, we do not have records today. She did sign a release in office today for [facility] [place] [facility].    Secondary Stroke Prevention  She was not on ASA at the time of her suspected TIA  She was started on ASA 81 mg and her Rosuvastatin was increased from 5 mg to 20 mg daily   Compliant with her medications, no issues affording or obtaining medications.   Denies excessive bruising or bleeding     Deficits  She states she has had dizziness since April 2024 before TIA, which has continued since this event (stable)  She is attending balance therapy  She denies numbness, tingling  She does have generalized weakness and fatigue 2/2 due to a sinus infection which has been ongoing x 17 days (unrelated to suspected TIA) also recent KEENAN   She denies vision changes or any new or worsening stroke like symptoms  She denies difficulty speaking or swallowing     Monitoring  BP she does not monitor at home.   BP today is 112/72    HgbA1c 8/2/2024 7.8 (managed by her PCP; Trulicity was increased but currently on hold)    8/2/2024 8/2/2024 total 154, triglycerides 191, HDL 51, LDL 65 (on Rosuvastatin 20 mg daily)    She reports having CT and MRI brain- she believes this was normal. Will request records.     Safety     Independent of all ADLs  No falls at home  She is managing her own medications   assistive devices- none  Driving- has not driven for 2 weeks due to generalized weakness but does usually drive w/o difficulty  Memory- no concerns     Substance use    Smoking- never  Etoh- none  Drugs- none  Caffeine- she does drink coffee, tea, soda a few times per week    "  Sleep  She does have FELIX  She is not using CPAP  She is going back to follow up on this     Diet  She has cut out juice, almost has cut out soda  Still eating ice cream and other sweets but reports being more cautious  She does not follow a diabetic diet  She does have a follow up with a nutritionist scheduled.      Exercise  She denies any formal exercise or walking  She does plan to start walking with 2 of her neighbors        PT/OT/ST  Balance therapy 20 out of 24 visits complete   She does feel like this was helping       Follow ups  She has a pending consultation with Cardiology 10/1  She also is scheduling an appt with Nephrology due to CKD and low magnesium.       The following portions of the patient's history were reviewed and updated as appropriate: allergies, current medications, past family history, past medical history, past social history, past surgical history, and problem list.       Objective:    Blood pressure 112/72, pulse 88, height 5' 5\" (1.651 m), weight 93.4 kg (206 lb), SpO2 96%, not currently breastfeeding.    Physical Exam  Vitals reviewed.   Constitutional:       Appearance: Normal appearance. She is not toxic-appearing or diaphoretic.   HENT:      Head: Normocephalic and atraumatic.      Nose: Nose normal.      Mouth/Throat:      Mouth: Mucous membranes are moist.      Pharynx: Oropharynx is clear.   Eyes:      Extraocular Movements: Extraocular movements intact.      Conjunctiva/sclera: Conjunctivae normal.      Pupils: Pupils are equal, round, and reactive to light.   Cardiovascular:      Rate and Rhythm: Normal rate.      Pulses: Normal pulses.   Pulmonary:      Effort: Pulmonary effort is normal.   Musculoskeletal:         General: Normal range of motion.      Cervical back: Normal range of motion and neck supple.      Right lower leg: No edema.      Left lower leg: No edema.   Skin:     General: Skin is warm.   Neurological:      General: No focal deficit present.      Mental " Status: She is alert and oriented to person, place, and time.      Cranial Nerves: No cranial nerve deficit.      Sensory: No sensory deficit.      Motor: No weakness.      Coordination: Coordination normal.      Gait: Gait normal.      Deep Tendon Reflexes: Reflexes normal.   Psychiatric:         Mood and Affect: Mood normal.         Behavior: Behavior normal.         Thought Content: Thought content normal.         Judgment: Judgment normal.       Neurological Examination  On neurological examination patient is alert, awake, oriented and in no distress. Speech is fluent without dysarthria or aphasia. Cranial nerves 2-12 were symmetrically intact bilaterally. No evidence of any focal weakness or sensory loss in the upper or lower extremities. Motor testing reveals 5/5 strength of the bilateral upper and lower extremities.There was no pronator drift.  No fasciculations present. No abnormal involuntary movements. Finger- to-nose reveals no tremor or ataxia and intact proprioceptive function, no dysmetria was noted. Rapid alternating movement normal. Sensation was intact to vibration, light touch, and temperature in bilateral upper and lower extremities. Deep tendon reflexes were 2++ but equal and symmetric throughout in the bilateral upper and lower extremities. She is able to rise easily without assistance from a seated position. Casual gait is normal including stance, stride, and arm swing. Difficulty with tandem gait. Romberg is present with mild sway.      ROS:    Review of Systems   Constitutional:  Negative for appetite change, fatigue and fever.   HENT: Negative.  Negative for hearing loss, tinnitus, trouble swallowing and voice change.    Eyes: Negative.  Negative for photophobia, pain and visual disturbance.   Respiratory: Negative.  Negative for shortness of breath.    Cardiovascular: Negative.  Negative for palpitations.   Gastrointestinal: Negative.  Negative for nausea and vomiting.   Endocrine:  Negative.  Negative for cold intolerance.   Genitourinary: Negative.  Negative for dysuria, frequency and urgency.   Musculoskeletal:  Negative for back pain, gait problem, myalgias, neck pain and neck stiffness.   Skin: Negative.  Negative for rash.   Allergic/Immunologic: Negative.    Neurological:  Positive for dizziness (started before TIA; ongoing). Negative for tremors, seizures, syncope, facial asymmetry, speech difficulty, weakness, light-headedness, numbness and headaches.   Hematological: Negative.  Does not bruise/bleed easily.   Psychiatric/Behavioral: Negative.  Negative for confusion, hallucinations and sleep disturbance.    All other systems reviewed and are negative.    Reviewed ROS as entered by medical assistant.

## 2024-09-17 NOTE — ASSESSMENT & PLAN NOTE
Ana Rosa Parker is a 64 year old female who was suspected to have suffered a TIA in May 2024. Unfortunately this occurred while in [place] and we do not have records from her admission to review today. She did sign a release for records today. Nonetheless, she is now appropriately maintained on ASA 81 mg and Rosuvastatin 20 mg daily. She does not have any residual symptoms from this event, although she has recently been dealing with other unrelated symptoms likely related to electrolyte imbalance 2/2 CKD. Her BP and LDL are well controlled, although her diabetic control continues to be suboptimal. She was encouraged to follow up with her nutritionist and to adhere to a strict diabetic diet. Pending review of testing competed in [place], I will decide if additional testing such as a carotid doppler is necessary at this time. Her neurologic exam is intact and non focal. She was encouraged to follow up for treatment of her FELIX. We discussed the increased risk of CVA with untreated FELIX. We reviewed her stroke risk factors and management of the same. She was provided stroke education and we reviewed stroke warning signs/symptoms and reasons to return by ambulance to the ER.       Plan:  - Continue with good blood pressure control; I would recommend monitoring at home at least 3 times per week; Goal of <130/80; at goal   - Continue with good cholesterol control; Goal LDL <70; at goal  - Continue with good blood sugar control; Goal HgbA1c <7.0; not at goal continue to work on this   - Will defer monitoring of cholesterol and blood sugar and management of hypertensive medications to the primary care provider  - Stay well hydrated by drinking enough water   - Will request records from [place] today; pending review will decide on the need for carotid doppler or other imaging   - Eat a healthy diet, high in lean meats fish, turkey, chicken. Low in fats, cholesterol, sugars and sodium. Avoid canned foods,  get lets of fresh/frozen  vegetables/fruits.  - Get routine exercise/physical activity as much as able to tolerate  - Keep follow ups with your other health care providers; including cardiology and nephrology   - Continue Aspirin 81 mg daily and Crestor 20 mg daily.  -  Fall precautions  -  Follow up for treatment of FELIX    I will plan for her to return to the office in 6 months time but would be happy to see her sooner if the need should arise.  If she has any symptoms concerning for TIA or stroke including sudden painless loss of vision or double vision, difficulty speaking or swallowing, vertigo/room spinning that does not quickly resolve, or weakness/numbness affecting 1 side of the face or body she should proceed by ambulance to the nearest emergency room immediately.

## 2024-09-19 ENCOUNTER — TELEPHONE (OUTPATIENT)
Age: 64
End: 2024-09-19

## 2024-09-19 NOTE — TELEPHONE ENCOUNTER
Patient called, request  a STAT appointment for Nephrology, states first appointment offered in December, Patient states she can not wait until then, due to be hospitalized twice for kidney issues. Patient request a callback with suggestions, Please advise Patient at [phone], if any further questions.

## 2024-09-19 NOTE — TELEPHONE ENCOUNTER
Left message for pt re: earlier appt with nephrology.  I told her to call them and be put on a cancellation list.  I told her that we can't do anything for getting her in sooner, since they have their own triage system.

## 2024-09-27 ENCOUNTER — TELEPHONE (OUTPATIENT)
Age: 64
End: 2024-09-27

## 2024-09-27 ENCOUNTER — APPOINTMENT (OUTPATIENT)
Dept: LAB | Age: 64
End: 2024-09-27
Payer: COMMERCIAL

## 2024-09-27 DIAGNOSIS — E83.42 HYPOMAGNESEMIA: Primary | ICD-10-CM

## 2024-09-27 DIAGNOSIS — N18.32 STAGE 3B CHRONIC KIDNEY DISEASE (HCC): ICD-10-CM

## 2024-09-27 DIAGNOSIS — N17.9 AKI (ACUTE KIDNEY INJURY) (HCC): ICD-10-CM

## 2024-09-27 DIAGNOSIS — E83.42 HYPOMAGNESEMIA: ICD-10-CM

## 2024-09-27 DIAGNOSIS — E87.5 HYPERKALEMIA: ICD-10-CM

## 2024-09-27 LAB
ALBUMIN SERPL BCG-MCNC: 4 G/DL (ref 3.5–5)
ALP SERPL-CCNC: 87 U/L (ref 34–104)
ALT SERPL W P-5'-P-CCNC: 13 U/L (ref 7–52)
ANION GAP SERPL CALCULATED.3IONS-SCNC: 9 MMOL/L (ref 4–13)
AST SERPL W P-5'-P-CCNC: 17 U/L (ref 13–39)
BILIRUB SERPL-MCNC: 0.24 MG/DL (ref 0.2–1)
BUN SERPL-MCNC: 23 MG/DL (ref 5–25)
CALCIUM SERPL-MCNC: 8.7 MG/DL (ref 8.4–10.2)
CHLORIDE SERPL-SCNC: 106 MMOL/L (ref 96–108)
CK SERPL-CCNC: 44 U/L (ref 26–192)
CO2 SERPL-SCNC: 22 MMOL/L (ref 21–32)
CREAT SERPL-MCNC: 1.28 MG/DL (ref 0.6–1.3)
CRP SERPL QL: 1.7 MG/L
GFR SERPL CREATININE-BSD FRML MDRD: 44 ML/MIN/1.73SQ M
GLUCOSE P FAST SERPL-MCNC: 241 MG/DL (ref 65–99)
MAGNESIUM SERPL-MCNC: 1.5 MG/DL (ref 1.9–2.7)
POTASSIUM SERPL-SCNC: 4.8 MMOL/L (ref 3.5–5.3)
PROT SERPL-MCNC: 6.7 G/DL (ref 6.4–8.4)
SODIUM SERPL-SCNC: 137 MMOL/L (ref 135–147)

## 2024-09-27 PROCEDURE — 82550 ASSAY OF CK (CPK): CPT

## 2024-09-27 PROCEDURE — 86778 TOXOPLASMA ANTIBODY IGM: CPT

## 2024-09-27 PROCEDURE — 83735 ASSAY OF MAGNESIUM: CPT

## 2024-09-27 PROCEDURE — 36415 COLL VENOUS BLD VENIPUNCTURE: CPT

## 2024-09-27 PROCEDURE — 86140 C-REACTIVE PROTEIN: CPT

## 2024-09-27 PROCEDURE — 86777 TOXOPLASMA ANTIBODY: CPT

## 2024-09-27 PROCEDURE — 80053 COMPREHEN METABOLIC PANEL: CPT

## 2024-09-27 RX ORDER — MAGNESIUM OXIDE 400 MG/1
1 TABLET ORAL EVERY MORNING
Qty: 100 TABLET | Refills: 3 | Status: SHIPPED | OUTPATIENT
Start: 2024-09-27

## 2024-09-27 NOTE — TELEPHONE ENCOUNTER
Patient called in stating that she needs a refill of magnesium oxide (MAG-OX) 400 mg tablet send to her pharmacy as soonest possible due to being recently being hospitalized for low magnesium. Please review and send in if appropriate.     Reason for call:   [x] Refill   [] Prior Auth  [] Other:     Office:   [] PCP/Provider -   [x] Specialty/Provider -     Medication: magnesium oxide (MAG-OX) 400 mg tablet     Dose/Frequency: Take 1 tablet by mouth every morning,     Quantity: 30 capsule    Pharmacy: [id number] - [place] - [address] [place] [address] [phone]    Does the patient have enough for 3 days?   [] Yes   [x] No - Send as HP to POD

## 2024-09-27 NOTE — TELEPHONE ENCOUNTER
Patient calling for STAT refill of Mag-Ox 400 mg. States without this medication she could potentially be hospitalized for a third time.  Warm transferred call to Jeanette at [facility] line for further assistance.

## 2024-10-01 LAB
T GONDII IGG SER QL IA: NEGATIVE
T GONDII IGM SERPL QL IA: NEGATIVE

## 2024-10-02 ENCOUNTER — OFFICE VISIT (OUTPATIENT)
Age: 64
End: 2024-10-02
Payer: COMMERCIAL

## 2024-10-02 VITALS
HEIGHT: 65 IN | TEMPERATURE: 98.4 F | WEIGHT: 208 LBS | SYSTOLIC BLOOD PRESSURE: 110 MMHG | OXYGEN SATURATION: 99 % | BODY MASS INDEX: 34.66 KG/M2 | HEART RATE: 82 BPM | DIASTOLIC BLOOD PRESSURE: 60 MMHG

## 2024-10-02 DIAGNOSIS — G89.29 CHRONIC PAIN OF LEFT KNEE: ICD-10-CM

## 2024-10-02 DIAGNOSIS — M25.562 CHRONIC PAIN OF LEFT KNEE: ICD-10-CM

## 2024-10-02 DIAGNOSIS — E11.9 TYPE 2 DIABETES MELLITUS WITHOUT COMPLICATION, WITHOUT LONG-TERM CURRENT USE OF INSULIN (HCC): ICD-10-CM

## 2024-10-02 DIAGNOSIS — K21.9 GASTROESOPHAGEAL REFLUX DISEASE WITHOUT ESOPHAGITIS: ICD-10-CM

## 2024-10-02 DIAGNOSIS — Z23 NEED FOR IMMUNIZATION AGAINST INFLUENZA: ICD-10-CM

## 2024-10-02 DIAGNOSIS — E83.42 HYPOMAGNESEMIA: ICD-10-CM

## 2024-10-02 DIAGNOSIS — R42 VERTIGO: Primary | ICD-10-CM

## 2024-10-02 LAB
CREAT UR-MCNC: 142.8 MG/DL
MICROALBUMIN UR-MCNC: <7 MG/L

## 2024-10-02 PROCEDURE — 82570 ASSAY OF URINE CREATININE: CPT | Performed by: FAMILY MEDICINE

## 2024-10-02 PROCEDURE — 99214 OFFICE O/P EST MOD 30 MIN: CPT | Performed by: FAMILY MEDICINE

## 2024-10-02 PROCEDURE — 90656 IIV3 VACC NO PRSV 0.5 ML IM: CPT

## 2024-10-02 PROCEDURE — 90471 IMMUNIZATION ADMIN: CPT

## 2024-10-02 PROCEDURE — 82043 UR ALBUMIN QUANTITATIVE: CPT | Performed by: FAMILY MEDICINE

## 2024-10-02 RX ORDER — FAMOTIDINE 40 MG/1
40 TABLET, FILM COATED ORAL DAILY
Qty: 90 TABLET | Refills: 1 | Status: SHIPPED | OUTPATIENT
Start: 2024-10-02

## 2024-10-04 NOTE — PROGRESS NOTES
Assessment/Plan:    65 y/o woman with: DM2, vertigo, GERD, hypomagnesemia, and knee pain. Will refer to vestibular therapy, will continue meds. And check follow-up labs. Will refer to Ortho. Discussed supportive care and return parameters.     No problem-specific Assessment & Plan notes found for this encounter.       Diagnoses and all orders for this visit:    Vertigo  -     Ambulatory Referral to Physical Therapy; Future  -     Comprehensive metabolic panel; Future  -     Hemoglobin A1C; Future  -     Magnesium; Future    Gastroesophageal reflux disease without esophagitis  -     famotidine (PEPCID) 40 MG tablet; Take 1 tablet (40 mg total) by mouth daily  -     Comprehensive metabolic panel; Future  -     Hemoglobin A1C; Future  -     Magnesium; Future    Hypomagnesemia  -     Comprehensive metabolic panel; Future  -     Hemoglobin A1C; Future  -     Magnesium; Future    Chronic pain of left knee  -     Ambulatory Referral to Orthopedic Surgery; Future    Need for immunization against influenza  -     Fluzone 0.5 mL IM    Type 2 diabetes mellitus without complication, without long-term current use of insulin (HCC)  -     Albumin / creatinine urine ratio; Future  -     Albumin / creatinine urine ratio          Subjective:     Chief Complaint   Patient presents with    Well Check     Patient is here for an annual wellness visit and review labs. DM foot exam done at this visit. No other concerns.  LR    Knee Pain     Patient is complaining of left knee pain  for about a month.  LR        Patient ID: Ana Rosa Parker is a 64 y.o. female.    Pt is a 65 y/o woman who presents for follow-up on DM2, vertigo, GERD, hypomagnesemia, and knee pain. Pt admits feeling somewhat better and denies other acute complaints no fevers chills nausea or vomiting.    Knee Pain         The following portions of the patient's history were reviewed and updated as appropriate: allergies, current medications, past family history, past medical  "history, past social history, past surgical history and problem list.    Review of Systems   Constitutional: Negative.    HENT: Negative.     Eyes: Negative.    Respiratory: Negative.     Cardiovascular: Negative.    Gastrointestinal: Negative.    Endocrine: Negative.    Genitourinary: Negative.    Musculoskeletal:  Positive for arthralgias.   Allergic/Immunologic: Negative.    Neurological:  Positive for dizziness.   Hematological: Negative.    Psychiatric/Behavioral: Negative.     All other systems reviewed and are negative.        Objective:      /60 (BP Location: Left arm, Patient Position: Sitting, Cuff Size: Adult)   Pulse 82   Temp 98.4 °F (36.9 °C) (Tympanic)   Ht 5' 5\" (1.651 m)   Wt 94.3 kg (208 lb)   LMP  (LMP Unknown)   SpO2 99%   BMI 34.61 kg/m²          Physical Exam  Constitutional:       Appearance: She is well-developed.   HENT:      Head: Atraumatic.      Right Ear: External ear normal.      Left Ear: External ear normal.   Eyes:      Extraocular Movements: EOM normal.      Conjunctiva/sclera: Conjunctivae normal.      Pupils: Pupils are equal, round, and reactive to light.   Cardiovascular:      Rate and Rhythm: Normal rate and regular rhythm.      Heart sounds: Normal heart sounds.   Pulmonary:      Effort: Pulmonary effort is normal. No respiratory distress.      Breath sounds: Normal breath sounds.   Abdominal:      General: There is no distension.      Palpations: Abdomen is soft.      Tenderness: There is no abdominal tenderness. There is no guarding or rebound.   Musculoskeletal:         General: Normal range of motion.      Cervical back: Normal range of motion.   Skin:     General: Skin is warm and dry.   Neurological:      Mental Status: She is alert and oriented to person, place, and time.      Cranial Nerves: No cranial nerve deficit.   Psychiatric:         Mood and Affect: Mood and affect normal.         Behavior: Behavior normal.         Thought Content: Thought content " normal.         Judgment: Judgment normal.

## 2024-10-21 ENCOUNTER — TELEPHONE (OUTPATIENT)
Age: 64
End: 2024-10-21

## 2024-10-22 ENCOUNTER — APPOINTMENT (OUTPATIENT)
Dept: LAB | Facility: CLINIC | Age: 64
End: 2024-10-22
Payer: COMMERCIAL

## 2024-10-22 DIAGNOSIS — E83.42 HYPOMAGNESEMIA: ICD-10-CM

## 2024-10-22 DIAGNOSIS — R42 VERTIGO: ICD-10-CM

## 2024-10-22 DIAGNOSIS — K21.9 GASTROESOPHAGEAL REFLUX DISEASE WITHOUT ESOPHAGITIS: ICD-10-CM

## 2024-10-22 LAB
ALBUMIN SERPL BCG-MCNC: 4.1 G/DL (ref 3.5–5)
ALP SERPL-CCNC: 90 U/L (ref 34–104)
ALT SERPL W P-5'-P-CCNC: 10 U/L (ref 7–52)
ANION GAP SERPL CALCULATED.3IONS-SCNC: 5 MMOL/L (ref 4–13)
AST SERPL W P-5'-P-CCNC: 14 U/L (ref 13–39)
BILIRUB SERPL-MCNC: 0.18 MG/DL (ref 0.2–1)
BUN SERPL-MCNC: 24 MG/DL (ref 5–25)
CALCIUM SERPL-MCNC: 8.8 MG/DL (ref 8.4–10.2)
CHLORIDE SERPL-SCNC: 105 MMOL/L (ref 96–108)
CO2 SERPL-SCNC: 25 MMOL/L (ref 21–32)
CREAT SERPL-MCNC: 1.27 MG/DL (ref 0.6–1.3)
EST. AVERAGE GLUCOSE BLD GHB EST-MCNC: 160 MG/DL
GFR SERPL CREATININE-BSD FRML MDRD: 45 ML/MIN/1.73SQ M
GLUCOSE SERPL-MCNC: 143 MG/DL (ref 65–140)
HBA1C MFR BLD: 7.2 %
MAGNESIUM SERPL-MCNC: 1.9 MG/DL (ref 1.9–2.7)
POTASSIUM SERPL-SCNC: 5.4 MMOL/L (ref 3.5–5.3)
PROT SERPL-MCNC: 6.6 G/DL (ref 6.4–8.4)
SODIUM SERPL-SCNC: 135 MMOL/L (ref 135–147)

## 2024-10-22 PROCEDURE — 80053 COMPREHEN METABOLIC PANEL: CPT

## 2024-10-22 PROCEDURE — 83036 HEMOGLOBIN GLYCOSYLATED A1C: CPT

## 2024-10-22 PROCEDURE — 83735 ASSAY OF MAGNESIUM: CPT

## 2024-10-22 PROCEDURE — 36415 COLL VENOUS BLD VENIPUNCTURE: CPT

## 2024-10-23 NOTE — PROGRESS NOTES
NEPHROLOGY OUTPATIENT CONSULTATION   Ana Rosa Parker 64 y.o. female MRN: [id number]  Date: 10/24/2024  Reason for consultation:   Chief Complaint   Patient presents with    Consult    hypomagnesemia       ASSESSMENT AND PLAN:  Hypomagnesemia  -has been on magnesium oxide 400 mg twice daily for last 3 weeks and last magnesium level was at normal range at 1.9  -Was on omeprazole prior to September which could have contributed to hypomagnesemia and she was taken off omeprazole and currently on Pepcid since then  -Discussed with patient about different causes of hypomagnesemia in the setting of magnesium level improving with use of magnesium supplementation and stopping omeprazole, will hold off on 24-hour urine testing at this time.  Continue current dose of magnesium oxide 400 mg twice daily.  Recheck magnesium level in 2 weeks in 4 weeks and if magnesium level continue to improve may be able to decrease the dose of magnesium oxide.    Hyperkalemia  -Potassium level elevated to 5.4 meq/L  -Hyperkalemia in the setting of CKD, use of lisinopril and due to intake of electrolyte water for last 1 month  -Recommend low potassium diet, list provided  -Continue lisinopril for now, recheck labs in 2 weeks in 4 weeks.  If potassium is persistently elevated would need to stop lisinopril and switch to calcium channel blocker.  Recommend avoiding electrolyte water which could be containing high amount of potassium.    Chronic Kidney Disease Stage 3a  -Baseline Creatinine: 1.0-1.2 mg/dl  -Renal Function has been stable. Last Creatinine was 1.27 mg/dl  -Had acute kidney injury in September likely prerenal /renal function improved status post IV fluid  -Etiology: Chronic kidney disease likely due to hypertensive nephrosclerosis and age-related nephron loss  -Plan:   Continue to monitor renal function.  Check labs in 2 weeks in 4 weeks.  Avoid Nephrotoxins like NSAIDs and IV contrast.   Follow-up: 5 months with labs     Primary  Hypertension with chronic kidney disease stage III:   -Current medication: Lisinopril 10 mg daily     -Current blood pressure: stable and at goal   -Plan:     Continue lisinopril at current dose.  Monitor potassium level and if potassium level is persistently elevated may need to switch to thousand send blocker  -Recommend 2 g sodium diet.    -Recommend daily exercise and weight loss  -Discussed home monitoring of BP and maintaining a log of blood pressure.  -Recommend goal BP less than 130/80.     Anemia, unspecified  -Hemoglobin 11.2 g/dL.  MCV 94  -Continue to monitor hemoglobin    Diabetes Mellitus Type 2 with CKD stage 3 :  -recommend goal A1c less than 7.0 if possible  -stressed on diabetic diet and daily exercise as well as weight loss  -continue monitoring of A1c per PCP, management per PCP and follow-up with PCP  -discussed about risk of CKD progression if diabetes is not controlled well.  -currently on:Metformin.  Okay to continue metformin for now but may need to stop once his GFR is less than 30.  Discussed with patient who verbalized understanding  -Last A1c:  7.2     Diagnoses and all orders for this visit:    Hypomagnesemia  -     Ambulatory Referral to Nephrology  -     magnesium oxide (MAG-OX) 400 mg tablet; Take 1 tablet (400 mg total) by mouth 2 (two) times a day  -     Magnesium; Future  -     Magnesium; Future  -     Magnesium; Future    Hyperkalemia  -     Basic metabolic panel; Future  -     Basic metabolic panel; Future  -     Basic metabolic panel; Future    Stage 3a chronic kidney disease (HCC)  -     Basic metabolic panel; Future  -     Basic metabolic panel; Future  -     Basic metabolic panel; Future  -     Protein / creatinine ratio, urine; Future  -     PTH, intact; Future  -     Urinalysis with microscopic; Future  -     Magnesium; Future  -     CBC; Future  -     Phosphorus; Future    Benign hypertension with CKD (chronic kidney disease) stage III (HCC)  -     Basic metabolic panel;  "Future  -     Protein / creatinine ratio, urine; Future    Anemia, unspecified type  -     CBC; Future    Controlled type 2 diabetes mellitus with stage 3 chronic kidney disease, without long-term current use of insulin ([facility])  -     Basic metabolic panel; Future  -     Protein / creatinine ratio, urine; Future        Patient Instructions   -Renal Function is stable   -You have Chronic Kidney Disease Stage 3   -Avoid NSAIDs like Ibuprofen/Motrin, Naproxen/Aleve, Celebrex, meloxicam/Mobic, Diclofenac and other NSAIDs.  -Okay to take Acetaminophen/Tylenol if you do not have any liver problems  -Avoid IV contrast used for CT scan and cardiac catheterization.    -If plan for any study with IV contrast, please let me know so we could hydrate with fluids before and after IV contrast  -Dosage  of certain medications may need to be adjusted depending on Kidney function.     BP stable   -Recommend 2 g sodium diet.    -Recommend daily exercise and weight loss  -Discussed home monitoring of BP and maintaining a log of blood pressure.  -Recommend goal BP less than 130/80. Please inform me if SBP below 110 or above 140's persistently.    Recommend low potassium diet, check blood work in 2 weeks and again in 4 weeks    Follow-up in 5 months with repeat labs before the office visit    Patient Education     Low-potassium diet   The Basics   Written by the doctors and editors at [facility]   What is potassium? -- Potassium is a mineral found in most foods. The body needs potassium to work normally. It keeps the heart beating and helps the nerves and muscles work. But people need only a certain amount of potassium. Too much or too little potassium in the body can cause problems.  Having too much potassium in the blood is called \"hyperkalemia.\" This can cause heart rhythm problems and muscle weakness.  Who might need to be on a low-potassium diet? -- People usually need to be on a low-potassium diet to treat or prevent hyperkalemia.  The " "most common causes of hyperkalemia are:   Certain medicines - Some medicines, including certain ones for high blood pressure and heart problems, might raise the level of potassium in the body.   Kidney disease - Normally, the kidneys filter blood and remove excess salt and water through urination (figure 1). They keep the level of potassium in the blood normal. When the kidneys don't work well or stop working, they can't get rid of the potassium in the urine. Then, too much potassium builds up in the blood.  Many people who get a treatment called \"dialysis\" for kidney disease need to be on a low-potassium diet. Dialysis is a treatment that takes over the job of the kidneys.  What does eating a low-potassium diet involve? -- Almost all foods have potassium. So the key is to:   Choose foods with low levels of potassium (table 1).   Avoid or eat only small amounts of foods with high levels of potassium (table 2).  Your doctor will probably recommend that you work with a dietitian (food expert) to help plan your meals. They will tell you how much potassium you should eat each day.  To figure out how much potassium you are eating, look at the food's nutrition label (figure 2). You need to look at the:   \"Potassium\" number - This tells you how much potassium is in 1 serving of the food. If you eat 1 serving, then you are eating this amount of potassium.   \"Serving size\" - This tells you how big a serving is. If you eat 2 servings, then you are eating 2 times the amount of potassium listed.  What are other ways to cut down on potassium? -- Here are some other ways to cut down on potassium:   Drain the liquid from canned fruits, vegetables, or meats before eating.   If you eat foods that have a lot of potassium, eat only small portions.   Reduce the amount of potassium in the vegetables you eat. You can do this for both frozen and raw vegetables. (If the vegetables are raw, peel and cut them up first.) To reduce the amount " of potassium, soak the vegetables in warm unsalted water for at least 2 hours. Then, drain the water and rinse the vegetables in warm water. If you cook the vegetables, cook them in unsalted water.  All topics are updated as new evidence becomes available and our peer review process is complete.  This topic retrieved from [employer] on: Feb 26, 2024.  Topic 53874 Version 8.0  Release: 32.2.4 - C32.56  © 2024 UpToDate, Inc. and/or its affiliates. All rights reserved.  figure 1: Anatomy of the urinary tract     Urine is made by the kidneys. It passes from the kidneys into the bladder through 2 tubes called the ureters. Then, it leaves the bladder through another tube called the urethra.  Graphic 56804 Version 8.0  table 1: Some foods that are low in potassium  Fruits  Vegetables  Proteins    Apple juice  Apples and applesauce  Blackberries  Blueberries  Cherries  Cranberries  Grapefruit  Grapes and grape juice  Peaches  Pears  Pineapple  Plums  Raspberries  Strawberries  Watermelon Alfalfa sprouts  Asparagus  Cabbage (cooked)  Carrots (cooked)  Cauliflower  Celery  Corn  Cucumber  Eggplant  Green beans  Green peas  Green peppers  Kale  Lettuce  [place]ra  Onions  Radish  Rhubarb  Spinach  Water chestnuts  Wax beans  Yellow squash  Zucchini Almonds  Cashews  Chicken  Eggs  Flax seed  Peanuts  Pumpkin seeds  Shrimp  [place] seeds  Tuna  Turkey  Walnuts   Graphic 00430 Version 3.0  table 2: Some foods that are high in potassium  Fruits  Vegetables  Proteins  Other    Avocado  Bananas  Coconut  Cantaloupe and honeydew melons  Dates  Dried fruits  Figs  Kiwi  [place]  Nectarines  Oranges and orange juice  Prunes and prune juice  Raisins Artichokes  Baked beans  Beets  Broccoli  [place] sprouts  Cabbage (raw)  Carrots (raw)  Chard  Olives  Potatoes (white and sweet)  Pickles  Pumpkin  [place]  Squash (acorn, butternut, esquivel)  Tomatoes and tomato juice Black beans  Clams  Ground beef  Kidney beans  Lobster  Navy beans  [place]  beans  [place]  Sardines  Scallops  Steak  [place] Chocolate  Dairy products  Granola  Milk  Peanut butter  Soups that are salt-free or low-sodium  Soy milk  Sports drinks  Tomato sauce  Wheat bran and bran products  Whole-grain bread  Yogurt   The foods on this list are high in potassium. People who need to follow a low-potassium diet should avoid or limit these foods.  Graphic 41794 Version 3.0  figure 2: Potassium on food labels     To figure out how much potassium you are eating, check the label to find out how much potassium is in 1 serving. If you are having more than 1 serving, multiply the amount of potassium by the number of servings you plan to eat. For instance, this whole can of soup has 2 servings. If you are going to eat the whole can, you should multiply 450 by 2. That means you will be having 900 milligrams (mg) of potassium.  Graphic 66422 Version 5.0  Consumer Information Use and Disclaimer   Disclaimer: This generalized information is a limited summary of diagnosis, treatment, and/or medication information. It is not meant to be comprehensive and should be used as a tool to help the user understand and/or assess potential diagnostic and treatment options. It does NOT include all information about conditions, treatments, medications, side effects, or risks that may apply to a specific patient. It is not intended to be medical advice or a substitute for the medical advice, diagnosis, or treatment of a health care provider based on the health care provider's examination and assessment of a patient's specific and unique circumstances. Patients must speak with a health care provider for complete information about their health, medical questions, and treatment options, including any risks or benefits regarding use of medications. This information does not endorse any treatments or medications as safe, effective, or approved for treating a specific patient. UpToDate, Inc. and its affiliates disclaim any  warranty or liability relating to this information or the use thereof.The use of this information is governed by the Terms of Use, available at https://www.wolterskluwer.com/en/know/clinical-effectiveness-terms. 2024© UpToDate, Inc. and its affiliates and/or licensors. All rights reserved.  Copyright   © 2024 UpToDate, Inc. and/or its affiliates. All rights reserved.         HISTORY OF PRESENT ILLNESS:  Ana Rosa Parker is a 64 y.o. year old female with medical issues of diabetes mellitus type 2 x 4 yrs , history of kidney stones,  GERD, vertigo, chronic left knee pain who presents for initial consultation for hypomagnesemia  -> Patient was found to have low magnesium of 1.4 on blood work from September 2024.  Last blood work from 10/22/2024 showed magnesium level improved to 1.9    Cousin has kidney transplant    _> Also found to have potassium of 5.4 on blood work from 10/22 and creatinine of 1.27 mg/dL    Baseline creatinine has been around 1.0 to 1.2 mg/dL.    -Patient had acute kidney injury with creatinine elevated to 1.85 on September 5, 2024 when she presented to the ED with complaint of dizziness and fatigue.  She was also found to have hypomagnesemia at the same time with magnesium level 1.4.  COVID test was negative.  She was diagnosed with viral URI with worsening of chronic vertigo.  Was given IV fluid and renal function improved to creatinine 1.6.  Also had magnesium replacement.  Since then on oral magnesium oxide 400 mg twice daily    Patient denies any muscle cramps    Reviewed blood work from 10/22/2024: Renal function improved to creatinine 1.27 and magnesium level improved to 1.9 mg/dL.  Potassium level still elevated at 5 meq/L.  Patient has good urine output, has been drinking electrolyte water    REVIEW OF SYSTEMS:    Review of Systems   Constitutional:  Negative for activity change, appetite change, chills, diaphoresis, fatigue and fever.   HENT:  Negative for congestion, facial swelling and  nosebleeds.    Eyes:  Negative for pain and visual disturbance.   Respiratory:  Negative for cough, chest tightness and shortness of breath.    Cardiovascular:  Negative for chest pain and palpitations.   Gastrointestinal:  Negative for abdominal distention, abdominal pain, diarrhea, nausea and vomiting.   Genitourinary:  Negative for difficulty urinating, dysuria, flank pain, frequency and hematuria.   Musculoskeletal:  Negative for arthralgias, back pain and joint swelling.   Skin:  Negative for rash.   Neurological:  Negative for dizziness, seizures, syncope, weakness and headaches.   Psychiatric/Behavioral:  Negative for agitation and confusion. The patient is not nervous/anxious.        More than 10 point review of systems were obtained and discussed in length with the patient.     PAST MEDICAL HISTORY:  Past Medical History:   Diagnosis Date    Allergic     Anxiety     Arthritis     Asthma     Claustrophobia     CPAP (continuous positive airway pressure) dependence     Depression     Diabetes mellitus (HCC)     Disease of thyroid gland     hypo    GERD (gastroesophageal reflux disease)     Headache     History of COVID-19     Hyperlipemia     Hyperlipidemia     Hypertension     Inflammatory bowel disease     Kidney stone     Kidney stones     Major depressive disorder     Motion sickness     Obesity     Risk for falls     Sleep apnea     TIA (transient ischemic attack)     Use of cane as ambulatory aid     Wears glasses     Wears partial dentures     upper partial       PAST SURGICAL HISTORY:  Past Surgical History:   Procedure Laterality Date    ADENOIDECTOMY      COLONOSCOPY      DILATION AND CURETTAGE OF UTERUS      NV ARTHRS KNE SURG W/MENISCECTOMY MED/LAT W/SHVG Left 3/24/2022    Procedure: ARTHROSCOPY KNEE w/ partial medial menisectomy;  Surgeon: Terry Bhatt MD;  Location: [facility];  Service: Orthopedics    NV COLONOSCOPY FLX DX W/COLLJ SPEC WHEN PFRMD N/A 3/15/2019    Procedure: COLONOSCOPY;   Surgeon: Angel Saavedra MD;  Location: [facility];  Service: Gastroenterology    ROTATOR CUFF REPAIR Right     SHOULDER SURGERY Left     impingement    TONSILLECTOMY      TUBAL LIGATION         ALLERGIES:  Allergies   Allergen Reactions    Darvon [Propoxyphene] Dizziness    Fluoxetine Other (See Comments)     suicidal    Meclizine Dizziness    Morphine And Codeine Nausea Only    Oxycodone-Acetaminophen Other (See Comments) and Tachycardia     The whole house was moving    Percolone [Oxycodone] Other (See Comments) and Tachycardia     The whole house was moving       SOCIAL HISTORY:  Social History     Substance and Sexual Activity   Alcohol Use No     Social History     Substance and Sexual Activity   Drug Use Never     Social History     Tobacco Use   Smoking Status Never    Passive exposure: Never   Smokeless Tobacco Never       FAMILY HISTORY:  Family History   Problem Relation Age of Onset    ALS Mother     Venous thrombosis Father     Diabetes Father     Other Family         cerebral embolism    Diabetes Family     Hypertension Family     Kidney disease Family     Breast cancer Neg Hx     Colon cancer Neg Hx     Ovarian cancer Neg Hx     Uterine cancer Neg Hx        MEDICATIONS:    Current Outpatient Medications:     albuterol (ProAir HFA) 90 mcg/act inhaler, Inhale 2 puffs every 4 (four) hours as needed for wheezing or shortness of breath, Disp: 8.5 g, Rfl: 1    aspirin 81 mg chewable tablet, Chew 81 mg daily, Disp: , Rfl:     busPIRone (BUSPAR) 10 mg tablet, 10 mg in the morning, Disp: , Rfl:     eszopiclone (LUNESTA) 1 mg tablet, Take 2 mg by mouth daily at bedtime Takes 2-3 daily, Disp: , Rfl:     famotidine (PEPCID) 40 MG tablet, Take 1 tablet (40 mg total) by mouth daily, Disp: 90 tablet, Rfl: 1    fluticasone (FLONASE) 50 mcg/act nasal spray, 1 spray into each nostril daily, Disp: 16 g, Rfl: 0    gabapentin (NEURONTIN) 100 mg capsule, TAKE 1 TO 2 CAPSULES BY MOUTH DAILY AT BEDTIME, Disp: , Rfl:      "levothyroxine 88 mcg tablet, Take 1 tablet (88 mcg total) by mouth daily in the early morning, Disp: 90 tablet, Rfl: 1    lisinopril (ZESTRIL) 10 mg tablet, Take 1 tablet (10 mg total) by mouth daily, Disp: 90 tablet, Rfl: 1    loratadine (CLARITIN) 10 mg tablet, Take 1 tablet (10 mg total) by mouth 2 (two) times a day (Patient taking differently: Take 10 mg by mouth daily), Disp: 60 tablet, Rfl: 8    magnesium oxide (MAG-OX) 400 mg tablet, Take 1 tablet (400 mg total) by mouth 2 (two) times a day, Disp: 180 tablet, Rfl: 3    metFORMIN (GLUCOPHAGE) 500 mg tablet, TAKE 2 TABLETS BY MOUTH TWICE A DAY WITH FOOD (Patient taking differently: Take 1,000 mg by mouth daily with breakfast), Disp: 360 tablet, Rfl: 1    QUEtiapine (SEROquel) 200 mg tablet, Take 1 tablet (200 mg total) by mouth daily at bedtime, Disp: 90 tablet, Rfl: 1    QUEtiapine (SEROquel) 25 mg tablet, Take 25 mg by mouth 2 (two) times a day as needed 2-3 at bedtime prn, Disp: , Rfl:     rosuvastatin (CRESTOR) 20 MG tablet, Take 1 tablet (20 mg total) by mouth daily, Disp: 100 tablet, Rfl: 3    sertraline (ZOLOFT) 100 mg tablet, TAKE 2 TABLETS (200 MG TOTAL) BY MOUTH DAILY AT BEDTIME, Disp: 180 tablet, Rfl: 1    traZODone (DESYREL) 50 mg tablet, TAKE 1 TABLET BY MOUTH DAILY AT BEDTIME (Patient taking differently: 100 mg 2 tablets), Disp: 90 tablet, Rfl: 1    venlafaxine (EFFEXOR-XR) 150 mg 24 hr capsule, TAKE 1 CAPSULE BY MOUTH EVERY DAY, Disp: 90 capsule, Rfl: 1    benzonatate (TESSALON PERLES) 100 mg capsule, 1-2 caps every 8 hours as needed for cough (Patient not taking: Reported on 9/17/2024), Disp: 30 capsule, Rfl: 0      PHYSICAL EXAM:  Vitals:    10/24/24 1320 10/24/24 1347   BP:  112/60   Pulse:  74   Weight: 95.7 kg (211 lb)    Height: 5' 5\" (1.651 m)      Body mass index is 35.11 kg/m².  Wt Readings from Last 3 Encounters:   10/24/24 95.7 kg (211 lb)   10/02/24 94.3 kg (208 lb)   09/17/24 93.4 kg (206 lb)     Physical Exam  Constitutional:       " General: She is not in acute distress.     Appearance: Normal appearance. She is well-developed.   HENT:      Head: Normocephalic and atraumatic.      Nose: Nose normal.      Mouth/Throat:      Mouth: Mucous membranes are moist.   Eyes:      General: No scleral icterus.     Conjunctiva/sclera: Conjunctivae normal.      Pupils: Pupils are equal, round, and reactive to light.   Neck:      Thyroid: No thyromegaly.      Vascular: No JVD.   Cardiovascular:      Rate and Rhythm: Normal rate and regular rhythm.      Heart sounds: Normal heart sounds. No murmur heard.     No friction rub.   Pulmonary:      Effort: Pulmonary effort is normal. No respiratory distress.      Breath sounds: Normal breath sounds. No wheezing or rales.   Abdominal:      General: Bowel sounds are normal. There is no distension.      Palpations: Abdomen is soft.      Tenderness: There is no abdominal tenderness.   Musculoskeletal:         General: No deformity.      Cervical back: Neck supple.      Right lower leg: No edema.      Left lower leg: No edema.   Skin:     General: Skin is warm and dry.      Findings: No rash.   Neurological:      Mental Status: She is alert and oriented to person, place, and time.   Psychiatric:         Mood and Affect: Mood normal.         Behavior: Behavior normal.         Thought Content: Thought content normal.           Lab Results:   Results for orders placed or performed in visit on 10/22/24   Comprehensive metabolic panel    Collection Time: 10/22/24  2:14 PM   Result Value Ref Range    Sodium 135 135 - 147 mmol/L    Potassium 5.4 (H) 3.5 - 5.3 mmol/L    Chloride 105 96 - 108 mmol/L    CO2 25 21 - 32 mmol/L    ANION GAP 5 4 - 13 mmol/L    BUN 24 5 - 25 mg/dL    Creatinine 1.27 0.60 - 1.30 mg/dL    Glucose 143 (H) 65 - 140 mg/dL    Calcium 8.8 8.4 - 10.2 mg/dL    AST 14 13 - 39 U/L    ALT 10 7 - 52 U/L    Alkaline Phosphatase 90 34 - 104 U/L    Total Protein 6.6 6.4 - 8.4 g/dL    Albumin 4.1 3.5 - 5.0 g/dL     "Total Bilirubin 0.18 (L) 0.20 - 1.00 mg/dL    eGFR 45 ml/min/1.73sq m   Hemoglobin A1C    Collection Time: 10/22/24  2:14 PM   Result Value Ref Range    Hemoglobin A1C 7.2 (H) Normal 4.0-5.6%; PreDiabetic 5.7-6.4%; Diabetic >=6.5%; Glycemic control for adults with diabetes <7.0% %     mg/dl   Magnesium    Collection Time: 10/22/24  2:14 PM   Result Value Ref Range    Magnesium 1.9 1.9 - 2.7 mg/dL       Results from last 7 days   Lab Units 10/22/24  1414   SODIUM mmol/L 135   POTASSIUM mmol/L 5.4*   CHLORIDE mmol/L 105   CO2 mmol/L 25   BUN mg/dL 24   CREATININE mg/dL 1.27   CALCIUM mg/dL 8.8   MAGNESIUM mg/dL 1.9       Portions of the record may have been created with voice recognition software. Occasional wrong word or \"sound a like\" substitutions may have occurred due to the inherent limitations of voice recognition software. Read the chart carefully and recognize, using context, where substitutions have occurred.    "

## 2024-10-24 ENCOUNTER — CONSULT (OUTPATIENT)
Dept: NEPHROLOGY | Facility: CLINIC | Age: 64
End: 2024-10-24
Payer: COMMERCIAL

## 2024-10-24 VITALS
BODY MASS INDEX: 35.16 KG/M2 | DIASTOLIC BLOOD PRESSURE: 60 MMHG | SYSTOLIC BLOOD PRESSURE: 112 MMHG | WEIGHT: 211 LBS | HEART RATE: 74 BPM | HEIGHT: 65 IN

## 2024-10-24 DIAGNOSIS — E83.42 HYPOMAGNESEMIA: Primary | ICD-10-CM

## 2024-10-24 DIAGNOSIS — E87.5 HYPERKALEMIA: ICD-10-CM

## 2024-10-24 DIAGNOSIS — N18.30 BENIGN HYPERTENSION WITH CKD (CHRONIC KIDNEY DISEASE) STAGE III (HCC): ICD-10-CM

## 2024-10-24 DIAGNOSIS — I12.9 BENIGN HYPERTENSION WITH CKD (CHRONIC KIDNEY DISEASE) STAGE III (HCC): ICD-10-CM

## 2024-10-24 DIAGNOSIS — D64.9 ANEMIA, UNSPECIFIED TYPE: ICD-10-CM

## 2024-10-24 DIAGNOSIS — N18.30 CONTROLLED TYPE 2 DIABETES MELLITUS WITH STAGE 3 CHRONIC KIDNEY DISEASE, WITHOUT LONG-TERM CURRENT USE OF INSULIN (HCC): ICD-10-CM

## 2024-10-24 DIAGNOSIS — N18.31 STAGE 3A CHRONIC KIDNEY DISEASE (HCC): ICD-10-CM

## 2024-10-24 DIAGNOSIS — E11.22 CONTROLLED TYPE 2 DIABETES MELLITUS WITH STAGE 3 CHRONIC KIDNEY DISEASE, WITHOUT LONG-TERM CURRENT USE OF INSULIN (HCC): ICD-10-CM

## 2024-10-24 PROCEDURE — 99244 OFF/OP CNSLTJ NEW/EST MOD 40: CPT | Performed by: INTERNAL MEDICINE

## 2024-10-24 RX ORDER — MAGNESIUM OXIDE 400 MG/1
1 TABLET ORAL 2 TIMES DAILY
Qty: 180 TABLET | Refills: 3 | Status: SHIPPED | OUTPATIENT
Start: 2024-10-24

## 2024-10-24 NOTE — PATIENT INSTRUCTIONS
"-Renal Function is stable   -You have Chronic Kidney Disease Stage 3   -Avoid NSAIDs like Ibuprofen/Motrin, Naproxen/Aleve, Celebrex, meloxicam/Mobic, Diclofenac and other NSAIDs.  -Okay to take Acetaminophen/Tylenol if you do not have any liver problems  -Avoid IV contrast used for CT scan and cardiac catheterization.    -If plan for any study with IV contrast, please let me know so we could hydrate with fluids before and after IV contrast  -Dosage  of certain medications may need to be adjusted depending on Kidney function.     BP stable   -Recommend 2 g sodium diet.    -Recommend daily exercise and weight loss  -Discussed home monitoring of BP and maintaining a log of blood pressure.  -Recommend goal BP less than 130/80. Please inform me if SBP below 110 or above 140's persistently.    Recommend low potassium diet, check blood work in 2 weeks and again in 4 weeks    Follow-up in 5 months with repeat labs before the office visit    Patient Education     Low-potassium diet   The Basics   Written by the doctors and editors at [facility]   What is potassium? -- Potassium is a mineral found in most foods. The body needs potassium to work normally. It keeps the heart beating and helps the nerves and muscles work. But people need only a certain amount of potassium. Too much or too little potassium in the body can cause problems.  Having too much potassium in the blood is called \"hyperkalemia.\" This can cause heart rhythm problems and muscle weakness.  Who might need to be on a low-potassium diet? -- People usually need to be on a low-potassium diet to treat or prevent hyperkalemia.  The most common causes of hyperkalemia are:   Certain medicines - Some medicines, including certain ones for high blood pressure and heart problems, might raise the level of potassium in the body.   Kidney disease - Normally, the kidneys filter blood and remove excess salt and water through urination (figure 1). They keep the level of " "potassium in the blood normal. When the kidneys don't work well or stop working, they can't get rid of the potassium in the urine. Then, too much potassium builds up in the blood.  Many people who get a treatment called \"dialysis\" for kidney disease need to be on a low-potassium diet. Dialysis is a treatment that takes over the job of the kidneys.  What does eating a low-potassium diet involve? -- Almost all foods have potassium. So the key is to:   Choose foods with low levels of potassium (table 1).   Avoid or eat only small amounts of foods with high levels of potassium (table 2).  Your doctor will probably recommend that you work with a dietitian (food expert) to help plan your meals. They will tell you how much potassium you should eat each day.  To figure out how much potassium you are eating, look at the food's nutrition label (figure 2). You need to look at the:   \"Potassium\" number - This tells you how much potassium is in 1 serving of the food. If you eat 1 serving, then you are eating this amount of potassium.   \"Serving size\" - This tells you how big a serving is. If you eat 2 servings, then you are eating 2 times the amount of potassium listed.  What are other ways to cut down on potassium? -- Here are some other ways to cut down on potassium:   Drain the liquid from canned fruits, vegetables, or meats before eating.   If you eat foods that have a lot of potassium, eat only small portions.   Reduce the amount of potassium in the vegetables you eat. You can do this for both frozen and raw vegetables. (If the vegetables are raw, peel and cut them up first.) To reduce the amount of potassium, soak the vegetables in warm unsalted water for at least 2 hours. Then, drain the water and rinse the vegetables in warm water. If you cook the vegetables, cook them in unsalted water.  All topics are updated as new evidence becomes available and our peer review process is complete.  This topic retrieved from UpToDate " on: Feb 26, 2024.  Topic 68650 Version 8.0  Release: 32.2.4 - C32.56  © 2024 UpToDate, Inc. and/or its affiliates. All rights reserved.  figure 1: Anatomy of the urinary tract     Urine is made by the kidneys. It passes from the kidneys into the bladder through 2 tubes called the ureters. Then, it leaves the bladder through another tube called the urethra.  Graphic 37566 Version 8.0  table 1: Some foods that are low in potassium  Fruits  Vegetables  Proteins    Apple juice  Apples and applesauce  Blackberries  Blueberries  Cherries  Cranberries  Grapefruit  Grapes and grape juice  Peaches  Pears  Pineapple  Plums  Raspberries  Strawberries  Watermelon Alfalfa sprouts  Asparagus  Cabbage (cooked)  Carrots (cooked)  Cauliflower  Celery  Corn  Cucumber  Eggplant  Green beans  Green peas  Green peppers  Kale  Lettuce  [place]  Onions  Radish  Rhubarb  Spinach  Water chestnuts  Wax beans  Yellow squash  Zucchini Almonds  Cashews  Chicken  Eggs  Flax seed  Peanuts  Pumpkin seeds  Shrimp  [place] seeds  Tuna  Turkey  Walnuts   Graphic 35678 Version 3.0  table 2: Some foods that are high in potassium  Fruits  Vegetables  Proteins  Other    Avocado  Bananas  Coconut  Cantaloupe and honeydew melons  Dates  Dried fruits  Figs  Kiwi  [place]  Nectarines  Oranges and orange juice  Prunes and prune juice  Raisins Artichokes  Baked beans  Beets  Broccoli  [place] sprouts  Cabbage (raw)  Carrots (raw)  Chard  Olives  Potatoes (white and sweet)  Pickles  Pumpkin  [place]  Squash (acorn, butternut, esquivel)  Tomatoes and tomato juice Black beans  Clams  Ground beef  Kidney beans  Lobster  Navy beans  [place] beans  [place]  Sardines  Scallops  Steak  [place] Chocolate  Dairy products  Granola  Milk  Peanut butter  Soups that are salt-free or low-sodium  Soy milk  Sports drinks  Tomato sauce  Wheat bran and bran products  Whole-grain bread  Yogurt   The foods on this list are high in potassium. People who need to follow a low-potassium  diet should avoid or limit these foods.  Graphic 08818 Version 3.0  figure 2: Potassium on food labels     To figure out how much potassium you are eating, check the label to find out how much potassium is in 1 serving. If you are having more than 1 serving, multiply the amount of potassium by the number of servings you plan to eat. For instance, this whole can of soup has 2 servings. If you are going to eat the whole can, you should multiply 450 by 2. That means you will be having 900 milligrams (mg) of potassium.  Graphic 91848 Version 5.0  Consumer Information Use and Disclaimer   Disclaimer: This generalized information is a limited summary of diagnosis, treatment, and/or medication information. It is not meant to be comprehensive and should be used as a tool to help the user understand and/or assess potential diagnostic and treatment options. It does NOT include all information about conditions, treatments, medications, side effects, or risks that may apply to a specific patient. It is not intended to be medical advice or a substitute for the medical advice, diagnosis, or treatment of a health care provider based on the health care provider's examination and assessment of a patient's specific and unique circumstances. Patients must speak with a health care provider for complete information about their health, medical questions, and treatment options, including any risks or benefits regarding use of medications. This information does not endorse any treatments or medications as safe, effective, or approved for treating a specific patient. UpToDate, Inc. and its affiliates disclaim any warranty or liability relating to this information or the use thereof.The use of this information is governed by the Terms of Use, available at https://www.wolterskluwer.com/en/know/clinical-effectiveness-terms. 2024© UpToDate, Inc. and its affiliates and/or licensors. All rights reserved.  Copyright   © 2024 UpToDate, Inc. and/or  its affiliates. All rights reserved.

## 2024-10-29 NOTE — ED PROVIDER NOTES
"History  Chief Complaint   Patient presents with    Weakness - Generalized     Patient arrives with complaints of generalized weakness and low electrolytes. Patient states was admitted to the hospital and magnesium was repleted but was still \"unable to walk\"      HPI  64-year-old female recently seen in the emergency department presented with complaint of generalized weakness.  Patient states her magnesium was low and she was in renal failure was given fluid and magnesium was told to follow-up.  Return back to the ED stating that she has been sick for past few weeks ago with sinus infection was taking antibiotics however feels that she has not recovered fully.  Is any chest pain shortness of breath abdominal pain nausea vomiting or diarrhea states just feels weak.  Stable awake alert nontoxic-appearing.  Prior to Admission Medications   Prescriptions Last Dose Informant Patient Reported? Taking?   QUEtiapine (SEROquel) 200 mg tablet  Self No No   Sig: Take 1 tablet (200 mg total) by mouth daily at bedtime   QUEtiapine (SEROquel) 25 mg tablet   Yes No   Sig: Take 25 mg by mouth 2 (two) times a day as needed 2-3 at bedtime prn   albuterol (ProAir HFA) 90 mcg/act inhaler   No No   Sig: Inhale 2 puffs every 4 (four) hours as needed for wheezing or shortness of breath   aspirin 81 mg chewable tablet   Yes No   Sig: Chew 81 mg daily   benzonatate (TESSALON PERLES) 100 mg capsule   No No   Si-2 caps every 8 hours as needed for cough   busPIRone (BUSPAR) 10 mg tablet   Yes No   Sig: 10 mg in the morning   dulaglutide (Trulicity) 4.5 MG/0.5ML injection   No No   Sig: Inject 0.5 mL (4.5 mg total) under the skin every 7 days   eszopiclone (LUNESTA) 1 mg tablet   Yes No   Sig: Take 2 mg by mouth daily at bedtime Takes 2-3 daily   fluticasone (FLONASE) 50 mcg/act nasal spray   No No   Si spray into each nostril daily   gabapentin (NEURONTIN) 100 mg capsule   Yes No   Sig: TAKE 1 TO 2 CAPSULES BY MOUTH DAILY AT BEDTIME "   levothyroxine 88 mcg tablet   No No   Sig: Take 1 tablet (88 mcg total) by mouth daily in the early morning   lisinopril (ZESTRIL) 10 mg tablet   No No   Sig: Take 1 tablet (10 mg total) by mouth daily   loratadine (CLARITIN) 10 mg tablet   No No   Sig: Take 1 tablet (10 mg total) by mouth 2 (two) times a day   meclizine (ANTIVERT) 25 mg tablet   No No   Sig: Take 1 tablet (25 mg total) by mouth 3 (three) times a day as needed for dizziness   metFORMIN (GLUCOPHAGE) 500 mg tablet   No No   Sig: TAKE 2 TABLETS BY MOUTH TWICE A DAY WITH FOOD   naproxen (Naprosyn) 500 mg tablet   No No   Sig: Take 1 tablet (500 mg total) by mouth 2 (two) times a day with meals for 7 days   omeprazole (PriLOSEC) 20 mg delayed release capsule   No No   Sig: Take 1 capsule (20 mg total) by mouth daily before breakfast   polyethylene glycol (Golytely) 4000 mL solution   No No   Sig: Take 4,000 mL by mouth once for 1 dose Take 4000 mL by mouth once for 1 dose. Use as directed   Patient not taking: Reported on 5/29/2024   rosuvastatin (CRESTOR) 20 MG tablet   No No   Sig: Take 1 tablet (20 mg total) by mouth daily   sertraline (ZOLOFT) 100 mg tablet   No No   Sig: TAKE 2 TABLETS (200 MG TOTAL) BY MOUTH DAILY AT BEDTIME   traZODone (DESYREL) 50 mg tablet   No No   Sig: TAKE 1 TABLET BY MOUTH DAILY AT BEDTIME   venlafaxine (EFFEXOR-XR) 150 mg 24 hr capsule   No No   Sig: TAKE 1 CAPSULE BY MOUTH EVERY DAY      Facility-Administered Medications: None       Past Medical History:   Diagnosis Date    Allergic     Anxiety     Arthritis     Asthma     Claustrophobia     CPAP (continuous positive airway pressure) dependence     Depression     Diabetes mellitus (HCC)     Disease of thyroid gland     hypo    GERD (gastroesophageal reflux disease)     Headache     History of COVID-19     Hyperlipemia     Hyperlipidemia     Hypertension     Inflammatory bowel disease     Kidney stone     Kidney stones     Major depressive disorder     Motion sickness      Obesity     Risk for falls     Sleep apnea     TIA (transient ischemic attack)     Use of cane as ambulatory aid     Wears glasses     Wears partial dentures     upper partial       Past Surgical History:   Procedure Laterality Date    ADENOIDECTOMY      COLONOSCOPY      DILATION AND CURETTAGE OF UTERUS      VA ARTHRS KNE SURG W/MENISCECTOMY MED/LAT W/SHVG Left 3/24/2022    Procedure: ARTHROSCOPY KNEE w/ partial medial menisectomy;  Surgeon: Terry Bhatt MD;  Location: [facility];  Service: Orthopedics    VA COLONOSCOPY FLX DX W/COLLJ SPEC WHEN PFRMD N/A 3/15/2019    Procedure: COLONOSCOPY;  Surgeon: Angel Saavedra MD;  Location: [facility];  Service: Gastroenterology    ROTATOR CUFF REPAIR Right     SHOULDER SURGERY Left     impingement    TONSILLECTOMY      TUBAL LIGATION         Family History   Problem Relation Age of Onset    ALS Mother     Venous thrombosis Father     Diabetes Father     Other Family         cerebral embolism    Diabetes Family     Hypertension Family     Kidney disease Family     Breast cancer Neg Hx     Colon cancer Neg Hx     Ovarian cancer Neg Hx     Uterine cancer Neg Hx      I have reviewed and agree with the history as documented.    E-Cigarette/Vaping    E-Cigarette Use Never User      E-Cigarette/Vaping Substances    Nicotine No     THC No     CBD No     Flavoring No     Other No     Unknown No      Social History     Tobacco Use    Smoking status: Never     Passive exposure: Never    Smokeless tobacco: Never   Vaping Use    Vaping status: Never Used   Substance Use Topics    Alcohol use: No    Drug use: Never       Review of Systems   Constitutional:  Negative for chills and fever.   HENT:  Negative for rhinorrhea and sore throat.    Eyes:  Negative for visual disturbance.   Respiratory:  Negative for cough and shortness of breath.    Cardiovascular:  Negative for chest pain and leg swelling.   Gastrointestinal:  Negative for abdominal pain, diarrhea, nausea and vomiting.    Genitourinary:  Negative for dysuria.   Musculoskeletal:  Negative for back pain and myalgias.   Skin:  Negative for rash.   Neurological:  Positive for weakness. Negative for dizziness and headaches.   Psychiatric/Behavioral:  Negative for confusion.    All other systems reviewed and are negative.      Physical Exam  Physical Exam  Vitals and nursing note reviewed.   Constitutional:       Appearance: She is well-developed.   HENT:      Head: Normocephalic and atraumatic.      Right Ear: External ear normal.      Left Ear: External ear normal.      Nose: Nose normal.      Mouth/Throat:      Mouth: Mucous membranes are moist.      Pharynx: No oropharyngeal exudate.   Eyes:      General: No scleral icterus.        Right eye: No discharge.         Left eye: No discharge.      Conjunctiva/sclera: Conjunctivae normal.      Pupils: Pupils are equal, round, and reactive to light.   Cardiovascular:      Rate and Rhythm: Normal rate and regular rhythm.      Pulses: Normal pulses.      Heart sounds: Normal heart sounds.   Pulmonary:      Effort: Pulmonary effort is normal. No respiratory distress.      Breath sounds: Normal breath sounds. No wheezing.   Abdominal:      General: Bowel sounds are normal.      Palpations: Abdomen is soft.   Musculoskeletal:         General: Normal range of motion.      Cervical back: Normal range of motion and neck supple.   Lymphadenopathy:      Cervical: No cervical adenopathy.   Skin:     General: Skin is warm and dry.      Capillary Refill: Capillary refill takes less than 2 seconds.   Neurological:      General: No focal deficit present.      Mental Status: She is alert and oriented to person, place, and time.   Psychiatric:         Mood and Affect: Mood normal.         Behavior: Behavior normal.         Vital Signs  ED Triage Vitals [09/09/24 1103]   Temperature Pulse Respirations Blood Pressure SpO2   (!) 97 °F (36.1 °C) 89 18 123/58 96 %      Temp Source Heart Rate Source Patient  Position - Orthostatic VS BP Location FiO2 (%)   Temporal Monitor -- -- --      Pain Score       --           Vitals:    09/09/24 1300 09/09/24 1330 09/09/24 1400 09/09/24 1430   BP: 116/57 120/58 113/58 111/56   Pulse: 81 81 77 77         Visual Acuity      ED Medications  Medications   sodium chloride 0.9 % bolus 1,000 mL (0 mL Intravenous Stopped 9/9/24 1333)   magnesium sulfate 2 g/50 mL IVPB (premix) 2 g (0 g Intravenous Stopped 9/9/24 1432)       Diagnostic Studies  Results Reviewed       Procedure Component Value Units Date/Time    Basic metabolic panel [[id number]]  (Abnormal) Collected: 09/09/24 1121    Lab Status: Final result Specimen: Blood from Arm, Left Updated: 09/09/24 1225     Sodium 135 mmol/L      Potassium 5.1 mmol/L      Chloride 108 mmol/L      CO2 19 mmol/L      ANION GAP 8 mmol/L      BUN 32 mg/dL      Creatinine 1.45 mg/dL      Glucose 161 mg/dL      Calcium 9.0 mg/dL      eGFR 38 ml/min/1.73sq m     Narrative:      National Kidney Disease Foundation guidelines for Chronic Kidney Disease (CKD):     Stage 1 with normal or high GFR (GFR > 90 mL/min/1.73 square meters)    Stage 2 Mild CKD (GFR = 60-89 mL/min/1.73 square meters)    Stage 3A Moderate CKD (GFR = 45-59 mL/min/1.73 square meters)    Stage 3B Moderate CKD (GFR = 30-44 mL/min/1.73 square meters)    Stage 4 Severe CKD (GFR = 15-29 mL/min/1.73 square meters)    Stage 5 End Stage CKD (GFR <15 mL/min/1.73 square meters)  Note: GFR calculation is accurate only with a steady state creatinine    Magnesium [[id number]]  (Abnormal) Collected: 09/09/24 1121    Lab Status: Final result Specimen: Blood from Arm, Left Updated: 09/09/24 1225     Magnesium 1.4 mg/dL     FLU/RSV/COVID - if FLU/RSV clinically relevant [[id number]]  (Normal) Collected: 09/09/24 1122    Lab Status: Final result Specimen: Nares from Nose Updated: 09/09/24 1207     SARS-CoV-2 Negative     INFLUENZA A PCR Negative     INFLUENZA B PCR Negative     RSV PCR Negative     Narrative:      This test has been performed using the CoV-2/Flu/RSV plus assay on the [employer] GeneXpert platform. This test has been validated by the  and verified by the performing laboratory.     This test is designed to amplify and detect the following: nucleocapsid (N), envelope (E), and RNA-dependent RNA polymerase (RdRP) genes of the SARS-CoV-2 genome; matrix (M), basic polymerase (PB2), and acidic protein (PA) segments of the influenza A genome; matrix (M) and non-structural protein (NS) segments of the influenza B genome, and the nucleocapsid genes of RSV A and RSV B.     Positive results are indicative of the presence of Flu A, Flu B, RSV, and/or SARS-CoV-2 RNA. Positive results for SARS-CoV-2 or suspected novel influenza should be reported to state, local, or federal health departments according to local reporting requirements.      All results should be assessed in conjunction with clinical presentation and other laboratory markers for clinical management.     FOR PEDIATRIC PATIENTS - copy/paste COVID Guidelines URL to browser: [email or web address]       CBC and differential [[id number]]  (Abnormal) Collected: 09/09/24 1121    Lab Status: Final result Specimen: Blood from Arm, Left Updated: 09/09/24 1130     WBC 11.97 Thousand/uL      RBC 3.84 Million/uL      Hemoglobin 11.2 g/dL      Hematocrit 36.0 %      MCV 94 fL      MCH 29.2 pg      MCHC 31.1 g/dL      RDW 13.2 %      MPV 9.5 fL      Platelets 215 Thousands/uL      nRBC 0 /100 WBCs      Segmented % 66 %      Immature Grans % 1 %      Lymphocytes % 23 %      Monocytes % 7 %      Eosinophils Relative 3 %      Basophils Relative 0 %      Absolute Neutrophils 7.81 Thousands/µL      Absolute Immature Grans 0.15 Thousand/uL      Absolute Lymphocytes 2.78 Thousands/µL      Absolute Monocytes 0.84 Thousand/µL      Eosinophils Absolute 0.35 Thousand/µL      Basophils Absolute 0.04 Thousands/µL      UA w Reflex to Microscopic w Reflex to Culture [[id number]]     Lab Status: No result Specimen: Urine                    No orders to display              Procedures  Procedures         ED Course                                 SBIRT 20yo+      Flowsheet Row Most Recent Value   Initial Alcohol Screen: US AUDIT-C     1. How often do you have a drink containing alcohol? 0 Filed at: 09/09/2024 1104   2. How many drinks containing alcohol do you have on a typical day you are drinking?  0 Filed at: 09/09/2024 1104   3a. Male UNDER 65: How often do you have five or more drinks on one occasion? 0 Filed at: 09/09/2024 1104   3b. FEMALE Any Age, or MALE 65+: How often do you have 4 or more drinks on one occassion? 0 Filed at: 09/09/2024 1104   Audit-C Score 0 Filed at: 09/09/2024 1104   CARMELINA: How many times in the past year have you...    Used an illegal drug or used a prescription medication for non-medical reasons? Never Filed at: 09/09/2024 1104                      Medical Decision Making  64-year-old female recently seen in the emergency department presented with complaint of generalized weakness.  Patient states her magnesium was low and she was in renal failure was given fluid and magnesium was told to follow-up.  Return back to the ED stating that she has been sick for past few weeks ago with sinus infection was taking antibiotics however feels that she has not recovered fully.  Is any chest pain shortness of breath abdominal pain nausea vomiting or diarrhea states just feels weak.  Stable awake alert nontoxic-appearing.  History is taken from patient  Differential diagnoses include electrolyte abnormality/Lyme/viral syndrome/renal failure/arrhythmia  Plan will obtain labs CBC BMP UA magnesium  Lab reviewed which shows low magnesium 1.4 will start patient magnesium infusion discussed with patient recommending to follow-up with the nephrology referral is provided.  Patient is also requesting to get tested for mold  and other allergies patient is given referral for allergy specialist.  States feeling better after fluid resuscitation and magnesium required also requesting for magnesium prescription.  Patient verbalized understanding plan  Disposition discharge home with strict precaution to return to the ED if notice any worsening symptoms patient verbalized understand the plan discharge home.    Amount and/or Complexity of Data Reviewed  Labs: ordered.    Risk  OTC drugs.  Prescription drug management.                 Disposition  Final diagnoses:   Weakness   Hypomagnesemia     Time reflects when diagnosis was documented in both MDM as applicable and the Disposition within this note       Time User Action Codes Description Comment    9/9/2024  2:19 PM Alfredo Santos Add [R53.1] Weakness     9/9/2024  2:20 PM Alfredo Santos Add [E83.42] Hypomagnesemia           ED Disposition       ED Disposition   Discharge    Condition   Stable    Date/Time   Mon Sep 9, 2024 1419    Comment   Ana Rosa Parker discharge to home/self care.                   Follow-up Information       Follow up With Specialties Details Why Contact Info    Faheem Ellis,  Family Medicine Call today  [address] [place] [address]  [phone]              Patient's Medications   Discharge Prescriptions    MAGNESIUM 400 MG CAPS    Take 1 capsule (400 mg total) by mouth in the morning for 20 days       Start Date: 9/9/2024  End Date: 9/29/2024       Order Dose: 400 mg       Quantity: 20 capsule    Refills: 0           PDMP Review         Value Time User    PDMP Reviewed  Yes 3/24/2022  1:30 PM Min Paz PA-C            ED Provider  Electronically Signed by             Alfredo Santos MD  09/09/24 2633     Yes

## 2024-10-30 ENCOUNTER — APPOINTMENT (OUTPATIENT)
Dept: RADIOLOGY | Facility: OTHER | Age: 64
End: 2024-10-30
Payer: COMMERCIAL

## 2024-10-30 ENCOUNTER — OFFICE VISIT (OUTPATIENT)
Dept: OBGYN CLINIC | Facility: OTHER | Age: 64
End: 2024-10-30
Payer: COMMERCIAL

## 2024-10-30 VITALS
DIASTOLIC BLOOD PRESSURE: 73 MMHG | SYSTOLIC BLOOD PRESSURE: 114 MMHG | WEIGHT: 211 LBS | HEART RATE: 81 BPM | HEIGHT: 65 IN | BODY MASS INDEX: 35.16 KG/M2

## 2024-10-30 DIAGNOSIS — M25.562 LEFT KNEE PAIN, UNSPECIFIED CHRONICITY: Primary | ICD-10-CM

## 2024-10-30 DIAGNOSIS — Z01.89 ENCOUNTER FOR LOWER EXTREMITY COMPARISON IMAGING STUDY: ICD-10-CM

## 2024-10-30 DIAGNOSIS — M25.562 LEFT KNEE PAIN, UNSPECIFIED CHRONICITY: ICD-10-CM

## 2024-10-30 PROCEDURE — 20610 DRAIN/INJ JOINT/BURSA W/O US: CPT | Performed by: ORTHOPAEDIC SURGERY

## 2024-10-30 PROCEDURE — 73564 X-RAY EXAM KNEE 4 OR MORE: CPT

## 2024-10-30 PROCEDURE — 73562 X-RAY EXAM OF KNEE 3: CPT

## 2024-10-30 PROCEDURE — 99214 OFFICE O/P EST MOD 30 MIN: CPT | Performed by: ORTHOPAEDIC SURGERY

## 2024-10-30 RX ORDER — METHYLPREDNISOLONE ACETATE 40 MG/ML
1 INJECTION, SUSPENSION INTRA-ARTICULAR; INTRALESIONAL; INTRAMUSCULAR; SOFT TISSUE
Status: COMPLETED | OUTPATIENT
Start: 2024-10-30 | End: 2024-10-30

## 2024-10-30 RX ORDER — BUPIVACAINE HYDROCHLORIDE 2.5 MG/ML
4 INJECTION, SOLUTION INFILTRATION; PERINEURAL
Status: COMPLETED | OUTPATIENT
Start: 2024-10-30 | End: 2024-10-30

## 2024-10-30 RX ADMIN — METHYLPREDNISOLONE ACETATE 1 ML: 40 INJECTION, SUSPENSION INTRA-ARTICULAR; INTRALESIONAL; INTRAMUSCULAR; SOFT TISSUE at 14:00

## 2024-10-30 RX ADMIN — BUPIVACAINE HYDROCHLORIDE 4 ML: 2.5 INJECTION, SOLUTION INFILTRATION; PERINEURAL at 14:00

## 2024-10-30 NOTE — PROGRESS NOTES
"Orthopaedic Surgery - Office Note  Ana Rosa Parker (64 y.o. female)   : 1960   MRN: [id number]  Encounter Date: 10/30/2024    Assessment / Plan  Left knee pain, concern for mensicus tear vs early arthritic changes    CSI of left knee joint was performed  Referral given to begin PT for hip, thigh and core strengthening   If she fails to improve, we can order an MRI for further evaluation of the mensicus   Activity as tolerated  Ice, heat and anti-inflammatories prn   Ordered and reviewed XR during the visit   Follow-up:  Return in about 6 weeks (around 2024) for follow up with Dr. Bhatt.      Chief Complaint / Date of Onset  Left knee pain   Injury Mechanism / Date  Fell in mid-October   Surgery / Date  S/p L knee arthroscopy with partial medial meniscectomy (DOS: 3/24/22) done by myself     History of Present Illness   Ana Rosa Parker is a 64 y.o. female who presents for left knee pain. She states she has fallen a few times recently, she does have some vestibular issues due to low magnesium. She did get some swelling after the fall. She states her knee has bothered her since the fall. She describes her pain as being medial and worsening with WB activity and going up and down steps. She does use tylenol prn for pain. She attempted to wear compression but this did not help. She denies any mechanical symptoms.    Treatment Summary  Medications / Modalities  Tylenol prn   Bracing / Immobilization  None  Physical Therapy  None  Injections  None  Prior Surgeries  Listed above  Other Treatments  None    Employment / Current Status  N/A    Sport / Organization / Current Status  N/A      Review of Systems  Pertinent items are noted in HPI.  All other systems were reviewed and are negative.      Physical Exam  /73   Pulse 81   Ht 5' 5\" (1.651 m)   Wt 95.7 kg (211 lb)   LMP  (LMP Unknown)   BMI 35.11 kg/m²   Cons: Appears well.  No apparent distress.  Psych: Alert. Oriented x3.  Mood and affect " "normal.  Eyes: PERRLA, EOMI  Resp: Normal effort.  No audible wheezing or stridor.  CV: Palpable pulse.  No discernable arrhythmia.  No LE edema.  Lymph:  No palpable cervical, axillary, or inguinal lymphadenopathy.  Skin: Warm.  No palpable masses.  No visible lesions.  Neuro: Normal muscle tone.  Normal and symmetric DTR's.     Left Knee Exam  Alignment:  Normal knee alignment.  Inspection:  No swelling. No ecchymosis.  Palpation:   moderate medial joint ine tenderness. No effusion.  ROM:  Knee Extension 0. Knee Flexion 135.  Strength:  Able to SLR without lag.  Stability:  No objective knee instability. Stable Varus / Valgus stress, Lachman, and Posterior drawer.  Tests:  (-) Millicent.  Patella:  Normal patellar mobility.  Neurovascular:  Sensation intact in DP/SP/Burns/Sa/T nerve distributions.  2+ DP & PT pulses.  Gait:  Normal.       Studies Reviewed  I have personally reviewed pertinent films in PACS.  XR of left knee- images from 10/30/2024 showing no abnormalities or fractures, very early narrowing of the joint space    Large joint arthrocentesis: L knee  Universal Protocol:  Consent given by: patient  Time out: Immediately prior to procedure a \"time out\" was called to verify the correct patient, procedure, equipment, support staff and site/side marked as required.  Site marked: the operative site was marked  Supporting Documentation  Indications: pain and diagnostic evaluation   Procedure Details  Location: knee - L knee  Preparation: Patient was prepped and draped in the usual sterile fashion  Needle size: 22 G  Ultrasound guidance: no  Approach: lateral  Medications administered: 4 mL bupivacaine 0.25 %; 1 mL methylPREDNISolone acetate 40 mg/mL    Patient tolerance: patient tolerated the procedure well with no immediate complications  Dressing:  Sterile dressing applied          Medical, Surgical, Family, and Social History  The patient's medical history, family history, and social history, were reviewed " and updated as appropriate.    Past Medical History:   Diagnosis Date    Allergic     Anxiety     Arthritis     Asthma     Claustrophobia     CPAP (continuous positive airway pressure) dependence     Depression     Diabetes mellitus (HCC)     Disease of thyroid gland     hypo    GERD (gastroesophageal reflux disease)     Headache     History of COVID-19     Hyperlipemia     Hyperlipidemia     Hypertension     Inflammatory bowel disease     Kidney stone     Kidney stones     Major depressive disorder     Motion sickness     Obesity     Risk for falls     Sleep apnea     TIA (transient ischemic attack)     Use of cane as ambulatory aid     Wears glasses     Wears partial dentures     upper partial       Past Surgical History:   Procedure Laterality Date    ADENOIDECTOMY      COLONOSCOPY      DILATION AND CURETTAGE OF UTERUS      TX ARTHRS KNE SURG W/MENISCECTOMY MED/LAT W/SHVG Left 3/24/2022    Procedure: ARTHROSCOPY KNEE w/ partial medial menisectomy;  Surgeon: Terry Bhatt MD;  Location: [facility] OR;  Service: Orthopedics    TX COLONOSCOPY FLX DX W/COLLJ SPEC WHEN PFRMD N/A 3/15/2019    Procedure: COLONOSCOPY;  Surgeon: Angel Saavedra MD;  Location: [facility] GI LAB;  Service: Gastroenterology    ROTATOR CUFF REPAIR Right     SHOULDER SURGERY Left     impingement    TONSILLECTOMY      TUBAL LIGATION         Family History   Problem Relation Age of Onset    ALS Mother     Venous thrombosis Father     Diabetes Father     Other Family         cerebral embolism    Diabetes Family     Hypertension Family     Kidney disease Family     Breast cancer Neg Hx     Colon cancer Neg Hx     Ovarian cancer Neg Hx     Uterine cancer Neg Hx        Social History     Occupational History    Not on file   Tobacco Use    Smoking status: Never     Passive exposure: Never    Smokeless tobacco: Never   Vaping Use    Vaping status: Never Used   Substance and Sexual Activity    Alcohol use: No    Drug use: Never    Sexual activity: Yes      Partners: Male     Birth control/protection: Post-menopausal       Allergies   Allergen Reactions    Darvon [Propoxyphene] Dizziness    Fluoxetine Other (See Comments)     suicidal    Meclizine Dizziness    Morphine And Codeine Nausea Only    Oxycodone-Acetaminophen Other (See Comments) and Tachycardia     The whole house was moving    Percolone [Oxycodone] Other (See Comments) and Tachycardia     The whole house was moving         Current Outpatient Medications:     albuterol (ProAir HFA) 90 mcg/act inhaler, Inhale 2 puffs every 4 (four) hours as needed for wheezing or shortness of breath, Disp: 8.5 g, Rfl: 1    aspirin 81 mg chewable tablet, Chew 81 mg daily, Disp: , Rfl:     busPIRone (BUSPAR) 10 mg tablet, 10 mg in the morning, Disp: , Rfl:     eszopiclone (LUNESTA) 1 mg tablet, Take 2 mg by mouth daily at bedtime Takes 2-3 daily, Disp: , Rfl:     famotidine (PEPCID) 40 MG tablet, Take 1 tablet (40 mg total) by mouth daily, Disp: 90 tablet, Rfl: 1    fluticasone (FLONASE) 50 mcg/act nasal spray, 1 spray into each nostril daily, Disp: 16 g, Rfl: 0    gabapentin (NEURONTIN) 100 mg capsule, TAKE 1 TO 2 CAPSULES BY MOUTH DAILY AT BEDTIME, Disp: , Rfl:     levothyroxine 88 mcg tablet, Take 1 tablet (88 mcg total) by mouth daily in the early morning, Disp: 90 tablet, Rfl: 1    lisinopril (ZESTRIL) 10 mg tablet, Take 1 tablet (10 mg total) by mouth daily, Disp: 90 tablet, Rfl: 1    loratadine (CLARITIN) 10 mg tablet, Take 1 tablet (10 mg total) by mouth 2 (two) times a day (Patient taking differently: Take 10 mg by mouth daily), Disp: 60 tablet, Rfl: 8    magnesium oxide (MAG-OX) 400 mg tablet, Take 1 tablet (400 mg total) by mouth 2 (two) times a day, Disp: 180 tablet, Rfl: 3    metFORMIN (GLUCOPHAGE) 500 mg tablet, TAKE 2 TABLETS BY MOUTH TWICE A DAY WITH FOOD (Patient taking differently: Take 1,000 mg by mouth daily with breakfast), Disp: 360 tablet, Rfl: 1    QUEtiapine (SEROquel) 200 mg tablet, Take 1 tablet  (200 mg total) by mouth daily at bedtime, Disp: 90 tablet, Rfl: 1    QUEtiapine (SEROquel) 25 mg tablet, Take 25 mg by mouth 2 (two) times a day as needed 2-3 at bedtime prn, Disp: , Rfl:     rosuvastatin (CRESTOR) 20 MG tablet, Take 1 tablet (20 mg total) by mouth daily, Disp: 100 tablet, Rfl: 3    sertraline (ZOLOFT) 100 mg tablet, TAKE 2 TABLETS (200 MG TOTAL) BY MOUTH DAILY AT BEDTIME, Disp: 180 tablet, Rfl: 1    traZODone (DESYREL) 50 mg tablet, TAKE 1 TABLET BY MOUTH DAILY AT BEDTIME (Patient taking differently: 100 mg 2 tablets), Disp: 90 tablet, Rfl: 1    venlafaxine (EFFEXOR-XR) 150 mg 24 hr capsule, TAKE 1 CAPSULE BY MOUTH EVERY DAY, Disp: 90 capsule, Rfl: 1    benzonatate (TESSALON PERLES) 100 mg capsule, 1-2 caps every 8 hours as needed for cough (Patient not taking: Reported on 9/17/2024), Disp: 30 capsule, Rfl: 0      Merlene Escalona    Scribe Attestation      I,:  Merlene Escalona am acting as a scribe while in the presence of the attending physician.:       I,:  Terry Bhatt MD personally performed the services described in this documentation    as scribed in my presence.:

## 2024-11-06 ENCOUNTER — APPOINTMENT (OUTPATIENT)
Dept: LAB | Age: 64
End: 2024-11-06
Payer: COMMERCIAL

## 2024-11-06 DIAGNOSIS — E87.5 HYPERKALEMIA: ICD-10-CM

## 2024-11-06 DIAGNOSIS — E83.42 HYPOMAGNESEMIA: ICD-10-CM

## 2024-11-06 DIAGNOSIS — N18.31 STAGE 3A CHRONIC KIDNEY DISEASE (HCC): ICD-10-CM

## 2024-11-06 LAB
ANION GAP SERPL CALCULATED.3IONS-SCNC: 8 MMOL/L (ref 4–13)
BUN SERPL-MCNC: 35 MG/DL (ref 5–25)
CALCIUM SERPL-MCNC: 8.8 MG/DL (ref 8.4–10.2)
CHLORIDE SERPL-SCNC: 105 MMOL/L (ref 96–108)
CO2 SERPL-SCNC: 22 MMOL/L (ref 21–32)
CREAT SERPL-MCNC: 1.53 MG/DL (ref 0.6–1.3)
GFR SERPL CREATININE-BSD FRML MDRD: 36 ML/MIN/1.73SQ M
GLUCOSE P FAST SERPL-MCNC: 239 MG/DL (ref 65–99)
MAGNESIUM SERPL-MCNC: 2 MG/DL (ref 1.9–2.7)
POTASSIUM SERPL-SCNC: 5.1 MMOL/L (ref 3.5–5.3)
SODIUM SERPL-SCNC: 135 MMOL/L (ref 135–147)

## 2024-11-06 PROCEDURE — 36415 COLL VENOUS BLD VENIPUNCTURE: CPT

## 2024-11-06 PROCEDURE — 80048 BASIC METABOLIC PNL TOTAL CA: CPT

## 2024-11-06 PROCEDURE — 83735 ASSAY OF MAGNESIUM: CPT

## 2024-11-07 ENCOUNTER — TELEPHONE (OUTPATIENT)
Dept: NEPHROLOGY | Facility: CLINIC | Age: 64
End: 2024-11-07

## 2024-11-07 DIAGNOSIS — E83.42 HYPOMAGNESEMIA: ICD-10-CM

## 2024-11-07 DIAGNOSIS — E87.5 HYPERKALEMIA: ICD-10-CM

## 2024-11-07 DIAGNOSIS — N18.31 STAGE 3A CHRONIC KIDNEY DISEASE (HCC): Primary | ICD-10-CM

## 2024-11-07 NOTE — TELEPHONE ENCOUNTER
Not able to reach patient, left message  Reviewed blood work from November 6, 2024, potassium level improved to normal range at 5.1 but creatinine trended up to 1.5 from previous level of 1.27.  Magnesium level improved to normal range at 2.0.  Continue magnesium oxide 4 mg twice daily.  Okay to continue lisinopril for now  as potassium level improved with low potassium diet and stopping electrolyte water.  Stressed on oral hydration roughly 60 ounces per day as creatinine has trended up but also possible that this is the new baseline.  Repeat BMP and magnesium level in 2 weeks

## 2024-11-08 NOTE — TELEPHONE ENCOUNTER
Bryce Oseguera in detail with  message and advised she call back if she has any questions at this time.       Not able to reach patient, left message  Reviewed blood work from November 6, 2024, potassium level improved to normal range at 5.1 but creatinine trended up to 1.5 from previous level of 1.27.  Magnesium level improved to normal range at 2.0.  Continue magnesium oxide 4 mg twice daily.  Okay to continue lisinopril for now  as potassium level improved with low potassium diet and stopping electrolyte water.  Stressed on oral hydration roughly 60 ounces per day as creatinine has trended up but also possible that this is the new baseline.  Repeat BMP and magnesium level in 2 weeks

## 2024-11-11 NOTE — TELEPHONE ENCOUNTER
I spoke to Ana Rosa she advised she got our messages and understood everything, She did not have any questions at this time.

## 2024-11-13 ENCOUNTER — OFFICE VISIT (OUTPATIENT)
Age: 64
End: 2024-11-13
Payer: COMMERCIAL

## 2024-11-13 VITALS
WEIGHT: 206.8 LBS | HEIGHT: 65 IN | DIASTOLIC BLOOD PRESSURE: 62 MMHG | SYSTOLIC BLOOD PRESSURE: 110 MMHG | OXYGEN SATURATION: 98 % | BODY MASS INDEX: 34.45 KG/M2 | TEMPERATURE: 97.8 F | HEART RATE: 89 BPM

## 2024-11-13 DIAGNOSIS — Z12.11 COLON CANCER SCREENING: Primary | ICD-10-CM

## 2024-11-13 DIAGNOSIS — E05.90 HYPERTHYROIDISM: ICD-10-CM

## 2024-11-13 DIAGNOSIS — K21.9 GASTROESOPHAGEAL REFLUX DISEASE WITHOUT ESOPHAGITIS: ICD-10-CM

## 2024-11-13 DIAGNOSIS — E11.9 TYPE 2 DIABETES MELLITUS WITHOUT COMPLICATION, WITHOUT LONG-TERM CURRENT USE OF INSULIN (HCC): ICD-10-CM

## 2024-11-13 DIAGNOSIS — J30.1 ALLERGIC RHINITIS DUE TO POLLEN, UNSPECIFIED SEASONALITY: ICD-10-CM

## 2024-11-13 LAB
LEFT EYE DIABETIC RETINOPATHY: NORMAL
LEFT EYE IMAGE QUALITY: NORMAL
LEFT EYE MACULAR EDEMA: NORMAL
LEFT EYE OTHER RETINOPATHY: NORMAL
RIGHT EYE DIABETIC RETINOPATHY: NORMAL
RIGHT EYE IMAGE QUALITY: NORMAL
RIGHT EYE MACULAR EDEMA: NORMAL
RIGHT EYE OTHER RETINOPATHY: NORMAL
SEVERITY (EYE EXAM): NORMAL

## 2024-11-13 PROCEDURE — 99214 OFFICE O/P EST MOD 30 MIN: CPT | Performed by: FAMILY MEDICINE

## 2024-11-13 PROCEDURE — 92250 FUNDUS PHOTOGRAPHY W/I&R: CPT | Performed by: FAMILY MEDICINE

## 2024-11-13 RX ORDER — FAMOTIDINE 40 MG/1
40 TABLET, FILM COATED ORAL
Qty: 90 TABLET | Refills: 1 | Status: SHIPPED | OUTPATIENT
Start: 2024-11-13 | End: 2025-11-08

## 2024-11-13 RX ORDER — LORATADINE 10 MG/1
10 TABLET ORAL DAILY
Qty: 90 TABLET | Refills: 1 | Status: SHIPPED | OUTPATIENT
Start: 2024-11-13

## 2024-11-13 RX ORDER — LEVOTHYROXINE SODIUM 88 UG/1
88 TABLET ORAL
Qty: 90 TABLET | Refills: 1 | Status: SHIPPED | OUTPATIENT
Start: 2024-11-13

## 2024-11-13 NOTE — LETTER
November 14, 2024    Patient: Ana Rosa Parker  YOB: 1960  Date of Last Encounter: 10/2/2024      To whom it may concern:     Ana Rosa Parker has tested positive for COVID-19 (Coronavirus). She may return to {RETURN TO WORK/SCHOOL:[id number]} on ***, which is 10 days from illness onset (provided symptoms are improving) and 24 hours without fever.    Sincerely,         Chiara Hidalgo MA

## 2024-11-14 ENCOUNTER — RESULTS FOLLOW-UP (OUTPATIENT)
Age: 64
End: 2024-11-14

## 2024-11-15 NOTE — ASSESSMENT & PLAN NOTE
Continue current medications discussed abortive care return parameters  Lab Results   Component Value Date    HGBA1C 7.2 (H) 10/22/2024       Orders:    IRIS Diabetic eye exam    Comprehensive metabolic panel; Future    CBC and differential; Future    Albumin / creatinine urine ratio; Future    Lipid Panel with Direct LDL reflex; Future    TSH, 3rd generation with Free T4 reflex; Future

## 2024-11-15 NOTE — ASSESSMENT & PLAN NOTE
Continue current medications discussed abortive care return parameters  Orders:    loratadine (CLARITIN) 10 mg tablet; Take 1 tablet (10 mg total) by mouth daily    Comprehensive metabolic panel; Future    CBC and differential; Future    Albumin / creatinine urine ratio; Future    Lipid Panel with Direct LDL reflex; Future    TSH, 3rd generation with Free T4 reflex; Future

## 2024-11-15 NOTE — ASSESSMENT & PLAN NOTE
Continue current medications discussed abortive care return parameters  Orders:    famotidine (PEPCID) 40 MG tablet; Take 1 tablet (40 mg total) by mouth daily at bedtime    Comprehensive metabolic panel; Future    CBC and differential; Future    Albumin / creatinine urine ratio; Future    Lipid Panel with Direct LDL reflex; Future    TSH, 3rd generation with Free T4 reflex; Future

## 2024-11-15 NOTE — PROGRESS NOTES
Name: Ana Rosa Parker      : 1960      MRN: [id number]  Encounter Provider: Juan Diego Ayala MD  Encounter Date: 2024   Encounter department: [facility] PRIMARY CARE  :  Assessment & Plan  Colon cancer screening    Orders:    Ambulatory Referral to Colorectal Surgery; Future    Comprehensive metabolic panel; Future    CBC and differential; Future    Albumin / creatinine urine ratio; Future    Lipid Panel with Direct LDL reflex; Future    TSH, 3rd generation with Free T4 reflex; Future    Type 2 diabetes mellitus without complication, without long-term current use of insulin (HCC)  Continue current medications discussed abortive care return parameters  Lab Results   Component Value Date    HGBA1C 7.2 (H) 10/22/2024       Orders:    IRIS Diabetic eye exam    Comprehensive metabolic panel; Future    CBC and differential; Future    Albumin / creatinine urine ratio; Future    Lipid Panel with Direct LDL reflex; Future    TSH, 3rd generation with Free T4 reflex; Future    Hyperthyroidism  Continue current medications discussed abortive care return parameters  Orders:    levothyroxine 88 mcg tablet; Take 1 tablet (88 mcg total) by mouth daily in the early morning    Comprehensive metabolic panel; Future    CBC and differential; Future    Albumin / creatinine urine ratio; Future    Lipid Panel with Direct LDL reflex; Future    TSH, 3rd generation with Free T4 reflex; Future    Gastroesophageal reflux disease without esophagitis  Continue current medications discussed abortive care return parameters  Orders:    famotidine (PEPCID) 40 MG tablet; Take 1 tablet (40 mg total) by mouth daily at bedtime    Comprehensive metabolic panel; Future    CBC and differential; Future    Albumin / creatinine urine ratio; Future    Lipid Panel with Direct LDL reflex; Future    TSH, 3rd generation with Free T4 reflex; Future    Allergic rhinitis due to pollen, unspecified seasonality  Continue current medications  "discussed abortive care return parameters  Orders:    loratadine (CLARITIN) 10 mg tablet; Take 1 tablet (10 mg total) by mouth daily    Comprehensive metabolic panel; Future    CBC and differential; Future    Albumin / creatinine urine ratio; Future    Lipid Panel with Direct LDL reflex; Future    TSH, 3rd generation with Free T4 reflex; Future           History of Present Illness     Patient is a 64-year-old woman who presents for follow-up on type 2 diabetes allergic rhinitis GERD and hypothyroidism she admits being stable medications denies acute complaints at this time otherwise no fevers chills nausea vomiting      Review of Systems   Constitutional: Negative.    HENT: Negative.     Eyes: Negative.    Respiratory: Negative.     Cardiovascular: Negative.    Gastrointestinal: Negative.    Endocrine: Negative.    Genitourinary: Negative.    Musculoskeletal: Negative.    Allergic/Immunologic: Negative.    Neurological: Negative.    Hematological: Negative.    Psychiatric/Behavioral: Negative.     All other systems reviewed and are negative.         Objective   /62 (BP Location: Left arm, Patient Position: Sitting, Cuff Size: Large)   Pulse 89   Temp 97.8 °F (36.6 °C) (Temporal)   Ht 5' 5\" (1.651 m)   Wt 93.8 kg (206 lb 12.8 oz)   LMP  (LMP Unknown)   SpO2 98%   BMI 34.41 kg/m²      Physical Exam  Constitutional:       General: She is not in acute distress.     Appearance: She is well-developed. She is not diaphoretic.   HENT:      Head: Normocephalic and atraumatic.      Right Ear: External ear normal.      Left Ear: External ear normal.      Nose: Nose normal.      Mouth/Throat:      Pharynx: No oropharyngeal exudate.   Eyes:      General:         Right eye: No discharge.         Left eye: No discharge.      Conjunctiva/sclera: Conjunctivae normal.      Pupils: Pupils are equal, round, and reactive to light.   Neck:      Thyroid: No thyromegaly.      Trachea: No tracheal deviation.   Cardiovascular: "      Rate and Rhythm: Normal rate and regular rhythm.      Heart sounds: Normal heart sounds. No murmur heard.     No friction rub. No gallop.   Pulmonary:      Effort: Pulmonary effort is normal. No respiratory distress.      Breath sounds: Normal breath sounds.   Abdominal:      General: There is no distension.      Palpations: Abdomen is soft.      Tenderness: There is no abdominal tenderness. There is no guarding or rebound.   Musculoskeletal:         General: Normal range of motion.   Lymphadenopathy:      Cervical: No cervical adenopathy.   Skin:     General: Skin is warm.   Neurological:      Mental Status: She is alert and oriented to person, place, and time.      Cranial Nerves: No cranial nerve deficit.   Psychiatric:         Behavior: Behavior normal.         Thought Content: Thought content normal.         Judgment: Judgment normal.

## 2024-11-18 ENCOUNTER — OFFICE VISIT (OUTPATIENT)
Dept: CARDIOLOGY CLINIC | Facility: CLINIC | Age: 64
End: 2024-11-18
Payer: COMMERCIAL

## 2024-11-18 VITALS
OXYGEN SATURATION: 98 % | DIASTOLIC BLOOD PRESSURE: 60 MMHG | SYSTOLIC BLOOD PRESSURE: 124 MMHG | HEART RATE: 87 BPM | BODY MASS INDEX: 35.11 KG/M2 | WEIGHT: 211 LBS

## 2024-11-18 DIAGNOSIS — R06.02 SHORTNESS OF BREATH: ICD-10-CM

## 2024-11-18 DIAGNOSIS — G47.33 OSA (OBSTRUCTIVE SLEEP APNEA): ICD-10-CM

## 2024-11-18 DIAGNOSIS — E78.2 MIXED HYPERLIPIDEMIA: ICD-10-CM

## 2024-11-18 DIAGNOSIS — Z82.49 FAMILY HISTORY OF EARLY CAD: ICD-10-CM

## 2024-11-18 DIAGNOSIS — E11.9 TYPE 2 DIABETES MELLITUS WITHOUT COMPLICATION, WITHOUT LONG-TERM CURRENT USE OF INSULIN (HCC): ICD-10-CM

## 2024-11-18 DIAGNOSIS — G45.9 TIA (TRANSIENT ISCHEMIC ATTACK): Primary | ICD-10-CM

## 2024-11-18 DIAGNOSIS — I10 PRIMARY HYPERTENSION: ICD-10-CM

## 2024-11-18 DIAGNOSIS — E83.42 HYPOMAGNESEMIA: ICD-10-CM

## 2024-11-18 PROCEDURE — 99204 OFFICE O/P NEW MOD 45 MIN: CPT | Performed by: STUDENT IN AN ORGANIZED HEALTH CARE EDUCATION/TRAINING PROGRAM

## 2024-11-18 NOTE — ASSESSMENT & PLAN NOTE
History of TIA at [facility] in May 2024.  She denies any recurrent symptoms since then.  Her symptoms were fatigue, weakness, chest tightness, shortness this was in the setting of profound hypomagnesemia.  She is being treated with aspirin and statin.  I reviewed her echocardiogram report which did not note any abnormalities, noted normal cardiac function.  It is possible that her symptoms were related to her profound hypomagnesemia.  However, to be thorough, we will start with a 2-week Zio patch monitor to assess for any evidence of atrial fibrillation  -2-week Zio patch to assess for atrial fibrillation  -We did discuss that if no atrial fibrillation is detected, we can potentially refer for loop recorder implantation  -Continue aspirin 81 mg daily, rosuvastatin 20 mg daily

## 2024-11-18 NOTE — ASSESSMENT & PLAN NOTE
Profound hypomagnesemia with repeated magnesium levels of 1.4.  She is currently taking magnesium supplementation with magnesium oxide 400 mg daily.  She follows with nephrology and they are considering kidney biopsy.

## 2024-11-18 NOTE — ASSESSMENT & PLAN NOTE
Stable.  Last LDL was 65 on 8/2/2024.  Total cholesterol is 154, triglycerides 191, HDL 51  -Continue rosuvastatin 20 mg daily  -We will check a lipid panel and LP(a)

## 2024-11-18 NOTE — PROGRESS NOTES
[facility]  [address] [facility] [address]  [address]  [facility] [address]  Phone#  [phone]  Fax#  [phone]  [facility]'s Cardiology Office Consultation             NAME: Ana Rosa Parker  AGE: 64 y.o. SEX: female   : 1960   MRN: [id number]    DATE: 2024  TIME: 3:05 PM    Assessment & Plan  TIA (transient ischemic attack)  History of TIA at [facility] in May 2024.  She denies any recurrent symptoms since then.  Her symptoms were fatigue, weakness, chest tightness, shortness this was in the setting of profound hypomagnesemia.  She is being treated with aspirin and statin.  I reviewed her echocardiogram report which did not note any abnormalities, noted normal cardiac function.  It is possible that her symptoms were related to her profound hypomagnesemia.  However, to be thorough, we will start with a 2-week Zio patch monitor to assess for any evidence of atrial fibrillation  -2-week Zio patch to assess for atrial fibrillation  -We did discuss that if no atrial fibrillation is detected, we can potentially refer for loop recorder implantation  -Continue aspirin 81 mg daily, rosuvastatin 20 mg daily  Family history of early CAD  Stable.  Family history of early coronary artery disease.  Reports her sister had high LP(a) levels  -Will check LP(a) with her upcoming lipid panel  Mixed hyperlipidemia  Stable.  Last LDL was 65 on 2024.  Total cholesterol is 154, triglycerides 191, HDL 51  -Continue rosuvastatin 20 mg daily  -We will check a lipid panel and LP(a)  Hypomagnesemia  Profound hypomagnesemia with repeated magnesium levels of 1.4.  She is currently taking magnesium supplementation with magnesium oxide 400 mg daily.  She follows with nephrology and they are considering kidney biopsy.  Primary hypertension  Stable.  Blood pressure 124/60 today.  -Continue lisinopril 10 mg daily  FELIX (obstructive sleep apnea)  We discussed that obstructive sleep apnea is a risk factor for  development of atrial fibrillation.  She reports that she had a sleep study referral placed and plans to follow-up  -Encouraged her to obtain her sleep study  Type 2 diabetes mellitus without complication, without long-term current use of insulin ([facility])    Lab Results   Component Value Date    HGBA1C 7.2 (H) 10/22/2024       Follow up 1 month for Zio patch review      ------     Chief Complaint   Patient presents with    New Patient Visit     Refer by Dr. Faheem Jose     All the time       HPI:    Ana Rosa Parker is a 64 y.o.-year-old female who presents to the cardiology clinic for follow-up of TIA.    Referred by Dr. Faheem Ellis for TIA.    Past medical history significant for TIA, hypertension, headaches, FELIX, type 2 diabetes, stage 3 chronic kidney disease, hyperthyroidism.    She had a hospitalization at [facility] in May 2024.  At that time, she presented with fatigue, dizziness, chest tightness, and shortness of breath.  She states that she felt weak and as though she could not move.  Denies any focal neurological deficits. She was found to have profound hypomagnesemia with magnesium levels in the low ones.  She is currently following up with nephrology for further evaluation of etiology.  She reports that her head imaging did not show evidence of stroke.  I reviewed her echocardiogram report (images not available) which noted normal cardiac systolic function, no note of wall motion abnormalities or valvular pathology.    She denies any palpitations.  Denies recurrence of chest pain, chest discomfort, shortness of breath.    She does note that she has a significant family history of early coronary artery disease.  She was told recently that her sister had LP(a) level checked and it was elevated.      ------    I personally reviewed the patient's pertinent cardiac imaging and labs in Epic:    Recent TSH, lipid panel not yet obtained  11/06/24 Mg 2.0 (1.5 previously)  11/06/24 K 5.1, Cr 1.53,  eGFR 36   8/02/24 5/5/24 LV EF 66%, grade 1 diastolic dysfunction    -----    Past history, family history, social history, current medications, vital signs, recent lab and imaging studies and  prior cardiology studies reviewed independently on this visit.    Allergies   Allergen Reactions    Darvon [Propoxyphene] Dizziness    Fluoxetine Other (See Comments)     suicidal    Meclizine Dizziness    Morphine And Codeine Nausea Only    Oxycodone-Acetaminophen Other (See Comments) and Tachycardia     The whole house was moving    Percolone [Oxycodone] Other (See Comments) and Tachycardia     The whole house was moving       Current Outpatient Medications:     albuterol (ProAir HFA) 90 mcg/act inhaler, Inhale 2 puffs every 4 (four) hours as needed for wheezing or shortness of breath, Disp: 8.5 g, Rfl: 1    aspirin 81 mg chewable tablet, Chew 81 mg daily, Disp: , Rfl:     busPIRone (BUSPAR) 10 mg tablet, 10 mg in the morning, Disp: , Rfl:     eszopiclone (LUNESTA) 1 mg tablet, Take 2 mg by mouth daily at bedtime Takes 2-3 daily, Disp: , Rfl:     famotidine (PEPCID) 40 MG tablet, Take 1 tablet (40 mg total) by mouth daily, Disp: 90 tablet, Rfl: 1    fluticasone (FLONASE) 50 mcg/act nasal spray, 1 spray into each nostril daily, Disp: 16 g, Rfl: 0    gabapentin (NEURONTIN) 100 mg capsule, TAKE 1 TO 2 CAPSULES BY MOUTH DAILY AT BEDTIME, Disp: , Rfl:     levothyroxine 88 mcg tablet, Take 1 tablet (88 mcg total) by mouth daily in the early morning, Disp: 90 tablet, Rfl: 1    lisinopril (ZESTRIL) 10 mg tablet, Take 1 tablet (10 mg total) by mouth daily, Disp: 90 tablet, Rfl: 1    loratadine (CLARITIN) 10 mg tablet, Take 1 tablet (10 mg total) by mouth 2 (two) times a day, Disp: 60 tablet, Rfl: 8    magnesium oxide (MAG-OX) 400 mg tablet, Take 1 tablet (400 mg total) by mouth 2 (two) times a day, Disp: 180 tablet, Rfl: 3    metFORMIN (GLUCOPHAGE) 500 mg tablet, TAKE 2 TABLETS BY MOUTH TWICE A DAY WITH FOOD, Disp: 360 tablet,  Rfl: 1    QUEtiapine (SEROquel) 200 mg tablet, Take 1 tablet (200 mg total) by mouth daily at bedtime, Disp: 90 tablet, Rfl: 1    QUEtiapine (SEROquel) 25 mg tablet, Take 25 mg by mouth 2 (two) times a day as needed 2-3 at bedtime prn, Disp: , Rfl:     rosuvastatin (CRESTOR) 20 MG tablet, Take 1 tablet (20 mg total) by mouth daily, Disp: 100 tablet, Rfl: 3    sertraline (ZOLOFT) 100 mg tablet, TAKE 2 TABLETS (200 MG TOTAL) BY MOUTH DAILY AT BEDTIME, Disp: 180 tablet, Rfl: 1    traZODone (DESYREL) 50 mg tablet, TAKE 1 TABLET BY MOUTH DAILY AT BEDTIME, Disp: 90 tablet, Rfl: 1    venlafaxine (EFFEXOR-XR) 150 mg 24 hr capsule, TAKE 1 CAPSULE BY MOUTH EVERY DAY, Disp: 90 capsule, Rfl: 1    benzonatate (TESSALON PERLES) 100 mg capsule, 1-2 caps every 8 hours as needed for cough (Patient not taking: Reported on 9/17/2024), Disp: 30 capsule, Rfl: 0    famotidine (PEPCID) 40 MG tablet, Take 1 tablet (40 mg total) by mouth daily at bedtime (Patient not taking: Reported on 11/18/2024), Disp: 90 tablet, Rfl: 1    loratadine (CLARITIN) 10 mg tablet, Take 1 tablet (10 mg total) by mouth daily (Patient not taking: Reported on 11/18/2024), Disp: 90 tablet, Rfl: 1    Past Medical History:   Diagnosis Date    Allergic     Anxiety     Arthritis     Asthma     Claustrophobia     CPAP (continuous positive airway pressure) dependence     Depression     Diabetes mellitus (HCC)     Disease of thyroid gland     hypo    GERD (gastroesophageal reflux disease)     Headache     History of COVID-19     Hyperlipemia     Hyperlipidemia     Hypertension     Inflammatory bowel disease     Kidney stone     Kidney stones     Major depressive disorder     Motion sickness     Obesity     Risk for falls     Sleep apnea     TIA (transient ischemic attack)     Use of cane as ambulatory aid     Wears glasses     Wears partial dentures     upper partial     Past Surgical History:   Procedure Laterality Date    ADENOIDECTOMY      COLONOSCOPY      DILATION AND  CURETTAGE OF UTERUS      MN ARTHRS KNE SURG W/MENISCECTOMY MED/LAT W/SHVG Left 3/24/2022    Procedure: ARTHROSCOPY KNEE w/ partial medial menisectomy;  Surgeon: Terry Bhatt MD;  Location: [facility] OR;  Service: Orthopedics    MN COLONOSCOPY FLX DX W/COLLJ SPEC WHEN PFRMD N/A 3/15/2019    Procedure: COLONOSCOPY;  Surgeon: Angel Saavedra MD;  Location: [facility] GI LAB;  Service: Gastroenterology    ROTATOR CUFF REPAIR Right     SHOULDER SURGERY Left     impingement    TONSILLECTOMY      TUBAL LIGATION       Family History   Problem Relation Age of Onset    ALS Mother     Venous thrombosis Father     Diabetes Father     Other Family         cerebral embolism    Diabetes Family     Hypertension Family     Kidney disease Family     Breast cancer Neg Hx     Colon cancer Neg Hx     Ovarian cancer Neg Hx     Uterine cancer Neg Hx        Social History   reports that she has never smoked. She has never been exposed to tobacco smoke. She has never used smokeless tobacco. She reports that she does not drink alcohol and does not use drugs.     Review of Systems   Constitutional:  Positive for fatigue.   Respiratory:  Negative for chest tightness and shortness of breath.    Cardiovascular:  Negative for chest pain, palpitations and leg swelling.   Neurological: Negative.    Psychiatric/Behavioral: Negative.       Objective:     Vitals:    11/18/24 1455   BP: 124/60   Pulse: 87   SpO2: 98%     Physical Exam  Vitals reviewed.   Constitutional:       General: She is not in acute distress.     Appearance: She is well-developed.   HENT:      Head: Normocephalic and atraumatic.   Eyes:      Conjunctiva/sclera: Conjunctivae normal.   Cardiovascular:      Rate and Rhythm: Normal rate and regular rhythm.      Heart sounds: No murmur heard.  Pulmonary:      Effort: Pulmonary effort is normal. No respiratory distress.      Breath sounds: Normal breath sounds.   Musculoskeletal:         General: No swelling.      Cervical back: Neck  "supple.   Skin:     General: Skin is warm and dry.   Neurological:      Mental Status: She is alert.   Psychiatric:         Mood and Affect: Mood normal.         Pertinent Laboratory/Diagnostic Studies:    Laboratory studies reviewed personally by Kelley Ramsey MD    BMP:   Lab Results   Component Value Date    SODIUM 135 11/06/2024    K 5.1 11/06/2024     11/06/2024    CO2 22 11/06/2024    BUN 35 (H) 11/06/2024    CREATININE 1.53 (H) 11/06/2024    GLUC 143 (H) 10/22/2024    CALCIUM 8.8 11/06/2024     CBC:  Lab Results   Component Value Date    WBC 11.97 (H) 09/09/2024    HGB 11.2 (L) 09/09/2024    HCT 36.0 09/09/2024    MCV 94 09/09/2024     09/09/2024     Lipid Profile:   Lab Results   Component Value Date    HDL 51 08/02/2024     Lab Results   Component Value Date    LDLCALC 65 08/02/2024     Lab Results   Component Value Date    TRIG 191 (H) 08/02/2024      Other labs:  Lab Results   Component Value Date    NLL5PYINDXWH 0.979 08/02/2024     Lab Results   Component Value Date    ALT 10 10/22/2024    AST 14 10/22/2024       Imaging Studies:     Pertinent cardiac studies and imaging studies were personally reviewed on this visit and results summarized above.    Kelley Ramsey MD, PhD    Portions of the record may have been created with voice recognition software.  Occasional wrong word or \"sound alike\" substitutions may have occurred due to the inherent limitations of voice recognition software.  Read the chart carefully and recognize, using context, where substitutions have occurred. Please reach out to me directly for any clarifications.    "

## 2024-11-18 NOTE — ASSESSMENT & PLAN NOTE
We discussed that obstructive sleep apnea is a risk factor for development of atrial fibrillation.  She reports that she had a sleep study referral placed and plans to follow-up  -Encouraged her to obtain her sleep study

## 2024-11-18 NOTE — PATIENT INSTRUCTIONS
Cardiac CT Calcium Score:  A CT calcium score exam, also known as a coronary calcium scan, is a quick, convenient and noninvasive way of evaluating the amount of calcified (hard) plaque in your heart vessels. The level of calcium equates to the extent of plaque build-up in your arteries. Plaque in the arteries can cause heart attacks.    The radiologist reads the images and sends your cardiologist a report with a calcium score. Patients with higher scores have a greater risk for a heart attack, heart disease or stroke. Knowing your score can help us decide on blood pressure and cholesterol goals that will minimize your risk as much as possible.    The American College of Cardiology found that Coronary artery calcification (CAC) is an excellent cardiovascular disease risk marker and can help guide the decision to use cholesterol reducing medications such as statins. A negative calcium score may reduce the need for statins in otherwise eligible patients. Knowing your score could save your life.    The exam takes less than 10 minutes, is painless and does not require any IV or oral contrast. The exam is typically not covered by insurance. The fee at [facility] radiology locations is $99.

## 2024-12-07 ENCOUNTER — APPOINTMENT (OUTPATIENT)
Dept: LAB | Facility: CLINIC | Age: 64
End: 2024-12-07
Payer: COMMERCIAL

## 2024-12-07 DIAGNOSIS — J30.1 ALLERGIC RHINITIS DUE TO POLLEN, UNSPECIFIED SEASONALITY: ICD-10-CM

## 2024-12-07 DIAGNOSIS — E83.42 HYPOMAGNESEMIA: ICD-10-CM

## 2024-12-07 DIAGNOSIS — E11.9 TYPE 2 DIABETES MELLITUS WITHOUT COMPLICATION, WITHOUT LONG-TERM CURRENT USE OF INSULIN (HCC): ICD-10-CM

## 2024-12-07 DIAGNOSIS — E05.90 HYPERTHYROIDISM: ICD-10-CM

## 2024-12-07 DIAGNOSIS — Z12.11 COLON CANCER SCREENING: ICD-10-CM

## 2024-12-07 DIAGNOSIS — K21.9 GASTROESOPHAGEAL REFLUX DISEASE WITHOUT ESOPHAGITIS: ICD-10-CM

## 2024-12-07 DIAGNOSIS — E87.5 HYPERKALEMIA: ICD-10-CM

## 2024-12-07 DIAGNOSIS — Z82.49 FAMILY HISTORY OF EARLY CAD: ICD-10-CM

## 2024-12-07 DIAGNOSIS — N18.31 STAGE 3A CHRONIC KIDNEY DISEASE (HCC): ICD-10-CM

## 2024-12-07 LAB
ALBUMIN SERPL BCG-MCNC: 4.1 G/DL (ref 3.5–5)
ALP SERPL-CCNC: 88 U/L (ref 34–104)
ALT SERPL W P-5'-P-CCNC: 15 U/L (ref 7–52)
ANION GAP SERPL CALCULATED.3IONS-SCNC: 10 MMOL/L (ref 4–13)
ANION GAP SERPL CALCULATED.3IONS-SCNC: 8 MMOL/L (ref 4–13)
AST SERPL W P-5'-P-CCNC: 16 U/L (ref 13–39)
BASOPHILS # BLD AUTO: 0.04 THOUSANDS/ÂΜL (ref 0–0.1)
BASOPHILS NFR BLD AUTO: 1 % (ref 0–1)
BILIRUB SERPL-MCNC: 0.27 MG/DL (ref 0.2–1)
BUN SERPL-MCNC: 23 MG/DL (ref 5–25)
BUN SERPL-MCNC: 23 MG/DL (ref 5–25)
CALCIUM SERPL-MCNC: 8.9 MG/DL (ref 8.4–10.2)
CALCIUM SERPL-MCNC: 9.3 MG/DL (ref 8.4–10.2)
CHLORIDE SERPL-SCNC: 107 MMOL/L (ref 96–108)
CHLORIDE SERPL-SCNC: 108 MMOL/L (ref 96–108)
CHOLEST SERPL-MCNC: 165 MG/DL (ref ?–200)
CO2 SERPL-SCNC: 24 MMOL/L (ref 21–32)
CO2 SERPL-SCNC: 25 MMOL/L (ref 21–32)
CREAT SERPL-MCNC: 1.32 MG/DL (ref 0.6–1.3)
CREAT SERPL-MCNC: 1.35 MG/DL (ref 0.6–1.3)
EOSINOPHIL # BLD AUTO: 0.26 THOUSAND/ÂΜL (ref 0–0.61)
EOSINOPHIL NFR BLD AUTO: 5 % (ref 0–6)
ERYTHROCYTE [DISTWIDTH] IN BLOOD BY AUTOMATED COUNT: 12.9 % (ref 11.6–15.1)
GFR SERPL CREATININE-BSD FRML MDRD: 42 ML/MIN/1.73SQ M
GFR SERPL CREATININE-BSD FRML MDRD: 43 ML/MIN/1.73SQ M
GLUCOSE P FAST SERPL-MCNC: 144 MG/DL (ref 65–99)
GLUCOSE P FAST SERPL-MCNC: 144 MG/DL (ref 65–99)
HCT VFR BLD AUTO: 33.3 % (ref 34.8–46.1)
HDLC SERPL-MCNC: 48 MG/DL
HGB BLD-MCNC: 10.4 G/DL (ref 11.5–15.4)
IMM GRANULOCYTES # BLD AUTO: 0.01 THOUSAND/UL (ref 0–0.2)
IMM GRANULOCYTES NFR BLD AUTO: 0 % (ref 0–2)
LDLC SERPL CALC-MCNC: 76 MG/DL (ref 0–100)
LYMPHOCYTES # BLD AUTO: 2.12 THOUSANDS/ÂΜL (ref 0.6–4.47)
LYMPHOCYTES NFR BLD AUTO: 40 % (ref 14–44)
MAGNESIUM SERPL-MCNC: 1.9 MG/DL (ref 1.9–2.7)
MCH RBC QN AUTO: 29.5 PG (ref 26.8–34.3)
MCHC RBC AUTO-ENTMCNC: 31.2 G/DL (ref 31.4–37.4)
MCV RBC AUTO: 94 FL (ref 82–98)
MONOCYTES # BLD AUTO: 0.52 THOUSAND/ÂΜL (ref 0.17–1.22)
MONOCYTES NFR BLD AUTO: 10 % (ref 4–12)
NEUTROPHILS # BLD AUTO: 2.32 THOUSANDS/ÂΜL (ref 1.85–7.62)
NEUTS SEG NFR BLD AUTO: 44 % (ref 43–75)
NRBC BLD AUTO-RTO: 0 /100 WBCS
PLATELET # BLD AUTO: 217 THOUSANDS/UL (ref 149–390)
PMV BLD AUTO: 10 FL (ref 8.9–12.7)
POTASSIUM SERPL-SCNC: 4.8 MMOL/L (ref 3.5–5.3)
POTASSIUM SERPL-SCNC: 4.8 MMOL/L (ref 3.5–5.3)
PROT SERPL-MCNC: 6.5 G/DL (ref 6.4–8.4)
RBC # BLD AUTO: 3.53 MILLION/UL (ref 3.81–5.12)
SODIUM SERPL-SCNC: 141 MMOL/L (ref 135–147)
SODIUM SERPL-SCNC: 141 MMOL/L (ref 135–147)
TRIGL SERPL-MCNC: 206 MG/DL (ref ?–150)
TSH SERPL DL<=0.05 MIU/L-ACNC: 2.17 UIU/ML (ref 0.45–4.5)
WBC # BLD AUTO: 5.27 THOUSAND/UL (ref 4.31–10.16)

## 2024-12-07 PROCEDURE — 80053 COMPREHEN METABOLIC PANEL: CPT

## 2024-12-07 PROCEDURE — 85025 COMPLETE CBC W/AUTO DIFF WBC: CPT

## 2024-12-07 PROCEDURE — 80048 BASIC METABOLIC PNL TOTAL CA: CPT

## 2024-12-07 PROCEDURE — 83735 ASSAY OF MAGNESIUM: CPT

## 2024-12-07 PROCEDURE — 36415 COLL VENOUS BLD VENIPUNCTURE: CPT

## 2024-12-07 PROCEDURE — 84443 ASSAY THYROID STIM HORMONE: CPT

## 2024-12-07 PROCEDURE — 80061 LIPID PANEL: CPT

## 2024-12-07 PROCEDURE — 83695 ASSAY OF LIPOPROTEIN(A): CPT

## 2024-12-08 ENCOUNTER — RESULTS FOLLOW-UP (OUTPATIENT)
Dept: OTHER | Facility: HOSPITAL | Age: 64
End: 2024-12-08

## 2024-12-08 NOTE — RESULT ENCOUNTER NOTE
Please inform patient that potassium level improved to 4.8 meq/L which is at normal range, renal function also improved to creatinine 1.35 mg/dL and magnesium level is at normal range as well.  Continue current treatment

## 2024-12-09 LAB — LPA SERPL-SCNC: 348 NMOL/L

## 2024-12-09 NOTE — RESULT ENCOUNTER NOTE
I spoke to Ana Rosa she is aware that renal function. Potassium and electrolytes are are normal no changes to be made at this time.

## 2024-12-09 NOTE — TELEPHONE ENCOUNTER
----- Message from Chet Valderrama MD sent at 12/8/2024 10:13 AM EST -----  Please inform patient that potassium level improved to 4.8 meq/L which is at normal range, renal function also improved to creatinine 1.35 mg/dL and magnesium level is at normal range as well.  Continue current treatment

## 2024-12-11 ENCOUNTER — RESULTS FOLLOW-UP (OUTPATIENT)
Dept: CARDIOLOGY CLINIC | Facility: CLINIC | Age: 64
End: 2024-12-11

## 2024-12-23 ENCOUNTER — TELEPHONE (OUTPATIENT)
Dept: CARDIOLOGY CLINIC | Facility: CLINIC | Age: 64
End: 2024-12-23

## 2024-12-23 NOTE — TELEPHONE ENCOUNTER
Left message for to return call. Her zio monitor was never returned back to [facility]. Mailed out to her around 11/18/2024

## 2025-01-14 ENCOUNTER — OFFICE VISIT (OUTPATIENT)
Dept: URGENT CARE | Age: 65
End: 2025-01-14
Payer: COMMERCIAL

## 2025-01-14 VITALS
TEMPERATURE: 97.9 F | DIASTOLIC BLOOD PRESSURE: 80 MMHG | HEART RATE: 96 BPM | OXYGEN SATURATION: 97 % | RESPIRATION RATE: 18 BRPM | SYSTOLIC BLOOD PRESSURE: 138 MMHG

## 2025-01-14 DIAGNOSIS — Z20.828 EXPOSURE TO THE FLU: ICD-10-CM

## 2025-01-14 DIAGNOSIS — R05.1 ACUTE COUGH: ICD-10-CM

## 2025-01-14 DIAGNOSIS — J02.9 SORE THROAT: Primary | ICD-10-CM

## 2025-01-14 LAB — S PYO AG THROAT QL: NEGATIVE

## 2025-01-14 PROCEDURE — 87636 SARSCOV2 & INF A&B AMP PRB: CPT

## 2025-01-14 PROCEDURE — 87070 CULTURE OTHR SPECIMN AEROBIC: CPT

## 2025-01-14 PROCEDURE — 99213 OFFICE O/P EST LOW 20 MIN: CPT | Performed by: EMERGENCY MEDICINE

## 2025-01-14 PROCEDURE — 87880 STREP A ASSAY W/OPTIC: CPT

## 2025-01-14 RX ORDER — ESZOPICLONE 3 MG/1
3 TABLET, FILM COATED ORAL
COMMUNITY
Start: 2024-12-27

## 2025-01-14 RX ORDER — BENZONATATE 100 MG/1
100 CAPSULE ORAL 3 TIMES DAILY PRN
Qty: 20 CAPSULE | Refills: 0 | Status: SHIPPED | OUTPATIENT
Start: 2025-01-14

## 2025-01-15 NOTE — PROGRESS NOTES
[facility]        NAME: Ana Rosa Parker is a 64 y.o. female  : 1960    MRN: [id number]  DATE: 2025  TIME: 7:14 PM      Assessment and Plan     Sore throat [J02.9]  1. Sore throat  POCT rapid ANTIGEN strepA    Throat culture    Throat culture      2. Acute cough  benzonatate (TESSALON PERLES) 100 mg capsule    Covid/Flu- Office Collect Normal    Covid/Flu- Office Collect Normal      3. Exposure to the flu            Patient agreeable to hold on Tamiflu at this time.  Agreeable to COVID/influenza PCR testing.  Rapid strep negative.  Throat culture sent. Will Hold on antibiotics at this time as symptoms started 1 day ago, no fever, no exudate, presence of cough  Patient Instructions     Use benzonatate as directed as needed for cough.   Hydration and rest.  Recommend throat lozenges and gargling warm salt water for throat irritation.   Acetaminophen and ibuprofen for pain relief and fever reduction.   COVID/influenza testing.  Use the [facility] MyChart to obtain lab results.  PCP follow up in 3-5 days.   Go to an emergency department if difficulty breathing occurs or if symptoms worsen.    Recommended supplements for potential COVID-19 is the following: Vitamin D3 2000 IU  daily ,  Vitamin C 1g  every 12 hours , Multivitamin Daily     Chief Complaint     Chief Complaint   Patient presents with    Cold Like Symptoms     States has been having sore throat starting today  States ears are not currently hurting but has had same symptoms and each time get sinus infection and ear pain         History of Present Illness     Patient is 64-year-old female who presents with sore throat for 1 day.  Reports strep throat.   at bedside states that he recently had influenza A.  Patient denies wheezing or shortness of breath.  Denies nausea or vomiting.  Reports body aches.  Patient states she does have Flonase at home        Review of Systems     Review of Systems   Constitutional:  Positive for  fatigue. Negative for chills and fever.   HENT:  Positive for congestion and sore throat.    Respiratory:  Positive for cough. Negative for shortness of breath and wheezing.    Gastrointestinal:  Negative for diarrhea, nausea and vomiting.   All other systems reviewed and are negative.        Current Medications       Current Outpatient Medications:     albuterol (ProAir HFA) 90 mcg/act inhaler, Inhale 2 puffs every 4 (four) hours as needed for wheezing or shortness of breath, Disp: 8.5 g, Rfl: 1    aspirin 81 mg chewable tablet, Chew 81 mg daily, Disp: , Rfl:     benzonatate (TESSALON PERLES) 100 mg capsule, Take 1 capsule (100 mg total) by mouth 3 (three) times a day as needed for cough, Disp: 20 capsule, Rfl: 0    busPIRone (BUSPAR) 10 mg tablet, 10 mg in the morning, Disp: , Rfl:     eszopiclone (LUNESTA) 1 mg tablet, Take 2 mg by mouth daily at bedtime Takes 2-3 daily, Disp: , Rfl:     eszopiclone (LUNESTA) 3 MG tablet, Take 3 mg by mouth daily at bedtime, Disp: , Rfl:     famotidine (PEPCID) 40 MG tablet, Take 1 tablet (40 mg total) by mouth daily, Disp: 90 tablet, Rfl: 1    fluticasone (FLONASE) 50 mcg/act nasal spray, 1 spray into each nostril daily, Disp: 16 g, Rfl: 0    gabapentin (NEURONTIN) 100 mg capsule, TAKE 1 TO 2 CAPSULES BY MOUTH DAILY AT BEDTIME, Disp: , Rfl:     levothyroxine 88 mcg tablet, Take 1 tablet (88 mcg total) by mouth daily in the early morning, Disp: 90 tablet, Rfl: 1    lisinopril (ZESTRIL) 10 mg tablet, Take 1 tablet (10 mg total) by mouth daily, Disp: 90 tablet, Rfl: 1    loratadine (CLARITIN) 10 mg tablet, Take 1 tablet (10 mg total) by mouth 2 (two) times a day, Disp: 60 tablet, Rfl: 8    magnesium oxide (MAG-OX) 400 mg tablet, Take 1 tablet (400 mg total) by mouth 2 (two) times a day, Disp: 180 tablet, Rfl: 3    metFORMIN (GLUCOPHAGE) 500 mg tablet, TAKE 2 TABLETS BY MOUTH TWICE A DAY WITH FOOD, Disp: 360 tablet, Rfl: 1    QUEtiapine (SEROquel) 200 mg tablet, Take 1 tablet (200  mg total) by mouth daily at bedtime, Disp: 90 tablet, Rfl: 1    QUEtiapine (SEROquel) 25 mg tablet, Take 25 mg by mouth 2 (two) times a day as needed 2-3 at bedtime prn, Disp: , Rfl:     rosuvastatin (CRESTOR) 20 MG tablet, Take 1 tablet (20 mg total) by mouth daily, Disp: 100 tablet, Rfl: 3    sertraline (ZOLOFT) 100 mg tablet, TAKE 2 TABLETS (200 MG TOTAL) BY MOUTH DAILY AT BEDTIME, Disp: 180 tablet, Rfl: 1    traZODone (DESYREL) 50 mg tablet, TAKE 1 TABLET BY MOUTH DAILY AT BEDTIME, Disp: 90 tablet, Rfl: 1    venlafaxine (EFFEXOR-XR) 150 mg 24 hr capsule, TAKE 1 CAPSULE BY MOUTH EVERY DAY, Disp: 90 capsule, Rfl: 1    benzonatate (TESSALON PERLES) 100 mg capsule, 1-2 caps every 8 hours as needed for cough (Patient not taking: Reported on 9/17/2024), Disp: 30 capsule, Rfl: 0    famotidine (PEPCID) 40 MG tablet, Take 1 tablet (40 mg total) by mouth daily at bedtime (Patient not taking: Reported on 1/14/2025), Disp: 90 tablet, Rfl: 1    loratadine (CLARITIN) 10 mg tablet, Take 1 tablet (10 mg total) by mouth daily (Patient not taking: Reported on 1/14/2025), Disp: 90 tablet, Rfl: 1    Current Allergies     Allergies as of 01/14/2025 - Reviewed 01/14/2025   Allergen Reaction Noted    Darvon [propoxyphene] Dizziness 01/26/2019    Fluoxetine Other (See Comments) 10/01/2012    Meclizine Dizziness 10/01/2012    Morphine and codeine Nausea Only 10/01/2012    Oxycodone-acetaminophen Other (See Comments) and Tachycardia 10/01/2012    Percolone [oxycodone] Other (See Comments) and Tachycardia 03/20/2019              The following portions of the patient's history were reviewed and updated as appropriate: allergies, current medications, past family history, past medical history, past social history, past surgical history and problem list.     Past Medical History:   Diagnosis Date    Allergic     Anxiety     Arthritis     Asthma     Claustrophobia     CPAP (continuous positive airway pressure) dependence     Depression      Diabetes mellitus (HCC)     Disease of thyroid gland     hypo    GERD (gastroesophageal reflux disease)     Headache     History of COVID-19     Hyperlipemia     Hyperlipidemia     Hypertension     Inflammatory bowel disease     Kidney stone     Kidney stones     Major depressive disorder     Motion sickness     Obesity     Risk for falls     Sleep apnea     TIA (transient ischemic attack)     Use of cane as ambulatory aid     Wears glasses     Wears partial dentures     upper partial       Past Surgical History:   Procedure Laterality Date    ADENOIDECTOMY      COLONOSCOPY      DILATION AND CURETTAGE OF UTERUS      NM ARTHRS KNE SURG W/MENISCECTOMY MED/LAT W/SHVG Left 3/24/2022    Procedure: ARTHROSCOPY KNEE w/ partial medial menisectomy;  Surgeon: Terry Bhatt MD;  Location: [facility] OR;  Service: Orthopedics    NM COLONOSCOPY FLX DX W/COLLJ SPEC WHEN PFRMD N/A 3/15/2019    Procedure: COLONOSCOPY;  Surgeon: Angel Saavedra MD;  Location: [facility] GI LAB;  Service: Gastroenterology    ROTATOR CUFF REPAIR Right     SHOULDER SURGERY Left     impingement    TONSILLECTOMY      TUBAL LIGATION         Family History   Problem Relation Age of Onset    ALS Mother     Venous thrombosis Father     Diabetes Father     Other Family         cerebral embolism    Diabetes Family     Hypertension Family     Kidney disease Family     Breast cancer Neg Hx     Colon cancer Neg Hx     Ovarian cancer Neg Hx     Uterine cancer Neg Hx          Medications have been verified.        Objective     /80   Pulse 96   Temp 97.9 °F (36.6 °C)   Resp 18   LMP  (LMP Unknown)   SpO2 97%   No LMP recorded (lmp unknown). Patient is postmenopausal.         Physical Exam     Physical Exam  Vitals and nursing note reviewed.   Constitutional:       General: She is awake. She is not in acute distress.     Appearance: Normal appearance. She is not ill-appearing, toxic-appearing or diaphoretic.   HENT:      Right Ear: Tympanic membrane, ear  canal and external ear normal.      Left Ear: Tympanic membrane, ear canal and external ear normal.      Nose: Congestion present.      Mouth/Throat:      Lips: Pink.      Mouth: Mucous membranes are moist.      Pharynx: Oropharynx is clear. Uvula midline. Posterior oropharyngeal erythema present. No pharyngeal swelling, oropharyngeal exudate or uvula swelling.   Cardiovascular:      Rate and Rhythm: Normal rate.      Pulses: Normal pulses.      Heart sounds: Normal heart sounds, S1 normal and S2 normal.   Pulmonary:      Effort: Pulmonary effort is normal.      Breath sounds: Normal breath sounds and air entry.   Skin:     General: Skin is warm.      Capillary Refill: Capillary refill takes less than 2 seconds.   Neurological:      Mental Status: She is alert.   Psychiatric:         Mood and Affect: Mood normal.         Behavior: Behavior normal.         Thought Content: Thought content normal.         Judgment: Judgment normal.

## 2025-01-15 NOTE — PATIENT INSTRUCTIONS
Use benzonatate as directed as needed for cough.   Hydration and rest.  Recommend throat lozenges and gargling warm salt water for throat irritation.   Acetaminophen and ibuprofen for pain relief and fever reduction.   COVID/influenza testing.  Use the [facility] MyChart to obtain lab results.  PCP follow up in 3-5 days.   Go to an emergency department if difficulty breathing occurs or if symptoms worsen.    Recommended supplements for potential COVID-19 is the following: Vitamin D3 2000 IU  daily ,  Vitamin C 1g  every 12 hours , Multivitamin Daily

## 2025-01-16 LAB — BACTERIA THROAT CULT: NORMAL

## 2025-02-12 ENCOUNTER — NURSE TRIAGE (OUTPATIENT)
Age: 65
End: 2025-02-12

## 2025-02-12 ENCOUNTER — HOSPITAL ENCOUNTER (EMERGENCY)
Facility: HOSPITAL | Age: 65
Discharge: HOME | End: 2025-02-12
Attending: EMERGENCY MEDICINE
Payer: COMMERCIAL

## 2025-02-12 VITALS
HEIGHT: 65 IN | BODY MASS INDEX: 35.16 KG/M2 | OXYGEN SATURATION: 98 % | SYSTOLIC BLOOD PRESSURE: 141 MMHG | HEART RATE: 72 BPM | WEIGHT: 211 LBS | DIASTOLIC BLOOD PRESSURE: 69 MMHG | RESPIRATION RATE: 14 BRPM | TEMPERATURE: 97.8 F

## 2025-02-12 DIAGNOSIS — K02.9 DENTAL CARIES: Primary | ICD-10-CM

## 2025-02-12 LAB
ATRIAL RATE: 77 BPM
P AXIS: 65 DEGREES
PR INTERVAL: 150 MS
QRS AXIS: 33 DEGREES
QRSD INTERVAL: 90 MS
QT INTERVAL: 370 MS
QTC INTERVAL: 418 MS
T WAVE AXIS: 66 DEGREES
VENTRICULAR RATE: 77 BPM

## 2025-02-12 PROCEDURE — 96372 THER/PROPH/DIAG INJ SC/IM: CPT

## 2025-02-12 PROCEDURE — 93005 ELECTROCARDIOGRAM TRACING: CPT

## 2025-02-12 PROCEDURE — 99285 EMERGENCY DEPT VISIT HI MDM: CPT | Performed by: EMERGENCY MEDICINE

## 2025-02-12 PROCEDURE — 99282 EMERGENCY DEPT VISIT SF MDM: CPT

## 2025-02-12 RX ORDER — FENTANYL CITRATE 50 UG/ML
25 INJECTION, SOLUTION INTRAMUSCULAR; INTRAVENOUS ONCE
Refills: 0 | Status: COMPLETED | OUTPATIENT
Start: 2025-02-12 | End: 2025-02-12

## 2025-02-12 RX ORDER — KETOROLAC TROMETHAMINE 30 MG/ML
15 INJECTION, SOLUTION INTRAMUSCULAR; INTRAVENOUS ONCE
Status: DISCONTINUED | OUTPATIENT
Start: 2025-02-12 | End: 2025-02-12

## 2025-02-12 RX ADMIN — FENTANYL CITRATE 25 MCG: 50 INJECTION INTRAMUSCULAR; INTRAVENOUS at 12:11

## 2025-02-12 NOTE — DISCHARGE INSTRUCTIONS
Please do not take any anti-inflammatory medication: Motrin or ibuprofen based medications due to your history of renal insufficiency.

## 2025-02-12 NOTE — TELEPHONE ENCOUNTER
"Patient called crying because she is having 11/10 tooth pain. Saw dentist yesterday and has an infection in a root canal. Was prescribed an antibiotic. States the pain is unbearable and they told her to take tylenol and ibuprofen together but nothing is helping the pain. Advised to go to the ED, so that they can get pain under control and then it is important to take the medication prescribed as ordered. Do not hold off on pain medication until it becomes severe because it can get out of control again, stated understanding. Is going to the ED now.       Reason for Disposition   SEVERE mouth pain (e.g., excruciating) and not improved after 2 hours of pain medicine    Answer Assessment - Initial Assessment Questions  1. ONSET: \"When did the mouth start hurting?\" (e.g., hours or days ago)       Has been hurting for 2 weeks  2. SEVERITY: \"How bad is the pain?\" (Scale 1-10; mild, moderate or severe)      States she saw the dentist yesterday. She has an infected root canal.  3. SORES: \"Are there any sores or ulcers in the mouth?\" If Yes, ask: \"What part of the mouth are the sores in?\"      no  4. FEVER: \"Do you have a fever?\" If Yes, ask: \"What is your temperature, how was it measured, and when did it start?\"      unknown  5. CAUSE: \"What do you think is causing the mouth pain?\"      Infection  6. OTHER SYMPTOMS: \"Do you have any other symptoms?\" (e.g., difficulty breathing)      no    Protocols used: Mouth Pain-Adult-[place]    "

## 2025-02-12 NOTE — ED PROVIDER NOTES
Time reflects when diagnosis was documented in both MDM as applicable and the Disposition within this note       Time User Action Codes Description Comment    2/12/2025 12:25 PM Boyd Robles Add [K02.9] Dental caries           ED Disposition       ED Disposition   Discharge    Condition   Stable    Date/Time   Wed Feb 12, 2025 12:20 PM    Comment   Ana Rosa Parker discharge to home/self care.                   Assessment & Plan       Medical Decision Making  Risk  Prescription drug management.    Patient presents for dental pain due to suspected dental betty. Patient not immunosuppressed, afebrile and well appearing with patent airway, have low suspicfion for deep space infection or any concern for airway compromise. Based on history, physical, and work up. No evidence of tooth fracture, avulsion, or bleeding socket. No evidence of RPA, PTA, Chau’s angina, periapical abscess. Offered patient dental nerve block for pain which patient accepted/declined_. Instructed patient to continue to treat pain with ibuprofen/acetaminophen until they see a dentist. Defer ABX for dental pain alone with no overt evidence of infection_. Patient discharged home and will follow up with dentist. Discussed return precautions for odontogenic infections and other dental pain emergencies.     See ED course for specifics.      ED Course as of 02/12/25 1813 Wed Feb 12, 2025   1200 Patient seen, evaluated, examined, please see MDM note, nontoxic-appearing, chronic dental pain, seen and evaluated by dentist yesterday.  No focal abscess detected on exam.  Patient was insistent of having narcotic level pain medicine for her dental pain, patient has multiple documented allergies of Percocet, oxycodone.  Patient does have a history of renal insufficiency, most recent running December 2024 1.23, advised patient to be careful regarding NSAID use.  Patient is also a known diabetic, was informed that she cannot receive steroids to increase her  blood sugars and place her at high risk for significant hyperglycemia.  No ACS like symptoms on review of systems.   1220 EKG within normal limits.   1233 After discussion patient about NSAIDs, will hold off given the patient Toradol, anticipatory guidance given to the patient regarding possible side effects of antibiotics: Loose stool diarrhea can be managed as an outpatient with probiotics and yogurt based medicine, patient does not have a history of lactose intolerance.  Also aware of side effects of the antibiotics prescribed to her by her dentist of yeast infections that be managed as an outpatient.  Patient stable for discharge.       Medications   fentaNYL injection 25 mcg (25 mcg Intramuscular Given 2/12/25 1211)       ED Risk Strat Scores                          SBIRT 22yo+      Flowsheet Row Most Recent Value   Initial Alcohol Screen: US AUDIT-C     1. How often do you have a drink containing alcohol? 0 Filed at: 02/12/2025 1126   2. How many drinks containing alcohol do you have on a typical day you are drinking?  0 Filed at: 02/12/2025 1126   3a. Male UNDER 65: How often do you have five or more drinks on one occasion? 0 Filed at: 02/12/2025 1126   3b. FEMALE Any Age, or MALE 65+: How often do you have 4 or more drinks on one occassion? 0 Filed at: 02/12/2025 1126   Audit-C Score 0 Filed at: 02/12/2025 1126   CARMELINA: How many times in the past year have you...    Used an illegal drug or used a prescription medication for non-medical reasons? Never Filed at: 02/12/2025 1126                            History of Present Illness       Chief Complaint   Patient presents with    Dental Pain     Pt has a known dental issue (Infected & Needs root cannal) called dentist and they offered tylenol with codine and pt did not  script due to 10/10 pain. Saw the dentist yesterday       Past Medical History:   Diagnosis Date    Allergic     Anxiety     Arthritis     Asthma     Claustrophobia     CPAP (continuous  positive airway pressure) dependence     Depression     Diabetes mellitus (HCC)     Disease of thyroid gland     hypo    GERD (gastroesophageal reflux disease)     Headache     History of COVID-19     Hyperlipemia     Hyperlipidemia     Hypertension     Inflammatory bowel disease     Kidney stone     Kidney stones     Major depressive disorder     Motion sickness     Obesity     Risk for falls     Sleep apnea     TIA (transient ischemic attack)     Use of cane as ambulatory aid     Wears glasses     Wears partial dentures     upper partial      Past Surgical History:   Procedure Laterality Date    ADENOIDECTOMY      COLONOSCOPY      DILATION AND CURETTAGE OF UTERUS      NM ARTHRS KNE SURG W/MENISCECTOMY MED/LAT W/SHVG Left 3/24/2022    Procedure: ARTHROSCOPY KNEE w/ partial medial menisectomy;  Surgeon: Terry Bhatt MD;  Location: [facility] OR;  Service: Orthopedics    NM COLONOSCOPY FLX DX W/COLLJ SPEC WHEN PFRMD N/A 3/15/2019    Procedure: COLONOSCOPY;  Surgeon: Angel Saavedra MD;  Location: [facility] GI LAB;  Service: Gastroenterology    ROTATOR CUFF REPAIR Right     SHOULDER SURGERY Left     impingement    TONSILLECTOMY      TUBAL LIGATION        Family History   Problem Relation Age of Onset    ALS Mother     Venous thrombosis Father     Diabetes Father     Other Family         cerebral embolism    Diabetes Family     Hypertension Family     Kidney disease Family     Breast cancer Neg Hx     Colon cancer Neg Hx     Ovarian cancer Neg Hx     Uterine cancer Neg Hx       Social History     Tobacco Use    Smoking status: Never     Passive exposure: Never    Smokeless tobacco: Never   Vaping Use    Vaping status: Never Used   Substance Use Topics    Alcohol use: No    Drug use: Never      E-Cigarette/Vaping    E-Cigarette Use Never User       E-Cigarette/Vaping Substances    Nicotine No     THC No     CBD No     Flavoring No     Other No     Unknown No       I have reviewed and agree with the history as  documented.     HPI  This is a 64-year-old female, recently seen by her dentist for chronic dental pain, started on amoxicillin yesterday and has received a total of 2 doses, symptoms been going on for 3 to 4 days without any associated nausea, without any associated diaphoresis chest pain presyncopal symptoms or exertional symptoms.  Patient denies any sore throat, trouble swallowing, recent facial trauma, ear pain, jaw claudication, neck stiffness or rash.  Also denies any melena, hematochezia or hemoptysis.    She was seen and evaluated by her dentist yesterday which is located in [facility].    Past medical history is chronic renal insufficiency and diabetes    Remaining 12 point review of systems is unremarkable.    Review of Systems   Constitutional:  Negative for activity change, diaphoresis, fatigue and fever.   HENT:  Positive for dental problem. Negative for congestion, drooling, ear discharge, ear pain, facial swelling, hearing loss, mouth sores, nosebleeds, postnasal drip, rhinorrhea, sinus pressure, sinus pain, sneezing, sore throat, tinnitus, trouble swallowing and voice change.    Eyes: Negative.    Respiratory: Negative.  Negative for chest tightness.    Cardiovascular: Negative.  Negative for chest pain.   Gastrointestinal: Negative.  Negative for nausea.   Endocrine: Negative.    Genitourinary: Negative.    Musculoskeletal: Negative.    Skin: Negative.    Allergic/Immunologic: Negative.    Neurological: Negative.    Hematological: Negative.    Psychiatric/Behavioral: Negative.             Objective       ED Triage Vitals [02/12/25 1124]   Temperature Pulse Blood Pressure Respirations SpO2 Patient Position - Orthostatic VS   97.8 °F (36.6 °C) 77 160/71 18 98 % Sitting      Temp Source Heart Rate Source BP Location FiO2 (%) Pain Score    Temporal Monitor Left arm -- 10 - Worst Possible Pain      Vitals      Date and Time Temp Pulse SpO2 Resp BP Pain Score FACES Pain Rating User   02/12/25 1215 -- 72  98 % 14 141/69 -- -- [facility]   02/12/25 1211 -- -- -- -- -- 10 - Worst Possible Pain -- [facility]   02/12/25 1200 -- 81 97 % 18 142/70 -- -- [facility]   02/12/25 1125 -- -- 98 % -- -- -- -- [facility]   02/12/25 1124 97.8 °F (36.6 °C) 77 98 % 18 160/71 10 - Worst Possible Pain -- [facility]            Physical Exam  Vitals and nursing note reviewed.   Constitutional:       General: She is not in acute distress.     Appearance: Normal appearance. She is normal weight. She is not ill-appearing, toxic-appearing or diaphoretic.   HENT:      Head: Normocephalic and atraumatic.      Comments: Poor dentition, patient has multiple teeth missing, patient has a upper tooth, molar that appears decayed, no focal abscess.     Right Ear: Tympanic membrane, ear canal and external ear normal.      Left Ear: Tympanic membrane, ear canal and external ear normal.      Nose: Nose normal.      Mouth/Throat:      Mouth: Mucous membranes are moist.      Pharynx: Oropharynx is clear.        Comments:   Ears appear normal.  External auditory canals patent without erythema or edema bilaterally.  TM grey/flat bilaterally.  Nose normal inspection, no deformity, nares patent bilaterally.  No septal hematoma, No epistaxis.  Mucous membranes moist, pink.  Tongue midline without edema.  Uvula midline without deviation.  Posterior pharynx widely patent.  No posterior erythema.  Tonsils without edema, No defined dental abscess.  No sublingual or submandibular fullness or swelling.  No trismus.  No drooling or pooling of secretions. No stridor w/o evidence of brawniness under the tongue.   Eyes:      Extraocular Movements: Extraocular movements intact.      Conjunctiva/sclera: Conjunctivae normal.      Pupils: Pupils are equal, round, and reactive to light.   Cardiovascular:      Rate and Rhythm: Normal rate and regular rhythm.      Pulses: Normal pulses.   Pulmonary:      Effort: Pulmonary effort is normal. No respiratory distress.   Abdominal:      General: Abdomen is flat.  Bowel sounds are normal.      Palpations: Abdomen is soft.   Musculoskeletal:         General: Normal range of motion.      Cervical back: Normal range of motion.   Skin:     General: Skin is warm.      Capillary Refill: Capillary refill takes less than 2 seconds.   Neurological:      General: No focal deficit present.      Mental Status: She is alert and oriented to person, place, and time. Mental status is at baseline.   Psychiatric:         Mood and Affect: Mood normal.         Behavior: Behavior normal.         Thought Content: Thought content normal.         Judgment: Judgment normal.         Results Reviewed       None            No orders to display       ECG 12 Lead Documentation Only    Date/Time: 2/12/2025 12:14 PM    Performed by: Boyd Robles III, DO  Authorized by: Boyd Robles III, DO    Indications / Diagnosis:  Jaw pain  ECG reviewed by me, the ED Provider: no    Patient location:  ED  Comments:      I personally reviewed this EKG that was performed the patient February 12, 2025, EKG was completed at 12:13 PM inter by me the same time, normal sinus rhythm with low voltage throughout the precordial leads, ventricular rate of 77 bpm, the remaining portion Nevils within normal limits.    No diffuse elevations to indicate pericarditis.  No coved ST elevations greater than 2mm with negative T waves in V1-3 to indicate concern for brugada.  No biphasic T waves in V2, V3 to indicate Wellens (critical stenosis of LAD).   No elevation in aVR or deviation when compared to V1 (can be associated with ST depression in I,II, V4-6 when left main occlusion is present).       ED Medication and Procedure Management   Prior to Admission Medications   Prescriptions Last Dose Informant Patient Reported? Taking?   QUEtiapine (SEROquel) 200 mg tablet  Self No No   Sig: Take 1 tablet (200 mg total) by mouth daily at bedtime   QUEtiapine (SEROquel) 25 mg tablet  Self Yes No   Sig: Take 25 mg by mouth 2  (two) times a day as needed 2-3 at bedtime prn   albuterol (ProAir HFA) 90 mcg/act inhaler  Self No No   Sig: Inhale 2 puffs every 4 (four) hours as needed for wheezing or shortness of breath   aspirin 81 mg chewable tablet  Self Yes No   Sig: Chew 81 mg daily   benzonatate (TESSALON PERLES) 100 mg capsule  Self No No   Si-2 caps every 8 hours as needed for cough   Patient not taking: Reported on 2024   benzonatate (TESSALON PERLES) 100 mg capsule   No No   Sig: Take 1 capsule (100 mg total) by mouth 3 (three) times a day as needed for cough   busPIRone (BUSPAR) 10 mg tablet  Self Yes No   Sig: 10 mg in the morning   eszopiclone (LUNESTA) 1 mg tablet  Self Yes No   Sig: Take 2 mg by mouth daily at bedtime Takes 2-3 daily   eszopiclone (LUNESTA) 3 MG tablet   Yes No   Sig: Take 3 mg by mouth daily at bedtime   famotidine (PEPCID) 40 MG tablet   No No   Sig: Take 1 tablet (40 mg total) by mouth daily   famotidine (PEPCID) 40 MG tablet   No No   Sig: Take 1 tablet (40 mg total) by mouth daily at bedtime   Patient not taking: Reported on 2025   fluticasone (FLONASE) 50 mcg/act nasal spray  Self No No   Si spray into each nostril daily   gabapentin (NEURONTIN) 100 mg capsule  Self Yes No   Sig: TAKE 1 TO 2 CAPSULES BY MOUTH DAILY AT BEDTIME   levothyroxine 88 mcg tablet   No No   Sig: Take 1 tablet (88 mcg total) by mouth daily in the early morning   lisinopril (ZESTRIL) 10 mg tablet  Self No No   Sig: Take 1 tablet (10 mg total) by mouth daily   loratadine (CLARITIN) 10 mg tablet  Self No No   Sig: Take 1 tablet (10 mg total) by mouth 2 (two) times a day   loratadine (CLARITIN) 10 mg tablet   No No   Sig: Take 1 tablet (10 mg total) by mouth daily   Patient not taking: Reported on 2025   magnesium oxide (MAG-OX) 400 mg tablet   No No   Sig: Take 1 tablet (400 mg total) by mouth 2 (two) times a day   metFORMIN (GLUCOPHAGE) 500 mg tablet  Self No No   Sig: TAKE 2 TABLETS BY MOUTH TWICE A DAY WITH  FOOD   rosuvastatin (CRESTOR) 20 MG tablet  Self No No   Sig: Take 1 tablet (20 mg total) by mouth daily   sertraline (ZOLOFT) 100 mg tablet  Self No No   Sig: TAKE 2 TABLETS (200 MG TOTAL) BY MOUTH DAILY AT BEDTIME   traZODone (DESYREL) 50 mg tablet  Self No No   Sig: TAKE 1 TABLET BY MOUTH DAILY AT BEDTIME   venlafaxine (EFFEXOR-XR) 150 mg 24 hr capsule  Self No No   Sig: TAKE 1 CAPSULE BY MOUTH EVERY DAY      Facility-Administered Medications: None     Discharge Medication List as of 2/12/2025 12:28 PM        CONTINUE these medications which have NOT CHANGED    Details   albuterol (ProAir HFA) 90 mcg/act inhaler Inhale 2 puffs every 4 (four) hours as needed for wheezing or shortness of breath, Starting Tue 8/27/2024, Normal      aspirin 81 mg chewable tablet Chew 81 mg daily, Historical Med      !! benzonatate (TESSALON PERLES) 100 mg capsule 1-2 caps every 8 hours as needed for cough, Normal      !! benzonatate (TESSALON PERLES) 100 mg capsule Take 1 capsule (100 mg total) by mouth 3 (three) times a day as needed for cough, Starting Tue 1/14/2025, Normal      busPIRone (BUSPAR) 10 mg tablet 10 mg in the morning, Starting Tue 2/13/2024, Historical Med      !! eszopiclone (LUNESTA) 1 mg tablet Take 2 mg by mouth daily at bedtime Takes 2-3 daily, Starting Wed 2/14/2024, Historical Med      !! eszopiclone (LUNESTA) 3 MG tablet Take 3 mg by mouth daily at bedtime, Starting Fri 12/27/2024, Historical Med      !! famotidine (PEPCID) 40 MG tablet Take 1 tablet (40 mg total) by mouth daily, Starting Wed 10/2/2024, Normal      !! famotidine (PEPCID) 40 MG tablet Take 1 tablet (40 mg total) by mouth daily at bedtime, Starting Wed 11/13/2024, Until Sat 11/8/2025, Normal      fluticasone (FLONASE) 50 mcg/act nasal spray 1 spray into each nostril daily, Starting u 9/5/2024, Normal      gabapentin (NEURONTIN) 100 mg capsule TAKE 1 TO 2 CAPSULES BY MOUTH DAILY AT BEDTIME, Historical Med      levothyroxine 88 mcg tablet Take 1  tablet (88 mcg total) by mouth daily in the early morning, Starting Wed 11/13/2024, Normal      lisinopril (ZESTRIL) 10 mg tablet Take 1 tablet (10 mg total) by mouth daily, Starting Thu 8/1/2024, Normal      !! loratadine (CLARITIN) 10 mg tablet Take 1 tablet (10 mg total) by mouth 2 (two) times a day, Starting Mon 8/28/2023, Normal      !! loratadine (CLARITIN) 10 mg tablet Take 1 tablet (10 mg total) by mouth daily, Starting Wed 11/13/2024, Normal      magnesium oxide (MAG-OX) 400 mg tablet Take 1 tablet (400 mg total) by mouth 2 (two) times a day, Starting Thu 10/24/2024, Normal      metFORMIN (GLUCOPHAGE) 500 mg tablet TAKE 2 TABLETS BY MOUTH TWICE A DAY WITH FOOD, Normal      !! QUEtiapine (SEROquel) 200 mg tablet Take 1 tablet (200 mg total) by mouth daily at bedtime, Starting Fri 1/7/2022, Normal      !! QUEtiapine (SEROquel) 25 mg tablet Take 25 mg by mouth 2 (two) times a day as needed 2-3 at bedtime prn, Starting Wed 2/1/2023, Historical Med      rosuvastatin (CRESTOR) 20 MG tablet Take 1 tablet (20 mg total) by mouth daily, Starting Thu 8/1/2024, Normal      sertraline (ZOLOFT) 100 mg tablet TAKE 2 TABLETS (200 MG TOTAL) BY MOUTH DAILY AT BEDTIME, Starting Thu 2/9/2023, Normal      traZODone (DESYREL) 50 mg tablet TAKE 1 TABLET BY MOUTH DAILY AT BEDTIME, Normal      venlafaxine (EFFEXOR-XR) 150 mg 24 hr capsule TAKE 1 CAPSULE BY MOUTH EVERY DAY, Normal       !! - Potential duplicate medications found. Please discuss with provider.        No discharge procedures on file.  ED SEPSIS DOCUMENTATION   Time reflects when diagnosis was documented in both MDM as applicable and the Disposition within this note       Time User Action Codes Description Comment    2/12/2025 12:25 PM Boyd Robles Add [K02.9] Dental caries                  Boyd Robles III, DO  02/12/25 4636

## 2025-02-17 NOTE — CONSULTS
Consultation - GI   Telly Carmichael 66 y.o. female MRN: [id number]  Unit/Bed#: E5 -01 Encounter: [id number]      Assessment/Plan     Assessment:  1. Constipation  2. Weight loss  3. Anemia  Pt presented with constipation x 9 days with associated lower abdominal and flank pain. CT abdomen pelvis showed large stool burden in colon and incidental cholelithiasis   Pt hospitalized for similar issue 4/2023  Pt reports feeling better and having multiple bowel movements 8/1 afternoon. Anemia likely dilutional.  No evidence of overt bleeding. Plan:   Start GoLytley 4L 1 dose   Continue Senna tablet 8.6 mg   Continue Miralax PRN  Iron Panel for mild anemia  Outpatient follow-up for stronger agent including Jacqualine Haste. Patient is stable from GI perspective for discharge when comfortable from a constipation standpoint. Check iron studies. Supplement if necessary. Patient EGD and colonoscopy to investigate weight loss. 2. Cholelithiasis  Incidental finding on CT abdomen/pelvis  Noted on previous CT 4/2023 & 7/2022  Pt denies associated pain    Plan  Follow up outpatient GI    3. Type 2 DM  Pt reports under control  Pt on Metformin 574 BID, Trulicity 1x week  Last HbA1C- 7.8    Plan  Continue current medications    3. Hypothyroidism  Pt on Levothyroxine   TSH WNL    Plan  Continue current medications     4. Anxiety  Pt on Effexor, Zoloft, Seroquel  Potentially a source of constipation     Plan  Monitor   Continue current medications      History of Present Illness   Physician Requesting Consult: Brendon Page MD  Reason for Consult / Principal Problem: Constipation   Hx and PE limited by:     HPI: Telly Carmichael is a 66y.o. year old female with history of asthma, HTN, DM type II, hypothyroidism who presents with constipation and lower abdominal pain. The patient states that she has not had a bowel movement for the past 9 days.  She has been hospitalized for this before 4/2023 and she states that she has a family history of this with her mother having similar issues. The Pt states that she has some lower abdominal as well as some flank pain that started when the constipation started. She reports trying at home stool softeners but they did not help. Pt reports having an unintentional weight loss of 20 lbs in the past few months but is unsure of the specific timeline. Pt endorses some nausea and decreased appetite (onset same as constipation), some dizziness but denies vomiting, chest pain, SOB. Pt was given fleets enema in ED and started on Senna 8.6 mg which she said helped a little and was able to have a bowel movement around 2-3am but patient still feels constipated. Pt's CT scan 7/31 showed large stool burden throughout the colon and incidental cholelithiasis without inflammation- both have been noted on previous exams. Last colonoscopy was in 2019 and showed 3 mm polyp in transverse colon which was an adenoma- pt was recommended for coloscopy again in 2024. Pt also stated she was recently treated for UTI at urgent care with Bactrim-DS 7/27. Pt's labs WNL except glucose 181 mg/dL. Pt reports compliance with all home meds. Inpatient consult to gastroenterology  Consult performed by: Shiela Brewer DO  Consult ordered by: Sherice Tucker PA-C          Inpatient consult to gastroenterology     Performed by  Shiela Brewer DO   Authorized by Sherice Tucker PA-C             Review of Systems   Constitutional: Positive for unexpected weight change. Negative for appetite change, chills and fever. HENT: Negative for ear pain and sore throat. Eyes: Negative for pain and visual disturbance. Respiratory: Negative for cough and shortness of breath. Cardiovascular: Negative for chest pain, palpitations and leg swelling. Gastrointestinal: Positive for abdominal pain and constipation. Negative for abdominal distention, diarrhea, nausea and vomiting.    Genitourinary: Negative for dysuria and 72-year-old female PMH SCC lip, cyclical vomiting syndrome, HTN, HLD, gout, HERD, prior CVA on aggrenox, recent diagnosis of primary pancreatic adenocarcinoma of head with tumor in umbilicus found during repair of umbilical hernia,  CT showing pancreatic mass, pateint was undergoing chemotherapy and developed profound chemo-induced pancytopenia with vomitting mucositis and diarrhea, presented to [facility] on 2/9 where she was found to be in septic shock due to pseudomonal bacteremia was started on levophed and eventually titrated off txferred to floor and then txferred to  at request of family for further evaluation and management. On 2/15 pt developed rigors and tachypnea/tachycardia in setting of 1u plt transfusion, pt was given lasix 80mg IV x1 and txferred to ICU for close respiratory and hemodynamic monitoring.     Problems:  #Sepsis (POA) due to pseudomonal bacteremia and UTI  #MALLORY and hypokalemia likely pre-renal in setting of sepsis and diuretic  #Bilateral pleural effusion and ascites 2/2 malignancy and hypoalbuminemia  #Pancytopenia  #Mucositis  #Metastatic pancreatic adenocarcinoma    Plan:  - Mental status intact, pain control prn  - Hemodynamically stable, little hypertensive will restart home amlodipine, TTE reviewed hyperdynamic LV w/ trace pericardial effusion  - On room air this AM, cont to monitor, tachypnea possible in setting of effusions/ascites, will cont to monitor, IV albumin poss to help with 3rd spacing  - PO diet, supplement with TPN due to poor PO intake from mucositis  - MALLORY improving w/ IVF started on LR @125/h, hypokalemia improving w/ repletions, cont to monitor UOP/renal indices, nephro consult appreciated  - C/w IV ericka for pseudomonas bacteremia/UTI, Cdiff negative c/w cdiff ppx, fluconazole for oral thrush, ID following recs appreciated  - DVT ppx SCDs, hold pharm DVT ppx in setting of thrombocytopenia, heme/onc following recs appreciated c/w zarxio, LE doppler negative for DVT    Dispo: SICU, full code, pt and  updated at bedside hematuria. Musculoskeletal: Negative for arthralgias and back pain. Skin: Negative for color change and rash. Neurological: Negative for dizziness, seizures, syncope and light-headedness (mild/transient). All other systems reviewed and are negative. Historical Information   Past Medical History:   Diagnosis Date   • Anxiety    • Arthritis    • Asthma    • Claustrophobia    • CPAP (continuous positive airway pressure) dependence    • Depression    • Diabetes mellitus ([address])    • Disease of thyroid gland     hypo   • GERD (gastroesophageal reflux disease)    • Headache    • History of COVID-19    • Hyperlipemia    • Kidney stone    • Kidney stones    • Major depressive disorder    • Motion sickness    • Risk for falls    • Sleep apnea    • Use of cane as ambulatory aid    • Wears glasses    • Wears partial dentures     upper partial     Past Surgical History:   Procedure Laterality Date   • ADENOIDECTOMY     • COLONOSCOPY     • DILATION AND CURETTAGE OF UTERUS     • SD ARTHRS KNE SURG W/MENISCECTOMY MED/LAT W/SHVG Left 3/24/2022    Procedure: ARTHROSCOPY KNEE w/ partial medial menisectomy;  Surgeon: Jeovanny Norman MD;  Location: [facility] OR;  Service: Orthopedics   • [facility] WHEN PFR[facility] N/A 3/15/2019    Procedure: COLONOSCOPY;  Surgeon: Berlin Martin MD;  Location: [facility] GI LAB;   Service: Gastroenterology   • ROTATOR CUFF REPAIR Right    • SHOULDER SURGERY Left     impingement   • TONSILLECTOMY     • TUBAL LIGATION       Social History   Social History     Substance and Sexual Activity   Alcohol Use No     Social History     Substance and Sexual Activity   Drug Use No     E-Cigarette/Vaping   • E-Cigarette Use Never User      E-Cigarette/Vaping Substances   • Nicotine No    • THC No    • CBD No    • Flavoring No    • Other No    • Unknown No      Social History     Tobacco Use   Smoking Status Never   Smokeless Tobacco Never     Family History:   Family History   Problem Relation Age of Onset   • ALS Mother    • Venous thrombosis Father    • Diabetes Father    • Other Family         cerebral embolism   • Diabetes Family    • Hypertension Family    • Kidney disease Family    • Breast cancer Neg Hx    • Colon cancer Neg Hx    • Ovarian cancer Neg Hx    • Uterine cancer Neg Hx        Meds/Allergies   all current active meds have been reviewed    Allergies   Allergen Reactions   • Darvon [Propoxyphene] Dizziness   • Fluoxetine Other (See Comments)     suicidal   • Meclizine Dizziness   • Morphine And Related Nausea Only   • Oxycodone-Acetaminophen Other (See Comments) and Tachycardia     The whole house was moving   • [address] [Oxycodone] Other (See Comments) and Tachycardia     The whole house was moving       Objective       Intake/Output Summary (Last 24 hours) at 8/1/2023 0825  Last data filed at 8/1/2023 0816  Gross per 24 hour   Intake 1300 ml   Output --   Net 1300 ml       Invasive Devices:   Peripheral IV 07/31/23 Left Antecubital (Active)   Site Assessment WDL; Clean;Dry; Intact 08/01/23 0100   Dressing Type Transparent 08/01/23 0100   Line Status Flushed;Saline locked 08/01/23 0100   Dressing Status Clean;Dry; Intact 08/01/23 0100       Physical Exam  Vitals and nursing note reviewed. Constitutional:       General: She is not in acute distress. Appearance: She is well-developed. She is not ill-appearing. HENT:      Head: Normocephalic and atraumatic. Eyes:      Conjunctiva/sclera: Conjunctivae normal.   Cardiovascular:      Rate and Rhythm: Normal rate and regular rhythm. Heart sounds: No murmur heard. Pulmonary:      Effort: Pulmonary effort is normal. No respiratory distress. Breath sounds: Normal breath sounds. Abdominal:      General: Bowel sounds are normal. There is no distension. Palpations: Abdomen is soft. Tenderness: There is no abdominal tenderness. Musculoskeletal:         General: No swelling. Cervical back: Neck supple. Right lower leg: No edema. Left lower leg: No edema. Skin:     General: Skin is warm and dry. Capillary Refill: Capillary refill takes less than 2 seconds. Neurological:      General: No focal deficit present. Mental Status: She is alert. Psychiatric:         Mood and Affect: Mood normal.         Lab Results: I have personally reviewed pertinent reports. .  Lab Results   Component Value Date/Time    SODIUM 134 (L) 08/01/2023 08:23 AM    K 4.4 08/01/2023 08:23 AM     08/01/2023 08:23 AM    CO2 26 08/01/2023 08:23 AM    BUN 14 08/01/2023 08:23 AM    CREATININE 1.20 08/01/2023 08:23 AM    GLUC 181 (H) 08/01/2023 08:23 AM       Lab Results   Component Value Date/Time    WBC 6.80 08/01/2023 08:23 AM    WBC 6.13 11/25/2013 11:54 AM    RBC 3.87 08/01/2023 08:23 AM    RBC 4.22 11/25/2013 11:54 AM    HGB 11.4 (L) 08/01/2023 08:23 AM    HGB 12.2 11/25/2013 11:54 AM    HCT 35.8 08/01/2023 08:23 AM    HCT 37.8 11/25/2013 11:54 AM     08/01/2023 08:23 AM     11/25/2013 11:54 AM       Lab Results   Component Value Date/Time    ALKPHOS 93 07/31/2023 06:29 PM    ALT 17 07/31/2023 06:29 PM    AST 17 07/31/2023 06:29 PM    ALB 4.3 07/31/2023 06:29 PM    TBILI 0.28 07/31/2023 06:29 PM     Imaging Studies: I have personally reviewed pertinent reports. CT abdomen/pelvis:     IMPRESSION:  Large amount of stool throughout the colon similar to prior study in keeping with constipation. Gallstones without surrounding inflammation. No acute change from previous CT    Colonoscopy 3/2019: There was a 3 mm sessile polyp in the transverse colon that was removed with cold forceps polypectomy. Retroflexed view of the rectum was unremarkable. EKG, Pathology, and Other Studies: I have personally reviewed pertinent reports.       VTE Prophylaxis: Enoxaparin (Lovenox)    Barry Reynolds, 3rd Year Medical Student, PCOM

## 2025-02-19 ENCOUNTER — OFFICE VISIT (OUTPATIENT)
Age: 65
End: 2025-02-19
Payer: COMMERCIAL

## 2025-02-19 VITALS
OXYGEN SATURATION: 98 % | TEMPERATURE: 98.1 F | BODY MASS INDEX: 33.99 KG/M2 | DIASTOLIC BLOOD PRESSURE: 62 MMHG | SYSTOLIC BLOOD PRESSURE: 116 MMHG | WEIGHT: 204 LBS | RESPIRATION RATE: 16 BRPM | HEART RATE: 83 BPM | HEIGHT: 65 IN

## 2025-02-19 DIAGNOSIS — I10 PRIMARY HYPERTENSION: ICD-10-CM

## 2025-02-19 DIAGNOSIS — K21.9 GASTROESOPHAGEAL REFLUX DISEASE WITHOUT ESOPHAGITIS: ICD-10-CM

## 2025-02-19 DIAGNOSIS — J30.1 ALLERGIC RHINITIS DUE TO POLLEN, UNSPECIFIED SEASONALITY: ICD-10-CM

## 2025-02-19 DIAGNOSIS — E83.42 HYPOMAGNESEMIA: ICD-10-CM

## 2025-02-19 DIAGNOSIS — Z00.00 WELL ADULT EXAM: Primary | ICD-10-CM

## 2025-02-19 DIAGNOSIS — E11.9 TYPE 2 DIABETES MELLITUS WITHOUT COMPLICATION, WITHOUT LONG-TERM CURRENT USE OF INSULIN (HCC): ICD-10-CM

## 2025-02-19 LAB — SL AMB POCT HEMOGLOBIN AIC: 7.6 (ref ?–6.5)

## 2025-02-19 PROCEDURE — 83036 HEMOGLOBIN GLYCOSYLATED A1C: CPT | Performed by: FAMILY MEDICINE

## 2025-02-19 PROCEDURE — 99396 PREV VISIT EST AGE 40-64: CPT | Performed by: FAMILY MEDICINE

## 2025-02-19 RX ORDER — FAMOTIDINE 40 MG/1
40 TABLET, FILM COATED ORAL
Qty: 90 TABLET | Refills: 1 | Status: SHIPPED | OUTPATIENT
Start: 2025-02-19 | End: 2026-02-14

## 2025-02-19 RX ORDER — LORATADINE 10 MG/1
10 TABLET ORAL DAILY
Qty: 90 TABLET | Refills: 1 | Status: SHIPPED | OUTPATIENT
Start: 2025-02-19

## 2025-02-19 RX ORDER — MAGNESIUM OXIDE 400 MG/1
1 TABLET ORAL 2 TIMES DAILY
Qty: 180 TABLET | Refills: 3 | Status: SHIPPED | OUTPATIENT
Start: 2025-02-19

## 2025-02-19 NOTE — PROGRESS NOTES
"Assessment/Plan:       Diagnoses and all orders for this visit:    Well adult exam    Type 2 diabetes mellitus without complication, without long-term current use of insulin ([facility])  -     POCT hemoglobin A1c    Gastroesophageal reflux disease without esophagitis  -     famotidine (PEPCID) 40 MG tablet; Take 1 tablet (40 mg total) by mouth daily at bedtime    Allergic rhinitis due to pollen, unspecified seasonality  -     loratadine (CLARITIN) 10 mg tablet; Take 1 tablet (10 mg total) by mouth daily    Hypomagnesemia  -     magnesium oxide (MAG-OX) 400 mg tablet; Take 1 tablet (400 mg total) by mouth 2 (two) times a day            Subjective:        Patient ID: Ana Rosa Parker is a 65 y.o. female.      Patient is here for wellness exam.  Labs and vaccines reviewed.  Colonoscopy 2023.  Patient up-to-date with mammogram due March of this year.  Patient due for gynecologic care.          The following portions of the patient's history were reviewed and updated as appropriate: allergies, current medications, past family history, past medical history, past social history, past surgical history and problem list.      Review of Systems   Constitutional: Negative.    HENT:  Positive for dental problem.    Eyes: Negative.    Respiratory: Negative.     Cardiovascular: Negative.    Gastrointestinal: Negative.    Endocrine: Negative.    Genitourinary: Negative.    Musculoskeletal: Negative.    Skin: Negative.    Allergic/Immunologic: Negative.    Neurological: Negative.    Hematological: Negative.    Psychiatric/Behavioral: Negative.             Objective:        Depression Screening and Follow-up Plan: Patient was screened for depression during today's encounter. They screened negative with a PHQ-9 score of 0.              /62 (BP Location: Left arm, Patient Position: Sitting, Cuff Size: Large)   Pulse 83   Temp 98.1 °F (36.7 °C) (Temporal)   Resp 16   Ht 5' 5\" (1.651 m)   Wt 92.5 kg (204 lb)   LMP  (LMP Unknown)   " SpO2 98%   BMI 33.95 kg/m²          Physical Exam  Vitals and nursing note reviewed.   Constitutional:       General: She is not in acute distress.     Appearance: Normal appearance. She is not ill-appearing, toxic-appearing or diaphoretic.   HENT:      Head: Normocephalic and atraumatic.      Right Ear: Tympanic membrane, ear canal and external ear normal. There is no impacted cerumen.      Left Ear: Tympanic membrane, ear canal and external ear normal. There is no impacted cerumen.      Nose: Nose normal. No congestion or rhinorrhea.   Eyes:      General: No scleral icterus.        Right eye: No discharge.         Left eye: No discharge.   Neck:      Vascular: No carotid bruit.   Cardiovascular:      Rate and Rhythm: Normal rate and regular rhythm.      Pulses: Normal pulses.      Heart sounds: Normal heart sounds. No murmur heard.     No friction rub. No gallop.   Pulmonary:      Effort: Pulmonary effort is normal. No respiratory distress.      Breath sounds: Normal breath sounds. No stridor. No wheezing, rhonchi or rales.   Chest:      Chest wall: No tenderness.   Musculoskeletal:         General: No swelling, tenderness, deformity or signs of injury. Normal range of motion.      Cervical back: Normal range of motion and neck supple. No rigidity. No muscular tenderness.      Right lower leg: No edema.      Left lower leg: No edema.   Lymphadenopathy:      Cervical: No cervical adenopathy.   Skin:     General: Skin is warm and dry.      Capillary Refill: Capillary refill takes less than 2 seconds.      Coloration: Skin is not jaundiced.      Findings: No bruising, erythema, lesion or rash.   Neurological:      Mental Status: She is alert and oriented to person, place, and time. Mental status is at baseline.      Cranial Nerves: No cranial nerve deficit.      Sensory: No sensory deficit.      Motor: No weakness.      Coordination: Coordination normal.      Gait: Gait normal.   Psychiatric:         Mood and  Affect: Mood normal.         Behavior: Behavior normal.         Thought Content: Thought content normal.         Judgment: Judgment normal.

## 2025-02-20 RX ORDER — LISINOPRIL 10 MG/1
10 TABLET ORAL DAILY
Qty: 90 TABLET | Refills: 1 | Status: SHIPPED | OUTPATIENT
Start: 2025-02-20

## 2025-02-25 ENCOUNTER — OFFICE VISIT (OUTPATIENT)
Age: 65
End: 2025-02-25
Payer: COMMERCIAL

## 2025-02-25 VITALS
WEIGHT: 208.2 LBS | HEIGHT: 65 IN | BODY MASS INDEX: 34.69 KG/M2 | HEART RATE: 91 BPM | TEMPERATURE: 98 F | SYSTOLIC BLOOD PRESSURE: 114 MMHG | DIASTOLIC BLOOD PRESSURE: 74 MMHG | OXYGEN SATURATION: 97 %

## 2025-02-25 DIAGNOSIS — K04.7 CHRONIC DENTAL INFECTION: ICD-10-CM

## 2025-02-25 DIAGNOSIS — Z12.11 COLON CANCER SCREENING: Primary | ICD-10-CM

## 2025-02-25 DIAGNOSIS — Z12.31 ENCOUNTER FOR SCREENING MAMMOGRAM FOR BREAST CANCER: ICD-10-CM

## 2025-02-25 DIAGNOSIS — E11.9 TYPE 2 DIABETES MELLITUS WITHOUT COMPLICATION, WITHOUT LONG-TERM CURRENT USE OF INSULIN (HCC): ICD-10-CM

## 2025-02-25 PROCEDURE — 99214 OFFICE O/P EST MOD 30 MIN: CPT | Performed by: FAMILY MEDICINE

## 2025-02-25 RX ORDER — GABAPENTIN 100 MG/1
200 CAPSULE ORAL 2 TIMES DAILY
Qty: 360 CAPSULE | Refills: 1 | Status: SHIPPED | OUTPATIENT
Start: 2025-02-25

## 2025-02-25 RX ORDER — DULAGLUTIDE 0.75 MG/.5ML
0.75 INJECTION, SOLUTION SUBCUTANEOUS WEEKLY
Qty: 4 ML | Refills: 2 | Status: SHIPPED | OUTPATIENT
Start: 2025-02-25

## 2025-02-25 RX ORDER — AMOXICILLIN 500 MG/1
CAPSULE ORAL
COMMUNITY
Start: 2025-02-20

## 2025-02-25 RX ORDER — ACETAMINOPHEN AND CODEINE PHOSPHATE 300; 30 MG/1; MG/1
1 TABLET ORAL DAILY PRN
Qty: 30 TABLET | Refills: 0 | Status: SHIPPED | OUTPATIENT
Start: 2025-02-25

## 2025-02-28 NOTE — PROGRESS NOTES
Name: Ana Rosa Parker      : 1960      MRN: [id number]  Encounter Provider: Juan Diego Ayala MD  Encounter Date: 2025   Encounter department: [facility] PRIMARY CARE  :  Assessment & Plan  Colon cancer screening    Orders:    Ambulatory Referral to General Surgery; Future    Comprehensive metabolic panel; Future    Magnesium; Future    TSH, 3rd generation with Free T4 reflex; Future    CBC and differential; Future    Comprehensive metabolic panel; Future    Comprehensive metabolic panel; Future    Encounter for screening mammogram for breast cancer    Orders:    Mammo screening bilateral w 3d and cad; Future    Comprehensive metabolic panel; Future    Magnesium; Future    TSH, 3rd generation with Free T4 reflex; Future    CBC and differential; Future    Comprehensive metabolic panel; Future    Comprehensive metabolic panel; Future    Type 2 diabetes mellitus without complication, without long-term current use of insulin (HCC)  Will add trulicity. Discussed supportive care and return parameters.   Lab Results   Component Value Date    HGBA1C 7.6 (A) 2025       Orders:    Dulaglutide (Trulicity) 0.75 MG/0.5ML SOAJ; Inject 0.75 mg under the skin once a week    Comprehensive metabolic panel; Future    Magnesium; Future    TSH, 3rd generation with Free T4 reflex; Future    CBC and differential; Future    Comprehensive metabolic panel; Future    Comprehensive metabolic panel; Future    Chronic dental infection  Add Augmentin. Discussed supportive care and return parameters. Will give tylenol #3 for breakthrough symptoms only.  Orders:    amoxicillin-clavulanate (AUGMENTIN) 875-125 mg per tablet; Take 1 tablet by mouth every 12 (twelve) hours for 10 days    gabapentin (NEURONTIN) 100 mg capsule; Take 2 capsules (200 mg total) by mouth 2 (two) times a day    acetaminophen-codeine (TYLENOL with CODEINE #3) 300-30 MG per tablet; Take 1 tablet by mouth daily as needed for severe pain     "Comprehensive metabolic panel; Future    Magnesium; Future    TSH, 3rd generation with Free T4 reflex; Future    CBC and differential; Future    Comprehensive metabolic panel; Future    Comprehensive metabolic panel; Future           History of Present Illness   Patient is a 66 y/o woman who presents for follow-up on DM2, with chronic dental infection, pt has follow-up with oral surgeon no fevers chills nausea or vomiting otherwise admits being stable on meds.      Review of Systems   Constitutional: Negative.    HENT: Negative.     Eyes: Negative.    Respiratory: Negative.     Cardiovascular: Negative.    Gastrointestinal: Negative.    Endocrine: Negative.    Genitourinary: Negative.    Musculoskeletal: Negative.    Allergic/Immunologic: Negative.    Neurological: Negative.    Hematological: Negative.    Psychiatric/Behavioral: Negative.     All other systems reviewed and are negative.      Objective   /74 (BP Location: Right arm, Patient Position: Sitting, Cuff Size: Large)   Pulse 91   Temp 98 °F (36.7 °C) (Temporal)   Ht 5' 5\" (1.651 m)   Wt 94.4 kg (208 lb 3.2 oz)   LMP  (LMP Unknown)   SpO2 97%   BMI 34.65 kg/m²      Physical Exam  Constitutional:       General: She is not in acute distress.     Appearance: She is well-developed. She is not diaphoretic.   HENT:      Head: Normocephalic and atraumatic.      Right Ear: External ear normal.      Left Ear: External ear normal.      Nose: Nose normal.      Mouth/Throat:      Pharynx: No oropharyngeal exudate.   Eyes:      General:         Right eye: No discharge.         Left eye: No discharge.      Conjunctiva/sclera: Conjunctivae normal.      Pupils: Pupils are equal, round, and reactive to light.   Neck:      Thyroid: No thyromegaly.      Trachea: No tracheal deviation.   Cardiovascular:      Rate and Rhythm: Normal rate and regular rhythm.      Heart sounds: Normal heart sounds. No murmur heard.     No friction rub. No gallop.   Pulmonary:      " Effort: Pulmonary effort is normal. No respiratory distress.      Breath sounds: Normal breath sounds.   Abdominal:      General: There is no distension.      Palpations: Abdomen is soft.      Tenderness: There is no abdominal tenderness. There is no guarding or rebound.   Musculoskeletal:         General: Normal range of motion.   Lymphadenopathy:      Cervical: No cervical adenopathy.   Skin:     General: Skin is warm.   Neurological:      Mental Status: She is alert and oriented to person, place, and time.      Cranial Nerves: No cranial nerve deficit.   Psychiatric:         Behavior: Behavior normal.         Thought Content: Thought content normal.         Judgment: Judgment normal.

## 2025-02-28 NOTE — ASSESSMENT & PLAN NOTE
Will add trulicity. Discussed supportive care and return parameters.   Lab Results   Component Value Date    HGBA1C 7.6 (A) 02/19/2025       Orders:    Dulaglutide (Trulicity) 0.75 MG/0.5ML SOAJ; Inject 0.75 mg under the skin once a week    Comprehensive metabolic panel; Future    Magnesium; Future    TSH, 3rd generation with Free T4 reflex; Future    CBC and differential; Future    Comprehensive metabolic panel; Future    Comprehensive metabolic panel; Future

## 2025-03-05 ENCOUNTER — TELEPHONE (OUTPATIENT)
Age: 65
End: 2025-03-05

## 2025-03-12 ENCOUNTER — TELEPHONE (OUTPATIENT)
Dept: NEUROLOGY | Facility: CLINIC | Age: 65
End: 2025-03-12

## 2025-03-12 NOTE — TELEPHONE ENCOUNTER
LMOM to confirm patient's appointment on 3/18  with Joe. Reminded pt to arrive 15 min prior to allow enough time for check in.

## 2025-04-28 DIAGNOSIS — F51.01 PRIMARY INSOMNIA: Primary | ICD-10-CM

## 2025-04-28 NOTE — TELEPHONE ENCOUNTER
Medication: eszopiclone (LUNESTA) 3 MG tablet     Dose/Frequency: Take 3 mg by mouth daily at bedtime     Quantity:      Pharmacy: [id number] - [place] - [address] [place] [address]     Office:   [x] PCP/Provider -   [] Speciality/Provider -     Does the patient have enough for 3 days?   [] Yes   [x] No - Send as HP to POD

## 2025-04-29 RX ORDER — ESZOPICLONE 3 MG/1
3 TABLET, FILM COATED ORAL
Qty: 30 TABLET | Refills: 0 | Status: SHIPPED | OUTPATIENT
Start: 2025-04-29

## 2025-05-07 DIAGNOSIS — F32.A DEPRESSION, UNSPECIFIED DEPRESSION TYPE: ICD-10-CM

## 2025-05-07 RX ORDER — VENLAFAXINE HYDROCHLORIDE 150 MG/1
150 CAPSULE, EXTENDED RELEASE ORAL DAILY
Qty: 90 CAPSULE | Refills: 1 | Status: SHIPPED | OUTPATIENT
Start: 2025-05-07

## 2025-05-07 RX ORDER — SERTRALINE HYDROCHLORIDE 100 MG/1
200 TABLET, FILM COATED ORAL
Qty: 180 TABLET | Refills: 1 | Status: SHIPPED | OUTPATIENT
Start: 2025-05-07

## 2025-05-07 NOTE — TELEPHONE ENCOUNTER
Reason for call:   [x] Refill   [] Prior Auth  [] Other:     Office:   [x] PCP/Provider - Juan [facility]      [] Specialty/Provider -     Medication: Zoloft    Dose/Frequency: 100 mg     Quantity: #180    Pharmacy: [facility] [address] [place] [address] [facility]   Does the patient have enough for 3 days?   [] Yes   [x] No - Send as HP to POD    [facility]   Does the patient have enough for 10 days?   [] Yes   [] No - Send as HP to POD                  Reason for call:   [x] Refill   [] Prior Auth  [] Other:     Office:   [x] PCP/Provider -   [] Specialty/Provider -     Medication: Effexor-XR    Dose/Frequency: 150 mg 24 hr capsule    Quantity: #90    [facility]acy: [facility]   Does the patient have enough for 3 days?   [] Yes   [x] No - Send as HP to POD    Mail Away Pharmacy   Does the patient have enough for 10 days?   [] Yes   [] No - Send as HP to POD

## 2025-05-08 DIAGNOSIS — E11.9 TYPE 2 DIABETES MELLITUS WITHOUT COMPLICATION, WITHOUT LONG-TERM CURRENT USE OF INSULIN (HCC): ICD-10-CM

## 2025-05-09 RX ORDER — DULAGLUTIDE 0.75 MG/.5ML
INJECTION, SOLUTION SUBCUTANEOUS
Qty: 12.857 ML | Refills: 2 | Status: SHIPPED | OUTPATIENT
Start: 2025-05-09

## 2025-05-24 ENCOUNTER — OFFICE VISIT (OUTPATIENT)
Dept: URGENT CARE | Age: 65
End: 2025-05-24
Payer: MEDICARE

## 2025-05-24 VITALS
BODY MASS INDEX: 34.55 KG/M2 | HEART RATE: 88 BPM | DIASTOLIC BLOOD PRESSURE: 61 MMHG | TEMPERATURE: 98.2 F | SYSTOLIC BLOOD PRESSURE: 129 MMHG | OXYGEN SATURATION: 98 % | RESPIRATION RATE: 20 BRPM | WEIGHT: 207.6 LBS

## 2025-05-24 DIAGNOSIS — N39.0 URINARY TRACT INFECTION WITH HEMATURIA, SITE UNSPECIFIED: Primary | ICD-10-CM

## 2025-05-24 DIAGNOSIS — R31.9 URINARY TRACT INFECTION WITH HEMATURIA, SITE UNSPECIFIED: Primary | ICD-10-CM

## 2025-05-24 LAB
SL AMB  POCT GLUCOSE, UA: NEGATIVE
SL AMB LEUKOCYTE ESTERASE,UA: ABNORMAL
SL AMB POCT BILIRUBIN,UA: NEGATIVE
SL AMB POCT BLOOD,UA: ABNORMAL
SL AMB POCT CLARITY,UA: ABNORMAL
SL AMB POCT COLOR,UA: YELLOW
SL AMB POCT KETONES,UA: NEGATIVE
SL AMB POCT NITRITE,UA: NEGATIVE
SL AMB POCT PH,UA: 6
SL AMB POCT SPECIFIC GRAVITY,UA: 1.03
SL AMB POCT URINE PROTEIN: 30
SL AMB POCT UROBILINOGEN: 0.2

## 2025-05-24 PROCEDURE — G0463 HOSPITAL OUTPT CLINIC VISIT: HCPCS | Performed by: PHYSICIAN ASSISTANT

## 2025-05-24 PROCEDURE — 99213 OFFICE O/P EST LOW 20 MIN: CPT | Performed by: PHYSICIAN ASSISTANT

## 2025-05-24 PROCEDURE — 87086 URINE CULTURE/COLONY COUNT: CPT | Performed by: PHYSICIAN ASSISTANT

## 2025-05-24 PROCEDURE — 81002 URINALYSIS NONAUTO W/O SCOPE: CPT | Performed by: PHYSICIAN ASSISTANT

## 2025-05-24 RX ORDER — NITROFURANTOIN 25; 75 MG/1; MG/1
100 CAPSULE ORAL 2 TIMES DAILY
Qty: 14 CAPSULE | Refills: 0 | Status: SHIPPED | OUTPATIENT
Start: 2025-05-24 | End: 2025-05-31

## 2025-05-24 NOTE — PATIENT INSTRUCTIONS
Patient was educated on UTI. Patient was educated on antibiotics. Eat on antibiotics. Will send urine for a culture.    Patient was educated on Aquaphor for sun burns.     Follow up with PCP

## 2025-05-24 NOTE — PROGRESS NOTES
[facility]        NAME: Ana Rosa Parker is a 65 y.o. female  : 1960    MRN: [id number]  DATE: May 24, 2025  TIME: 5:27 PM    Assessment and Plan   Urinary tract infection with hematuria, site unspecified [N39.0, R31.9]  1. Urinary tract infection with hematuria, site unspecified  POCT urine dip    Urine culture    Urine culture    nitrofurantoin (MACROBID) 100 mg capsule        PCT urine large blood and large leukocytes will send for culture.     Patient Instructions   Patient was educated on UTI. Patient was educated on antibiotics. Eat on antibiotics. Will send urine for a culture.    Patient was educated on Aquaphor for sun burns.     Follow up with PCP    Follow up with PCP in 3-5 days.  Proceed to  ER if symptoms worsen.    If tests have been performed at [facility], our office will contact you with results if changes need to be made to the care plan discussed with you at the visit.  You can review your full results on [facility] MyChart.    Chief Complaint     Chief Complaint   Patient presents with    Possible UTI     Started about 3 days ago with UTI symptoms. Having urgency and incontinence. Is also having painful urination. Today noticed some blood on pad.          History of Present Illness       Patient is a pleasant 65 year old female who presents today with urinary frequency , urgency and blood in urine for 2-3 days. Patient reports history of UTI's. Denies any vaginal discharge or vaginal itching. Allergies were reviewed in chart.    Patient also reports concerns for sunburn on left and right upper chest.         Review of Systems   Review of Systems   Respiratory: Negative.     Cardiovascular: Negative.    Genitourinary:  Positive for dysuria, frequency, hematuria and urgency.   Skin:         Sun burn noted on right and left upper chest   Psychiatric/Behavioral: Negative.           Current Medications     Current Medications[1]    Current Allergies     Allergies as of 2025 -  Reviewed 05/24/2025   Allergen Reaction Noted    Darvon [propoxyphene] Dizziness 01/26/2019    Fluoxetine Other (See Comments) 10/01/2012    Meclizine Dizziness 10/01/2012    Morphine and codeine Nausea Only 10/01/2012    Oxycodone-acetaminophen Other (See Comments) and Tachycardia 10/01/2012    Percolone [oxycodone] Other (See Comments) and Tachycardia 03/20/2019            The following portions of the patient's history were reviewed and updated as appropriate: allergies, current medications, past family history, past medical history, past social history, past surgical history and problem list.     Past Medical History[2]    Past Surgical History[3]    Family History[4]      Medications have been verified.        Objective   /61   Pulse 88   Temp 98.2 °F (36.8 °C) (Tympanic)   Resp 20   Wt 94.2 kg (207 lb 9.6 oz)   LMP  (LMP Unknown)   SpO2 98%   BMI 34.55 kg/m²   No LMP recorded (lmp unknown). Patient is postmenopausal.       Physical Exam     Physical Exam  Vitals and nursing note reviewed.   Constitutional:       Appearance: Normal appearance.   HENT:      Head: Normocephalic.     Cardiovascular:      Rate and Rhythm: Normal rate and regular rhythm.      Heart sounds: Normal heart sounds.   Pulmonary:      Breath sounds: Normal breath sounds. No wheezing.   Abdominal:      Palpations: Abdomen is soft.      Tenderness: There is no right CVA tenderness or left CVA tenderness.     Skin:     Comments: Sun burn noted on right and left upper chest     Neurological:      General: No focal deficit present.      Mental Status: She is alert and oriented to person, place, and time.     Psychiatric:         Mood and Affect: Mood normal.                        [1]   Current Outpatient Medications:     acetaminophen-codeine (TYLENOL with CODEINE #3) 300-30 MG per tablet, Take 1 tablet by mouth daily as needed for severe pain, Disp: 30 tablet, Rfl: 0    aspirin 81 mg chewable tablet, Chew 81 mg in the morning.,  Disp: , Rfl:     busPIRone (BUSPAR) 10 mg tablet, 10 mg in the morning, Disp: , Rfl:     eszopiclone (LUNESTA) 1 mg tablet, Take 2 mg by mouth daily at bedtime Takes 2-3 daily, Disp: , Rfl:     eszopiclone (LUNESTA) 3 MG tablet, Take 1 tablet (3 mg total) by mouth daily at bedtime, Disp: 30 tablet, Rfl: 0    famotidine (PEPCID) 40 MG tablet, Take 1 tablet (40 mg total) by mouth daily, Disp: 90 tablet, Rfl: 1    fluticasone (FLONASE) 50 mcg/act nasal spray, 1 spray into each nostril daily, Disp: 16 g, Rfl: 0    gabapentin (NEURONTIN) 100 mg capsule, Take 2 capsules (200 mg total) by mouth 2 (two) times a day, Disp: 360 capsule, Rfl: 1    levothyroxine 88 mcg tablet, Take 1 tablet (88 mcg total) by mouth daily in the early morning, Disp: 90 tablet, Rfl: 1    lisinopril (ZESTRIL) 10 mg tablet, TAKE 1 TABLET BY MOUTH EVERY DAY, Disp: 90 tablet, Rfl: 1    loratadine (CLARITIN) 10 mg tablet, Take 1 tablet (10 mg total) by mouth 2 (two) times a day, Disp: 60 tablet, Rfl: 8    magnesium oxide (MAG-OX) 400 mg tablet, Take 1 tablet (400 mg total) by mouth 2 (two) times a day, Disp: 180 tablet, Rfl: 3    metFORMIN (GLUCOPHAGE) 500 mg tablet, TAKE 2 TABLETS BY MOUTH TWICE A DAY WITH FOOD, Disp: 360 tablet, Rfl: 1    nitrofurantoin (MACROBID) 100 mg capsule, Take 1 capsule (100 mg total) by mouth 2 (two) times a day for 7 days, Disp: 14 capsule, Rfl: 0    QUEtiapine (SEROquel) 200 mg tablet, Take 1 tablet (200 mg total) by mouth daily at bedtime, Disp: 90 tablet, Rfl: 1    QUEtiapine (SEROquel) 25 mg tablet, Take 25 mg by mouth as needed in the morning and 25 mg as needed in the evening. 2-3 at bedtime prn., Disp: , Rfl:     rosuvastatin (CRESTOR) 20 MG tablet, Take 1 tablet (20 mg total) by mouth daily, Disp: 100 tablet, Rfl: 3    sertraline (ZOLOFT) 100 mg tablet, Take 2 tablets (200 mg total) by mouth daily at bedtime, Disp: 180 tablet, Rfl: 1    traZODone (DESYREL) 50 mg tablet, TAKE 1 TABLET BY MOUTH DAILY AT BEDTIME, Disp:  90 tablet, Rfl: 1    Trulicity 0.75 MG/0.5ML SOAJ, inject 0.5 MILLILITERS ( 0.75 milligrams ) subcutaneously ONCE EVERY WEEK ROTATE INJECTION SITES, Disp: 12.857 mL, Rfl: 2    venlafaxine (EFFEXOR-XR) 150 mg 24 hr capsule, Take 1 capsule (150 mg total) by mouth daily, Disp: 90 capsule, Rfl: 1    albuterol (ProAir HFA) 90 mcg/act inhaler, Inhale 2 puffs every 4 (four) hours as needed for wheezing or shortness of breath (Patient not taking: Reported on 2/19/2025), Disp: 8.5 g, Rfl: 1    amoxicillin (AMOXIL) 500 mg capsule, take 1 capsule by mouth every 8 hours until finished (Patient not taking: Reported on 5/24/2025), Disp: , Rfl:     benzonatate (TESSALON PERLES) 100 mg capsule, 1-2 caps every 8 hours as needed for cough (Patient not taking: Reported on 9/17/2024), Disp: 30 capsule, Rfl: 0    benzonatate (TESSALON PERLES) 100 mg capsule, Take 1 capsule (100 mg total) by mouth 3 (three) times a day as needed for cough (Patient not taking: Reported on 2/19/2025), Disp: 20 capsule, Rfl: 0    famotidine (PEPCID) 40 MG tablet, Take 1 tablet (40 mg total) by mouth daily at bedtime (Patient not taking: Reported on 5/24/2025), Disp: 90 tablet, Rfl: 1    loratadine (CLARITIN) 10 mg tablet, Take 1 tablet (10 mg total) by mouth daily (Patient not taking: Reported on 5/24/2025), Disp: 90 tablet, Rfl: 1  [2]   Past Medical History:  Diagnosis Date    Allergic     Anxiety     Arthritis     Asthma     Claustrophobia     CPAP (continuous positive airway pressure) dependence     Depression     Diabetes mellitus (HCC)     Disease of thyroid gland     hypo    GERD (gastroesophageal reflux disease)     Headache     History of COVID-19     Hyperlipemia     Hyperlipidemia     Hypertension     Inflammatory bowel disease     Kidney stone     Kidney stones     Major depressive disorder     Motion sickness     Obesity     Risk for falls     Sleep apnea     TIA (transient ischemic attack)     Use of cane as ambulatory aid     Wears glasses      Wears partial dentures     upper partial   [3]   Past Surgical History:  Procedure Laterality Date    ADENOIDECTOMY      COLONOSCOPY      DILATION AND CURETTAGE OF UTERUS      NM ARTHRS KNE SURG W/MENISCECTOMY MED/LAT W/SHVG Left 3/24/2022    Procedure: ARTHROSCOPY KNEE w/ partial medial menisectomy;  Surgeon: Terry Bhatt MD;  Location: [facility] OR;  Service: Orthopedics    NM COLONOSCOPY FLX DX W/COLLJ SPEC WHEN PFRMD N/A 3/15/2019    Procedure: COLONOSCOPY;  Surgeon: Angel Saavedra MD;  Location: [facility] GI LAB;  Service: Gastroenterology    ROTATOR CUFF REPAIR Right     SHOULDER SURGERY Left     impingement    TONSILLECTOMY      TUBAL LIGATION     [4]   Family History  Problem Relation Name Age of Onset    ALS Mother      Venous thrombosis Father      Diabetes Father      Other Family          cerebral embolism    Diabetes Family      Hypertension Family      Kidney disease Family      Breast cancer Neg Hx      Colon cancer Neg Hx      Ovarian cancer Neg Hx      Uterine cancer Neg Hx

## 2025-05-26 LAB — BACTERIA UR CULT: NORMAL

## 2025-05-30 ENCOUNTER — OFFICE VISIT (OUTPATIENT)
Age: 65
End: 2025-05-30
Payer: MEDICARE

## 2025-05-30 VITALS
OXYGEN SATURATION: 95 % | TEMPERATURE: 97.7 F | SYSTOLIC BLOOD PRESSURE: 120 MMHG | WEIGHT: 207 LBS | BODY MASS INDEX: 34.49 KG/M2 | DIASTOLIC BLOOD PRESSURE: 76 MMHG | HEIGHT: 65 IN | HEART RATE: 95 BPM

## 2025-05-30 DIAGNOSIS — F33.2 SEVERE RECURRENT MAJOR DEPRESSION WITHOUT PSYCHOTIC FEATURES (HCC): ICD-10-CM

## 2025-05-30 DIAGNOSIS — F33.2 SEVERE RECURRENT MAJOR DEPRESSION WITHOUT PSYCHOTIC FEATURES (HCC): Primary | ICD-10-CM

## 2025-05-30 DIAGNOSIS — N18.30 CONTROLLED TYPE 2 DIABETES MELLITUS WITH STAGE 3 CHRONIC KIDNEY DISEASE, WITHOUT LONG-TERM CURRENT USE OF INSULIN (HCC): ICD-10-CM

## 2025-05-30 DIAGNOSIS — E11.22 CONTROLLED TYPE 2 DIABETES MELLITUS WITH STAGE 3 CHRONIC KIDNEY DISEASE, WITHOUT LONG-TERM CURRENT USE OF INSULIN (HCC): ICD-10-CM

## 2025-05-30 DIAGNOSIS — F32.A DEPRESSION, UNSPECIFIED DEPRESSION TYPE: ICD-10-CM

## 2025-05-30 DIAGNOSIS — R39.9 UTI SYMPTOMS: ICD-10-CM

## 2025-05-30 DIAGNOSIS — F51.01 PRIMARY INSOMNIA: ICD-10-CM

## 2025-05-30 DIAGNOSIS — E78.2 MIXED HYPERLIPIDEMIA: ICD-10-CM

## 2025-05-30 DIAGNOSIS — K04.7 CHRONIC DENTAL INFECTION: ICD-10-CM

## 2025-05-30 LAB
BACTERIA UR QL AUTO: ABNORMAL /HPF
BILIRUB UR QL STRIP: NEGATIVE
CLARITY UR: CLEAR
COLOR UR: YELLOW
GLUCOSE UR STRIP-MCNC: ABNORMAL MG/DL
HGB UR QL STRIP.AUTO: NEGATIVE
HYALINE CASTS #/AREA URNS LPF: ABNORMAL /LPF
KETONES UR STRIP-MCNC: NEGATIVE MG/DL
LEUKOCYTE ESTERASE UR QL STRIP: ABNORMAL
MUCOUS THREADS UR QL AUTO: ABNORMAL
NITRITE UR QL STRIP: NEGATIVE
NON-SQ EPI CELLS URNS QL MICRO: ABNORMAL /HPF
PH UR STRIP.AUTO: 6 [PH]
PROT UR STRIP-MCNC: ABNORMAL MG/DL
RBC #/AREA URNS AUTO: ABNORMAL /HPF
SP GR UR STRIP.AUTO: 1.03 (ref 1–1.03)
UROBILINOGEN UR STRIP-ACNC: <2 MG/DL
WBC #/AREA URNS AUTO: ABNORMAL /HPF

## 2025-05-30 PROCEDURE — 87086 URINE CULTURE/COLONY COUNT: CPT | Performed by: FAMILY MEDICINE

## 2025-05-30 PROCEDURE — 81001 URINALYSIS AUTO W/SCOPE: CPT | Performed by: FAMILY MEDICINE

## 2025-05-30 PROCEDURE — 99214 OFFICE O/P EST MOD 30 MIN: CPT | Performed by: FAMILY MEDICINE

## 2025-05-30 PROCEDURE — G2211 COMPLEX E/M VISIT ADD ON: HCPCS | Performed by: FAMILY MEDICINE

## 2025-05-30 RX ORDER — QUETIAPINE FUMARATE 25 MG/1
50 TABLET, FILM COATED ORAL
Qty: 180 TABLET | Refills: 1 | Status: SHIPPED | OUTPATIENT
Start: 2025-05-30 | End: 2025-06-02

## 2025-05-30 RX ORDER — BENZTROPINE MESYLATE 0.5 MG/1
0.5 TABLET ORAL
Qty: 90 TABLET | Refills: 1 | Status: SHIPPED | OUTPATIENT
Start: 2025-05-30

## 2025-05-30 RX ORDER — TRAZODONE HYDROCHLORIDE 50 MG/1
50 TABLET ORAL
Qty: 90 TABLET | Refills: 1 | Status: SHIPPED | OUTPATIENT
Start: 2025-05-30

## 2025-05-30 RX ORDER — ESZOPICLONE 3 MG/1
3 TABLET, FILM COATED ORAL
Qty: 30 TABLET | Refills: 0 | Status: SHIPPED | OUTPATIENT
Start: 2025-05-30

## 2025-05-30 RX ORDER — BUSPIRONE HYDROCHLORIDE 10 MG/1
10 TABLET ORAL 2 TIMES DAILY
Qty: 180 TABLET | Refills: 1 | Status: SHIPPED | OUTPATIENT
Start: 2025-05-30

## 2025-05-30 RX ORDER — VENLAFAXINE HYDROCHLORIDE 150 MG/1
150 CAPSULE, EXTENDED RELEASE ORAL DAILY
Qty: 90 CAPSULE | Refills: 1 | Status: SHIPPED | OUTPATIENT
Start: 2025-05-30

## 2025-05-30 RX ORDER — QUETIAPINE FUMARATE 200 MG/1
200 TABLET, FILM COATED ORAL
Qty: 90 TABLET | Refills: 1 | Status: SHIPPED | OUTPATIENT
Start: 2025-05-30

## 2025-05-30 RX ORDER — GABAPENTIN 100 MG/1
200 CAPSULE ORAL
Qty: 180 CAPSULE | Refills: 1 | Status: SHIPPED | OUTPATIENT
Start: 2025-05-30

## 2025-05-30 RX ORDER — CEPHALEXIN 500 MG/1
500 CAPSULE ORAL 2 TIMES DAILY
Qty: 14 CAPSULE | Refills: 0 | Status: SHIPPED | OUTPATIENT
Start: 2025-05-30 | End: 2025-06-06

## 2025-05-30 RX ORDER — SERTRALINE HYDROCHLORIDE 100 MG/1
200 TABLET, FILM COATED ORAL
Qty: 180 TABLET | Refills: 1 | Status: SHIPPED | OUTPATIENT
Start: 2025-05-30

## 2025-05-31 LAB — BACTERIA UR CULT: NORMAL

## 2025-06-02 RX ORDER — QUETIAPINE FUMARATE 25 MG/1
TABLET, FILM COATED ORAL
Qty: 180 TABLET | Refills: 1 | Status: SHIPPED | OUTPATIENT
Start: 2025-06-02

## 2025-06-02 NOTE — ASSESSMENT & PLAN NOTE
Stable, continue meds. Discussed supportive care and return parameters.   Orders:    QUEtiapine (SEROquel) 25 mg tablet; Take 2 tablets (50 mg total) by mouth daily at bedtime 2-3 at bedtime prn    busPIRone (BUSPAR) 10 mg tablet; Take 1 tablet (10 mg total) by mouth 2 (two) times a day    benztropine (COGENTIN) 0.5 mg tablet; Take 1 tablet (0.5 mg total) by mouth daily at bedtime    Ambulatory referral to Psych Services; Future

## 2025-06-02 NOTE — ASSESSMENT & PLAN NOTE
Stable, continue meds. Discussed supportive care and return parameters.   Lab Results   Component Value Date    HGBA1C 7.6 (A) 02/19/2025

## 2025-06-02 NOTE — PROGRESS NOTES
Name: Ana Rosa Parker      : 1960      MRN: [id number]  Encounter Provider: Juan Diego Ayala MD  Encounter Date: 2025   Encounter department: [facility] PRIMARY C[facility]  :  Assessment & Plan  UTI symptoms  Will send urine and empirically treat with Keflex. Discussed supportive care and return parameters.   Orders:    cephalexin (KEFLEX) 500 mg capsule; Take 1 capsule (500 mg total) by mouth in the morning and 1 capsule (500 mg total) before bedtime. Do all this for 7 days.    UA w Reflex to Microscopic w Reflex to Culture    Urine culture    Urine Microscopic    Severe recurrent major depression without psychotic features (HCC)  Stable, continue meds. Discussed supportive care and return parameters.   Orders:    QUEtiapine (SEROquel) 25 mg tablet; Take 2 tablets (50 mg total) by mouth daily at bedtime 2-3 at bedtime prn    busPIRone (BUSPAR) 10 mg tablet; Take 1 tablet (10 mg total) by mouth 2 (two) times a day    benztropine (COGENTIN) 0.5 mg tablet; Take 1 tablet (0.5 mg total) by mouth daily at bedtime    Ambulatory referral to Psych Services; Future    Controlled type 2 diabetes mellitus with stage 3 chronic kidney disease, without long-term current use of insulin (HCC)  Stable, continue meds. Discussed supportive care and return parameters.   Lab Results   Component Value Date    HGBA1C 7.6 (A) 2025          Depression, unspecified depression type  Stable, continue meds. Discussed supportive care and return parameters.   Orders:    sertraline (ZOLOFT) 100 mg tablet; Take 2 tablets (200 mg total) by mouth daily at bedtime    QUEtiapine (SEROquel) 200 mg tablet; Take 1 tablet (200 mg total) by mouth daily at bedtime    venlafaxine (EFFEXOR-XR) 150 mg 24 hr capsule; Take 1 capsule (150 mg total) by mouth daily    traZODone (DESYREL) 50 mg tablet; Take 1 tablet (50 mg total) by mouth daily at bedtime    Mixed hyperlipidemia  Discussed supportive care and return parameters.     "    Chronic dental infection  Stable, continue meds. Discussed supportive care and return parameters.   Orders:    gabapentin (NEURONTIN) 100 mg capsule; Take 2 capsules (200 mg total) by mouth daily at bedtime    Primary insomnia  Stable, continue meds. Discussed supportive care and return parameters. PAPADMP reviewed and refilled.  Orders:    eszopiclone (LUNESTA) 3 MG tablet; Take 1 tablet (3 mg total) by mouth daily at bedtime           History of Present Illness   Patient is a 64 y/o woman who presents for follow-up on urinary frequency and dysuria, along with follow-up of MDD, DM2 with diabetic nephropathy and CKD3, HLD, chronic pain and insomnia. Pt admits being stable on meds. And denies acute complaints otherwise. No fevers chills nausea or vomiting.      Review of Systems   Constitutional: Negative.    HENT: Negative.     Eyes: Negative.    Respiratory: Negative.     Cardiovascular: Negative.    Gastrointestinal: Negative.    Endocrine: Negative.    Genitourinary:  Positive for dysuria.   Musculoskeletal: Negative.    Allergic/Immunologic: Negative.    Neurological: Negative.    Hematological: Negative.    Psychiatric/Behavioral: Negative.     All other systems reviewed and are negative.      Objective   /76 (BP Location: Right arm, Patient Position: Sitting, Cuff Size: Large)   Pulse 95   Temp 97.7 °F (36.5 °C) (Temporal)   Ht 5' 5\" (1.651 m)   Wt 93.9 kg (207 lb)   LMP  (LMP Unknown)   SpO2 95%   BMI 34.45 kg/m²      Physical Exam  Constitutional:       General: She is not in acute distress.     Appearance: She is well-developed. She is not diaphoretic.   HENT:      Head: Normocephalic and atraumatic.      Right Ear: External ear normal.      Left Ear: External ear normal.      Nose: Nose normal.      Mouth/Throat:      Pharynx: No oropharyngeal exudate.     Eyes:      General:         Right eye: No discharge.         Left eye: No discharge.      Conjunctiva/sclera: Conjunctivae normal.      " Pupils: Pupils are equal, round, and reactive to light.     Neck:      Thyroid: No thyromegaly.      Trachea: No tracheal deviation.     Cardiovascular:      Rate and Rhythm: Normal rate and regular rhythm.      Heart sounds: Normal heart sounds. No murmur heard.     No friction rub. No gallop.   Pulmonary:      Effort: Pulmonary effort is normal. No respiratory distress.      Breath sounds: Normal breath sounds.   Abdominal:      General: There is no distension.      Palpations: Abdomen is soft.      Tenderness: There is no abdominal tenderness. There is no guarding or rebound.     Musculoskeletal:         General: Normal range of motion.   Lymphadenopathy:      Cervical: No cervical adenopathy.     Skin:     General: Skin is warm.     Neurological:      Mental Status: She is alert and oriented to person, place, and time.      Cranial Nerves: No cranial nerve deficit.     Psychiatric:         Behavior: Behavior normal.         Thought Content: Thought content normal.         Judgment: Judgment normal.

## 2025-06-13 DIAGNOSIS — E05.90 HYPERTHYROIDISM: ICD-10-CM

## 2025-06-13 RX ORDER — LEVOTHYROXINE SODIUM 88 UG/1
88 TABLET ORAL
Qty: 90 TABLET | Refills: 1 | Status: SHIPPED | OUTPATIENT
Start: 2025-06-13

## 2025-06-17 ENCOUNTER — TELEPHONE (OUTPATIENT)
Age: 65
End: 2025-06-17

## 2025-06-17 NOTE — TELEPHONE ENCOUNTER
Reached out to pt as 2nd attempt for routine referral and placing on proper wait list. Pt stated that she was walking into the dentist and would call back        Referral closed.

## 2025-07-29 DIAGNOSIS — E83.42 HYPOMAGNESEMIA: ICD-10-CM

## 2025-07-29 DIAGNOSIS — E05.90 HYPERTHYROIDISM: ICD-10-CM

## 2025-07-29 DIAGNOSIS — E11.8 TYPE 2 DIABETES MELLITUS WITH COMPLICATION (HCC): ICD-10-CM

## 2025-07-29 DIAGNOSIS — F33.2 SEVERE RECURRENT MAJOR DEPRESSION WITHOUT PSYCHOTIC FEATURES (HCC): ICD-10-CM

## 2025-07-29 DIAGNOSIS — F32.A DEPRESSION, UNSPECIFIED DEPRESSION TYPE: ICD-10-CM

## 2025-07-29 DIAGNOSIS — E78.2 MIXED HYPERLIPIDEMIA: ICD-10-CM

## 2025-07-29 DIAGNOSIS — F51.01 PRIMARY INSOMNIA: ICD-10-CM

## 2025-07-29 RX ORDER — MAGNESIUM OXIDE 400 MG/1
1 TABLET ORAL 2 TIMES DAILY
Qty: 180 TABLET | Refills: 1 | Status: SHIPPED | OUTPATIENT
Start: 2025-07-29

## 2025-07-29 RX ORDER — VENLAFAXINE HYDROCHLORIDE 150 MG/1
150 CAPSULE, EXTENDED RELEASE ORAL DAILY
Qty: 90 CAPSULE | Refills: 0 | Status: SHIPPED | OUTPATIENT
Start: 2025-07-29

## 2025-07-29 RX ORDER — LEVOTHYROXINE SODIUM 88 UG/1
88 TABLET ORAL
Qty: 90 TABLET | Refills: 1 | OUTPATIENT
Start: 2025-07-29

## 2025-07-29 RX ORDER — ROSUVASTATIN CALCIUM 20 MG/1
20 TABLET, COATED ORAL DAILY
Qty: 90 TABLET | Refills: 1 | Status: SHIPPED | OUTPATIENT
Start: 2025-07-29

## 2025-07-29 RX ORDER — TRAZODONE HYDROCHLORIDE 50 MG/1
50 TABLET ORAL
Qty: 90 TABLET | Refills: 0 | Status: SHIPPED | OUTPATIENT
Start: 2025-07-29

## 2025-07-29 RX ORDER — SERTRALINE HYDROCHLORIDE 100 MG/1
200 TABLET, FILM COATED ORAL
Qty: 180 TABLET | Refills: 0 | Status: SHIPPED | OUTPATIENT
Start: 2025-07-29

## 2025-07-30 RX ORDER — QUETIAPINE FUMARATE 25 MG/1
50 TABLET, FILM COATED ORAL
Qty: 180 TABLET | Refills: 1 | Status: SHIPPED | OUTPATIENT
Start: 2025-07-30

## 2025-07-30 RX ORDER — ESZOPICLONE 3 MG/1
3 TABLET, FILM COATED ORAL
Qty: 30 TABLET | Refills: 0 | Status: SHIPPED | OUTPATIENT
Start: 2025-07-30

## 2025-07-30 RX ORDER — QUETIAPINE FUMARATE 200 MG/1
200 TABLET, FILM COATED ORAL
Qty: 90 TABLET | Refills: 0 | Status: SHIPPED | OUTPATIENT
Start: 2025-07-30

## 2025-08-07 ENCOUNTER — APPOINTMENT (OUTPATIENT)
Dept: LAB | Facility: CLINIC | Age: 65
End: 2025-08-07
Payer: MEDICARE

## 2025-08-08 ENCOUNTER — RESULTS FOLLOW-UP (OUTPATIENT)
Dept: NEPHROLOGY | Facility: CLINIC | Age: 65
End: 2025-08-08

## 2025-08-08 ENCOUNTER — TELEPHONE (OUTPATIENT)
Age: 65
End: 2025-08-08

## 2025-08-08 DIAGNOSIS — N18.30 BENIGN HYPERTENSION WITH CKD (CHRONIC KIDNEY DISEASE) STAGE III (HCC): ICD-10-CM

## 2025-08-08 DIAGNOSIS — I12.9 BENIGN HYPERTENSION WITH CKD (CHRONIC KIDNEY DISEASE) STAGE III (HCC): ICD-10-CM

## 2025-08-08 DIAGNOSIS — E87.5 HYPERKALEMIA: Primary | ICD-10-CM

## 2025-08-08 RX ORDER — AMLODIPINE BESYLATE 5 MG/1
5 TABLET ORAL DAILY
Qty: 90 TABLET | Refills: 3 | Status: SHIPPED | OUTPATIENT
Start: 2025-08-08

## 2025-08-11 ENCOUNTER — OFFICE VISIT (OUTPATIENT)
Age: 65
End: 2025-08-11
Payer: MEDICARE

## 2025-08-15 ENCOUNTER — TELEPHONE (OUTPATIENT)
Age: 65
End: 2025-08-15

## 2025-08-16 ENCOUNTER — APPOINTMENT (OUTPATIENT)
Dept: LAB | Facility: CLINIC | Age: 65
End: 2025-08-16
Payer: MEDICARE

## 2025-08-16 DIAGNOSIS — N18.32 STAGE 3B CHRONIC KIDNEY DISEASE (HCC): ICD-10-CM

## 2025-08-16 DIAGNOSIS — I12.9 BENIGN HYPERTENSION WITH CKD (CHRONIC KIDNEY DISEASE) STAGE III (HCC): ICD-10-CM

## 2025-08-16 DIAGNOSIS — N18.30 BENIGN HYPERTENSION WITH CKD (CHRONIC KIDNEY DISEASE) STAGE III (HCC): ICD-10-CM

## 2025-08-16 DIAGNOSIS — E87.5 HYPERKALEMIA: ICD-10-CM

## 2025-08-16 DIAGNOSIS — E74.89 OTHER SPECIFIED DISORDERS OF CARBOHYDRATE METABOLISM (HCC): ICD-10-CM

## 2025-08-16 DIAGNOSIS — Z79.899 LONG TERM USE OF DRUG: ICD-10-CM

## 2025-08-16 LAB
ANION GAP SERPL CALCULATED.3IONS-SCNC: 6 MMOL/L (ref 4–13)
BUN SERPL-MCNC: 32 MG/DL (ref 5–25)
CALCIUM SERPL-MCNC: 8.8 MG/DL (ref 8.4–10.2)
CHLORIDE SERPL-SCNC: 106 MMOL/L (ref 96–108)
CO2 SERPL-SCNC: 23 MMOL/L (ref 21–32)
CREAT SERPL-MCNC: 1.58 MG/DL (ref 0.6–1.3)
EST. AVERAGE GLUCOSE BLD GHB EST-MCNC: 174 MG/DL
GFR SERPL CREATININE-BSD FRML MDRD: 34 ML/MIN/1.73SQ M
GLUCOSE P FAST SERPL-MCNC: 149 MG/DL (ref 65–99)
HBA1C MFR BLD: 7.7 %
MAGNESIUM SERPL-MCNC: 2.3 MG/DL (ref 1.9–2.7)
POTASSIUM SERPL-SCNC: 4.9 MMOL/L (ref 3.5–5.3)
SODIUM SERPL-SCNC: 135 MMOL/L (ref 135–147)
VIT B12 SERPL-MCNC: 549 PG/ML (ref 180–914)

## 2025-08-16 PROCEDURE — 83735 ASSAY OF MAGNESIUM: CPT

## 2025-08-16 PROCEDURE — 80048 BASIC METABOLIC PNL TOTAL CA: CPT

## 2025-08-16 PROCEDURE — 83036 HEMOGLOBIN GLYCOSYLATED A1C: CPT

## 2025-08-16 PROCEDURE — 36415 COLL VENOUS BLD VENIPUNCTURE: CPT

## 2025-08-16 PROCEDURE — 82607 VITAMIN B-12: CPT

## 2025-08-18 ENCOUNTER — OFFICE VISIT (OUTPATIENT)
Age: 65
End: 2025-08-18
Payer: MEDICARE

## 2025-08-18 VITALS
WEIGHT: 202 LBS | HEIGHT: 65 IN | OXYGEN SATURATION: 98 % | TEMPERATURE: 97.5 F | SYSTOLIC BLOOD PRESSURE: 110 MMHG | HEART RATE: 78 BPM | DIASTOLIC BLOOD PRESSURE: 64 MMHG | BODY MASS INDEX: 33.66 KG/M2

## 2025-08-18 DIAGNOSIS — K21.00 GASTROESOPHAGEAL REFLUX DISEASE WITH ESOPHAGITIS WITHOUT HEMORRHAGE: ICD-10-CM

## 2025-08-18 DIAGNOSIS — D64.9 ANEMIA, UNSPECIFIED TYPE: ICD-10-CM

## 2025-08-18 DIAGNOSIS — J30.2 SEASONAL ALLERGIES: ICD-10-CM

## 2025-08-18 DIAGNOSIS — G47.33 OSA (OBSTRUCTIVE SLEEP APNEA): ICD-10-CM

## 2025-08-18 DIAGNOSIS — R53.83 OTHER FATIGUE: ICD-10-CM

## 2025-08-18 DIAGNOSIS — F51.01 PRIMARY INSOMNIA: ICD-10-CM

## 2025-08-18 DIAGNOSIS — E11.9 TYPE 2 DIABETES MELLITUS WITHOUT COMPLICATION, WITHOUT LONG-TERM CURRENT USE OF INSULIN (HCC): Primary | ICD-10-CM

## 2025-08-18 DIAGNOSIS — E83.42 HYPOMAGNESEMIA: ICD-10-CM

## 2025-08-18 DIAGNOSIS — F33.2 SEVERE RECURRENT MAJOR DEPRESSION WITHOUT PSYCHOTIC FEATURES (HCC): ICD-10-CM

## 2025-08-18 DIAGNOSIS — M65.341 TRIGGER RING FINGER OF RIGHT HAND: ICD-10-CM

## 2025-08-18 DIAGNOSIS — G43.009 MIGRAINE WITHOUT AURA AND WITHOUT STATUS MIGRAINOSUS, NOT INTRACTABLE: ICD-10-CM

## 2025-08-18 DIAGNOSIS — J06.9 VIRAL URI: ICD-10-CM

## 2025-08-18 DIAGNOSIS — E78.2 MIXED HYPERLIPIDEMIA: ICD-10-CM

## 2025-08-18 DIAGNOSIS — N18.32 STAGE 3B CHRONIC KIDNEY DISEASE (HCC): ICD-10-CM

## 2025-08-18 DIAGNOSIS — G45.9 TIA (TRANSIENT ISCHEMIC ATTACK): ICD-10-CM

## 2025-08-18 DIAGNOSIS — I10 PRIMARY HYPERTENSION: ICD-10-CM

## 2025-08-18 DIAGNOSIS — E87.5 HYPERKALEMIA: ICD-10-CM

## 2025-08-18 DIAGNOSIS — F32.A DEPRESSION, UNSPECIFIED DEPRESSION TYPE: ICD-10-CM

## 2025-08-18 DIAGNOSIS — J45.909 ASTHMA, UNSPECIFIED ASTHMA SEVERITY, UNSPECIFIED WHETHER COMPLICATED, UNSPECIFIED WHETHER PERSISTENT: ICD-10-CM

## 2025-08-18 DIAGNOSIS — K21.9 GASTROESOPHAGEAL REFLUX DISEASE WITHOUT ESOPHAGITIS: ICD-10-CM

## 2025-08-18 DIAGNOSIS — K04.7 CHRONIC DENTAL INFECTION: ICD-10-CM

## 2025-08-18 DIAGNOSIS — E05.90 HYPERTHYROIDISM: ICD-10-CM

## 2025-08-18 PROCEDURE — 99215 OFFICE O/P EST HI 40 MIN: CPT | Performed by: FAMILY MEDICINE

## 2025-08-18 PROCEDURE — G2211 COMPLEX E/M VISIT ADD ON: HCPCS | Performed by: FAMILY MEDICINE

## 2025-08-18 RX ORDER — TRAZODONE HYDROCHLORIDE 100 MG/1
200 TABLET ORAL
Qty: 180 TABLET | Refills: 1 | Status: SHIPPED | OUTPATIENT
Start: 2025-08-18 | End: 2025-08-19 | Stop reason: SDUPTHER

## 2025-08-18 RX ORDER — SERTRALINE HYDROCHLORIDE 100 MG/1
200 TABLET, FILM COATED ORAL
Qty: 180 TABLET | Refills: 0 | Status: SHIPPED | OUTPATIENT
Start: 2025-08-18

## 2025-08-18 RX ORDER — GABAPENTIN 100 MG/1
200 CAPSULE ORAL
Qty: 180 CAPSULE | Refills: 1 | Status: SHIPPED | OUTPATIENT
Start: 2025-08-18

## 2025-08-18 RX ORDER — LEVOTHYROXINE SODIUM 88 UG/1
88 TABLET ORAL
Qty: 90 TABLET | Refills: 1 | Status: SHIPPED | OUTPATIENT
Start: 2025-08-18

## 2025-08-18 RX ORDER — FLUTICASONE PROPIONATE 50 MCG
1 SPRAY, SUSPENSION (ML) NASAL DAILY
Qty: 16 G | Refills: 0 | Status: SHIPPED | OUTPATIENT
Start: 2025-08-18

## 2025-08-18 RX ORDER — QUETIAPINE FUMARATE 200 MG/1
200 TABLET, FILM COATED ORAL
Qty: 90 TABLET | Refills: 0 | Status: SHIPPED | OUTPATIENT
Start: 2025-08-18

## 2025-08-18 RX ORDER — ESZOPICLONE 3 MG/1
3 TABLET, FILM COATED ORAL
Qty: 30 TABLET | Refills: 5 | Status: SHIPPED | OUTPATIENT
Start: 2025-08-18

## 2025-08-18 RX ORDER — LORATADINE 10 MG/1
10 TABLET ORAL 2 TIMES DAILY
Qty: 60 TABLET | Refills: 8 | Status: SHIPPED | OUTPATIENT
Start: 2025-08-18

## 2025-08-18 RX ORDER — TRAZODONE HYDROCHLORIDE 100 MG/1
200 TABLET ORAL
Qty: 180 TABLET | Refills: 1 | Status: SHIPPED | OUTPATIENT
Start: 2025-08-18 | End: 2025-08-18 | Stop reason: SDUPTHER

## 2025-08-18 RX ORDER — FAMOTIDINE 40 MG/1
40 TABLET, FILM COATED ORAL DAILY
Qty: 90 TABLET | Refills: 1 | Status: SHIPPED | OUTPATIENT
Start: 2025-08-18

## 2025-08-18 RX ORDER — BUSPIRONE HYDROCHLORIDE 10 MG/1
10 TABLET ORAL 2 TIMES DAILY
Qty: 180 TABLET | Refills: 1 | Status: SHIPPED | OUTPATIENT
Start: 2025-08-18

## 2025-08-18 RX ORDER — VENLAFAXINE HYDROCHLORIDE 150 MG/1
150 CAPSULE, EXTENDED RELEASE ORAL DAILY
Qty: 90 CAPSULE | Refills: 0 | Status: SHIPPED | OUTPATIENT
Start: 2025-08-18

## 2025-08-18 RX ORDER — QUETIAPINE FUMARATE 25 MG/1
50 TABLET, FILM COATED ORAL
Qty: 180 TABLET | Refills: 1 | Status: SHIPPED | OUTPATIENT
Start: 2025-08-18

## 2025-08-18 RX ORDER — ROSUVASTATIN CALCIUM 20 MG/1
20 TABLET, COATED ORAL DAILY
Qty: 90 TABLET | Refills: 1 | Status: SHIPPED | OUTPATIENT
Start: 2025-08-18

## 2025-08-19 DIAGNOSIS — F32.A DEPRESSION, UNSPECIFIED DEPRESSION TYPE: ICD-10-CM

## 2025-08-19 LAB
CREAT UR-MCNC: 240.2 MG/DL
MICROALBUMIN UR-MCNC: 10.2 MG/L
MICROALBUMIN/CREAT 24H UR: 4 MG/G CREATININE (ref 0–30)

## 2025-08-19 RX ORDER — TRAZODONE HYDROCHLORIDE 100 MG/1
200 TABLET ORAL
Qty: 180 TABLET | Refills: 1 | Status: SHIPPED | OUTPATIENT
Start: 2025-08-19

## 2025-08-21 ENCOUNTER — TELEPHONE (OUTPATIENT)
Age: 65
End: 2025-08-21

## (undated) DEVICE — 3M™ STERI-STRIP™ REINFORCED ADHESIVE SKIN CLOSURES, R1547, 1/2 IN X 4 IN (12 MM X 100 MM), 6 STRIPS/ENVELOPE: Brand: 3M™ STERI-STRIP™

## (undated) DEVICE — TUBING SUCTION 5MM X 12 FT

## (undated) DEVICE — PUDDLE VAC

## (undated) DEVICE — ABDOMINAL PAD: Brand: DERMACEA

## (undated) DEVICE — SCD SEQUENTIAL COMPRESSION COMFORT SLEEVE MEDIUM KNEE LENGTH: Brand: KENDALL SCD

## (undated) DEVICE — COBAN 6 IN STERILE

## (undated) DEVICE — 3M™ IOBAN™ 2 ANTIMICROBIAL INCISE DRAPE 6650EZ: Brand: IOBAN™ 2

## (undated) DEVICE — IMPERVIOUS STOCKINETTE: Brand: DEROYAL

## (undated) DEVICE — 4-PORT MANIFOLD: Brand: NEPTUNE 2

## (undated) DEVICE — BETHLEHEM UNIVERSAL  ARTHRO PK: Brand: CARDINAL HEALTH

## (undated) DEVICE — CHLORAPREP HI-LITE 26ML ORANGE

## (undated) DEVICE — GLOVE SRG BIOGEL 8

## (undated) DEVICE — TIBURON SPLIT SHEET: Brand: CONVERTORS

## (undated) DEVICE — ASTOUND STANDARD SURGICAL GOWN, XL: Brand: CONVERTORS

## (undated) DEVICE — BLADE SHAVER DISSECTOR 4MM 13CM COOLCUT

## (undated) DEVICE — GLOVE INDICATOR PI UNDERGLOVE SZ 8.5 BLUE

## (undated) DEVICE — TUBING EXTENSION 6 FT CONTIN WAVE

## (undated) DEVICE — GLOVE SRG BIOGEL 7.5

## (undated) DEVICE — ACE WRAP 6 IN UNSTERILE

## (undated) DEVICE — PADDING CAST 6IN COTTON STRL

## (undated) DEVICE — INTENDED FOR TISSUE SEPARATION, AND OTHER PROCEDURES THAT REQUIRE A SHARP SURGICAL BLADE TO PUNCTURE OR CUT.: Brand: BARD-PARKER ® CARBON RIB-BACK BLADES

## (undated) DEVICE — GAUZE SPONGES,16 PLY: Brand: CURITY

## (undated) DEVICE — CYSTO TUBING TUR Y IRRIGATION

## (undated) DEVICE — 3M™ STERI-DRAPE™ U-DRAPE 1015: Brand: STERI-DRAPE™

## (undated) DEVICE — SYRINGE 20ML LL

## (undated) DEVICE — NEEDLE 18 G X 1 1/2

## (undated) DEVICE — SUT MONOCRYL 2-0 SH 27 IN Y417H